# Patient Record
Sex: FEMALE | Race: BLACK OR AFRICAN AMERICAN | Employment: FULL TIME | URBAN - METROPOLITAN AREA
[De-identification: names, ages, dates, MRNs, and addresses within clinical notes are randomized per-mention and may not be internally consistent; named-entity substitution may affect disease eponyms.]

---

## 2017-01-16 ENCOUNTER — OFFICE VISIT (OUTPATIENT)
Dept: FAMILY MEDICINE CLINIC | Age: 57
End: 2017-01-16

## 2017-01-16 VITALS — WEIGHT: 182 LBS | BODY MASS INDEX: 33.29 KG/M2

## 2017-01-16 DIAGNOSIS — Z12.39 SCREENING FOR BREAST CANCER: ICD-10-CM

## 2017-01-16 DIAGNOSIS — L03.119 CELLULITIS AND ABSCESS OF LOWER EXTREMITY: Primary | ICD-10-CM

## 2017-01-16 DIAGNOSIS — E11.42 DIABETIC PERIPHERAL NEUROPATHY ASSOCIATED WITH TYPE 2 DIABETES MELLITUS (HCC): ICD-10-CM

## 2017-01-16 DIAGNOSIS — L02.419 CELLULITIS AND ABSCESS OF LOWER EXTREMITY: Primary | ICD-10-CM

## 2017-01-16 RX ORDER — SULFAMETHOXAZOLE AND TRIMETHOPRIM 800; 160 MG/1; MG/1
1 TABLET ORAL 2 TIMES DAILY
Qty: 20 TAB | Refills: 0 | Status: SHIPPED | OUTPATIENT
Start: 2017-01-16 | End: 2017-01-26

## 2017-01-16 RX ORDER — CEPHALEXIN 500 MG/1
500 CAPSULE ORAL 3 TIMES DAILY
Qty: 30 CAP | Refills: 0 | Status: SHIPPED | OUTPATIENT
Start: 2017-01-16 | End: 2017-01-26

## 2017-01-16 NOTE — MR AVS SNAPSHOT
Visit Information Date & Time Provider Department Dept. Phone Encounter #  
 1/16/2017  3:15 PM Tim Freeman MD [address] [facility] OFFICE-ANNEX [phone] [id number] Follow-up Instructions Return if symptoms worsen or fail to improve. Upcoming Health Maintenance Date Due  
 EYE EXAM RETINAL OR DILATED Q1 4/8/1969 PAP AKA CERVICAL CYTOLOGY 3/13/2016 BREAST CANCER SCRN MAMMOGRAM 1/14/2017 HEMOGLOBIN A1C Q6M 6/29/2017 FOOT EXAM Q1 12/29/2017 MICROALBUMIN Q1 12/29/2017 LIPID PANEL Q1 12/29/2017 DTaP/Tdap/Td series (2 - Td) 2/20/2022 COLONOSCOPY 11/25/2025 Allergies as of 1/16/2017  Review Complete On: 12/29/2016 By: Kary Hodges LPN Severity Noted Reaction Type Reactions Nabumetone High 06/01/2011    Shortness of Breath Codeine  08/03/2010    Nausea Only  
 Gabapentin (Bulk)  10/24/2011    Shortness of Breath Lyrica [Pregabalin]  10/24/2011    Swelling Ankle swelling, foot pain , insomina Percocet [Oxycodone-acetaminophen]  08/03/2010    Other (comments)  
 loopy Current Immunizations  Reviewed on 12/29/2016 Name Date Influenza Vaccine 10/15/2016, 9/30/2016, 10/16/2015, 10/9/2014 Influenza Vaccine PF 10/23/2013 Influenza Vaccine Split 10/24/2011 TDAP Vaccine 2/20/2012 Not reviewed this visit You Were Diagnosed With   
  
 Codes Comments Cellulitis and abscess of lower extremity    -  Primary ICD-10-CM: L02.419, L03.119 ICD-9-CM: 682.6 Screening for breast cancer     ICD-10-CM: Z12.39 
ICD-9-CM: V76.10 Diabetic peripheral neuropathy associated with type 2 diabetes mellitus (HCC)     ICD-10-CM: E11.42 
ICD-9-CM: 250.60, 357.2 Vitals Weight(growth percentile) BMI OB Status Smoking Status 182 lb (82.6 kg) 33.29 kg/m2 Postmenopausal Never Smoker BMI and BSA Data Body Mass Index Body Surface Area  
 33.29 kg/m 2 1.9 m 2 Preferred Pharmacy Pharmacy Name Phone [facility] [address], [address] [place] [address] Avenue [phone] Your Updated Medication List  
  
   
This list is accurate as of: 1/16/17  3:36 PM.  Always use your most recent med list.  
  
  
  
  
 albuterol 90 mcg/actuation inhaler Commonly known as:  PROVENTIL HFA, VENTOLIN HFA, PROAIR HFA Take 1 Puff by inhalation every four (4) hours as needed for Wheezing. aspirin delayed-release 81 mg tablet Take  by mouth daily. Bacillus coagulans 10 billion cell Cpdr  
Commonly known as:  PROBIOTIC (B. COAGULANS) Take 1 Caplet by mouth daily. budesonide-formoterol 160-4.5 mcg/actuation HFA inhaler Commonly known as:  SYMBICORT Take 2 Puffs by inhalation two (2) times a day. cephALEXin 500 mg capsule Commonly known as:  Amedeo Ronde Take 1 Cap by mouth three (3) times daily for 10 days. dexamethasone 1.5 mg (35 tabs) Dspk Commonly known as:  [address] As directed for 10 days  
  
 ergocalciferol 50,000 unit capsule Commonly known as:  ERGOCALCIFEROL Take 1 Cap by mouth every seven (7) days. Indications: VITAMIN D DEFICIENCY  
  
 famotidine 40 mg tablet Commonly known as:  PEPCID  
TAKE ONE TABLET BY MOUTH BEFORE BREAKFAST AND BEFORE SUPPER **MAY  TAKE  1  EXTRA  DOSE  FOR  SEVERE  HEARTBURN  IF  NEEDED** FLUZONE QUAD [id number] (PF) Syrg injection Generic drug:  influenza vaccine 2016-17 (36mos+)(PF)  
  
 ibuprofen 100 mg/5 mL suspension Commonly known as:  ADVIL;MOTRIN Take 30 mL every 6 hours as needed for pain/fever x 5 days  
  
 lamoTRIgine 25 mg tablet Commonly known as: LaMICtal  
Take 1 Tab by mouth two (2) times daily as needed. lansoprazole 30 mg disintegrating tablet Commonly known as:  Kali Gonzalezdon Take 1 Tab by mouth Daily (before breakfast). MULTI FOR HER PO Take 1 Tab by mouth daily. trimethoprim-sulfamethoxazole 160-800 mg per tablet Commonly known as:  BACTRIM DS, SEPTRA DS Take 1 Tab by mouth two (2) times a day for 10 days. VITAMIN C 500 mg tablet Generic drug:  ascorbic acid (vitamin C) Take 500 mg by mouth daily. Prescriptions Printed Refills  
 trimethoprim-sulfamethoxazole (BACTRIM DS, SEPTRA DS) 160-800 mg per tablet 0 Sig: Take 1 Tab by mouth two (2) times a day for 10 days. Class: Print Route: Oral  
 cephALEXin (KEFLEX) 500 mg capsule 0 Sig: Take 1 Cap by mouth three (3) times daily for 10 days. Class: Print Route: Oral  
  
We Performed the Following AEROBIC BACTERIAL CULTURE [id number] CPT(R)] REFERRAL TO OPHTHALMOLOGY [REF57 Custom] Comments:  
 Please evaluate patient for DM, r/o retinopathy Follow-up Instructions Return if symptoms worsen or fail to improve. To-Do List   
 01/16/2017 Imaging:  [facility] BI SCREENING INCL CAD Referral Information Referral ID Referred By Referred To  
  
 [id number] Lexi Salguero Not Available Visits Status Start Date End Date 1 New Request 1/16/17 1/16/18 If your referral has a status of pending review or denied, additional information will be sent to support the outcome of this decision. Patient Instructions Breast Cancer Screening: Care Instructions Your Care Instructions A breast X-ray (mammogram) and an exam by your doctor can help find breast cancer early. Cancer is easier to treat when it's found early. If you are age 36 or older, ask your doctor when to start and how often to have a mammogram. The X-ray can spot tumors that are too small to be felt by hand. (It also can show harmless lumps, such as fluid-filled cysts). During a breast exam, your doctor will feel your breasts for lumps or any other possible signs of cancer. During a mammogram, a machine squeezes your breasts to make them flatter and easier to X-ray.  Your breasts may feel a bit sore as the machine squeezes. After the test, a doctor will study your mammogram. Your doctor will tell you the results. You will also be told if you need any follow-up tests. Follow-up care is a key part of your treatment and safety. Be sure to make and go to all appointments, and call your doctor if you are having problems. It's also a good idea to know your test results and keep a list of the medicines you take. What should you do to get ready for a mammogram? 
· On the day of the mammogram, do not use any deodorant, perfume, powders, or lotions on your breasts or armpits. They may affect the X-rays. · Remove any jewelry. You will need to take off your clothes above the waist. You will put on a cloth or paper top. If you are concerned about an area of your breast, show the technologist so that the area can be noted. How can you care for yourself at home? · If you have breast pain after the mammogram, take an over-the-counter pain medicine, such as acetaminophen (Tylenol), ibuprofen (Advil, Motrin), or naproxen (Aleve). Read and follow all instructions on the label. · Do not take two or more pain medicines at the same time unless the doctor told you to. Many pain medicines have acetaminophen, which is Tylenol. Too much acetaminophen (Tylenol) can be harmful. When should you call for help? Watch closely for changes in your health, and be sure to contact your doctor if: 
· You notice any changes in your breasts or the skin on your breasts. These may include lumps, fluid leaking suddenly from your nipples, or changes to the skin on your breast or nipple. Where can you learn more? Go to http://esteban-tiffani.info/. Enter A321 in the search box to learn more about \"Breast Cancer Screening: Care Instructions. \" Current as of: July 26, 2016 Content Version: 11.1 © 1563-7807 [employer].  Care instructions adapted under license by [address] (which disclaims liability or warranty for this information). If you have questions about a medical condition or this instruction, always ask your healthcare professional. [address] any warranty or liability for your use of this information. Breast Self-Exam: Care Instructions Your Care Instructions A breast self-exam is when you check your breasts for lumps or changes. This regular exam helps you learn how your breasts normally look and feel. Most breast problems or changes are not because of cancer. Breast self-exam is not a substitute for a mammogram. Having regular breast exams by your doctor and regular mammograms improve your chances of finding any problems with your breasts. Some women set a time each month to do a step-by-step breast self-exam. Other women like a less formal system. They might look at their breasts as they brush their teeth, or feel their breasts once in a while in the shower. If you notice a change in your breast, tell your doctor. Follow-up care is a key part of your treatment and safety. Be sure to make and go to all appointments, and call your doctor if you are having problems. Its also a good idea to know your test results and keep a list of the medicines you take. How do you do a breast self-exam? 
· The best time to examine your breasts is usually one week after your menstrual period begins. Your breasts should not be tender then. If you do not have periods, you might do your exam on a day of the month that is easy to remember. · To examine your breasts: ¨ Remove all your clothes above the waist and lie down. When you are lying down, your breast tissue spreads evenly over your chest wall, which makes it easier to feel all your breast tissue. ¨ Use the padsnot the fingertipsof the 3 middle fingers of your left hand to check your right breast. Move your fingers slowly in small coin-sized circles that overlap. ¨ Use three levels of pressure to feel of all your breast tissue. Use light pressure to feel the tissue close to the skin surface. Use medium pressure to feel a little deeper. Use firm pressure to feel your tissue close to your breastbone and ribs. Use each pressure level to feel your breast tissue before moving on to the next spot. ¨ Check your entire breast, moving up and down as if following a strip from the collarbone to the bra line, and from the armpit to the ribs. Repeat until you have covered the entire breast. 
¨ Repeat this procedure for your left breast, using the pads of the 3 middle fingers of your right hand. · To examine your breasts while in the shower: 
¨ Place one arm over your head and lightly soap your breast on that side. ¨ Using the pads of your fingers, gently move your hand over your breast (in the strip pattern described above), feeling carefully for any lumps or changes. ¨ Repeat for the other breast. 
· Have your doctor inspect anything you notice to see if you need further testing. Where can you learn more? Go to http://esteban-tiffani.info/. Enter P148 in the search box to learn more about \"Breast Self-Exam: Care Instructions. \" Current as of: July 26, 2016 Content Version: 11.1 © 4520-5762 [employer]orporated. Care instructions adapted under license by [place]  (which disclaims liability or warranty for this information). If you have questions about a medical condition or this instruction, always ask your healthcare professional. [place] [address] any warranty or liability for your use of this information. Cellulitis: Care Instructions Your Care Instructions Cellulitis is a skin infection. It often occurs after a break in the skin from a scrape, cut, bite, or puncture, or after a rash. The doctor has checked you carefully, but problems can develop later.  If you notice any problems or new symptoms, get medical treatment right away. Follow-up care is a key part of your treatment and safety. Be sure to make and go to all appointments, and call your doctor if you are having problems. It's also a good idea to know your test results and keep a list of the medicines you take. How can you care for yourself at home? · Take your antibiotics as directed. Do not stop taking them just because you feel better. You need to take the full course of antibiotics. · Prop up the infected area on pillows to reduce pain and swelling. Try to keep the area above the level of your heart as often as you can. · If your doctor told you how to care for your wound, follow your doctor's instructions. If you did not get instructions, follow this general advice: ¨ Wash the wound with clean water 2 times a day. Don't use hydrogen peroxide or alcohol, which can slow healing. ¨ You may cover the wound with a thin layer of petroleum jelly, such as Vaseline, and a nonstick bandage. ¨ Apply more petroleum jelly and replace the bandage as needed. · Be safe with medicines. Take pain medicines exactly as directed. ¨ If the doctor gave you a prescription medicine for pain, take it as prescribed. ¨ If you are not taking a prescription pain medicine, ask your doctor if you can take an over-the-counter medicine. To prevent cellulitis in the future · Try to prevent cuts, scrapes, or other injuries to your skin. Cellulitis most often occurs where there is a break in the skin. · If you get a scrape, cut, mild burn, or bite, wash the wound with clean water as soon as you can to help avoid infection. Don't use hydrogen peroxide or alcohol, which can slow healing. · If you have swelling in your legs (edema), support stockings and good skin care may help prevent leg sores and cellulitis.  
· Take care of your feet, especially if you have diabetes or other conditions that increase the risk of infection. Wear shoes and socks. Do not go barefoot. If you have athlete's foot or other skin problems on your feet, talk to your doctor about how to treat them. When should you call for help? Call your doctor now or seek immediate medical care if: 
· You have signs that your infection is getting worse, such as: 
¨ Increased pain, swelling, warmth, or redness. ¨ Red streaks leading from the area. ¨ Pus draining from the area. ¨ A fever. · You get a rash. Watch closely for changes in your health, and be sure to contact your doctor if: 
· You are not getting better after 1 day (24 hours). · You do not get better as expected. Where can you learn more? Go to http://esteban-tiffani.info/. [place] in the search box to learn more about \"Cellulitis: Care Instructions. \" Current as of: February 5, 2016 Content Version: 11.1 © 1466-7014 [employer]. Care instructions adapted under license by [employer] (which disclaims liability or warranty for this information). If you have questions about a medical condition or this instruction, always ask your healthcare professional. [address] any warranty or liability for your use of this information. Learning About Diabetes Food Guidelines Your Care Instructions Meal planning is important to manage diabetes. It helps keep your blood sugar at a target level (which you set with your doctor). You don't have to eat special foods. You can eat what your family eats, including sweets once in a while. But you do have to pay attention to how often you eat and how much you eat of certain foods. You may want to work with a dietitian or a certified diabetes educator (CDE) to help you plan meals and snacks. A dietitian or CDE can also help you lose weight if that is one of your goals. What should you know about eating carbs? Managing the amount of carbohydrate (carbs) you eat is an important part of healthy meals when you have diabetes. Carbohydrate is found in many foods. · Learn which foods have carbs. And learn the amounts of carbs in different foods. ¨ Bread, cereal, pasta, and rice have about 15 grams of carbs in a serving. A serving is 1 slice of bread (1 ounce), ½ cup of cooked cereal, or 1/3 cup of cooked pasta or rice. ¨ Fruits have 15 grams of carbs in a serving. A serving is 1 small fresh fruit, such as an apple or orange; ½ of a banana; ½ cup of cooked or canned fruit; ½ cup of fruit juice; 1 cup of melon or raspberries; or 2 tablespoons of dried fruit. ¨ Milk and no-sugar-added yogurt have 15 grams of carbs in a serving. A serving is 1 cup of milk or 2/3 cup of no-sugar-added yogurt. ¨ Starchy vegetables have 15 grams of carbs in a serving. A serving is ½ cup of mashed potatoes or sweet potato; 1 cup winter squash; ½ of a small baked potato; ½ cup of cooked beans; or ½ cup cooked corn or green peas. · Learn how much carbs to eat each day and at each meal. A dietitian or CDE can teach you how to keep track of the amount of carbs you eat. This is called carbohydrate counting. · If you are not sure how to count carbohydrate grams, use the Plate Method to plan meals. It is a good, quick way to make sure that you have a balanced meal. It also helps you spread carbs throughout the day. ¨ Divide your plate by types of foods. Put non-starchy vegetables on half the plate, meat or other protein food on one-quarter of the plate, and a grain or starchy vegetable in the final quarter of the plate. To this you can add a small piece of fruit and 1 cup of milk or yogurt, depending on how many carbs you are supposed to eat at a meal. 
· Try to eat about the same amount of carbs at each meal. Do not \"save up\" your daily allowance of carbs to eat at one meal. 
· Proteins have very little or no carbs per serving.  Examples of proteins are beef, chicken, turkey, fish, eggs, tofu, cheese, cottage cheese, and peanut butter. A serving size of meat is 3 ounces, which is about the size of a deck of cards. Examples of meat substitute serving sizes (equal to 1 ounce of meat) are 1/4 cup of cottage cheese, 1 egg, 1 tablespoon of peanut butter, and ½ cup of tofu. How can you eat out and still eat healthy? · Learn to estimate the serving sizes of foods that have carbohydrate. If you measure food at home, it will be easier to estimate the amount in a serving of restaurant food. · If the meal you order has too much carbohydrate (such as potatoes, corn, or baked beans), ask to have a low-carbohydrate food instead. Ask for a salad or green vegetables. · If you use insulin, check your blood sugar before and after eating out to help you plan how much to eat in the future. · If you eat more carbohydrate at a meal than you had planned, take a walk or do other exercise. This will help lower your blood sugar. What else should you know? · Limit saturated fat, such as the fat from meat and dairy products. This is a healthy choice because people who have diabetes are at higher risk of heart disease. So choose lean cuts of meat and nonfat or low-fat dairy products. Use olive or canola oil instead of butter or shortening when cooking. · Don't skip meals. Your blood sugar may drop too low if you skip meals and take insulin or certain medicines for diabetes. · Check with your doctor before you drink alcohol. Alcohol can cause your blood sugar to drop too low. Alcohol can also cause a bad reaction if you take certain diabetes medicines. Follow-up care is a key part of your treatment and safety. Be sure to make and go to all appointments, and call your doctor if you are having problems. It's also a good idea to know your test results and keep a list of the medicines you take. Where can you learn more? Go to http://esteban-tiffani.info/. Enter V541 in the search box to learn more about \"Learning About Diabetes Food Guidelines. \" Current as of: May 23, 2016 Content Version: 11.1 © 2628-7575 [employer]. Care instructions adapted under license by [employer] (which disclaims liability or warranty for this information). If you have questions about a medical condition or this instruction, always ask your healthcare professional. [address] any warranty or liability for your use of this information. Introducing [facility]! [address]: Thank you for requesting a [employer] account. Our records indicate that you have previously registered for a [employer] account but its currently inactive. Please call our [employer] support line at [phone]. Additional Information If you have questions, please visit the Frequently Asked Questions section of the [employer] website at [email or web address] Remember, [employer] is NOT to be used for urgent needs. For medical emergencies, dial 911. Now available from your iPhone and Android! Please provide this summary of care documentation to your next provider. Your primary care clinician is listed as Rangel Camarena. If you have any questions after today's visit, please call [phone].

## 2017-01-16 NOTE — PROGRESS NOTES
HISTORY OF PRESENT ILLNESS  Mehran Conner is a 61 y.o. female. HPI   Rash on the rt,left abuttock   Started few days ago not better, the patient has tried alcohol washing and OTC antibiotic ointments, Vaseline application 2-3 times a day currently has 2 lesion on the right buttock and presentation on the left buttock no family member have the same problem, the area of concern having tingling sensations and they are painful,  states that are expanding red, and are swelled up, there were like couple lumps, but opened up and now with discharge      Current Outpatient Prescriptions   Medication Sig Dispense Refill    FLUZONE QUAD [id number], PF, syrg injection       budesonide-formoterol (SYMBICORT) 160-4.5 mcg/actuation HFA inhaler Take 2 Puffs by inhalation two (2) times a day. 1 Inhaler 5    albuterol (PROVENTIL HFA, VENTOLIN HFA, PROAIR HFA) 90 mcg/actuation inhaler Take 1 Puff by inhalation every four (4) hours as needed for Wheezing. 1 Inhaler 6    dexamethasone (DEXPAK 10 DAY) 1.5 mg (35 tabs) DsPk As directed for 10 days 35 Tab 0    famotidine (PEPCID) 40 mg tablet TAKE ONE TABLET BY MOUTH BEFORE BREAKFAST AND BEFORE SUPPER **MAY  TAKE  1  EXTRA  DOSE  FOR  SEVERE  HEARTBURN  IF  NEEDED** 70 Tab 0    lamoTRIgine (LAMICTAL) 25 mg tablet Take 1 Tab by mouth two (2) times daily as needed. 100 Tab 3    lansoprazole (PREVACID SOLUTAB) 30 mg disintegrating tablet Take 1 Tab by mouth Daily (before breakfast). 30 Tab 2    ergocalciferol (ERGOCALCIFEROL) 50,000 unit capsule Take 1 Cap by mouth every seven (7) days. Indications: VITAMIN D DEFICIENCY 12 Cap 0    ibuprofen (ADVIL;MOTRIN) 100 mg/5 mL suspension Take 30 mL every 6 hours as needed for pain/fever x 5 days 3 Bottle 3    aspirin delayed-release 81 mg tablet Take  by mouth daily.  ascorbic acid (VITAMIN C) 500 mg tablet Take 500 mg by mouth daily.  MULTIVITS,CA,MINERALS/IRON/FA (MULTI FOR HER PO) Take 1 Tab by mouth daily.        Allergies Allergen Reactions    Nabumetone Shortness of Breath    Codeine Nausea Only    Gabapentin (Bulk) Shortness of Breath    Lyrica [Pregabalin] Swelling     Ankle swelling, foot pain , insomina    Percocet [Oxycodone-Acetaminophen] Other (comments)     loopy     Past Medical History   Diagnosis Date    Arthritis 8/3/2010    Asthma     Hypercholesterolemia 8/18/2010    Postmenopausal 8/3/2010    Prediabetes 9/12/2013    Sinusitis, acute 12/12/2011     Past Surgical History   Procedure Laterality Date    Hx gyn  1987     tubal pregancy; laparotomy    Hx pelvic laparoscopy  1985     endometriosis    Hx orthopaedic  2006     neck    Pr revise knee joint replace,all parts  02/29/2012     tkr left    Hx orthopaedic  2008     cervical fusion of discs x4     Family History   Problem Relation Age of Onset    Hypertension Father     Heart Disease Father      CAD    Diabetes Father     High Cholesterol Father     Other Mother      tumor in chest    Allergic Rhinitis Brother     Asthma Brother     Allergic Rhinitis Child     Sickle Cell Trait Child     GERD Child      Social History   Substance Use Topics    Smoking status: Never Smoker    Smokeless tobacco: Never Used    Alcohol use No      Lab Results  Component Value Date/Time   WBC 5.3 10/28/2015 03:36 PM   HGB 14.4 10/28/2015 03:36 PM   HCT 43.8 10/28/2015 03:36 PM   PLATELET 844 94/92/4093 03:36 PM   MCV 83 10/28/2015 03:36 PM       Lab Results  Component Value Date/Time   TSH 0.661 10/28/2015 03:36 PM   T4, Total 7.7 08/04/2014 12:18 PM         Review of Systems   Constitutional: Negative for chills and fever. HENT: Negative for ear pain and nosebleeds. Eyes: Negative for blurred vision, pain and discharge. Respiratory: Negative for shortness of breath. Cardiovascular: Negative for chest pain and leg swelling. Gastrointestinal: Negative for constipation, diarrhea, nausea and vomiting. Genitourinary: Negative for frequency. Musculoskeletal: Negative for joint pain. Skin: Positive for rash. Neurological: Negative for headaches. Psychiatric/Behavioral: Negative for depression. The patient is not nervous/anxious. Physical Exam   Constitutional: She is oriented to person, place, and time. She appears well-developed and well-nourished. HENT:   Head: Normocephalic and atraumatic. Eyes: Conjunctivae and EOM are normal.   Neck: Normal range of motion. Neck supple. Cardiovascular: Normal rate, regular rhythm and normal heart sounds. No murmur heard. Pulmonary/Chest: Effort normal and breath sounds normal.   Abdominal: Soft. Bowel sounds are normal. She exhibits no distension. Musculoskeletal: Normal range of motion. She exhibits no edema. Neurological: She is alert and oriented to person, place, and time. Skin: Skin is warm. Rash noted. There is erythema. Psychiatric: Her behavior is normal.   Nursing note and vitals reviewed. ASSESSMENT and PLAN  Diagnoses and all orders for this visit:    Cellulitis and abscess of lower extremity  -     AEROBIC BACTERIAL CULTURE  -     trimethoprim-sulfamethoxazole (BACTRIM DS, SEPTRA DS) 160-800 mg per tablet; Take 1 Tab by mouth two (2) times a day for 10 days. -     cephALEXin (KEFLEX) 500 mg capsule; Take 1 Cap by mouth three (3) times daily for 10 days.  -     Bacillus coagulans (PROBIOTIC, B. COAGULANS,) 10 billion cell cpDR; Take 1 Caplet by mouth daily. Screening for breast cancer  -     [facility] MAMMO BI SCREENING INCL CAD;  Future    Diabetic peripheral neuropathy associated with type 2 diabetes mellitus ([facility]  -     REFERRAL TO OPHTHALMOLOGY     patient was told to call 3-7 days for the progression, in addition was told to daily washing showering which had twice daily and stay compliant with the current Abx high dose for the next 10 days, await the culture for further management mostly emphasized to continue on 2 wash bid with soaps and water, multiple hand washing, medications side effects was addressed, was told to RTC or call if not better  Patient agreed with today's recommendation

## 2017-01-16 NOTE — PATIENT INSTRUCTIONS
Breast Cancer Screening: Care Instructions  Your Care Instructions  A breast X-ray (mammogram) and an exam by your doctor can help find breast cancer early. Cancer is easier to treat when it's found early. If you are age 36 or older, ask your doctor when to start and how often to have a mammogram. The X-ray can spot tumors that are too small to be felt by hand. (It also can show harmless lumps, such as fluid-filled cysts). During a breast exam, your doctor will feel your breasts for lumps or any other possible signs of cancer. During a mammogram, a machine squeezes your breasts to make them flatter and easier to X-ray. Your breasts may feel a bit sore as the machine squeezes. After the test, a doctor will study your mammogram. Your doctor will tell you the results. You will also be told if you need any follow-up tests. Follow-up care is a key part of your treatment and safety. Be sure to make and go to all appointments, and call your doctor if you are having problems. It's also a good idea to know your test results and keep a list of the medicines you take. What should you do to get ready for a mammogram?  · On the day of the mammogram, do not use any deodorant, perfume, powders, or lotions on your breasts or armpits. They may affect the X-rays. · Remove any jewelry. You will need to take off your clothes above the waist. You will put on a cloth or paper top. If you are concerned about an area of your breast, show the technologist so that the area can be noted. How can you care for yourself at home? · If you have breast pain after the mammogram, take an over-the-counter pain medicine, such as acetaminophen (Tylenol), ibuprofen (Advil, Motrin), or naproxen (Aleve). Read and follow all instructions on the label. · Do not take two or more pain medicines at the same time unless the doctor told you to. Many pain medicines have acetaminophen, which is Tylenol.  Too much acetaminophen (Tylenol) can be harmful. When should you call for help? Watch closely for changes in your health, and be sure to contact your doctor if:  · You notice any changes in your breasts or the skin on your breasts. These may include lumps, fluid leaking suddenly from your nipples, or changes to the skin on your breast or nipple. Where can you learn more? Go to http://esteban-tiffani.info/. Enter H868 in the search box to learn more about \"Breast Cancer Screening: Care Instructions. \"  Current as of: July 26, 2016  Content Version: 11.1  © 4465-9438 [employer]. Care instructions adapted under license by [employer] (which disclaims liability or warranty for this information). If you have questions about a medical condition or this instruction, always ask your healthcare professional. [address] any warranty or liability for your use of this information. Breast Self-Exam: Care Instructions  Your Care Instructions  A breast self-exam is when you check your breasts for lumps or changes. This regular exam helps you learn how your breasts normally look and feel. Most breast problems or changes are not because of cancer. Breast self-exam is not a substitute for a mammogram. Having regular breast exams by your doctor and regular mammograms improve your chances of finding any problems with your breasts. Some women set a time each month to do a step-by-step breast self-exam. Other women like a less formal system. They might look at their breasts as they brush their teeth, or feel their breasts once in a while in the shower. If you notice a change in your breast, tell your doctor. Follow-up care is a key part of your treatment and safety. Be sure to make and go to all appointments, and call your doctor if you are having problems. Its also a good idea to know your test results and keep a list of the medicines you take.   How do you do a breast self-exam?  · The best time to examine your breasts is usually one week after your menstrual period begins. Your breasts should not be tender then. If you do not have periods, you might do your exam on a day of the month that is easy to remember. · To examine your breasts:  ¨ Remove all your clothes above the waist and lie down. When you are lying down, your breast tissue spreads evenly over your chest wall, which makes it easier to feel all your breast tissue. ¨ Use the pads--not the fingertips--of the 3 middle fingers of your left hand to check your right breast. Move your fingers slowly in small coin-sized circles that overlap. ¨ Use three levels of pressure to feel of all your breast tissue. Use light pressure to feel the tissue close to the skin surface. Use medium pressure to feel a little deeper. Use firm pressure to feel your tissue close to your breastbone and ribs. Use each pressure level to feel your breast tissue before moving on to the next spot. ¨ Check your entire breast, moving up and down as if following a strip from the collarbone to the bra line, and from the armpit to the ribs. Repeat until you have covered the entire breast.  ¨ Repeat this procedure for your left breast, using the pads of the 3 middle fingers of your right hand. · To examine your breasts while in the shower:  ¨ Place one arm over your head and lightly soap your breast on that side. ¨ Using the pads of your fingers, gently move your hand over your breast (in the strip pattern described above), feeling carefully for any lumps or changes. ¨ Repeat for the other breast.  · Have your doctor inspect anything you notice to see if you need further testing. Where can you learn more? Go to http://esteban-tiffani.info/. Enter P148 in the search box to learn more about \"Breast Self-Exam: Care Instructions. \"  Current as of: July 26, 2016  Content Version: 11.1  © 0006-0071 [employer]  Care instructions adapted under license by [employer] (which disclaims liability or warranty for this information). If you have questions about a medical condition or this instruction, always ask your healthcare professional. [address] any warranty or liability for your use of this information. Cellulitis: Care Instructions  Your Care Instructions    Cellulitis is a skin infection. It often occurs after a break in the skin from a scrape, cut, bite, or puncture, or after a rash. The doctor has checked you carefully, but problems can develop later. If you notice any problems or new symptoms, get medical treatment right away. Follow-up care is a key part of your treatment and safety. Be sure to make and go to all appointments, and call your doctor if you are having problems. It's also a good idea to know your test results and keep a list of the medicines you take. How can you care for yourself at home? · Take your antibiotics as directed. Do not stop taking them just because you feel better. You need to take the full course of antibiotics. · Prop up the infected area on pillows to reduce pain and swelling. Try to keep the area above the level of your heart as often as you can. · If your doctor told you how to care for your wound, follow your doctor's instructions. If you did not get instructions, follow this general advice:  ¨ Wash the wound with clean water 2 times a day. Don't use hydrogen peroxide or alcohol, which can slow healing. ¨ You may cover the wound with a thin layer of petroleum jelly, such as Vaseline, and a nonstick bandage. ¨ Apply more petroleum jelly and replace the bandage as needed. · Be safe with medicines. Take pain medicines exactly as directed. ¨ If the doctor gave you a prescription medicine for pain, take it as prescribed. ¨ If you are not taking a prescription pain medicine, ask your doctor if you can take an over-the-counter medicine.   To prevent cellulitis in the future  · Try to prevent cuts, scrapes, or other injuries to your skin. Cellulitis most often occurs where there is a break in the skin. · If you get a scrape, cut, mild burn, or bite, wash the wound with clean water as soon as you can to help avoid infection. Don't use hydrogen peroxide or alcohol, which can slow healing. · If you have swelling in your legs (edema), support stockings and good skin care may help prevent leg sores and cellulitis. · Take care of your feet, especially if you have diabetes or other conditions that increase the risk of infection. Wear shoes and socks. Do not go barefoot. If you have athlete's foot or other skin problems on your feet, talk to your doctor about how to treat them. When should you call for help? Call your doctor now or seek immediate medical care if:  · You have signs that your infection is getting worse, such as:  ¨ Increased pain, swelling, warmth, or redness. ¨ Red streaks leading from the area. ¨ Pus draining from the area. ¨ A fever. · You get a rash. Watch closely for changes in your health, and be sure to contact your doctor if:  · You are not getting better after 1 day (24 hours). · You do not get better as expected. Where can you learn more? Go to http://esteban-tiffani.info/. Perla Gonzalez in the search box to learn more about \"Cellulitis: Care Instructions. \"  Current as of: February 5, 2016  Content Version: 11.1  © 3113-5558 [employer]. Care instructions adapted under license by [employer] (which disclaims liability or warranty for this information). If you have questions about a medical condition or this instruction, always ask your healthcare professional. [place] [address] any warranty or liability for your use of this information. Learning About Diabetes Food Guidelines  Your Care Instructions  Meal planning is important to manage diabetes.  It helps keep your blood sugar at a target level (which you set with your doctor). You don't have to eat special foods. You can eat what your family eats, including sweets once in a while. But you do have to pay attention to how often you eat and how much you eat of certain foods. You may want to work with a dietitian or a certified diabetes educator (CDE) to help you plan meals and snacks. A dietitian or CDE can also help you lose weight if that is one of your goals. What should you know about eating carbs? Managing the amount of carbohydrate (carbs) you eat is an important part of healthy meals when you have diabetes. Carbohydrate is found in many foods. · Learn which foods have carbs. And learn the amounts of carbs in different foods. ¨ Bread, cereal, pasta, and rice have about 15 grams of carbs in a serving. A serving is 1 slice of bread (1 ounce), ½ cup of cooked cereal, or 1/3 cup of cooked pasta or rice. ¨ Fruits have 15 grams of carbs in a serving. A serving is 1 small fresh fruit, such as an apple or orange; ½ of a banana; ½ cup of cooked or canned fruit; ½ cup of fruit juice; 1 cup of melon or raspberries; or 2 tablespoons of dried fruit. ¨ Milk and no-sugar-added yogurt have 15 grams of carbs in a serving. A serving is 1 cup of milk or 2/3 cup of no-sugar-added yogurt. ¨ Starchy vegetables have 15 grams of carbs in a serving. A serving is ½ cup of mashed potatoes or sweet potato; 1 cup winter squash; ½ of a small baked potato; ½ cup of cooked beans; or ½ cup cooked corn or green peas. · Learn how much carbs to eat each day and at each meal. A dietitian or CDE can teach you how to keep track of the amount of carbs you eat. This is called carbohydrate counting. · If you are not sure how to count carbohydrate grams, use the Plate Method to plan meals. It is a good, quick way to make sure that you have a balanced meal. It also helps you spread carbs throughout the day. ¨ Divide your plate by types of foods.  Put non-starchy vegetables on half the plate, meat or other protein food on one-quarter of the plate, and a grain or starchy vegetable in the final quarter of the plate. To this you can add a small piece of fruit and 1 cup of milk or yogurt, depending on how many carbs you are supposed to eat at a meal.  · Try to eat about the same amount of carbs at each meal. Do not \"save up\" your daily allowance of carbs to eat at one meal.  · Proteins have very little or no carbs per serving. Examples of proteins are beef, chicken, turkey, fish, eggs, tofu, cheese, cottage cheese, and peanut butter. A serving size of meat is 3 ounces, which is about the size of a deck of cards. Examples of meat substitute serving sizes (equal to 1 ounce of meat) are 1/4 cup of cottage cheese, 1 egg, 1 tablespoon of peanut butter, and ½ cup of tofu. How can you eat out and still eat healthy? · Learn to estimate the serving sizes of foods that have carbohydrate. If you measure food at home, it will be easier to estimate the amount in a serving of restaurant food. · If the meal you order has too much carbohydrate (such as potatoes, corn, or baked beans), ask to have a low-carbohydrate food instead. Ask for a salad or green vegetables. · If you use insulin, check your blood sugar before and after eating out to help you plan how much to eat in the future. · If you eat more carbohydrate at a meal than you had planned, take a walk or do other exercise. This will help lower your blood sugar. What else should you know? · Limit saturated fat, such as the fat from meat and dairy products. This is a healthy choice because people who have diabetes are at higher risk of heart disease. So choose lean cuts of meat and nonfat or low-fat dairy products. Use olive or canola oil instead of butter or shortening when cooking. · Don't skip meals. Your blood sugar may drop too low if you skip meals and take insulin or certain medicines for diabetes. · Check with your doctor before you drink alcohol.  Alcohol can cause your blood sugar to drop too low. Alcohol can also cause a bad reaction if you take certain diabetes medicines. Follow-up care is a key part of your treatment and safety. Be sure to make and go to all appointments, and call your doctor if you are having problems. It's also a good idea to know your test results and keep a list of the medicines you take. Where can you learn more? Go to http://esteban-tiffani.info/. Enter W345 in the search box to learn more about \"Learning About Diabetes Food Guidelines. \"  Current as of: May 23, 2016  Content Version: 11.1  © 9443-1246 [employer]. Care instructions adapted under license by [employer] (which disclaims liability or warranty for this information). If you have questions about a medical condition or this instruction, always ask your healthcare professional. [address] any warranty or liability for your use of this information.

## 2017-01-18 ENCOUNTER — TELEPHONE (OUTPATIENT)
Dept: FAMILY MEDICINE CLINIC | Age: 57
End: 2017-01-18

## 2017-01-18 LAB — BACTERIA SPEC AEROBE CULT: NORMAL

## 2017-01-18 NOTE — TELEPHONE ENCOUNTER
----- Message from Melody Lovell sent at 1/18/2017  7:53 AM EST -----  Regarding: Izabela Plummer MD/Telephone  Contact: [phone]  Patient needs callback about medication Rx \"Smzitmp -160 tablets AMN and Ciphalexin 500 mg cap LUP\". Patient stated that medication is causing her to have headaches and setting off her asthma. Please call patience. Best contact number is [phone].

## 2017-01-18 NOTE — TELEPHONE ENCOUNTER
Returned call to pt. C/o headaches and having to use her inhaler more since starting bactrim and cephalexin for cellulitis. Requesting a change in medication if possible.   Message sent to Dr Gordon Luciano

## 2017-01-18 NOTE — TELEPHONE ENCOUNTER
adives pt that she can stop the bactrim but cont on with keflex if trouble still persist then she can stop both meds and call us back for other advise

## 2017-03-09 ENCOUNTER — NURSE NAVIGATOR (OUTPATIENT)
Dept: FAMILY MEDICINE CLINIC | Age: 57
End: 2017-03-09

## 2017-03-16 ENCOUNTER — TELEPHONE (OUTPATIENT)
Dept: FAMILY MEDICINE CLINIC | Age: 57
End: 2017-03-16

## 2017-03-16 ENCOUNTER — OFFICE VISIT (OUTPATIENT)
Dept: FAMILY MEDICINE CLINIC | Age: 57
End: 2017-03-16

## 2017-03-16 VITALS
WEIGHT: 186 LBS | RESPIRATION RATE: 18 BRPM | DIASTOLIC BLOOD PRESSURE: 90 MMHG | BODY MASS INDEX: 34.23 KG/M2 | TEMPERATURE: 96.4 F | HEART RATE: 66 BPM | SYSTOLIC BLOOD PRESSURE: 143 MMHG | HEIGHT: 62 IN | OXYGEN SATURATION: 94 %

## 2017-03-16 DIAGNOSIS — J45.901 ASTHMA EXACERBATION: Primary | ICD-10-CM

## 2017-03-16 RX ORDER — AMOXICILLIN 500 MG/1
500 CAPSULE ORAL 2 TIMES DAILY
Qty: 20 CAP | Refills: 0 | Status: SHIPPED | OUTPATIENT
Start: 2017-03-16 | End: 2017-03-26

## 2017-03-16 RX ORDER — METHYLPREDNISOLONE 4 MG/1
TABLET ORAL
Qty: 1 DOSE PACK | Refills: 0 | Status: SHIPPED | OUTPATIENT
Start: 2017-03-17 | End: 2017-05-10 | Stop reason: ALTCHOICE

## 2017-03-16 RX ORDER — METHYLPREDNISOLONE ACETATE 40 MG/ML
40 INJECTION, SUSPENSION INTRA-ARTICULAR; INTRALESIONAL; INTRAMUSCULAR; SOFT TISSUE ONCE
Qty: 1 VIAL | Refills: 0
Start: 2017-03-16 | End: 2017-03-16

## 2017-03-16 RX ORDER — ALBUTEROL SULFATE 0.83 MG/ML
2.5 SOLUTION RESPIRATORY (INHALATION) ONCE
Qty: 1 EACH | Refills: 0
Start: 2017-03-16 | End: 2021-06-07 | Stop reason: SDUPTHER

## 2017-03-16 RX ORDER — HYDROCODONE POLISTIREX AND CHLORPHENIRAMINE POLISTIREX 10; 8 MG/5ML; MG/5ML
5 SUSPENSION, EXTENDED RELEASE ORAL
Qty: 115 ML | Refills: 0 | Status: SHIPPED | OUTPATIENT
Start: 2017-03-16 | End: 2017-05-10

## 2017-03-16 NOTE — PROGRESS NOTES
Chief Complaint   Patient presents with    Cough    Nasal Congestion       SUBJECTIVE:   Tom Quezada is a 61 y.o. female who complains of congestion and cough described as productive of green sputum for 2 weeks. She denies a  fevers, shortness of breath, positive wheezing at night. Patient does not smoke cigarettes. OBJECTIVE:  Blood pressure 143/90, pulse 66, temperature 96.4 °F (35.8 °C), temperature source Oral, resp. rate 18, height 5' 2\" (1.575 m), weight 186 lb (84.4 kg), SpO2 94 %. Appears stable, vital signs are as noted. Ears normal.  pharynx erythema w/o lesion. Neck supple. Palpable right anterior adenopathy. Nose not congested. Sinuses non tender. Lungs: decreased air entry with scattered wheezes, no rales. ASSESSMENT/PLAN:    ICD-10-CM ICD-9-CM    1. Asthma exacerbation J45.901 493.92 albuterol (PROVENTIL VENTOLIN) 2.5 mg /3 mL (0.083 %) nebulizer solution      ALBUTEROL, INHAL. SOL., FDA-APPROVED FINAL, NON-COMPOUND UNIT DOSE, 1 MG      INHAL RX, AIRWAY OBST/DX SPUTUM INDUCT      METHYLPREDNISOLONE ACETATE INJECTION 40 MG      SC THER/PROPH/DIAG INJECTION, SUBCUT/IM      methylPREDNISolone acetate (DEPO-MEDROL) 40 mg/mL injection      amoxicillin (AMOXIL) 500 mg capsule      methylPREDNISolone (MEDROL, KEITH,) 4 mg tablet      chlorpheniramine-HYDROcodone (TUSSIONEX) 10-8 mg/5 mL suspension     Patient Instructions        Asthma in Adults: Care Instructions  Your Care Instructions    During an asthma attack, your airways swell and narrow as a reaction to certain things (triggers). This makes it hard to breathe. You may be able to prevent asthma attacks if you avoid the things that set off your asthma symptoms. Keeping your asthma under control and treating symptoms before they get bad can help you avoid severe attacks. If you can control your asthma, you may be able to do all of your normal daily activities.  You may also avoid asthma attacks and trips to the hospital.  Follow-up care is a key part of your treatment and safety. Be sure to make and go to all appointments, and call your doctor if you are having problems. It's also a good idea to know your test results and keep a list of the medicines you take. How can you care for yourself at home? · Follow your asthma action plan so you can manage your symptoms at home. An asthma action plan will help you prevent and control airway reactions and will tell you what to do during an asthma attack. If you do not have an asthma action plan, work with your doctor to build one. · Take your asthma medicine exactly as prescribed. Medicine plays an important role in controlling asthma. Talk to your doctor right away if you have any questions about what to take and how to take it. ¨ Use your quick-relief medicine when you have symptoms of an attack. Quick-relief medicine often is an albuterol inhaler. Some people need to use quick-relief medicine before they exercise. ¨ Take your controller medicine every day, not just when you have symptoms. Controller medicine is usually an inhaled corticosteroid. The goal is to prevent problems before they occur. Do not use your controller medicine to try to treat an attack that has already started. It does not work fast enough to help. ¨ If your doctor prescribed corticosteroid pills to use during an attack, take them as directed. They may take hours to work, but they may shorten the attack and help you breathe better. ¨ Keep your quick-relief medicine with you at all times. · Talk to your doctor before using other medicines. Some medicines, such as aspirin, can cause asthma attacks in some people. · Check yourself for asthma symptoms to know which step to follow in your action plan. Watch for things like being short of breath, having chest tightness, coughing, and wheezing. Also notice if symptoms wake you up at night or if you get tired quickly when you exercise.   · If you have a peak flow meter, use it to check how well you are breathing. This can help you predict when an asthma attack is going to occur. Then you can take medicine to prevent the asthma attack or make it less severe. · See your doctor regularly. These visits will help you learn more about asthma and what you can do to control it. Your doctor will monitor your treatment to make sure the medicine is helping you. · Keep track of your asthma attacks and your treatment. After you have had an attack, write down what triggered it, what helped end it, and any concerns you have about your asthma action plan. Take your diary when you see your doctor. You can then review your asthma action plan and decide if it is working. · Do not smoke or allow others to smoke around you. Avoid smoky places. Smoking makes asthma worse. If you need help quitting, talk to your doctor about stop-smoking programs and medicines. These can increase your chances of quitting for good. · Learn what triggers an asthma attack for you, and avoid the triggers when you can. Common triggers include colds, smoke, air pollution, dust, pollen, mold, pets, cockroaches, stress, and cold air. · Avoid colds and the flu. Get a pneumococcal vaccine shot. If you have had one before, ask your doctor whether you need a second dose. Get a flu vaccine every fall. If you must be around people with colds or the flu, wash your hands often. When should you call for help? Call 911 anytime you think you may need emergency care. For example, call if:  · You have severe trouble breathing. Call your doctor now or seek immediate medical care if:  · Your symptoms do not get better after you have followed your asthma action plan. · You cough up yellow, dark brown, or bloody mucus (sputum). Watch closely for changes in your health, and be sure to contact your doctor if:  · Your coughing and wheezing get worse.   · You need to use quick-relief medicine on more than 2 days a week (unless it is just for exercise). · You need help figuring out what is triggering your asthma attacks. Where can you learn more? Go to http://esteban-tiffani.info/. Enter P597 in the search box to learn more about \"Asthma in Adults: Care Instructions. \"  Current as of: May 23, 2016  Content Version: 11.1  © 8279-8245 [employer]. Care instructions adapted under license by [employer] (which disclaims liability or warranty for this information). If you have questions about a medical condition or this instruction, always ask your healthcare professional. [address] any warranty or liability for your use of this information. Follow-up Disposition:  Return if symptoms worsen or fail to improve, for with pcp.

## 2017-03-16 NOTE — PROGRESS NOTES
1. Have you been to the ER, urgent care clinic since your last visit? Hospitalized since your last visit? No    2. Have you seen or consulted any other health care providers outside of the [address] [place] [address] since your last visit? Include any pap smears or colon screening. No     Patient is a patient of dr Dipika Avalos, states she has a cough with nasal congestion.  States her cough is non productive and nasal drainage is yellowish

## 2017-03-16 NOTE — MR AVS SNAPSHOT
Visit Information Date & Time Provider Department Dept. Phone Encounter #  
 3/16/2017 11:15 AM Letty Foss MD [facility] at [address] [id number] Follow-up Instructions Return if symptoms worsen or fail to improve, for with pcp. Upcoming Health Maintenance Date Due  
 EYE EXAM RETINAL OR DILATED Q1 4/8/1969 PAP AKA CERVICAL CYTOLOGY 3/13/2016 BREAST CANCER SCRN MAMMOGRAM 1/14/2017 HEMOGLOBIN A1C Q6M 6/29/2017 FOOT EXAM Q1 12/29/2017 MICROALBUMIN Q1 12/29/2017 LIPID PANEL Q1 12/29/2017 DTaP/Tdap/Td series (2 - Td) 2/20/2022 COLONOSCOPY 11/25/2025 Allergies as of 3/16/2017  Review Complete On: 3/16/2017 By: Jacqueline Potts LPN Severity Noted Reaction Type Reactions Nabumetone High 06/01/2011    Shortness of Breath Codeine  08/03/2010    Nausea Only  
 Gabapentin (Bulk)  10/24/2011    Shortness of Breath Lyrica [Pregabalin]  10/24/2011    Swelling Ankle swelling, foot pain , insomina Percocet [Oxycodone-acetaminophen]  08/03/2010    Other (comments)  
 loopy Current Immunizations  Reviewed on 1/16/2017 Name Date Influenza Vaccine 10/15/2016, 9/30/2016, 10/16/2015, 10/9/2014 Influenza Vaccine PF 10/23/2013 Influenza Vaccine Split 10/24/2011 TDAP Vaccine 2/20/2012 Not reviewed this visit You Were Diagnosed With   
  
 Codes Comments Asthma exacerbation    -  Primary ICD-10-CM: I89.379 ICD-9-CM: 370.84 Vitals BP Pulse Temp Resp Height(growth percentile) Weight(growth percentile) 143/90 (BP 1 Location: Right arm, BP Patient Position: Sitting) 66 96.4 °F (35.8 °C) (Oral) 18 5' 2\" (1.575 m) 186 lb (84.4 kg) SpO2 BMI OB Status Smoking Status 94% 34.02 kg/m2 Postmenopausal Never Smoker Vitals History BMI and BSA Data Body Mass Index Body Surface Area 34.02 kg/m 2 1.92 m 2 Preferred Pharmacy Pharmacy Name Phone [facility] [address], [address] [place] [address] [phone] Your Updated Medication List  
  
   
This list is accurate as of: 3/16/17  1:00 PM.  Always use your most recent med list.  
  
  
  
  
 * albuterol 90 mcg/actuation inhaler Commonly known as:  PROVENTIL HFA, VENTOLIN HFA, PROAIR HFA Take 1 Puff by inhalation every four (4) hours as needed for Wheezing. * albuterol 2.5 mg /3 mL (0.083 %) nebulizer solution Commonly known as:  PROVENTIL VENTOLIN  
3 mL by Nebulization route once for 1 dose. amoxicillin 500 mg capsule Commonly known as:  AMOXIL Take 1 Cap by mouth two (2) times a day for 10 days. aspirin delayed-release 81 mg tablet Take  by mouth daily. Bacillus coagulans 10 billion cell Cpdr  
Commonly known as:  PROBIOTIC (B. COAGULANS) Take 1 Caplet by mouth daily. budesonide-formoterol 160-4.5 mcg/actuation HFA inhaler Commonly known as:  SYMBICORT Take 2 Puffs by inhalation two (2) times a day. chlorpheniramine-HYDROcodone 10-8 mg/5 mL suspension Commonly known as:  Wynema Childs Take 5 mL by mouth every twelve (12) hours as needed for Cough. Max Daily Amount: 10 mL. Indications: COUGH  
  
 ergocalciferol 50,000 unit capsule Commonly known as:  ERGOCALCIFEROL Take 1 Cap by mouth every seven (7) days. Indications: VITAMIN D DEFICIENCY  
  
 famotidine 40 mg tablet Commonly known as:  PEPCID  
TAKE ONE TABLET BY MOUTH BEFORE BREAKFAST AND BEFORE SUPPER **MAY  TAKE  1  EXTRA  DOSE  FOR  SEVERE  HEARTBURN  IF  NEEDED** FLUZONE QUAD [id number] (PF) Syrg injection Generic drug:  influenza vaccine 2016-17 (36mos+)(PF)  
  
 ibuprofen 100 mg/5 mL suspension Commonly known as:  ADVIL;MOTRIN Take 30 mL every 6 hours as needed for pain/fever x 5 days  
  
 lamoTRIgine 25 mg tablet Commonly known as: LaMICtal  
Take 1 Tab by mouth two (2) times daily as needed. lansoprazole 30 mg disintegrating tablet Commonly known as:  [place] Gift Take 1 Tab by mouth Daily (before breakfast). methylPREDNISolone 4 mg tablet Commonly known as:  MEDROL (KEITH) Take as directed Start taking on:  3/17/2017  
  
 methylPREDNISolone acetate 40 mg/mL injection Commonly known as:  DEPO-MEDROL 1 mL by IntraMUSCular route once for 1 dose. MULTI FOR HER PO Take 1 Tab by mouth daily. VITAMIN C 500 mg tablet Generic drug:  ascorbic acid (vitamin C) Take 500 mg by mouth daily. * Notice: This list has 2 medication(s) that are the same as other medications prescribed for you. Read the directions carefully, and ask your doctor or other care provider to review them with you. Prescriptions Printed Refills  
 chlorpheniramine-HYDROcodone (TUSSIONEX) 10-8 mg/5 mL suspension 0 Sig: Take 5 mL by mouth every twelve (12) hours as needed for Cough. Max Daily Amount: 10 mL. Indications: COUGH Class: Print Route: Oral  
  
Prescriptions Sent to Pharmacy Refills  
 amoxicillin (AMOXIL) 500 mg capsule 0 Sig: Take 1 Cap by mouth two (2) times a day for 10 days. Class: Normal  
 Pharmacy: [address] Ph #: [phone] Route: Oral  
 methylPREDNISolone (MEDROL, KEITH,) 4 mg tablet 0 Starting on: 3/17/2017 Sig: Take as directed Class: Normal  
 Pharmacy: [address] Ph #: [phone] We Performed the Following ALBUTEROL, INHAL. SOL., FDA-APPROVED FINAL, NON-COMPOUND UNIT DOSE, 1 MG [ HCPCS] INHAL RX, AIRWAY OBST/DX SPUTUM INDUCT [id number] CPT(R)] METHYLPREDNISOLONE ACETATE INJECTION 40 MG [ HCP] TN THER/PROPH/DIAG INJECTION, SUBCUT/IM [id number] CPT(R)] Follow-up Instructions Return if symptoms worsen or fail to improve, for with pcp. Patient Instructions Asthma in Adults: Care Instructions Your Care Instructions During an asthma attack, your airways swell and narrow as a reaction to certain things (triggers). This makes it hard to breathe. You may be able to prevent asthma attacks if you avoid the things that set off your asthma symptoms. Keeping your asthma under control and treating symptoms before they get bad can help you avoid severe attacks. If you can control your asthma, you may be able to do all of your normal daily activities. You may also avoid asthma attacks and trips to the hospital. 
Follow-up care is a key part of your treatment and safety. Be sure to make and go to all appointments, and call your doctor if you are having problems. It's also a good idea to know your test results and keep a list of the medicines you take. How can you care for yourself at home? · Follow your asthma action plan so you can manage your symptoms at home. An asthma action plan will help you prevent and control airway reactions and will tell you what to do during an asthma attack. If you do not have an asthma action plan, work with your doctor to build one. · Take your asthma medicine exactly as prescribed. Medicine plays an important role in controlling asthma. Talk to your doctor right away if you have any questions about what to take and how to take it. ¨ Use your quick-relief medicine when you have symptoms of an attack. Quick-relief medicine often is an albuterol inhaler. Some people need to use quick-relief medicine before they exercise. ¨ Take your controller medicine every day, not just when you have symptoms. Controller medicine is usually an inhaled corticosteroid. The goal is to prevent problems before they occur. Do not use your controller medicine to try to treat an attack that has already started. It does not work fast enough to help. ¨ If your doctor prescribed corticosteroid pills to use during an attack, take them as directed.  They may take hours to work, but they may shorten the attack and help you breathe better. ¨ Keep your quick-relief medicine with you at all times. · Talk to your doctor before using other medicines. Some medicines, such as aspirin, can cause asthma attacks in some people. · Check yourself for asthma symptoms to know which step to follow in your action plan. Watch for things like being short of breath, having chest tightness, coughing, and wheezing. Also notice if symptoms wake you up at night or if you get tired quickly when you exercise. · If you have a peak flow meter, use it to check how well you are breathing. This can help you predict when an asthma attack is going to occur. Then you can take medicine to prevent the asthma attack or make it less severe. · See your doctor regularly. These visits will help you learn more about asthma and what you can do to control it. Your doctor will monitor your treatment to make sure the medicine is helping you. · Keep track of your asthma attacks and your treatment. After you have had an attack, write down what triggered it, what helped end it, and any concerns you have about your asthma action plan. Take your diary when you see your doctor. You can then review your asthma action plan and decide if it is working. · Do not smoke or allow others to smoke around you. Avoid smoky places. Smoking makes asthma worse. If you need help quitting, talk to your doctor about stop-smoking programs and medicines. These can increase your chances of quitting for good. · Learn what triggers an asthma attack for you, and avoid the triggers when you can. Common triggers include colds, smoke, air pollution, dust, pollen, mold, pets, cockroaches, stress, and cold air. · Avoid colds and the flu. Get a pneumococcal vaccine shot. If you have had one before, ask your doctor whether you need a second dose. Get a flu vaccine every fall. If you must be around people with colds or the flu, wash your hands often. When should you call for help? Call 911 anytime you think you may need emergency care. For example, call if: 
· You have severe trouble breathing. Call your doctor now or seek immediate medical care if: 
· Your symptoms do not get better after you have followed your asthma action plan. · You cough up yellow, dark brown, or bloody mucus (sputum). Watch closely for changes in your health, and be sure to contact your doctor if: 
· Your coughing and wheezing get worse. · You need to use quick-relief medicine on more than 2 days a week (unless it is just for exercise). · You need help figuring out what is triggering your asthma attacks. Where can you learn more? Go to http://esteban-tiffani.info/. Enter P597 in the search box to learn more about \"Asthma in Adults: Care Instructions. \" Current as of: May 23, 2016 Content Version: 11.1 © 9742-4906 [employer]. Care instructions adapted under license by [employer] (which disclaims liability or warranty for this information). If you have questions about a medical condition or this instruction, always ask your healthcare professional. [address] any warranty or liability for your use of this information. Introducing [facility]! Dear Janneth Estrada: Thank you for requesting a [employer] account. Our records indicate that you have previously registered for a [employer] account but its currently inactive. Please call our [employer] support line at [phone]. Additional Information If you have questions, please visit the Frequently Asked Questions section of the [employer] website at [email or web address] Remember, [employer] is NOT to be used for urgent needs. For medical emergencies, dial 911. Now available from your iPhone and Android! Please provide this summary of care documentation to your next provider. Your primary care clinician is listed as Rhiannon Barr.  If you have any questions after today's visit, please call [phone].

## 2017-03-16 NOTE — PATIENT INSTRUCTIONS

## 2017-05-10 ENCOUNTER — OFFICE VISIT (OUTPATIENT)
Dept: FAMILY MEDICINE CLINIC | Age: 57
End: 2017-05-10

## 2017-05-10 ENCOUNTER — HOSPITAL ENCOUNTER (OUTPATIENT)
Dept: LAB | Age: 57
Discharge: HOME | End: 2017-05-10
Payer: COMMERCIAL

## 2017-05-10 VITALS
SYSTOLIC BLOOD PRESSURE: 144 MMHG | WEIGHT: 184 LBS | DIASTOLIC BLOOD PRESSURE: 92 MMHG | TEMPERATURE: 96.7 F | OXYGEN SATURATION: 98 % | HEART RATE: 69 BPM | RESPIRATION RATE: 20 BRPM | BODY MASS INDEX: 33.86 KG/M2 | HEIGHT: 62 IN

## 2017-05-10 DIAGNOSIS — Z01.419 NORMAL GYNECOLOGIC EXAMINATION: Primary | ICD-10-CM

## 2017-05-10 DIAGNOSIS — L29.9 PRURITIC CONDITION: ICD-10-CM

## 2017-05-10 DIAGNOSIS — G47.00 INSOMNIA, UNSPECIFIED TYPE: ICD-10-CM

## 2017-05-10 DIAGNOSIS — Z12.4 PAP SMEAR FOR CERVICAL CANCER SCREENING: ICD-10-CM

## 2017-05-10 DIAGNOSIS — Z12.39 SCREENING FOR BREAST CANCER: ICD-10-CM

## 2017-05-10 LAB — WET MOUNT POCT, WMPOCT: NORMAL

## 2017-05-10 PROCEDURE — 88175 CYTOPATH C/V AUTO FLUID REDO: CPT | Performed by: NURSE PRACTITIONER

## 2017-05-10 RX ORDER — CYCLOBENZAPRINE HCL 10 MG
TABLET ORAL
COMMUNITY
Start: 2017-03-09 | End: 2017-11-09 | Stop reason: SDUPTHER

## 2017-05-10 RX ORDER — AMOXICILLIN 500 MG/1
TABLET, FILM COATED ORAL
COMMUNITY
Start: 2017-03-09 | End: 2017-11-29 | Stop reason: ALTCHOICE

## 2017-05-10 RX ORDER — HYDROXYZINE 25 MG/1
25 TABLET, FILM COATED ORAL
Qty: 30 TAB | Refills: 1 | Status: SHIPPED | OUTPATIENT
Start: 2017-05-10 | End: 2017-05-20

## 2017-05-10 NOTE — PATIENT INSTRUCTIONS
Allergies: Care Instructions  Your Care Instructions  Allergies occur when your body's defense system (immune system) overreacts to certain substances. The immune system treats a harmless substance as if it were a harmful germ or virus. Many things can cause this overreaction, including pollens, medicine, food, dust, animal dander, and mold. Allergies can be mild or severe. Mild allergies can be managed with home treatment. But medicine may be needed to prevent problems. Managing your allergies is an important part of staying healthy. Your doctor may suggest that you have allergy testing to help find out what is causing your allergies. When you know what things trigger your symptoms, you can avoid them. This can prevent allergy symptoms and other health problems. For severe allergies that cause reactions that affect your whole body (anaphylactic reactions), your doctor may prescribe a shot of epinephrine to carry with you in case you have a severe reaction. Learn how to give yourself the shot and keep it with you at all times. Make sure it is not . Follow-up care is a key part of your treatment and safety. Be sure to make and go to all appointments, and call your doctor if you are having problems. It's also a good idea to know your test results and keep a list of the medicines you take. How can you care for yourself at home? · If you have been told by your doctor that dust or dust mites are causing your allergy, decrease the dust around your bed:  [facility] sheets, pillowcases, and other bedding in hot water every week. ¨ Use dust-proof covers for pillows, duvets, and mattresses. Avoid plastic covers because they tear easily and do not \"breathe. \" Wash as instructed on the label. ¨ Do not use any blankets and pillows that you do not need. ¨ Use blankets that you can wash in your washing machine. ¨ Consider removing drapes and carpets, which attract and hold dust, from your bedroom.   · If you are allergic to house dust and mites, do not use home humidifiers. Your doctor can suggest ways you can control dust and mites. · Look for signs of cockroaches. Cockroaches cause allergic reactions. Use cockroach baits to get rid of them. Then, clean your home well. Cockroaches like areas where grocery bags, newspapers, empty bottles, or cardboard boxes are stored. Do not keep these inside your home, and keep trash and food containers sealed. Seal off any spots where cockroaches might enter your home. · If you are allergic to mold, get rid of furniture, rugs, and drapes that smell musty. Check for mold in the bathroom. · If you are allergic to outdoor pollen or mold spores, use air-conditioning. Change or clean all filters every month. Keep windows closed. · If you are allergic to pollen, stay inside when pollen counts are high. Use a vacuum  with a HEPA filter or a double-thickness filter at least two times each week. · Stay inside when air pollution is bad. Avoid paint fumes, perfumes, and other strong odors. · Avoid conditions that make your allergies worse. Stay away from smoke. Do not smoke or let anyone else smoke in your house. Do not use fireplaces or wood-burning stoves. · If you are allergic to your pets, change the air filter in your furnace every month. Use high-efficiency filters. · If you are allergic to pet dander, keep pets outside or out of your bedroom. Old carpet and cloth furniture can hold a lot of animal dander. You may need to replace them. When should you call for help? Give an epinephrine shot if:  · You think you are having a severe allergic reaction. · You have symptoms in more than one body area, such as mild nausea and an itchy mouth. After giving an epinephrine shot call 911, even if you feel better. Call 911 if:  · You have symptoms of a severe allergic reaction. These may include:  ¨ Sudden raised, red areas (hives) all over your body.   ¨ Swelling of the throat, mouth, lips, or tongue. ¨ Trouble breathing. ¨ Passing out (losing consciousness). Or you may feel very lightheaded or suddenly feel weak, confused, or restless. · You have been given an epinephrine shot, even if you feel better. Call your doctor now or seek immediate medical care if:  · You have symptoms of an allergic reaction, such as:  ¨ A rash or hives (raised, red areas on the skin). ¨ Itching. ¨ Swelling. ¨ Belly pain, nausea, or vomiting. Watch closely for changes in your health, and be sure to contact your doctor if:  · You do not get better as expected. Where can you learn more? Go to http://esteban-tiffani.info/. Enter P934 in the search box to learn more about \"Allergies: Care Instructions. \"  Current as of: February 12, 2016  Content Version: 11.2  © 0794-3597 [employer]. Care instructions adapted under license by [employer] (which disclaims liability or warranty for this information). If you have questions about a medical condition or this instruction, always ask your healthcare professional. [address] any warranty or liability for your use of this information. Managing Your Allergies: Care Instructions  Your Care Instructions  Managing your allergies is an important part of staying healthy. Your doctor will help you find out what may be causing the allergies. Common causes of allergy symptoms are house dust and dust mites, animal dander, mold, and pollen. As soon as you know what triggers your symptoms, try to reduce your exposure to your triggers. This can help prevent allergy symptoms, asthma, and other health problems. Ask your doctor about allergy medicine or immunotherapy. These treatments may help reduce or prevent allergy symptoms. Follow-up care is a key part of your treatment and safety. Be sure to make and go to all appointments, and call your doctor if you are having problems.  It's also a good idea to know your test results and keep a list of the medicines you take. How can you care for yourself at home? · If you think that dust or dust mites are causing your allergies:  ¨ Wash sheets, pillowcases, and other bedding every week in hot water. ¨ Use airtight, dust-proof covers for pillows, duvets, and mattresses. Avoid plastic covers, because they tend to tear quickly and do not \"breathe. \" Wash according to the instructions. ¨ Remove extra blankets and pillows that you don't need. ¨ Use blankets that are machine-washable. ¨ Don't use home humidifiers. They can help mites live longer. · Use air-conditioning. Change or clean all filters every month. Keep windows closed. Use high-efficiency air filters. Don't use window or attic fans, which draw dust into the air. · If you're allergic to pet dander, keep pets outside or, at the very least, out of your bedroom. Old carpet and cloth-covered furniture can hold a lot of animal dander. You may need to replace them. · Look for signs of cockroaches. Use cockroach baits to get rid of them. Then clean your home well. · If you're allergic to mold, don't keep indoor plants, because molds can grow in soil. Get rid of furniture, rugs, and drapes that smell musty. Check for mold in the bathroom. · If you're allergic to pollen, stay inside when pollen counts are high. · Don't smoke or let anyone else smoke in your house. Don't use fireplaces or wood-burning stoves. Avoid paint fumes, perfumes, and other strong odors. When should you call for help? Give an epinephrine shot if:  · You think you are having a severe allergic reaction. · You have symptoms in more than one body area, such as mild nausea and an itchy mouth. After giving an epinephrine shot call 911, even if you feel better. Call 911 if:  · You have symptoms of a severe allergic reaction. These may include:  ¨ Sudden raised, red areas (hives) all over your body.   ¨ Swelling of the throat, mouth, lips, or tongue. ¨ Trouble breathing. ¨ Passing out (losing consciousness). Or you may feel very lightheaded or suddenly feel weak, confused, or restless. · You have been given an epinephrine shot, even if you feel better. Call your doctor now or seek immediate medical care if:  · You have symptoms of an allergic reaction, such as:  ¨ A rash or hives (raised, red areas on the skin). ¨ Itching. ¨ Swelling. ¨ Belly pain, nausea, or vomiting. Watch closely for changes in your health, and be sure to contact your doctor if:  · Your allergies get worse. · You need help controlling your allergies. · You have questions about allergy testing. · You do not get better as expected. Where can you learn more? Go to http://esteban-tiffani.info/. Enter L249 in the search box to learn more about \"Managing Your Allergies: Care Instructions. \"  Current as of: February 12, 2016  Content Version: 11.2  © 9888-4780 [employer]. Care instructions adapted under license by [employer] (which disclaims liability or warranty for this information). If you have questions about a medical condition or this instruction, always ask your healthcare professional. [place] [address] any warranty or liability for your use of this information. Insomnia: Care Instructions  Your Care Instructions  Insomnia is the inability to sleep well. It is a common problem for most people at some time. Insomnia may make it hard for you to get to sleep, stay asleep, or sleep as long as you need to. This can make you tired and grouchy during the day. It can also make you forgetful, less effective at work, and unhappy. Insomnia can be caused by conditions such as depression or anxiety. Pain can also affect your ability to sleep. When these problems are solved, the insomnia usually clears up. But sometimes bad sleep habits can cause insomnia.   If insomnia is affecting your work or your enjoyment of life, you can take steps to improve your sleep. Follow-up care is a key part of your treatment and safety. Be sure to make and go to all appointments, and call your doctor if you are having problems. It's also a good idea to know your test results and keep a list of the medicines you take. How can you care for yourself at home? What to avoid  · Do not have drinks with caffeine, such as coffee or black tea, for 8 hours before bed. · Do not smoke or use other types of tobacco near bedtime. Nicotine is a stimulant and can keep you awake. · Avoid drinking alcohol late in the evening, because it can cause you to wake in the middle of the night. · Do not eat a big meal close to bedtime. If you are hungry, eat a light snack. · Do not drink a lot of water close to bedtime, because the need to urinate may wake you up during the night. · Do not read or watch TV in bed. Use the bed only for sleeping and sexual activity. What to try  · Go to bed at the same time every night, and wake up at the same time every morning. Do not take naps during the day. · Keep your bedroom quiet, dark, and cool. · Sleep on a comfortable pillow and mattress. · If watching the clock makes you anxious, turn it facing away from you so you cannot see the time. · If you worry when you lie down, start a worry book. Well before bedtime, write down your worries, and then set the book and your concerns aside. · Try meditation or other relaxation techniques before you go to bed. · If you cannot fall asleep, get up and go to another room until you feel sleepy. Do something relaxing. Repeat your bedtime routine before you go to bed again. · Make your house quiet and calm about an hour before bedtime. Turn down the lights, turn off the TV, log off the computer, and turn down the volume on music. This can help you relax after a busy day. When should you call for help?   Watch closely for changes in your health, and be sure to contact your doctor if:  · Your efforts to improve your sleep do not work. · Your insomnia gets worse. · You have been feeling down, depressed, or hopeless or have lost interest in things that you usually enjoy. Where can you learn more? Go to http://esteban-tiffani.info/. Enter P513 in the search box to learn more about \"Insomnia: Care Instructions. \"  Current as of: July 26, 2016  Content Version: 11.2  © 4183-3469 [employer]. Care instructions adapted under license by [employer] (which disclaims liability or warranty for this information). If you have questions about a medical condition or this instruction, always ask your healthcare professional. [place] [address] any warranty or liability for your use of this information.

## 2017-05-10 NOTE — PROGRESS NOTES
HISTORY OF PRESENT ILLNESS  Tomasa Heard is a 61 y.o. female. HPI  Patient comes in today for pap. When body temperature rises, tends to have itching of skin. Taking OTC loratadine. Had allergy testing ~20 years ago, states she had multiple allergies, including pollen, mold, tress, pet dander  Denies vaginal complaints. Postmenopausal.  LMP 15+ years ago. Due for mammo  Patient states she does not rest well at night. Sleeps ~6h nightly, but will wake up multiple times during night. States she tends to be tired in evenings, will fall asleep after dinner while sitting on sofa. Denies stress, polyuria, depression. Patient to schedule an appointment with Dr. Tamia Payan for CPE in near future. Allergies   Allergen Reactions    Nabumetone Shortness of Breath    Codeine Nausea Only    Gabapentin (Bulk) Shortness of Breath    Lyrica [Pregabalin] Swelling     Ankle swelling, foot pain , insomina    Percocet [Oxycodone-Acetaminophen] Other (comments)     loopy       Past Medical History:   Diagnosis Date    Arthritis 8/3/2010    Asthma     Hypercholesterolemia 8/18/2010    Postmenopausal 8/3/2010    Prediabetes 9/12/2013    Sinusitis, acute 12/12/2011       Past Surgical History:   Procedure Laterality Date    HX GYN  1987    tubal pregancy; laparotomy    HX ORTHOPAEDIC  2006    neck    HX ORTHOPAEDIC  2008    cervical fusion of discs x4    HX PELVIC LAPAROSCOPY  1985    endometriosis    REVISE KNEE JOINT REPLACE,ALL PARTS  02/29/2012    tkr left       Social History     Social History    Marital status:      Spouse name: N/A    Number of children: N/A    Years of education: N/A     Occupational History    Not on file.      Social History Main Topics    Smoking status: Never Smoker    Smokeless tobacco: Never Used    Alcohol use No    Drug use: No    Sexual activity: Not Currently     Other Topics Concern    Not on file     Social History Narrative       Family History   Problem Relation Age of Onset    Hypertension Father     Heart Disease Father      CAD    Diabetes Father     High Cholesterol Father     Other Mother      tumor in chest    Allergic Rhinitis Brother     Asthma Brother     Allergic Rhinitis Child     Sickle Cell Trait Child     GERD Child        Current Outpatient Prescriptions   Medication Sig    amoxicillin 500 mg tab     cyclobenzaprine (FLEXERIL) 10 mg tablet     Bacillus coagulans (PROBIOTIC, B. COAGULANS,) 10 billion cell cpDR Take 1 Caplet by mouth daily.  FLUZONE QUAD [id number], PF, syrg injection     budesonide-formoterol (SYMBICORT) 160-4.5 mcg/actuation HFA inhaler Take 2 Puffs by inhalation two (2) times a day.  albuterol (PROVENTIL HFA, VENTOLIN HFA, PROAIR HFA) 90 mcg/actuation inhaler Take 1 Puff by inhalation every four (4) hours as needed for Wheezing.  famotidine (PEPCID) 40 mg tablet TAKE ONE TABLET BY MOUTH BEFORE BREAKFAST AND BEFORE SUPPER **MAY  TAKE  1  EXTRA  DOSE  FOR  SEVERE  HEARTBURN  IF  NEEDED**    ergocalciferol (ERGOCALCIFEROL) 50,000 unit capsule Take 1 Cap by mouth every seven (7) days. Indications: VITAMIN D DEFICIENCY    ibuprofen (ADVIL;MOTRIN) 100 mg/5 mL suspension Take 30 mL every 6 hours as needed for pain/fever x 5 days    aspirin delayed-release 81 mg tablet Take  by mouth daily.  ascorbic acid (VITAMIN C) 500 mg tablet Take 500 mg by mouth daily.  MULTIVITS,CA,MINERALS/IRON/FA (MULTI FOR HER PO) Take 1 Tab by mouth daily.  albuterol (PROVENTIL VENTOLIN) 2.5 mg /3 mL (0.083 %) nebulizer solution 3 mL by Nebulization route once for 1 dose.  lamoTRIgine (LAMICTAL) 25 mg tablet Take 1 Tab by mouth two (2) times daily as needed. No current facility-administered medications for this visit. Review of Systems   Constitutional: Negative for chills and fever. Respiratory: Negative for shortness of breath. Cardiovascular: Negative for chest pain and palpitations.    Gastrointestinal: Negative for abdominal pain, nausea and vomiting. Genitourinary: Negative for dysuria, flank pain, frequency, hematuria and urgency. Psychiatric/Behavioral: The patient has insomnia. Vitals:    05/10/17 1318   BP: (!) 144/92   Pulse: 69   Resp: 20   Temp: 96.7 °F (35.9 °C)   TempSrc: Oral   SpO2: 98%   Weight: 184 lb (83.5 kg)   Height: 5' 2\" (1.575 m)     Physical Exam   Constitutional: She is oriented to person, place, and time. She appears well-developed and well-nourished. She is cooperative. Cardiovascular: Normal rate, regular rhythm and normal heart sounds. Pulmonary/Chest: Effort normal and breath sounds normal.   Genitourinary: Uterus normal. There is no rash, tenderness or lesion on the right labia. There is no rash, tenderness or lesion on the left labia. Uterus is not enlarged and not tender. Cervix exhibits no motion tenderness and no discharge. Right adnexum displays no mass, no tenderness and no fullness. Left adnexum displays no mass, no tenderness and no fullness. No erythema or tenderness in the vagina. No vaginal discharge found. Neurological: She is alert and oriented to person, place, and time. Skin: Skin is warm and dry. Psychiatric: She has a normal mood and affect. Her behavior is normal. Judgment and thought content normal.   Vitals reviewed. ASSESSMENT and PLAN    ICD-10-CM ICD-9-CM    1. Normal gynecologic examination Z01.419 V72.31    2. Pap smear for cervical cancer screening Z12.4 V76.2 AMB POC SMEAR, STAIN & INTERPRET, WET MOUNT      PAP (IMAGE GUIDED) W/REFL HPV ALL PATHOLOGY ([id number])      PAP (IMAGE GUIDED) W/REFL HPV ALL PATHOLOGY ([id number])   3. Insomnia, unspecified type G47.00 780.52    4. Pruritic condition L29.9 698.9 hydrOXYzine HCl (ATARAX) 25 mg tablet   5. Screening for breast cancer Z12.39 V76.10 [facility] MAMMO BI SCREENING INCL CAD     Encounter Diagnoses   Name Primary?     Normal gynecologic examination Yes    Pap smear for cervical cancer screening  Insomnia, unspecified type     Pruritic condition     Screening for breast cancer      Orders Placed This Encounter    AMB POC SMEAR, STAIN & INTERPRET, WET MOUNT    amoxicillin 500 mg tab    cyclobenzaprine (FLEXERIL) 10 mg tablet    hydrOXYzine HCl (ATARAX) 25 mg tablet    PAP (IMAGE GUIDED) W/REFL HPV ALL PATHOLOGY ([id number])     Kg Urrutia was seen today for complete physical and gyn exam.    Diagnoses and all orders for this visit:    Normal gynecologic examination  Pap smear for cervical cancer screening  -     AMB POC SMEAR, STAIN & INTERPRET, WET MOUNT  -     PAP (IMAGE GUIDED) W/REFL HPV ALL PATHOLOGY ([id number]); Future  -     PAP (IMAGE GUIDED) W/REFL HPV ALL PATHOLOGY ([id number])    Insomnia, unspecified type - encouraged patient to avoid naps in evening. May try OTC melatonin. Discussed healthy sleep habits    Pruritic condition - discussed with patient possible referral for allergy testing  -     hydrOXYzine HCl (ATARAX) 25 mg tablet; Take 1 Tab by mouth three (3) times daily as needed for Itching for up to 10 days. Screening for breast cancer  -     [facility] MAMMO BI SCREENING INCL CAD      Follow-up Disposition:  Return if symptoms worsen or fail to improve. I have reviewed the patient's allergies and made any necessary changes. Medical, procedural, social and family histories have been reviewed and updated as medically indicated. I have reconciled and/or revised patient medications in the EMR. I have discussed each diagnosis listed in this note with Miguelito Ortiz and/or their family. I have discussed treatment options and the risk/benefit analysis of those options, including safe use of medications and possible medication side effects. Through the use of shared decision making we have agreed to the above plan. The patient has received an after-visit summary and questions were answered concerning future plans. Nichole Umanzor, NICOLEP-C    This note will not be viewable in MyChart.

## 2017-05-10 NOTE — MR AVS SNAPSHOT
Visit Information Date & Time Provider Department Dept. Phone Encounter #  
 5/10/2017 12:30 PM Elizabeth Izaguirre NP [address] [phone] [id number] Follow-up Instructions Return if symptoms worsen or fail to improve. Upcoming Health Maintenance Date Due  
 PAP AKA CERVICAL CYTOLOGY 3/13/2016 BREAST CANCER SCRN MAMMOGRAM 1/14/2017 EYE EXAM RETINAL OR DILATED Q1 6/30/2017* HEMOGLOBIN A1C Q6M 6/29/2017 INFLUENZA AGE 9 TO ADULT 8/1/2017 FOOT EXAM Q1 12/29/2017 MICROALBUMIN Q1 12/29/2017 LIPID PANEL Q1 12/29/2017 DTaP/Tdap/Td series (2 - Td) 2/20/2022 COLONOSCOPY 11/25/2025 *Topic was postponed. The date shown is not the original due date. Allergies as of 5/10/2017  Review Complete On: 5/10/2017 By: Sharon Briscoe LPN Severity Noted Reaction Type Reactions Nabumetone High 06/01/2011    Shortness of Breath Codeine  08/03/2010    Nausea Only  
 Gabapentin (Bulk)  10/24/2011    Shortness of Breath Lyrica [Pregabalin]  10/24/2011    Swelling Ankle swelling, foot pain , insomina Percocet [Oxycodone-acetaminophen]  08/03/2010    Other (comments)  
 loopy Current Immunizations  Reviewed on 1/16/2017 Name Date Influenza Vaccine 10/15/2016, 9/30/2016, 10/16/2015, 10/9/2014 Influenza Vaccine PF 10/23/2013 Influenza Vaccine Split 10/24/2011 TDAP Vaccine 2/20/2012 Not reviewed this visit You Were Diagnosed With   
  
 Codes Comments Pruritic condition    -  Primary ICD-10-CM: L29.9 ICD-9-CM: 698.9 Pap smear for cervical cancer screening     ICD-10-CM: Z12.4 ICD-9-CM: V76.2 Normal gynecologic examination     ICD-10-CM: A30.789 ICD-9-CM: V72.31 Insomnia, unspecified type     ICD-10-CM: G47.00 ICD-9-CM: 780.52 Vitals Resp Height(growth percentile) Weight(growth percentile) BMI OB Status Smoking Status  20 5' 2\" (1.575 m) 184 lb (83.5 kg) 33.65 kg/m2 Postmenopausal Never Smoker BMI and BSA Data Body Mass Index Body Surface Area  
 33.65 kg/m 2 1.91 m 2 Preferred Pharmacy Pharmacy Name Phone [facility] [address], [address] [place] [address] Avenue [phone] Your Updated Medication List  
  
   
This list is accurate as of: 5/10/17  2:09 PM.  Always use your most recent med list.  
  
  
  
  
 * albuterol 90 mcg/actuation inhaler Commonly known as:  PROVENTIL HFA, VENTOLIN HFA, PROAIR HFA Take 1 Puff by inhalation every four (4) hours as needed for Wheezing. * albuterol 2.5 mg /3 mL (0.083 %) nebulizer solution Commonly known as:  PROVENTIL VENTOLIN  
3 mL by Nebulization route once for 1 dose. amoxicillin 500 mg Tab  
  
 aspirin delayed-release 81 mg tablet Take  by mouth daily. Bacillus coagulans 10 billion cell Cpdr  
Commonly known as:  PROBIOTIC (B. COAGULANS) Take 1 Caplet by mouth daily. budesonide-formoterol 160-4.5 mcg/actuation HFA inhaler Commonly known as:  SYMBICORT Take 2 Puffs by inhalation two (2) times a day. cyclobenzaprine 10 mg tablet Commonly known as:  FLEXERIL  
  
 ergocalciferol 50,000 unit capsule Commonly known as:  ERGOCALCIFEROL Take 1 Cap by mouth every seven (7) days. Indications: VITAMIN D DEFICIENCY  
  
 famotidine 40 mg tablet Commonly known as:  PEPCID  
TAKE ONE TABLET BY MOUTH BEFORE BREAKFAST AND BEFORE SUPPER **MAY  TAKE  1  EXTRA  DOSE  FOR  SEVERE  HEARTBURN  IF  NEEDED** FLUZONE QUAD [id number] (PF) Syrg injection Generic drug:  influenza vaccine 2016-17 (36mos+)(PF)  
  
 hydrOXYzine HCl 25 mg tablet Commonly known as:  ATARAX Take 1 Tab by mouth three (3) times daily as needed for Itching for up to 10 days. ibuprofen 100 mg/5 mL suspension Commonly known as:  ADVIL;MOTRIN Take 30 mL every 6 hours as needed for pain/fever x 5 days  
  
 lamoTRIgine 25 mg tablet Commonly known as:   LaMICtal  
 Take 1 Tab by mouth two (2) times daily as needed. MULTI FOR HER PO Take 1 Tab by mouth daily. VITAMIN C 500 mg tablet Generic drug:  ascorbic acid (vitamin C) Take 500 mg by mouth daily. * Notice: This list has 2 medication(s) that are the same as other medications prescribed for you. Read the directions carefully, and ask your doctor or other care provider to review them with you. Prescriptions Printed Refills  
 hydrOXYzine HCl (ATARAX) 25 mg tablet 1 Sig: Take 1 Tab by mouth three (3) times daily as needed for Itching for up to 10 days. Class: Print Route: Oral  
  
We Performed the Following AMB POC SMEAR, STAIN & Donato Shave MOUNT [id number] CPT(R)] Follow-up Instructions Return if symptoms worsen or fail to improve. To-Do List   
 05/10/2017 Pathology:  PAP (IMAGE GUIDED) W/REFL HPV ALL PATHOLOGY ([id number]) Patient Instructions Allergies: Care Instructions Your Care Instructions Allergies occur when your body's defense system (immune system) overreacts to certain substances. The immune system treats a harmless substance as if it were a harmful germ or virus. Many things can cause this overreaction, including pollens, medicine, food, dust, animal dander, and mold. Allergies can be mild or severe. Mild allergies can be managed with home treatment. But medicine may be needed to prevent problems. Managing your allergies is an important part of staying healthy. Your doctor may suggest that you have allergy testing to help find out what is causing your allergies. When you know what things trigger your symptoms, you can avoid them. This can prevent allergy symptoms and other health problems. For severe allergies that cause reactions that affect your whole body (anaphylactic reactions), your doctor may prescribe a shot of epinephrine to carry with you in case you have a severe reaction.  Learn how to give yourself the shot and keep it with you at all times. Make sure it is not . Follow-up care is a key part of your treatment and safety. Be sure to make and go to all appointments, and call your doctor if you are having problems. It's also a good idea to know your test results and keep a list of the medicines you take. How can you care for yourself at home? · If you have been told by your doctor that dust or dust mites are causing your allergy, decrease the dust around your bed: 
[facility] sheets, pillowcases, and other bedding in hot water every week. ¨ Use dust-proof covers for pillows, duvets, and mattresses. Avoid plastic covers because they tear easily and do not \"breathe. \" Wash as instructed on the label. ¨ Do not use any blankets and pillows that you do not need. ¨ Use blankets that you can wash in your washing machine. ¨ Consider removing drapes and carpets, which attract and hold dust, from your bedroom. · If you are allergic to house dust and mites, do not use home humidifiers. Your doctor can suggest ways you can control dust and mites. · Look for signs of cockroaches. Cockroaches cause allergic reactions. Use cockroach baits to get rid of them. Then, clean your home well. Cockroaches like areas where grocery bags, newspapers, empty bottles, or cardboard boxes are stored. Do not keep these inside your home, and keep trash and food containers sealed. Seal off any spots where cockroaches might enter your home. · If you are allergic to mold, get rid of furniture, rugs, and drapes that smell musty. Check for mold in the bathroom. · If you are allergic to outdoor pollen or mold spores, use air-conditioning. Change or clean all filters every month. Keep windows closed. · If you are allergic to pollen, stay inside when pollen counts are high. Use a vacuum  with a HEPA filter or a double-thickness filter at least two times each week. · Stay inside when air pollution is bad. Avoid paint fumes, perfumes, and other strong odors. · Avoid conditions that make your allergies worse. Stay away from smoke. Do not smoke or let anyone else smoke in your house. Do not use fireplaces or wood-burning stoves. · If you are allergic to your pets, change the air filter in your furnace every month. Use high-efficiency filters. · If you are allergic to pet dander, keep pets outside or out of your bedroom. Old carpet and cloth furniture can hold a lot of animal dander. You may need to replace them. When should you call for help? Give an epinephrine shot if: 
· You think you are having a severe allergic reaction. · You have symptoms in more than one body area, such as mild nausea and an itchy mouth. After giving an epinephrine shot call 911, even if you feel better. Call 911 if: 
· You have symptoms of a severe allergic reaction. These may include: 
¨ Sudden raised, red areas (hives) all over your body. ¨ Swelling of the throat, mouth, lips, or tongue. ¨ Trouble breathing. ¨ Passing out (losing consciousness). Or you may feel very lightheaded or suddenly feel weak, confused, or restless. · You have been given an epinephrine shot, even if you feel better. Call your doctor now or seek immediate medical care if: 
· You have symptoms of an allergic reaction, such as: ¨ A rash or hives (raised, red areas on the skin). ¨ Itching. ¨ Swelling. ¨ Belly pain, nausea, or vomiting. Watch closely for changes in your health, and be sure to contact your doctor if: 
· You do not get better as expected. Where can you learn more? Go to http://esteban-tiffani.info/. Enter M762 in the search box to learn more about \"Allergies: Care Instructions. \" Current as of: February 12, 2016 Content Version: 11.2 © 2729-4121 [employer].  Care instructions adapted under license by [employer] (which disclaims liability or warranty for this information). If you have questions about a medical condition or this instruction, always ask your healthcare professional. [address] any warranty or liability for your use of this information. Managing Your Allergies: Care Instructions Your Care Instructions Managing your allergies is an important part of staying healthy. Your doctor will help you find out what may be causing the allergies. Common causes of allergy symptoms are house dust and dust mites, animal dander, mold, and pollen. As soon as you know what triggers your symptoms, try to reduce your exposure to your triggers. This can help prevent allergy symptoms, asthma, and other health problems. Ask your doctor about allergy medicine or immunotherapy. These treatments may help reduce or prevent allergy symptoms. Follow-up care is a key part of your treatment and safety. Be sure to make and go to all appointments, and call your doctor if you are having problems. It's also a good idea to know your test results and keep a list of the medicines you take. How can you care for yourself at home? · If you think that dust or dust mites are causing your allergies: 
¨ Wash sheets, pillowcases, and other bedding every week in hot water. ¨ Use airtight, dust-proof covers for pillows, duvets, and mattresses. Avoid plastic covers, because they tend to tear quickly and do not \"breathe. \" Wash according to the instructions. ¨ Remove extra blankets and pillows that you don't need. ¨ Use blankets that are machine-washable. ¨ Don't use home humidifiers. They can help mites live longer. · Use air-conditioning. Change or clean all filters every month. Keep windows closed. Use high-efficiency air filters. Don't use window or attic fans, which draw dust into the air. · If you're allergic to pet dander, keep pets outside or, at the very least, out of your bedroom.  Old carpet and cloth-covered furniture can hold a lot of animal dander. You may need to replace them. · Look for signs of cockroaches. Use cockroach baits to get rid of them. Then clean your home well. · If you're allergic to mold, don't keep indoor plants, because molds can grow in soil. Get rid of furniture, rugs, and drapes that smell musty. Check for mold in the bathroom. · If you're allergic to pollen, stay inside when pollen counts are high. · Don't smoke or let anyone else smoke in your house. Don't use fireplaces or wood-burning stoves. Avoid paint fumes, perfumes, and other strong odors. When should you call for help? Give an epinephrine shot if: 
· You think you are having a severe allergic reaction. · You have symptoms in more than one body area, such as mild nausea and an itchy mouth. After giving an epinephrine shot call 911, even if you feel better. Call 911 if: 
· You have symptoms of a severe allergic reaction. These may include: 
¨ Sudden raised, red areas (hives) all over your body. ¨ Swelling of the throat, mouth, lips, or tongue. ¨ Trouble breathing. ¨ Passing out (losing consciousness). Or you may feel very lightheaded or suddenly feel weak, confused, or restless. · You have been given an epinephrine shot, even if you feel better. Call your doctor now or seek immediate medical care if: 
· You have symptoms of an allergic reaction, such as: ¨ A rash or hives (raised, red areas on the skin). ¨ Itching. ¨ Swelling. ¨ Belly pain, nausea, or vomiting. Watch closely for changes in your health, and be sure to contact your doctor if: 
· Your allergies get worse. · You need help controlling your allergies. · You have questions about allergy testing. · You do not get better as expected. Where can you learn more? Go to http://esteban-tiffani.info/. Enter L249 in the search box to learn more about \"Managing Your Allergies: Care Instructions. \" Current as of: February 12, 2016 Content Version: 11.2 © 6923-5127 [employer]. Care instructions adapted under license by [employer] (which disclaims liability or warranty for this information). If you have questions about a medical condition or this instruction, always ask your healthcare professional. [address] any warranty or liability for your use of this information. Insomnia: Care Instructions Your Care Instructions Insomnia is the inability to sleep well. It is a common problem for most people at some time. Insomnia may make it hard for you to get to sleep, stay asleep, or sleep as long as you need to. This can make you tired and grouchy during the day. It can also make you forgetful, less effective at work, and unhappy. Insomnia can be caused by conditions such as depression or anxiety. Pain can also affect your ability to sleep. When these problems are solved, the insomnia usually clears up. But sometimes bad sleep habits can cause insomnia. If insomnia is affecting your work or your enjoyment of life, you can take steps to improve your sleep. Follow-up care is a key part of your treatment and safety. Be sure to make and go to all appointments, and call your doctor if you are having problems. It's also a good idea to know your test results and keep a list of the medicines you take. How can you care for yourself at home? What to avoid · Do not have drinks with caffeine, such as coffee or black tea, for 8 hours before bed. · Do not smoke or use other types of tobacco near bedtime. Nicotine is a stimulant and can keep you awake. · Avoid drinking alcohol late in the evening, because it can cause you to wake in the middle of the night. · Do not eat a big meal close to bedtime. If you are hungry, eat a light snack. · Do not drink a lot of water close to bedtime, because the need to urinate may wake you up during the night. · Do not read or watch TV in bed.  Use the bed only for sleeping and sexual activity. What to try · Go to bed at the same time every night, and wake up at the same time every morning. Do not take naps during the day. · Keep your bedroom quiet, dark, and cool. · Sleep on a comfortable pillow and mattress. · If watching the clock makes you anxious, turn it facing away from you so you cannot see the time. · If you worry when you lie down, start a worry book. Well before bedtime, write down your worries, and then set the book and your concerns aside. · Try meditation or other relaxation techniques before you go to bed. · If you cannot fall asleep, get up and go to another room until you feel sleepy. Do something relaxing. Repeat your bedtime routine before you go to bed again. · Make your house quiet and calm about an hour before bedtime. Turn down the lights, turn off the TV, log off the computer, and turn down the volume on music. This can help you relax after a busy day. When should you call for help? Watch closely for changes in your health, and be sure to contact your doctor if: 
· Your efforts to improve your sleep do not work. · Your insomnia gets worse. · You have been feeling down, depressed, or hopeless or have lost interest in things that you usually enjoy. Where can you learn more? Go to http://esteban-tiffani.info/. Enter P513 in the search box to learn more about \"Insomnia: Care Instructions. \" Current as of: July 26, 2016 Content Version: 11.2 © 0446-8071 [employer]. Care instructions adapted under license by [email or web address] (which disclaims liability or warranty for this information). If you have questions about a medical condition or this instruction, always ask your healthcare professional. [place] [address] any warranty or liability for your use of this information. Introducing [address]! Dear Giselle Cates: Thank you for requesting a [employer] account.   Our records indicate that you have previously registered for a [employer] account but its currently inactive. Please call our [employer] support line at [phone]. Additional Information If you have questions, please visit the Frequently Asked Questions section of the [employer] website at [email or web address] Remember, [employer] is NOT to be used for urgent needs. For medical emergencies, dial 911. Now available from your iPhone and Android! Please provide this summary of care documentation to your next provider. Your primary care clinician is listed as Sarwat Herbert. If you have any questions after today's visit, please call [phone].

## 2017-07-27 ENCOUNTER — TELEPHONE (OUTPATIENT)
Dept: FAMILY MEDICINE CLINIC | Age: 57
End: 2017-07-27

## 2017-07-27 NOTE — TELEPHONE ENCOUNTER
Received call from pt with c/o nasal congestion x 2 days,  Drainage clear,  Hasn't taken anything otc. Advised per Dr Brennen Tena  Mucinex 1 tab twice daily x 7 days,  If not better or drainage changes to call back.   Pt stated understanding

## 2017-08-14 NOTE — TELEPHONE ENCOUNTER
----- Message from Alison Pratt sent at 8/14/2017  8:49 AM EDT -----  Regarding: Avis/telephone  Pt is requesting an appointment for this week. She stated after she eats when she wakes up in the morning she feels like she has indigestion. Pts number is [phone].

## 2017-08-16 RX ORDER — FAMOTIDINE 40 MG/1
TABLET, FILM COATED ORAL
Qty: 70 TAB | Refills: 0 | Status: SHIPPED | OUTPATIENT
Start: 2017-08-16 | End: 2018-07-23 | Stop reason: ALTCHOICE

## 2017-11-09 ENCOUNTER — OFFICE VISIT (OUTPATIENT)
Dept: FAMILY MEDICINE CLINIC | Age: 57
End: 2017-11-09

## 2017-11-09 VITALS
HEIGHT: 62 IN | RESPIRATION RATE: 20 BRPM | HEART RATE: 59 BPM | TEMPERATURE: 96.5 F | DIASTOLIC BLOOD PRESSURE: 82 MMHG | WEIGHT: 181 LBS | SYSTOLIC BLOOD PRESSURE: 126 MMHG | OXYGEN SATURATION: 99 % | BODY MASS INDEX: 33.31 KG/M2

## 2017-11-09 DIAGNOSIS — M25.512 ACUTE PAIN OF LEFT SHOULDER: Primary | ICD-10-CM

## 2017-11-09 DIAGNOSIS — E78.00 HYPERCHOLESTEROLEMIA: ICD-10-CM

## 2017-11-09 DIAGNOSIS — R73.03 PREDIABETES: Chronic | ICD-10-CM

## 2017-11-09 DIAGNOSIS — J45.20 MILD INTERMITTENT ASTHMA WITHOUT COMPLICATION: ICD-10-CM

## 2017-11-09 DIAGNOSIS — E55.9 VITAMIN D DEFICIENCY: Chronic | ICD-10-CM

## 2017-11-09 LAB
BILIRUB UR QL STRIP: NEGATIVE
GLUCOSE UR-MCNC: NEGATIVE MG/DL
HBA1C MFR BLD HPLC: 5.7 %
KETONES P FAST UR STRIP-MCNC: NEGATIVE MG/DL
PH UR STRIP: 6 [PH] (ref 4.6–8)
PROT UR QL STRIP: NEGATIVE
SP GR UR STRIP: 1.01 (ref 1–1.03)
UA UROBILINOGEN AMB POC: NORMAL (ref 0.2–1)
URINALYSIS CLARITY POC: CLEAR
URINALYSIS COLOR POC: YELLOW
URINE BLOOD POC: NORMAL
URINE LEUKOCYTES POC: NEGATIVE
URINE NITRITES POC: NEGATIVE

## 2017-11-09 RX ORDER — ALBUTEROL SULFATE 90 UG/1
1 AEROSOL, METERED RESPIRATORY (INHALATION)
Qty: 1 INHALER | Refills: 6 | Status: SHIPPED | OUTPATIENT
Start: 2017-11-09 | End: 2019-05-29 | Stop reason: SDUPTHER

## 2017-11-09 RX ORDER — PREDNISONE 10 MG/1
TABLET ORAL
Qty: 21 TAB | Refills: 0 | Status: SHIPPED | OUTPATIENT
Start: 2017-11-09 | End: 2017-11-29 | Stop reason: ALTCHOICE

## 2017-11-09 RX ORDER — CYCLOBENZAPRINE HCL 10 MG
10 TABLET ORAL
Qty: 30 TAB | Refills: 1 | Status: SHIPPED | OUTPATIENT
Start: 2017-11-09 | End: 2017-11-29 | Stop reason: ALTCHOICE

## 2017-11-09 NOTE — PROGRESS NOTES
Chief Complaint   Patient presents with    Shoulder Injury     Patient here today to be seen for left shoulder pain x 2 weeks. Patient have taken OCT medication and applied heat compresses and using inhaler.

## 2017-11-09 NOTE — MR AVS SNAPSHOT
Visit Information Date & Time Provider Department Dept. Phone Encounter #  
 11/9/2017 10:00 AM Sarwat BeardManuel [phone] [id number] Follow-up Instructions Return if symptoms worsen or fail to improve. Your Appointments 12/13/2017 11:15 AM  
ROUTINE CARE with Beryle Clam, MD  
[facility] OFFICE-ANNEX ([facility]) Appt Note: (l) shoulder pain [address] [place] [address] [id number] [phone] [address] [phone] Upcoming Health Maintenance Date Due HEMOGLOBIN A1C Q6M 6/29/2017 BREAST CANCER SCRN MAMMOGRAM 12/5/2017* FOOT EXAM Q1 12/29/2017 MICROALBUMIN Q1 12/29/2017 LIPID PANEL Q1 12/29/2017 EYE EXAM RETINAL OR DILATED Q1 8/5/2018 PAP AKA CERVICAL CYTOLOGY 5/10/2020 COLONOSCOPY 11/25/2025 DTaP/Tdap/Td series (3 - Td) 9/27/2027 *Topic was postponed. The date shown is not the original due date. Allergies as of 11/9/2017  Review Complete On: 5/10/2017 By: Sarwat Beard NP Severity Noted Reaction Type Reactions Nabumetone High 06/01/2011    Shortness of Breath Codeine  08/03/2010    Nausea Only  
 Gabapentin (Bulk)  10/24/2011    Shortness of Breath Lyrica [Pregabalin]  10/24/2011    Swelling Ankle swelling, foot pain , insomina Percocet [Oxycodone-acetaminophen]  08/03/2010    Other (comments)  
 mia Current Immunizations  Reviewed on 1/16/2017 Name Date Influenza Vaccine 9/27/2017, 10/15/2016, 9/30/2016, 10/16/2015, 10/9/2014 Influenza Vaccine PF 10/23/2013 Influenza Vaccine Split 10/24/2011 Pneumococcal Vaccine (Unspecified Type) 11/1/2013 TDAP Vaccine 2/20/2012 Tdap 9/27/2017 Not reviewed this visit You Were Diagnosed With   
  
 Codes Comments Acute pain of left shoulder    -  Primary ICD-10-CM: M25.512 ICD-9-CM: 719.41   
 Mild persistent asthma with acute exacerbation     ICD-10-CM: J45.31 
ICD-9-CM: 493.92 Bronchitis     ICD-10-CM: J40 ICD-9-CM: 272 Vitals BP Pulse Temp Resp Height(growth percentile) Weight(growth percentile) 126/82 (!) 59 96.5 °F (35.8 °C) (Oral) 20 5' 2\" (1.575 m) 181 lb (82.1 kg) SpO2 BMI OB Status Smoking Status 99% 33.11 kg/m2 Postmenopausal Never Smoker Vitals History BMI and BSA Data Body Mass Index Body Surface Area  
 33.11 kg/m 2 1.9 m 2 Preferred Pharmacy Pharmacy Name Phone [facility] Fredrick Martin [phone] Your Updated Medication List  
  
   
This list is accurate as of: 11/9/17 11:16 AM.  Always use your most recent med list.  
  
  
  
  
 * albuterol 2.5 mg /3 mL (0.083 %) nebulizer solution Commonly known as:  PROVENTIL VENTOLIN  
3 mL by Nebulization route once for 1 dose. * albuterol 90 mcg/actuation inhaler Commonly known as:  PROVENTIL HFA, VENTOLIN HFA, PROAIR HFA Take 1 Puff by inhalation every four (4) hours as needed for Wheezing. amoxicillin 500 mg Tab  
  
 aspirin delayed-release 81 mg tablet Take  by mouth daily. Bacillus coagulans 10 billion cell Cpdr  
Commonly known as:  PROBIOTIC (B. COAGULANS) Take 1 Caplet by mouth daily. budesonide-formoterol 160-4.5 mcg/actuation Hfaa Commonly known as:  SYMBICORT Take 2 Puffs by inhalation two (2) times a day. cyclobenzaprine 10 mg tablet Commonly known as:  FLEXERIL Take 1 Tab by mouth three (3) times daily (with meals). As needed for shoulder pain  
  
 ergocalciferol 50,000 unit capsule Commonly known as:  ERGOCALCIFEROL Take 1 Cap by mouth every seven (7) days. Indications: VITAMIN D DEFICIENCY  
  
 famotidine 40 mg tablet Commonly known as:  PEPCID  
TAKE ONE TABLET BY MOUTH BEFORE BREAKFAST AND BEFORE SUPPER **MAY  TAKE  1  EXTRA  DOSE  FOR  SEVERE  HEARTBURN  IF  NEEDED**  
  
 ibuprofen 100 mg/5 mL suspension Commonly known as:  ADVIL;MOTRIN Take 30 mL every 6 hours as needed for pain/fever x 5 days  
  
 lamoTRIgine 25 mg tablet Commonly known as: LaMICtal  
Take 1 Tab by mouth two (2) times daily as needed. MULTI FOR HER PO Take 1 Tab by mouth daily. predniSONE 10 mg dose pack Commonly known as:  STERAPRED DS See administration instruction per 10mg dose pack VITAMIN C 500 mg tablet Generic drug:  ascorbic acid (vitamin C) Take 500 mg by mouth daily. * Notice: This list has 2 medication(s) that are the same as other medications prescribed for you. Read the directions carefully, and ask your doctor or other care provider to review them with you. Prescriptions Sent to Pharmacy Refills  
 albuterol (PROVENTIL HFA, VENTOLIN HFA, PROAIR HFA) 90 mcg/actuation inhaler 6 Sig: Take 1 Puff by inhalation every four (4) hours as needed for Wheezing. Class: Normal  
 Pharmacy: [address], [address] [place] [address] Ph #: [phone] Route: Inhalation  
 predniSONE (STERAPRED DS) 10 mg dose pack 0 Sig: See administration instruction per 10mg dose pack Class: Normal  
 Pharmacy: [address] [place] [address] Ph #: [phone]  
 cyclobenzaprine (FLEXERIL) 10 mg tablet 1 Sig: Take 1 Tab by mouth three (3) times daily (with meals). As needed for shoulder pain  
 Class: Normal  
 Pharmacy: [address] [place] [address] Ph #: [phone] Route: Oral  
  
Follow-up Instructions Return if symptoms worsen or fail to improve. To-Do List   
 12/05/2017 3:20 PM  
  Appointment with [facility] K[facility]IN 1 at [facility] ([phone]) Shower or bathe using soap and water.   Do not use deodorant, powder, perfumes, or lotion the day of your exam.  If your prior mammograms were not performed at [facility] 6 please bring films with you or forward prior images 2 days before your procedure. Check in at registration 15min before your appointment time unless you were instructed to do otherwise. A script is not necessary, but if you have one, please bring it on the day of the mammogram or have it faxed to the department. [facility] [phone] [facility]  [phone] [address]  [phone] [facility] [phone] [facility] [address] Patria Primrose [phone] Patient Instructions Rotator Cuff: Exercises Your Care Instructions Here are some examples of typical rehabilitation exercises for your condition. Start each exercise slowly. Ease off the exercise if you start to have pain. Your doctor or physical therapist will tell you when you can start these exercises and which ones will work best for you. How to do the exercises Pendulum swing If you have pain in your back, do not do this exercise. 1. Hold on to a table or the back of a chair with your good arm. Then bend forward a little and let your sore arm hang straight down. This exercise does not use the arm muscles. Rather, use your legs and your hips to create movement that makes your arm swing freely. 2. Use the movement from your hips and legs to guide the slightly swinging arm back and forth like a pendulum (or elephant trunk). Then guide it in circles that start small (about the size of a dinner plate). Make the circles a bit larger each day, as your pain allows. 3. Do this exercise for 5 minutes, 5 to 7 times each day. 4. As you have less pain, try bending over a little farther to do this exercise. This will increase the amount of movement at your shoulder. Posterior stretching exercise 1. Hold the elbow of your injured arm with your other hand. 2. Use your hand to pull your injured arm gently up and across your body. You will feel a gentle stretch across the back of your injured shoulder. 3. Hold for at least 15 to 30 seconds. Then slowly lower your arm. 4. Repeat 2 to 4 times. Up-the-back stretch Your doctor or physical therapist may want you to wait to do this stretch until you have regained most of your range of motion and strength. You can do this stretch in different ways. Hold any of these stretches for at least 15 to 30 seconds. Repeat them 2 to 4 times. 1. Put your hand in your back pocket. Let it rest there to stretch your shoulder. 2. With your other hand, hold your injured arm (palm outward) behind your back by the wrist. Pull your arm up gently to stretch your shoulder. 3. Next, put a towel over your other shoulder. Put the hand of your injured arm behind your back. Now hold the back end of the towel. With the other hand, hold the front end of the towel in front of your body. Pull gently on the front end of the towel. This will bring your hand farther up your back to stretch your shoulder. Overhead stretch 1. Standing about an arm's length away, grasp onto a solid surface. You could use a countertop, a doorknob, or the back of a sturdy chair. 2. With your knees slightly bent, bend forward with your arms straight. Lower your upper body, and let your shoulders stretch. 3. As your shoulders are able to stretch farther, you may need to take a step or two backward. 4. Hold for at least 15 to 30 seconds. Then stand up and relax. If you had stepped back during your stretch, step forward so you can keep your hands on the solid surface. 5. Repeat 2 to 4 times. Shoulder flexion (lying down) To make a wand for this exercise, use a piece of PVC pipe or a broom handle with the broom removed. Make the wand about a foot wider than your shoulders. 1. Lie on your back, holding a wand with both hands. Your palms should face down as you hold the wand. 2. Keeping your elbows straight, slowly raise your arms over your head. Raise them until you feel a stretch in your shoulders, upper back, and chest. 
3. Hold for 15 to 30 seconds. 4. Repeat 2 to 4 times. Shoulder rotation (lying down) To make a wand for this exercise, use a piece of PVC pipe or a broom handle with the broom removed. Make the wand about a foot wider than your shoulders. 1. Lie on your back. Hold a wand with both hands with your elbows bent and palms up. 2. Keep your elbows close to your body, and move the wand across your body toward the sore arm. 3. Hold for 8 to 12 seconds. 4. Repeat 2 to 4 times. Wall climbing (to the side) Avoid any movement that is straight to your side, and be careful not to arch your back. Your arm should stay about 30 degrees to the front of your side. 1. Stand with your side to a wall so that your fingers can just touch it at an angle about 30 degrees toward the front of your body. 2. Walk the fingers of your injured arm up the wall as high as pain permits. Try not to shrug your shoulder up toward your ear as you move your arm up. 3. Hold that position for a count of at least 15 to 20. 
4. Walk your fingers back down to the starting position. 5. Repeat at least 2 to 4 times. Try to reach higher each time. Wall climbing (to the front) During this stretching exercise, be careful not to arch your back. 1. Face a wall, and stand so your fingers can just touch it. 2. Keeping your shoulder down, walk the fingers of your injured arm up the wall as high as pain permits. (Don't shrug your shoulder up toward your ear.) 3. Hold your arm in that position for at least 15 to 30 seconds. 4. Slowly walk your fingers back down to where you started. 5. Repeat at least 2 to 4 times. Try to reach higher each time. Shoulder blade squeeze 1. Stand with your arms at your sides, and squeeze your shoulder blades together. Do not raise your shoulders up as you squeeze. 2. Hold 6 seconds. 3. Repeat 8 to 12 times. Scapular exercise: Arm reach 1. Lie flat on your back. This exercise is a very slight motion that starts with your arms raised (elbows straight, arms straight). 2. From this position, reach higher toward the maria dolores or ceiling. Keep your elbows straight. All motion should be from your shoulder blade only. 3. Relax your arms back to where you started. 4. Repeat 8 to 12 times. Arm raise to the side During this strengthening exercise, your arm should stay about 30 degrees to the front of your side. 1. Slowly raise your injured arm to the side, with your thumb facing up. Raise your arm 60 degrees at the most (shoulder level is 90 degrees). 2. Hold the position for 3 to 5 seconds. Then lower your arm back to your side. If you need to, bring your \"good\" arm across your body and place it under the elbow as you lower your injured arm. Use your good arm to keep your injured arm from dropping down too fast. 
3. Repeat 8 to 12 times. 4. When you first start out, don't hold any extra weight in your hand. As you get stronger, you may use a 1-pound to 2-pound dumbbell or a small can of food. Shoulder flexor and extensor exercise These are isometric exercises. That means you contract your muscles without actually moving. 1. Push forward (flex): Stand facing a wall or doorjamb, about 6 inches or less back. Hold your injured arm against your body. Make a closed fist with your thumb on top. Then gently push your hand forward into the wall with about 25% to 50% of your strength. Don't let your body move backward as you push. Hold for about 6 seconds. Relax for a few seconds. Repeat 8 to 12 times. 2. Push backward (extend): Stand with your back flat against a wall. Your upper arm should be against the wall, with your elbow bent 90 degrees (your hand straight ahead). Push your elbow gently back against the wall with about 25% to 50% of your strength.  Don't let your body move forward as you push. Hold for about 6 seconds. Relax for a few seconds. Repeat 8 to 12 times. Scapular exercise: Wall push-ups This exercise is best done with your fingers somewhat turned out, rather than straight up and down. 1. Stand facing a wall, about 12 inches to 18 inches away. 2. Place your hands on the wall at shoulder height. 3. Slowly bend your elbows and bring your face to the wall. Keep your back and hips straight. 4. Push back to where you started. 5. Repeat 8 to 12 times. 6. When you can do this exercise against a wall comfortably, you can try it against a counter. You can then slowly progress to the end of a couch, then to a sturdy chair, and finally to the floor. Scapular exercise: Retraction For this exercise, you will need elastic exercise material, such as surgical tubing or Thera-Band. 1. Put the band around a solid object at about waist level. (A bedpost will work well.) Each hand should hold an end of the band. 2. With your elbows at your sides and bent to 90 degrees, pull the band back. Your shoulder blades should move toward each other. Then move your arms back where you started. 3. Repeat 8 to 12 times. 4. If you have good range of motion in your shoulders, try this exercise with your arms lifted out to the sides. Keep your elbows at a 90-degree angle. Raise the elastic band up to about shoulder level. Pull the band back to move your shoulder blades toward each other. Then move your arms back where you started. Internal rotator strengthening exercise 1. Start by tying a piece of elastic exercise material to a doorknob. You can use surgical tubing or Thera-Band. 2. Stand or sit with your shoulder relaxed and your elbow bent 90 degrees. Your upper arm should rest comfortably against your side. Squeeze a rolled towel between your elbow and your body for comfort. This will help keep your arm at your side. 3. Hold one end of the elastic band in the hand of the painful arm. 4. Slowly rotate your forearm toward your body until it touches your belly. Slowly move it back to where you started. 5. Keep your elbow and upper arm firmly tucked against the towel roll or at your side. 6. Repeat 8 to 12 times. External rotator strengthening exercise 1. Start by tying a piece of elastic exercise material to a doorknob. You can use surgical tubing or Thera-Band. (You may also hold one end of the band in each hand.) 2. Stand or sit with your shoulder relaxed and your elbow bent 90 degrees. Your upper arm should rest comfortably against your side. Squeeze a rolled towel between your elbow and your body for comfort. This will help keep your arm at your side. 3. Hold one end of the elastic band with the hand of the painful arm. 4. Start with your forearm across your belly. Slowly rotate the forearm out away from your body. Keep your elbow and upper arm tucked against the towel roll or the side of your body until you begin to feel tightness in your shoulder. Slowly move your arm back to where you started. 5. Repeat 8 to 12 times. Follow-up care is a key part of your treatment and safety. Be sure to make and go to all appointments, and call your doctor if you are having problems. It's also a good idea to know your test results and keep a list of the medicines you take. Where can you learn more? Go to http://esteban-tiffani.info/. Enter Patricia Hurtado in the search box to learn more about \"Rotator Cuff: Exercises. \" Current as of: March 21, 2017 Content Version: 11.4 © 5907-9948 [employer]. Care instructions adapted under license by [employer] (which disclaims liability or warranty for this information). If you have questions about a medical condition or this instruction, always ask your healthcare professional. [address] any warranty or liability for your use of this information. Shoulder Stretches: Exercises Your Care Instructions Here are some examples of exercises for your shoulder. Start each exercise slowly. Ease off the exercise if you start to have pain. Your doctor or physical therapist will tell you when you can start these exercises and which ones will work best for you. How to do the exercises Shoulder stretch 5.  a doorway and place one arm against the door frame. Your elbow should be a little higher than your shoulder. 6. Relax your shoulders as you lean forward, allowing your chest and shoulder muscles to stretch. You can also turn your body slightly away from your arm to stretch the muscles even more. 7. Hold for 15 to 30 seconds. 8. Repeat 2 to 4 times with each arm. Shoulder and chest stretch 5. Shoulder and chest stretch 6. While sitting, relax your upper body so you slump slightly in your chair. 7. As you breathe in, straighten your back and open your arms out to the sides. 8. Gently pull your shoulder blades back and downward. 9. Hold for 15 to 30 seconds as your breathe normally. 10. Repeat 2 to 4 times. Overhead stretch 4. Reach up over your head with both arms. 5. Hold for 15 to 30 seconds. 6. Repeat 2 to 4 times. Follow-up care is a key part of your treatment and safety. Be sure to make and go to all appointments, and call your doctor if you are having problems. It's also a good idea to know your test results and keep a list of the medicines you take. Where can you learn more? Go to http://esteban-tiffani.info/. Enter S254 in the search box to learn more about \"Shoulder Stretches: Exercises. \" Current as of: March 21, 2017 Content Version: 11.4 © 2974-9726 [employer]. Care instructions adapted under license by [employer] (which disclaims liability or warranty for this information).  If you have questions about a medical condition or this instruction, always ask your healthcare professional. Mindy [employer] disclaims any warranty or liability for your use of this information. Introducing [facility]! [facility]: Thank you for requesting a [employer] account. Our records indicate that you have previously registered for a [employer] account but its currently inactive. Please call our [employer] support line at [phone]. Additional Information If you have questions, please visit the Frequently Asked Questions section of the [employer] website at [email or web address] Remember, [employer] is NOT to be used for urgent needs. For medical emergencies, dial 911. Now available from your iPhone and Android! Please provide this summary of care documentation to your next provider. Your primary care clinician is listed as Prabhakar Christian. If you have any questions after today's visit, please call [phone].

## 2017-11-09 NOTE — PROGRESS NOTES
HISTORY OF PRESENT ILLNESS  Oswaldo Ty is a 61 y.o. female. HPI  Patient comes in today for left shoulder pain x 2 weeks. Not sure if she pulled a muscle. States she goes to bed with pain and wakes up with pain. dentist had given her muscle relaxer for jaw ans she took for shoulder, helped some with pain. Does not recall injury, but does some lifting of objects. Pain does not radiate down left arm. Some tingling in hand, but patient states related to her neck. Has tried ibuprofen, heat with some relief. States she has GERD, taking acid reflux medication. Using inhalers for asthma, no cough. Has been cleaning a back room with dust.    Would like fasting labs drawn today. Has appointment with Dr. Laurent Mittal on 11/29/17 for follow up. Allergies   Allergen Reactions    Nabumetone Shortness of Breath    Codeine Nausea Only    Gabapentin (Bulk) Shortness of Breath    Lyrica [Pregabalin] Swelling     Ankle swelling, foot pain , insomina    Percocet [Oxycodone-Acetaminophen] Other (comments)     loopy       Past Medical History:   Diagnosis Date    Arthritis 8/3/2010    Asthma     Hypercholesterolemia 8/18/2010    Postmenopausal 8/3/2010    Prediabetes 9/12/2013    Sinusitis, acute 12/12/2011       Past Surgical History:   Procedure Laterality Date    HX GYN  1987    tubal pregancy; laparotomy    HX ORTHOPAEDIC  2006    neck    HX ORTHOPAEDIC  2008    cervical fusion of discs x4    HX PELVIC LAPAROSCOPY  1985    endometriosis    REVISE KNEE JOINT REPLACE,ALL PARTS  02/29/2012    tkr left       Social History     Social History    Marital status:      Spouse name: N/A    Number of children: N/A    Years of education: N/A     Occupational History    Not on file.      Social History Main Topics    Smoking status: Never Smoker    Smokeless tobacco: Never Used    Alcohol use No    Drug use: No    Sexual activity: Not Currently     Other Topics Concern    Not on file     Social History Narrative       Family History   Problem Relation Age of Onset    Hypertension Father     Heart Disease Father      CAD    Diabetes Father     High Cholesterol Father     Other Mother      tumor in chest    Allergic Rhinitis Brother     Asthma Brother     Allergic Rhinitis Child     Sickle Cell Trait Child     GERD Child        Current Outpatient Prescriptions   Medication Sig    famotidine (PEPCID) 40 mg tablet TAKE ONE TABLET BY MOUTH BEFORE BREAKFAST AND BEFORE SUPPER **MAY  TAKE  1  EXTRA  DOSE  FOR  SEVERE  HEARTBURN  IF  NEEDED**    cyclobenzaprine (FLEXERIL) 10 mg tablet     Bacillus coagulans (PROBIOTIC, B. COAGULANS,) 10 billion cell cpDR Take 1 Caplet by mouth daily.  budesonide-formoterol (SYMBICORT) 160-4.5 mcg/actuation HFA inhaler Take 2 Puffs by inhalation two (2) times a day.  albuterol (PROVENTIL HFA, VENTOLIN HFA, PROAIR HFA) 90 mcg/actuation inhaler Take 1 Puff by inhalation every four (4) hours as needed for Wheezing.  ergocalciferol (ERGOCALCIFEROL) 50,000 unit capsule Take 1 Cap by mouth every seven (7) days. Indications: VITAMIN D DEFICIENCY    ibuprofen (ADVIL;MOTRIN) 100 mg/5 mL suspension Take 30 mL every 6 hours as needed for pain/fever x 5 days    aspirin delayed-release 81 mg tablet Take  by mouth daily.  ascorbic acid (VITAMIN C) 500 mg tablet Take 500 mg by mouth daily.  MULTIVITS,CA,MINERALS/IRON/FA (MULTI FOR HER PO) Take 1 Tab by mouth daily.  amoxicillin 500 mg tab     albuterol (PROVENTIL VENTOLIN) 2.5 mg /3 mL (0.083 %) nebulizer solution 3 mL by Nebulization route once for 1 dose.  lamoTRIgine (LAMICTAL) 25 mg tablet Take 1 Tab by mouth two (2) times daily as needed. No current facility-administered medications for this visit. Review of Systems   Constitutional: Negative for chills and fever. Respiratory: Negative for cough, shortness of breath and wheezing. Cardiovascular: Negative for chest pain and palpitations. Musculoskeletal: Positive for joint pain (left anterior shoulder pain). Negative for falls and neck pain. Skin: Negative. Neurological: Negative for tingling, sensory change and focal weakness. Vitals:    11/09/17 1002 11/09/17 1111   BP: (!) 143/93 126/82   Pulse: (!) 59    Resp: 20    Temp: 96.5 °F (35.8 °C)    TempSrc: Oral    SpO2: 99%    Weight: 181 lb (82.1 kg)    Height: 5' 2\" (1.575 m)      Physical Exam   Constitutional: She is oriented to person, place, and time. Vital signs are normal. She appears well-developed and well-nourished. She is cooperative. Cardiovascular: Normal rate, regular rhythm and normal heart sounds. Pulses:       Radial pulses are 2+ on the right side, and 2+ on the left side. Pulmonary/Chest: Effort normal and breath sounds normal. She exhibits tenderness and bony tenderness (left upper chest wall TTP just below clavicle). She exhibits no mass, no laceration, no crepitus, no edema, no deformity, no swelling and no retraction. Musculoskeletal:        Left shoulder: She exhibits tenderness (anterior shoulder TTP just below clavicle over subscapularis muscle.) and pain. She exhibits normal range of motion, no bony tenderness, no swelling, no effusion, no crepitus, no deformity, no laceration, no spasm, normal pulse and normal strength. Muscle strength BUEs 5/5 bilaterally, NV/sensation intact   Neurological: She is alert and oriented to person, place, and time. Skin: Skin is warm and dry. Psychiatric: She has a normal mood and affect. Her behavior is normal. Judgment and thought content normal.   Vitals reviewed. ASSESSMENT and PLAN    ICD-10-CM ICD-9-CM    1. Acute pain of left shoulder M25.512 719.41 predniSONE (STERAPRED DS) 10 mg dose pack      cyclobenzaprine (FLEXERIL) 10 mg tablet   2. Hypercholesterolemia W73.43 346.1 METABOLIC PANEL, COMPREHENSIVE      LIPID PANEL   3.  Prediabetes R73.03 790.29 CBC WITH AUTOMATED DIFF      METABOLIC PANEL, COMPREHENSIVE      AMB POC HEMOGLOBIN A1C      AMB POC URINALYSIS DIP STICK AUTO W/O MICRO   4. Vitamin D deficiency E55.9 268.9 VITAMIN D, 25 HYDROXY   5. Mild intermittent asthma without complication G48.97 079.79 albuterol (PROVENTIL HFA, VENTOLIN HFA, PROAIR HFA) 90 mcg/actuation inhaler     Encounter Diagnoses   Name Primary?  Acute pain of left shoulder Yes    Hypercholesterolemia     Prediabetes     Vitamin D deficiency     Mild intermittent asthma without complication      Orders Placed This Encounter    CBC WITH AUTOMATED DIFF    METABOLIC PANEL, COMPREHENSIVE    LIPID PANEL    VITAMIN D, 25 HYDROXY    AMB POC HEMOGLOBIN A1C    AMB POC URINALYSIS DIP STICK AUTO W/O MICRO    DISCONTD: FLUARIX QUAD [id number], PF, syrg injection    albuterol (PROVENTIL HFA, VENTOLIN HFA, PROAIR HFA) 90 mcg/actuation inhaler    predniSONE (STERAPRED DS) 10 mg dose pack    cyclobenzaprine (FLEXERIL) 10 mg tablet     Diagnoses and all orders for this visit:    1. Acute pain of left shoulder - ?strain / sprain ? costochondritis ?doubt referred pain from cardiac origin. Will trial steroid taper, muscle relaxer, continue heat, HEP. If no improvement in 1-2 weeks, patient to RTC  -     predniSONE (STERAPRED DS) 10 mg dose pack; See administration instruction per 10mg dose pack  -     cyclobenzaprine (FLEXERIL) 10 mg tablet; Take 1 Tab by mouth three (3) times daily (with meals). As needed for shoulder pain    FOLLOWING DIAGNOSES NOT MANAGED BY ME, MANAGED BY DR RUIZ (PCP), PATIENT IS FASTING TODAY, WANTS FASTING LABS PRIOR TO HER APPOINTMENT WITH DR RUIZ ON 11/29/17:  2. Hypercholesterolemia  -     METABOLIC PANEL, COMPREHENSIVE  -     LIPID PANEL    3. Prediabetes  -     CBC WITH AUTOMATED DIFF  -     METABOLIC PANEL, COMPREHENSIVE  -     AMB POC HEMOGLOBIN A1C  -     AMB POC URINALYSIS DIP STICK AUTO W/O MICRO    4.  Vitamin D deficiency  -     VITAMIN D, 25 HYDROXY    5. Mild intermittent asthma without complication  -     albuterol (PROVENTIL HFA, VENTOLIN HFA, PROAIR HFA) 90 mcg/actuation inhaler; Take 1 Puff by inhalation every four (4) hours as needed for Wheezing. Follow-up Disposition:  Return if symptoms worsen or fail to improve. I have reviewed the patient's allergies and made any necessary changes. Medical, procedural, social and family histories have been reviewed and updated as medically indicated. I have reconciled and/or revised patient medications in the EMR. I have discussed each diagnosis listed in this note with Reyna Bahena and/or their family. I have discussed treatment options and the risk/benefit analysis of those options, including safe use of medications and possible medication side effects. Through the use of shared decision making we have agreed to the above plan. The patient has received an after-visit summary and questions were answered concerning future plans. Nichole Umanzor, NICOLEP-C    This note will not be viewable in [employer].

## 2017-11-09 NOTE — PATIENT INSTRUCTIONS
Rotator Cuff: Exercises  Your Care Instructions  Here are some examples of typical rehabilitation exercises for your condition. Start each exercise slowly. Ease off the exercise if you start to have pain. Your doctor or physical therapist will tell you when you can start these exercises and which ones will work best for you. How to do the exercises  Pendulum swing    If you have pain in your back, do not do this exercise. 1. Hold on to a table or the back of a chair with your good arm. Then bend forward a little and let your sore arm hang straight down. This exercise does not use the arm muscles. Rather, use your legs and your hips to create movement that makes your arm swing freely. 2. Use the movement from your hips and legs to guide the slightly swinging arm back and forth like a pendulum (or elephant trunk). Then guide it in circles that start small (about the size of a dinner plate). Make the circles a bit larger each day, as your pain allows. 3. Do this exercise for 5 minutes, 5 to 7 times each day. 4. As you have less pain, try bending over a little farther to do this exercise. This will increase the amount of movement at your shoulder. Posterior stretching exercise    1. Hold the elbow of your injured arm with your other hand. 2. Use your hand to pull your injured arm gently up and across your body. You will feel a gentle stretch across the back of your injured shoulder. 3. Hold for at least 15 to 30 seconds. Then slowly lower your arm. 4. Repeat 2 to 4 times. Up-the-back stretch    Your doctor or physical therapist may want you to wait to do this stretch until you have regained most of your range of motion and strength. You can do this stretch in different ways. Hold any of these stretches for at least 15 to 30 seconds. Repeat them 2 to 4 times. 1. Put your hand in your back pocket. Let it rest there to stretch your shoulder.   2. With your other hand, hold your injured arm (palm outward) behind your back by the wrist. Pull your arm up gently to stretch your shoulder. 3. Next, put a towel over your other shoulder. Put the hand of your injured arm behind your back. Now hold the back end of the towel. With the other hand, hold the front end of the towel in front of your body. Pull gently on the front end of the towel. This will bring your hand farther up your back to stretch your shoulder. Overhead stretch    1. Standing about an arm's length away, grasp onto a solid surface. You could use a countertop, a doorknob, or the back of a sturdy chair. 2. With your knees slightly bent, bend forward with your arms straight. Lower your upper body, and let your shoulders stretch. 3. As your shoulders are able to stretch farther, you may need to take a step or two backward. 4. Hold for at least 15 to 30 seconds. Then stand up and relax. If you had stepped back during your stretch, step forward so you can keep your hands on the solid surface. 5. Repeat 2 to 4 times. Shoulder flexion (lying down)    To make a wand for this exercise, use a piece of PVC pipe or a broom handle with the broom removed. Make the wand about a foot wider than your shoulders. 1. Lie on your back, holding a wand with both hands. Your palms should face down as you hold the wand. 2. Keeping your elbows straight, slowly raise your arms over your head. Raise them until you feel a stretch in your shoulders, upper back, and chest.  3. Hold for 15 to 30 seconds. 4. Repeat 2 to 4 times. Shoulder rotation (lying down)    To make a wand for this exercise, use a piece of PVC pipe or a broom handle with the broom removed. Make the wand about a foot wider than your shoulders. 1. Lie on your back. Hold a wand with both hands with your elbows bent and palms up. 2. Keep your elbows close to your body, and move the wand across your body toward the sore arm. 3. Hold for 8 to 12 seconds. 4. Repeat 2 to 4 times.   Wall climbing (to the side)    Avoid any movement that is straight to your side, and be careful not to arch your back. Your arm should stay about 30 degrees to the front of your side. 1. Stand with your side to a wall so that your fingers can just touch it at an angle about 30 degrees toward the front of your body. 2. Walk the fingers of your injured arm up the wall as high as pain permits. Try not to shrug your shoulder up toward your ear as you move your arm up. 3. Hold that position for a count of at least 15 to 20.  4. Walk your fingers back down to the starting position. 5. Repeat at least 2 to 4 times. Try to reach higher each time. Wall climbing (to the front)    During this stretching exercise, be careful not to arch your back. 1. Face a wall, and stand so your fingers can just touch it. 2. Keeping your shoulder down, walk the fingers of your injured arm up the wall as high as pain permits. (Don't shrug your shoulder up toward your ear.)  3. Hold your arm in that position for at least 15 to 30 seconds. 4. Slowly walk your fingers back down to where you started. 5. Repeat at least 2 to 4 times. Try to reach higher each time. Shoulder blade squeeze    1. Stand with your arms at your sides, and squeeze your shoulder blades together. Do not raise your shoulders up as you squeeze. 2. Hold 6 seconds. 3. Repeat 8 to 12 times. Scapular exercise: Arm reach    1. Lie flat on your back. This exercise is a very slight motion that starts with your arms raised (elbows straight, arms straight). 2. From this position, reach higher toward the maria dolores or ceiling. Keep your elbows straight. All motion should be from your shoulder blade only. 3. Relax your arms back to where you started. 4. Repeat 8 to 12 times. Arm raise to the side    During this strengthening exercise, your arm should stay about 30 degrees to the front of your side. 1. Slowly raise your injured arm to the side, with your thumb facing up.  Raise your arm 60 degrees at the most (shoulder level is 90 degrees). 2. Hold the position for 3 to 5 seconds. Then lower your arm back to your side. If you need to, bring your \"good\" arm across your body and place it under the elbow as you lower your injured arm. Use your good arm to keep your injured arm from dropping down too fast.  3. Repeat 8 to 12 times. 4. When you first start out, don't hold any extra weight in your hand. As you get stronger, you may use a 1-pound to 2-pound dumbbell or a small can of food. Shoulder flexor and extensor exercise    These are isometric exercises. That means you contract your muscles without actually moving. 1. Push forward (flex): Stand facing a wall or doorjamb, about 6 inches or less back. Hold your injured arm against your body. Make a closed fist with your thumb on top. Then gently push your hand forward into the wall with about 25% to 50% of your strength. Don't let your body move backward as you push. Hold for about 6 seconds. Relax for a few seconds. Repeat 8 to 12 times. 2. Push backward (extend): Stand with your back flat against a wall. Your upper arm should be against the wall, with your elbow bent 90 degrees (your hand straight ahead). Push your elbow gently back against the wall with about 25% to 50% of your strength. Don't let your body move forward as you push. Hold for about 6 seconds. Relax for a few seconds. Repeat 8 to 12 times. Scapular exercise: Wall push-ups    This exercise is best done with your fingers somewhat turned out, rather than straight up and down. 1. Stand facing a wall, about 12 inches to 18 inches away. 2. Place your hands on the wall at shoulder height. 3. Slowly bend your elbows and bring your face to the wall. Keep your back and hips straight. 4. Push back to where you started. 5. Repeat 8 to 12 times. 6. When you can do this exercise against a wall comfortably, you can try it against a counter.  You can then slowly progress to the end of a couch, then to a sturdy chair, and finally to the floor. Scapular exercise: Retraction    For this exercise, you will need elastic exercise material, such as surgical tubing or Thera-Band. 1. Put the band around a solid object at about waist level. (A bedpost will work well.) Each hand should hold an end of the band. 2. With your elbows at your sides and bent to 90 degrees, pull the band back. Your shoulder blades should move toward each other. Then move your arms back where you started. 3. Repeat 8 to 12 times. 4. If you have good range of motion in your shoulders, try this exercise with your arms lifted out to the sides. Keep your elbows at a 90-degree angle. Raise the elastic band up to about shoulder level. Pull the band back to move your shoulder blades toward each other. Then move your arms back where you started. Internal rotator strengthening exercise    1. Start by tying a piece of elastic exercise material to a doorknob. You can use surgical tubing or Thera-Band. 2. Stand or sit with your shoulder relaxed and your elbow bent 90 degrees. Your upper arm should rest comfortably against your side. Squeeze a rolled towel between your elbow and your body for comfort. This will help keep your arm at your side. 3. Hold one end of the elastic band in the hand of the painful arm. 4. Slowly rotate your forearm toward your body until it touches your belly. Slowly move it back to where you started. 5. Keep your elbow and upper arm firmly tucked against the towel roll or at your side. 6. Repeat 8 to 12 times. External rotator strengthening exercise    1. Start by tying a piece of elastic exercise material to a doorknob. You can use surgical tubing or Thera-Band. (You may also hold one end of the band in each hand.)  2. Stand or sit with your shoulder relaxed and your elbow bent 90 degrees. Your upper arm should rest comfortably against your side. Squeeze a rolled towel between your elbow and your body for comfort.  This will help keep your arm at your side. 3. Hold one end of the elastic band with the hand of the painful arm. 4. Start with your forearm across your belly. Slowly rotate the forearm out away from your body. Keep your elbow and upper arm tucked against the towel roll or the side of your body until you begin to feel tightness in your shoulder. Slowly move your arm back to where you started. 5. Repeat 8 to 12 times. Follow-up care is a key part of your treatment and safety. Be sure to make and go to all appointments, and call your doctor if you are having problems. It's also a good idea to know your test results and keep a list of the medicines you take. Where can you learn more? Go to http://esteban-tiffani.info/. Enter Monica Gonzalez in the search box to learn more about \"Rotator Cuff: Exercises. \"  Current as of: March 21, 2017  Content Version: 11.4  © 5768-5939 [employer]. Care instructions adapted under license by [employer] (which disclaims liability or warranty for this information). If you have questions about a medical condition or this instruction, always ask your healthcare professional. [address] any warranty or liability for your use of this information. Shoulder Stretches: Exercises  Your Care Instructions  Here are some examples of exercises for your shoulder. Start each exercise slowly. Ease off the exercise if you start to have pain. Your doctor or physical therapist will tell you when you can start these exercises and which ones will work best for you. How to do the exercises  Shoulder stretch    5.  a doorway and place one arm against the door frame. Your elbow should be a little higher than your shoulder. 6. Relax your shoulders as you lean forward, allowing your chest and shoulder muscles to stretch. You can also turn your body slightly away from your arm to stretch the muscles even more.   7. Hold for 15 to 30 seconds. 8. Repeat 2 to 4 times with each arm. Shoulder and chest stretch    5. Shoulder and chest stretch  6. While sitting, relax your upper body so you slump slightly in your chair. 7. As you breathe in, straighten your back and open your arms out to the sides. 8. Gently pull your shoulder blades back and downward. 9. Hold for 15 to 30 seconds as your breathe normally. 10. Repeat 2 to 4 times. Overhead stretch    4. Reach up over your head with both arms. 5. Hold for 15 to 30 seconds. 6. Repeat 2 to 4 times. Follow-up care is a key part of your treatment and safety. Be sure to make and go to all appointments, and call your doctor if you are having problems. It's also a good idea to know your test results and keep a list of the medicines you take. Where can you learn more? Go to http://esteban-tiffani.info/. Enter S254 in the search box to learn more about \"Shoulder Stretches: Exercises. \"  Current as of: March 21, 2017  Content Version: 11.4  © 1250-6312 [employer]. Care instructions adapted under license by [employer] (which disclaims liability or warranty for this information). If you have questions about a medical condition or this instruction, always ask your healthcare professional. [address] any warranty or liability for your use of this information.

## 2017-11-10 LAB
25(OH)D3+25(OH)D2 SERPL-MCNC: 17.6 NG/ML (ref 30–100)
ALBUMIN SERPL-MCNC: 4.6 G/DL (ref 3.5–5.5)
ALBUMIN/GLOB SERPL: 1.6 {RATIO} (ref 1.2–2.2)
ALP SERPL-CCNC: 66 IU/L (ref 39–117)
ALT SERPL-CCNC: 19 IU/L (ref 0–32)
AST SERPL-CCNC: 24 IU/L (ref 0–40)
BASOPHILS # BLD AUTO: 0 X10E3/UL (ref 0–0.2)
BASOPHILS NFR BLD AUTO: 0 %
BILIRUB SERPL-MCNC: 0.7 MG/DL (ref 0–1.2)
BUN SERPL-MCNC: 15 MG/DL (ref 6–24)
BUN/CREAT SERPL: 14 (ref 9–23)
CALCIUM SERPL-MCNC: 9.7 MG/DL (ref 8.7–10.2)
CHLORIDE SERPL-SCNC: 99 MMOL/L (ref 96–106)
CHOLEST SERPL-MCNC: 263 MG/DL (ref 100–199)
CO2 SERPL-SCNC: 26 MMOL/L (ref 18–29)
CREAT SERPL-MCNC: 1.07 MG/DL (ref 0.57–1)
EOSINOPHIL # BLD AUTO: 0.2 X10E3/UL (ref 0–0.4)
EOSINOPHIL NFR BLD AUTO: 3 %
ERYTHROCYTE [DISTWIDTH] IN BLOOD BY AUTOMATED COUNT: 15.5 % (ref 12.3–15.4)
GFR SERPLBLD CREATININE-BSD FMLA CKD-EPI: 57 ML/MIN/1.73
GFR SERPLBLD CREATININE-BSD FMLA CKD-EPI: 66 ML/MIN/1.73
GLOBULIN SER CALC-MCNC: 2.8 G/DL (ref 1.5–4.5)
GLUCOSE SERPL-MCNC: 84 MG/DL (ref 65–99)
HCT VFR BLD AUTO: 45.5 % (ref 34–46.6)
HDLC SERPL-MCNC: 71 MG/DL
HGB BLD-MCNC: 14.5 G/DL (ref 11.1–15.9)
IMM GRANULOCYTES # BLD: 0 X10E3/UL (ref 0–0.1)
IMM GRANULOCYTES NFR BLD: 0 %
INTERPRETATION, 910389: NORMAL
INTERPRETATION: NORMAL
LDLC SERPL CALC-MCNC: 173 MG/DL (ref 0–99)
LYMPHOCYTES # BLD AUTO: 2.4 X10E3/UL (ref 0.7–3.1)
LYMPHOCYTES NFR BLD AUTO: 46 %
MCH RBC QN AUTO: 26.6 PG (ref 26.6–33)
MCHC RBC AUTO-ENTMCNC: 31.9 G/DL (ref 31.5–35.7)
MCV RBC AUTO: 84 FL (ref 79–97)
MONOCYTES # BLD AUTO: 0.4 X10E3/UL (ref 0.1–0.9)
MONOCYTES NFR BLD AUTO: 8 %
NEUTROPHILS # BLD AUTO: 2.3 X10E3/UL (ref 1.4–7)
NEUTROPHILS NFR BLD AUTO: 43 %
PDF IMAGE, 910387: NORMAL
PLATELET # BLD AUTO: 285 X10E3/UL (ref 150–379)
POTASSIUM SERPL-SCNC: 4.4 MMOL/L (ref 3.5–5.2)
PROT SERPL-MCNC: 7.4 G/DL (ref 6–8.5)
RBC # BLD AUTO: 5.45 X10E6/UL (ref 3.77–5.28)
SODIUM SERPL-SCNC: 140 MMOL/L (ref 134–144)
TRIGL SERPL-MCNC: 94 MG/DL (ref 0–149)
VLDLC SERPL CALC-MCNC: 19 MG/DL (ref 5–40)
WBC # BLD AUTO: 5.3 X10E3/UL (ref 3.4–10.8)

## 2017-11-27 DIAGNOSIS — E55.9 VITAMIN D DEFICIENCY: Primary | ICD-10-CM

## 2017-11-27 RX ORDER — ERGOCALCIFEROL 1.25 MG/1
50000 CAPSULE ORAL
Qty: 4 CAP | Refills: 2 | Status: SHIPPED | OUTPATIENT
Start: 2017-11-27 | End: 2017-11-29 | Stop reason: SDUPTHER

## 2017-11-29 ENCOUNTER — OFFICE VISIT (OUTPATIENT)
Dept: FAMILY MEDICINE CLINIC | Age: 57
End: 2017-11-29

## 2017-11-29 VITALS
TEMPERATURE: 96.7 F | BODY MASS INDEX: 33.57 KG/M2 | DIASTOLIC BLOOD PRESSURE: 79 MMHG | HEIGHT: 62 IN | WEIGHT: 182.4 LBS | HEART RATE: 82 BPM | SYSTOLIC BLOOD PRESSURE: 120 MMHG

## 2017-11-29 DIAGNOSIS — R06.83 PRIMARY SNORING: ICD-10-CM

## 2017-11-29 DIAGNOSIS — R73.03 PREDIABETES: Chronic | ICD-10-CM

## 2017-11-29 DIAGNOSIS — J45.30 MILD PERSISTENT ASTHMA WITHOUT COMPLICATION: Primary | ICD-10-CM

## 2017-11-29 DIAGNOSIS — E55.9 VITAMIN D DEFICIENCY: ICD-10-CM

## 2017-11-29 DIAGNOSIS — E78.00 HYPERCHOLESTEROLEMIA: ICD-10-CM

## 2017-11-29 RX ORDER — ROSUVASTATIN CALCIUM 10 MG/1
10 TABLET, COATED ORAL
Qty: 30 TAB | Refills: 6 | Status: SHIPPED | OUTPATIENT
Start: 2017-11-29 | End: 2018-02-08 | Stop reason: SDUPTHER

## 2017-11-29 RX ORDER — FLUTICASONE PROPIONATE 50 MCG
SPRAY, SUSPENSION (ML) NASAL
Qty: 1 BOTTLE | Refills: 5 | Status: SHIPPED | OUTPATIENT
Start: 2017-11-29 | End: 2020-06-12 | Stop reason: ALTCHOICE

## 2017-11-29 RX ORDER — ERGOCALCIFEROL 1.25 MG/1
50000 CAPSULE ORAL
Qty: 4 CAP | Refills: 2 | Status: SHIPPED | OUTPATIENT
Start: 2017-11-29 | End: 2018-07-23 | Stop reason: ALTCHOICE

## 2017-11-29 RX ORDER — CLOSTRIDIUM TETANI TOXOID ANTIGEN (FORMALDEHYDE INACTIVATED), CORYNEBACTERIUM DIPHTHERIAE TOXOID ANTIGEN (FORMALDEHYDE INACTIVATED), BORDETELLA PERTUSSIS TOXOID ANTIGEN (GLUTARALDEHYDE INACTIVATED), BORDETELLA PERTUSSIS FILAMENTOUS HEMAGGLUTININ ANTIGEN (FORMALDEHYDE INACTIVATED), BORDETELLA PERTUSSIS PERTACTIN ANTIGEN, AND BORDETELLA PERTUSSIS FIMBRIAE 2/3 ANTIGEN 5; 2; 2.5; 5; 3; 5 [LF]/.5ML; [LF]/.5ML; UG/.5ML; UG/.5ML; UG/.5ML; UG/.5ML
INJECTION, SUSPENSION INTRAMUSCULAR
COMMUNITY
Start: 2017-09-27 | End: 2017-11-29 | Stop reason: ALTCHOICE

## 2017-11-29 NOTE — PROGRESS NOTES
Chief Complaint   Patient presents with    Sinus Pain       C/o sinus pressure to right side of face ,  Congestion and cough,  Sputum yellow x 2 weeks. Denies fever/chills  Taking mucinex with no relief. Also requesting lab results      1. Have you been to the ER, urgent care clinic since your last visit? Hospitalized since your last visit? No    2. Have you seen or consulted any other health care providers outside of the [facility] since your last visit? Include any pap smears or colon screening.  No

## 2017-11-29 NOTE — PROGRESS NOTES
HISTORY OF PRESENT ILLNESS  Florian Mcclure is a 61 y.o. female.   HPI   Patient present stating that she has been dealing with asthmatic state for many years when she was asked how her asthmatic test was done she stated that she went to see  and they did protect her skin and therefore by pricking her skin she was told that she has an asthmatic state and that has been many years ago now days she does short acting bronchodilator Symbicort 2 puff twice daily and still complaining of congestion in addition she was told that since she has severe allergic rhinitis and she may need to take some over-the-counter medication as well for a better outcome she denies any fever chills she has some watery nose watery eyes no cough with some itchy throat appetite okay, fortunately she is an ever smoker, today she denies any fever chills has had no sick person exposure     Unfortunately she has body mass index of e greater than 33 elevated lipid panel hemoglobin A1c of prediabetic state abdominal obesity of more than 40 inches she does some daily walking and more otherwise a sedentary lifestyle and she has no level of vitamin D unfortunately she has had history of proteinuria in the past as well, also she has had positive family history of early heart disease currently    On no blood pressure medication not taking any daily aspirin today's blood pressure is nice and normal in addition she has had a claim of multiple allergic reaction toward nabumetone when she was asked what kind of allergic reaction she had that medication she states she does not recall many of the medication that she claimed that she has allergic reaction when she was asked today kidney to explain the reaction that she has she stated on all of them that she does not recall what kind of the reaction she had against those medications,  In addition patient has Had trouble with snoring and sometimes does not sleep throughout the night and sometimes feels tired during the day  Current Outpatient Prescriptions   Medication Sig Dispense Refill    albuterol (PROVENTIL HFA, VENTOLIN HFA, PROAIR HFA) 90 mcg/actuation inhaler Take 1 Puff by inhalation every four (4) hours as needed for Wheezing. 1 Inhaler 6    budesonide-formoterol (SYMBICORT) 160-4.5 mcg/actuation HFA inhaler Take 2 Puffs by inhalation two (2) times a day. 1 Inhaler 5    ibuprofen (ADVIL;MOTRIN) 100 mg/5 mL suspension Take 30 mL every 6 hours as needed for pain/fever x 5 days 3 Bottle 3    MULTIVITS,CA,MINERALS/IRON/FA (MULTI FOR HER PO) Take 1 Tab by mouth daily.  ergocalciferol (ERGOCALCIFEROL) 50,000 unit capsule Take 1 Cap by mouth every seven (7) days. Indications: Vitamin D Deficiency (Patient not taking: Reported on 11/29/2017) 4 Cap 2    famotidine (PEPCID) 40 mg tablet TAKE ONE TABLET BY MOUTH BEFORE BREAKFAST AND BEFORE SUPPER **MAY  TAKE  1  EXTRA  DOSE  FOR  SEVERE  HEARTBURN  IF  NEEDED** (Patient not taking: Reported on 11/29/2017) 70 Tab 0    albuterol (PROVENTIL VENTOLIN) 2.5 mg /3 mL (0.083 %) nebulizer solution 3 mL by Nebulization route once for 1 dose.  1 Each 0     Allergies   Allergen Reactions    Nabumetone Shortness of Breath    Codeine Nausea Only    Gabapentin (Bulk) Shortness of Breath    Lyrica [Pregabalin] Swelling     Ankle swelling, foot pain , insomina    Percocet [Oxycodone-Acetaminophen] Other (comments)     loopy     Past Medical History:   Diagnosis Date    Arthritis 8/3/2010    Asthma     Hypercholesterolemia 8/18/2010    Postmenopausal 8/3/2010    Prediabetes 9/12/2013    Sinusitis, acute 12/12/2011     Past Surgical History:   Procedure Laterality Date    HX GYN  1987    tubal pregancy; laparotomy    HX ORTHOPAEDIC  2006    neck    HX ORTHOPAEDIC  2008    cervical fusion of discs x4    HX PELVIC LAPAROSCOPY  1985    endometriosis    REVISE KNEE JOINT REPLACE,ALL PARTS  02/29/2012    tkr left     Family History   Problem Relation Age of Onset    Hypertension Father     Heart Disease Father      CAD    Diabetes Father     High Cholesterol Father     Other Mother      tumor in chest    Allergic Rhinitis Brother     Asthma Brother     Allergic Rhinitis Child     Sickle Cell Trait Child     GERD Child      Social History   Substance Use Topics    Smoking status: Never Smoker    Smokeless tobacco: Never Used    Alcohol use No      Lab Results  Component Value Date/Time   WBC 5.3 11/09/2017 11:38 AM   HGB 14.5 11/09/2017 11:38 AM   HCT 45.5 11/09/2017 11:38 AM   PLATELET 901 91/55/3794 11:38 AM   MCV 84 11/09/2017 11:38 AM     Lab Results  Component Value Date/Time   Hemoglobin A1c 6.1 08/26/2013 08:47 AM   Glucose 84 11/09/2017 11:38 AM   Microalb/Creat ratio (ug/mg creat.) 16.6 08/04/2014 12:18 PM   LDL, calculated 173 11/09/2017 11:38 AM   Creatinine 1.07 11/09/2017 11:38 AM      Lab Results  Component Value Date/Time   Cholesterol, total 263 11/09/2017 11:38 AM   HDL Cholesterol 71 11/09/2017 11:38 AM   LDL, calculated 173 11/09/2017 11:38 AM   Triglyceride 94 11/09/2017 11:38 AM   CHOL/HDL Ratio 4.1 08/18/2010 10:31 AM   Lab Results  Component Value Date/Time   ALT (SGPT) 19 11/09/2017 11:38 AM   AST (SGOT) 24 11/09/2017 11:38 AM   Alk. phosphatase 66 11/09/2017 11:38 AM   Bilirubin, total 0.7 11/09/2017 11:38 AM   Albumin 4.6 11/09/2017 11:38 AM   Protein, total 7.4 11/09/2017 11:38 AM   PLATELET 164 83/50/2470 11:38 AM       Lab Results  Component Value Date/Time   TSH 0.661 10/28/2015 03:36 PM   T4, Total 7.7 08/04/2014 12:18 PM           Review of Systems   Constitutional: Negative for chills and fever. HENT: Negative for ear pain and nosebleeds. Eyes: Negative for blurred vision, pain and discharge. Respiratory: Negative for shortness of breath. Cardiovascular: Negative for chest pain and leg swelling. Gastrointestinal: Negative for constipation, diarrhea, nausea and vomiting. Genitourinary: Negative for frequency. Musculoskeletal: Negative for joint pain. Skin: Negative for itching and rash. Neurological: Negative for headaches. Psychiatric/Behavioral: Negative for depression. The patient is not nervous/anxious. Physical Exam   Constitutional: She is oriented to person, place, and time. She appears well-developed and well-nourished. HENT:   Head: Normocephalic and atraumatic. Eyes: Conjunctivae and EOM are normal.   Neck: Normal range of motion. Neck supple. Cardiovascular: Normal rate, regular rhythm and normal heart sounds. No murmur heard. Pulmonary/Chest: Effort normal and breath sounds normal.   Abdominal: Soft. Bowel sounds are normal. She exhibits no distension. Musculoskeletal: Normal range of motion. She exhibits no edema. Neurological: She is alert and oriented to person, place, and time. Skin: No erythema. Psychiatric: Her behavior is normal.   Nursing note and vitals reviewed. ASSESSMENT and PLAN  Diagnoses and all orders for this visit:    1. Mild persistent asthma without complication  -     MICROALBUMIN, UR, RAND W/ MICROALBUMIN/CREA RATIO  -      DIABETES FOOT EXAM  -     [facility]  -     fluticasone (FLONASE) 50 mcg/actuation nasal spray; 2 sprays nighlty next 8 wks then every other night for 6 wks thereafter as needed  -     rosuvastatin (CRESTOR) 10 mg tablet; Take 1 Tab by mouth nightly.  -     REFERRAL TO SLEEP STUDIES    2. Vitamin D deficiency  -     MICROALBUMIN, UR, RAND W/ MICROALBUMIN/CREA RATIO  -      DIABETES FOOT EXAM  -     ergocalciferol (ERGOCALCIFEROL) 50,000 unit capsule; Take 1 Cap by mouth every seven (7) days. Indications: Vitamin D Deficiency  -     [facility]  -     fluticasone (FLONASE) 50 mcg/actuation nasal spray; 2 sprays nighlty next 8 wks then every other night for 6 wks thereafter as needed  -     rosuvastatin (CRESTOR) 10 mg tablet; Take 1 Tab by mouth nightly.  -     REFERRAL TO SLEEP STUDIES    3. Hypercholesterolemia  -     MICROALBUMIN, UR, RAND W/ MICROALBUMIN/CREA RATIO  -      DIABETES FOOT EXAM  -     [facility]  -     fluticasone (FLONASE) 50 mcg/actuation nasal spray; 2 sprays nighlty next 8 wks then every other night for 6 wks thereafter as needed  -     rosuvastatin (CRESTOR) 10 mg tablet; Take 1 Tab by mouth nightly.  -     REFERRAL TO SLEEP STUDIES    4. Prediabetes  -     MICROALBUMIN, UR, RAND W/ MICROALBUMIN/CREA RATIO  -      DIABETES FOOT EXAM  -     [facility]  -     fluticasone (FLONASE) 50 mcg/actuation nasal spray; 2 sprays nighlty next 8 wks then every other night for 6 wks thereafter as needed  -     rosuvastatin (CRESTOR) 10 mg tablet; Take 1 Tab by mouth nightly.  -     REFERRAL TO SLEEP STUDIES    5. Primary snoring  -     fluticasone (FLONASE) 50 mcg/actuation nasal spray; 2 sprays nighlty next 8 wks then every other night for 6 wks thereafter as needed  -     rosuvastatin (CRESTOR) 10 mg tablet;  Take 1 Tab by mouth nightly.  -     REFERRAL TO SLEEP STUDIES      At this time patient was told to lose weight, so that her body mass index would get into a normal level between 20-25,  increase physical activity, limit alcohol consumption, stop secondhand tobacco exposure    In addition the patient was told to start an active life style modifications, for which includes creating a an interesting delightful to do list,  such as start of a light physical activity with a brisk daily walking 30 minutes most days of the week, most likely to total of 150 minutes per week, then the patient was told to try to avoid fatty fast foods, have a low-fat low-cholesterol diet, include seafood such as adding fatty fish such as Deisy Lava, Mackerel, Eagle to the diet, increase vegetables and fruits, nuts 3-4 times per week and finally have a low-salt and K rich food intake for a good 4-6 months possibly for ever for the best outcome,   All mentioned recommendations, have to be done at least most days of the weeks for the best result,  Routine labs ordered, and the needed abnormal labs will be discussed soon and they can be repeated in 3-6 months. In addition relevant handouts were given to the patient for a better understanding,    patient was told to call if any problems.   Patient acknowledged understanding and     Patient agreed with today's recommendation

## 2017-11-29 NOTE — MR AVS SNAPSHOT
Visit Information Date & Time Provider Department Dept. Phone Encounter #  
 11/29/2017  4:30 PM Daniele Mera MD [address] [facility] [phone] [id number] Follow-up Instructions Return if symptoms worsen or fail to improve. Your Appointments 12/13/2017 11:15 AM  
ROUTINE CARE with Daniele Mera MD  
[address] [facility] ([facility]) Appt Note: (l) shoulder pain [address] [place] [address] [phone] [address] [facility] [address] Upcoming Health Maintenance Date Due  
 FOOT EXAM Q1 12/29/2017 MICROALBUMIN Q1 12/29/2017 BREAST CANCER SCRN MAMMOGRAM 12/5/2017* HEMOGLOBIN A1C Q6M 5/9/2018 EYE EXAM RETINAL OR DILATED Q1 8/5/2018 LIPID PANEL Q1 11/9/2018 PAP AKA CERVICAL CYTOLOGY 5/10/2020 COLONOSCOPY 11/25/2025 DTaP/Tdap/Td series (3 - Td) 9/27/2027 *Topic was postponed. The date shown is not the original due date. Allergies as of 11/29/2017  Review Complete On: 11/29/2017 By: Steven Macedo LPN Severity Noted Reaction Type Reactions Nabumetone High 06/01/2011    Shortness of Breath Codeine  08/03/2010    Nausea Only  
 Gabapentin (Bulk)  10/24/2011    Shortness of Breath Lyrica [Pregabalin]  10/24/2011    Swelling Ankle swelling, foot pain , insomina Percocet [Oxycodone-acetaminophen]  08/03/2010    Other (comments)  
 mia Current Immunizations  Reviewed on 1/16/2017 Name Date Influenza Vaccine 9/27/2017, 10/15/2016, 9/30/2016, 10/16/2015, 10/9/2014 Influenza Vaccine PF 10/23/2013 Influenza Vaccine Split 10/24/2011 Pneumococcal Vaccine (Unspecified Type) 11/1/2013 TDAP Vaccine 2/20/2012 Tdap 9/27/2017 Not reviewed this visit You Were Diagnosed With   
  
 Codes Comments Mild persistent asthma without complication    -  Primary ICD-10-CM: J45.30 ICD-9-CM: 493.90 Vitamin D deficiency     ICD-10-CM: E55.9 ICD-9-CM: 268.9 Hypercholesterolemia     ICD-10-CM: E78.00 ICD-9-CM: 272.0 Prediabetes     ICD-10-CM: R73.03 
ICD-9-CM: 790.29 Primary snoring     ICD-10-CM: R06.83 
ICD-9-CM: 786.09 Vitals BP Pulse Temp Height(growth percentile) Weight(growth percentile) BMI  
 120/79 (BP 1 Location: Left arm, BP Patient Position: Sitting) 82 96.7 °F (35.9 °C) (Oral) 5' 2\" (1.575 m) 182 lb 6.4 oz (82.7 kg) 33.36 kg/m2 OB Status Smoking Status Postmenopausal Never Smoker Vitals History BMI and BSA Data Body Mass Index Body Surface Area  
 33.36 kg/m 2 1.9 m 2 Preferred Pharmacy Pharmacy Name [facility] Fred[facility] Martin [phone] Your Updated Medication List  
  
   
This list is accurate as of: 11/29/17  5:25 PM.  Always use your most recent med list.  
  
  
  
  
 * albuterol 2.5 mg /3 mL (0.083 %) nebulizer solution Commonly known as:  PROVENTIL VENTOLIN  
3 mL by Nebulization route once for 1 dose. * albuterol 90 mcg/actuation inhaler Commonly known as:  PROVENTIL HFA, VENTOLIN HFA, PROAIR HFA Take 1 Puff by inhalation every four (4) hours as needed for Wheezing. budesonide-formoterol 160-4.5 mcg/actuation Hfaa Commonly known as:  SYMBICORT Take 2 Puffs by inhalation two (2) times a day. ergocalciferol 50,000 unit capsule Commonly known as:  ERGOCALCIFEROL Take 1 Cap by mouth every seven (7) days. Indications: Vitamin D Deficiency  
  
 famotidine 40 mg tablet Commonly known as:  PEPCID  
TAKE ONE TABLET BY MOUTH BEFORE BREAKFAST AND BEFORE SUPPER **MAY  TAKE  1  EXTRA  DOSE  FOR  SEVERE  HEARTBURN  IF  NEEDED**  
  
 fluticasone 50 mcg/actuation nasal spray Commonly known as:  FLONASE  
2 sprays nighlty next 8 wks then every other night for 6 wks thereafter as needed  
  
 ibuprofen 100 mg/5 mL suspension Commonly known as:  ADVIL;MOTRIN Take 30 mL every 6 hours as needed for pain/fever x 5 days MULTI FOR HER PO Take 1 Tab by mouth daily. rosuvastatin 10 mg tablet Commonly known as:  CRESTOR Take 1 Tab by mouth nightly. * Notice: This list has 2 medication(s) that are the same as other medications prescribed for you. Read the directions carefully, and ask your doctor or other care provider to review them with you. Prescriptions Sent to Pharmacy Refills  
 ergocalciferol (ERGOCALCIFEROL) 50,000 unit capsule 2 Sig: Take 1 Cap by mouth every seven (7) days. Indications: Vitamin D Deficiency Class: Normal  
 Pharmacy: [address] [place] [address], [address] [place] [address] Ph #: [phone] Route: Oral  
 fluticasone (FLONASE) 50 mcg/actuation nasal spray 5 Si sprays nighlty next 8 wks then every other night for 6 wks thereafter as needed Class: Normal  
 Pharmacy: [address] [place] [address], [address] [place] [address] Ph #: [phone] rosuvastatin (CRESTOR) 10 mg tablet 6 Sig: Take 1 Tab by mouth nightly. Class: Normal  
 Pharmacy: [address] [place] [address], [address] [place] [address] Ph #: [phone] Route: Oral  
  
We Performed the Following  DIABETES FOOT EXAM [HM7 Custom] MICROALBUMIN, UR, RAND W/ MICROALBUMIN/CREA RATIO [id number] CPT(R)] REFERRAL TO PULMONARY DISEASE [LVF79 Custom] Comments:  
 pulm function test  
 REFERRAL TO SLEEP STUDIES [REF99 Custom] Follow-up Instructions Return if symptoms worsen or fail to improve. To-Do List   
 2017 3:20 PM  
  Appointment with [facility] KATERIN 1 at [facility] ([phone]) Shower or bathe using soap and water.   Do not use deodorant, powder, perfumes, or lotion the day of your exam.  If your prior mammograms were not performed at [facility] please bring films with you or forward prior images 2 days before your procedure. Check in at registration 15min before your appointment time unless you were instructed to do otherwise. A script is not necessary, but if you have one, please bring it on the day of the mammogram or have it faxed to the department. [facility] [phone] [facility]  [phone] [facility] JAI  [phone] [facility] [phone] [facility] [address] [facility] [phone] Referral Information Referral ID Referred By Referred To  
  
 [id number] Erica Ph[facility] [address] [place] [address] [facility] [address], [address] Visits Status Start Date End Date 1 New Request 11/29/17 11/29/18 If your referral has a status of pending review or denied, additional information will be sent to support the outcome of this decision. Referral ID Referred By Referred To  
 [id number] Erica Phan Not Available Visits Status Start Date End Date 1 New Request 11/29/17 11/29/18 If your referral has a status of pending review or denied, additional information will be sent to support the outcome of this decision. Introducing [facility]! Dear Tito Haines: Thank you for requesting a [employer] account. Our records indicate that you have previously registered for a [employer] account but its currently inactive. Please call our [employer] support line at [phone]. Additional Information If you have questions, please visit the Frequently Asked Questions section of the [employer] website at [email or web address] Remember, [employer] is NOT to be used for urgent needs. For medical emergencies, dial 911. Now available from your iPhone and Android! Please provide this summary of care documentation to your next provider. Your primary care clinician is listed as Aquiles Morfin. If you have any questions after today's visit, please call [phone].

## 2017-12-05 ENCOUNTER — HOSPITAL ENCOUNTER (OUTPATIENT)
Dept: MAMMOGRAPHY | Age: 57
Discharge: HOME | End: 2017-12-05
Attending: FAMILY MEDICINE
Payer: COMMERCIAL

## 2017-12-05 DIAGNOSIS — Z12.31 VISIT FOR SCREENING MAMMOGRAM: ICD-10-CM

## 2017-12-05 PROCEDURE — 77067 SCR MAMMO BI INCL CAD: CPT

## 2017-12-13 DIAGNOSIS — J45.30 MILD PERSISTENT ASTHMA WITHOUT COMPLICATION: Primary | ICD-10-CM

## 2017-12-13 NOTE — PROGRESS NOTES
Received call from pt. Requesting chest xray.   Needed for pulm referral with Frank Collins MD  Order placed for pulmonary referral , per Verbal Order from Dr. Pablo Lagos on 12/13/2017

## 2018-02-08 ENCOUNTER — OFFICE VISIT (OUTPATIENT)
Dept: FAMILY MEDICINE CLINIC | Age: 58
End: 2018-02-08

## 2018-02-08 VITALS — HEIGHT: 62 IN | BODY MASS INDEX: 32.94 KG/M2 | WEIGHT: 179 LBS | RESPIRATION RATE: 14 BRPM

## 2018-02-08 DIAGNOSIS — R06.83 PRIMARY SNORING: ICD-10-CM

## 2018-02-08 DIAGNOSIS — G89.29 CHRONIC PAIN OF BOTH SHOULDERS: Primary | ICD-10-CM

## 2018-02-08 DIAGNOSIS — M25.512 ACUTE PAIN OF LEFT SHOULDER: ICD-10-CM

## 2018-02-08 DIAGNOSIS — E55.9 VITAMIN D DEFICIENCY: ICD-10-CM

## 2018-02-08 DIAGNOSIS — M25.512 CHRONIC PAIN OF BOTH SHOULDERS: Primary | ICD-10-CM

## 2018-02-08 DIAGNOSIS — J45.30 MILD PERSISTENT ASTHMA WITHOUT COMPLICATION: ICD-10-CM

## 2018-02-08 DIAGNOSIS — M25.511 CHRONIC PAIN OF BOTH SHOULDERS: Primary | ICD-10-CM

## 2018-02-08 DIAGNOSIS — E11.21 TYPE 2 DIABETES MELLITUS WITH NEPHROPATHY (HCC): ICD-10-CM

## 2018-02-08 DIAGNOSIS — M19.90 ARTHRITIS: ICD-10-CM

## 2018-02-08 DIAGNOSIS — R73.03 PREDIABETES: Chronic | ICD-10-CM

## 2018-02-08 DIAGNOSIS — E11.9 DIABETES MELLITUS WITHOUT COMPLICATION (HCC): ICD-10-CM

## 2018-02-08 DIAGNOSIS — E78.00 HYPERCHOLESTEROLEMIA: ICD-10-CM

## 2018-02-08 LAB — HBA1C MFR BLD HPLC: 5.6 %

## 2018-02-08 RX ORDER — METHYLPREDNISOLONE 4 MG/1
TABLET ORAL
Qty: 1 DOSE PACK | Refills: 0 | Status: SHIPPED | OUTPATIENT
Start: 2018-02-08 | End: 2018-05-30 | Stop reason: ALTCHOICE

## 2018-02-08 RX ORDER — IBUPROFEN 800 MG/1
800 TABLET ORAL
Qty: 18 TAB | Refills: 0 | Status: SHIPPED | OUTPATIENT
Start: 2018-02-08 | End: 2018-07-23 | Stop reason: ALTCHOICE

## 2018-02-08 RX ORDER — ROSUVASTATIN CALCIUM 10 MG/1
5 TABLET, COATED ORAL
Qty: 30 TAB | Refills: 6
Start: 2018-02-08 | End: 2018-05-30 | Stop reason: SDDI

## 2018-02-08 RX ORDER — GLUCOSAMINE/CHONDR SU A SOD 750-600 MG
TABLET ORAL
Qty: 60 TAB | Refills: 6 | Status: SHIPPED | OUTPATIENT
Start: 2018-02-08 | End: 2021-06-11 | Stop reason: ALTCHOICE

## 2018-02-08 RX ORDER — MULTIVITAMIN
1 TABLET ORAL 2 TIMES DAILY
Qty: 60 TAB | Refills: 11 | Status: SHIPPED | OUTPATIENT
Start: 2018-02-08 | End: 2021-12-20 | Stop reason: ALTCHOICE

## 2018-02-08 NOTE — PROGRESS NOTES
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 Health Maintenance reviewed-discussed with patient. 1. Have you been to the ER, urgent care clinic since your last visit? Hospitalized since your last visit? No    2. Have you seen or consulted any other health care providers outside of the [address] [place] [address] since your last visit? Include any pap smears or colon screening.  No

## 2018-02-08 NOTE — MR AVS SNAPSHOT
[facility] [facility] [address] 
[phone] Patient: Benjamin Soto MRN: [id number] :1960 Visit Information Date & Time Provider Department Dept. Phone Encounter #  
 2018 10:15 AM [facility]i Zam[facility] MD [address] [facility] [phone] [id number] Follow-up Instructions Return in about 6 months (around 2018), or if symptoms worsen or fail to improve. Your Appointments 2018  9:45 AM  
ROUTINE CARE with Remi Zamora MD  
[facility] ([address]) Appt Note: 6 months f/u. caharris 17  
 [address] [facility] [address]  
[phone] [id number] Upcoming Health Maintenance Date Due MICROALBUMIN Q1 2017 HEMOGLOBIN A1C Q6M 2018 EYE EXAM RETINAL OR DILATED Q1 2018 LIPID PANEL Q1 2018 FOOT EXAM Q1 2018 BREAST CANCER SCRN MAMMOGRAM 2019 PAP AKA CERVICAL CYTOLOGY 5/10/2020 COLONOSCOPY 2025 DTaP/Tdap/Td series (3 - Td) 2027 Allergies as of 2018  Review Complete On: 2018 By: Remi Zamora MD  
  
 Severity Noted Reaction Type Reactions Crestor [Rosuvastatin] High 2018    Shortness of Breath Nabumetone High 2011    Shortness of Breath Codeine  2010    Nausea Only  
 Gabapentin (Bulk)  10/24/2011    Shortness of Breath Lyrica [Pregabalin]  10/24/2011    Swelling Ankle swelling, foot pain , insomina Percocet [Oxycodone-acetaminophen]  2010    Other (comments)  
 loopy Current Immunizations  Reviewed on 2017 Name Date Influenza Vaccine 2017, 10/15/2016, 2016, 10/16/2015, 10/9/2014 Influenza Vaccine PF 10/23/2013 Influenza Vaccine Split 10/24/2011 Pneumococcal Vaccine (Unspecified Type) 2013 TDAP Vaccine 2/20/2012 Tdap 9/27/2017 Not reviewed this visit You Were Diagnosed With   
  
 Codes Comments Chronic pain of both shoulders    -  Primary ICD-10-CM: M25.511, G89.29, M25.512 ICD-9-CM: 719.41, 338.29 Diabetes mellitus without complication ([facility].)     NINOSKA-87-HZ: E11.9 ICD-9-CM: 250.00 Acute pain of left shoulder     ICD-10-CM: M25.512 ICD-9-CM: 719.41 Arthritis     ICD-10-CM: M19.90 ICD-9-CM: 716.90 Type 2 diabetes mellitus with nephropathy (HCC)     ICD-10-CM: E11.21 
ICD-9-CM: 250.40, 583.81 Hypercholesterolemia     ICD-10-CM: E78.00 ICD-9-CM: 272.0 Vitals Resp Height(growth percentile) Weight(growth percentile) BMI OB Status Smoking Status 14 5' 2\" (1.575 m) 179 lb (81.2 kg) 32.74 kg/m2 Postmenopausal Never Smoker BMI and BSA Data Body Mass Index Body Surface Area 32.74 kg/m 2 1.88 m 2 Preferred [facility] Pharm[facility] Name Phone [address] [facility] [phone] Your Updated Medication List  
  
   
This list is accurate as of: 2/8/18 11:49 AM.  Always use your most recent med list.  
  
  
  
  
 * albuterol 2.5 mg /3 mL (0.083 %) nebulizer solution Commonly known as:  PROVENTIL VENTOLIN  
3 mL by Nebulization route once for 1 dose. * albuterol 90 mcg/actuation inhaler Commonly known as:  PROVENTIL HFA, VENTOLIN HFA, PROAIR HFA Take 1 Puff by inhalation every four (4) hours as needed for Wheezing. budesonide-formoterol 160-4.5 mcg/actuation Hfaa Commonly known as:  SYMBICORT Take 2 Puffs by inhalation two (2) times a day. calcium-cholecalciferol (D3) tablet Commonly known as:  CALTRATE 600+D Take 1 Tab by mouth two (2) times a day. ergocalciferol 50,000 unit capsule Commonly known as:  ERGOCALCIFEROL Take 1 Cap by mouth every seven (7) days. Indications: Vitamin D Deficiency  
  
 famotidine 40 mg tablet Commonly known as:  PEPCID  
 TAKE ONE TABLET BY MOUTH BEFORE BREAKFAST AND BEFORE SUPPER **MAY  TAKE  1  EXTRA  DOSE  FOR  SEVERE  HEARTBURN  IF  NEEDED**  
  
 fluticasone 50 mcg/actuation nasal spray Commonly known as:  FLONASE  
2 sprays nighlty next 8 wks then every other night for 6 wks thereafter as needed  
  
 glucosamine-chondroitin 750-600 mg Tab One tab twice daily  
  
 ibuprofen 800 mg tablet Commonly known as:  MOTRIN Take 1 Tab by mouth every eight (8) hours as needed for Pain. methylPREDNISolone 4 mg tablet Commonly known as:  Daquan Brown As directed for 6 days one package MULTI FOR HER PO Take 1 Tab by mouth daily. rosuvastatin 10 mg tablet Commonly known as:  CRESTOR Take 1 Tab by mouth nightly. * Notice: This list has 2 medication(s) that are the same as other medications prescribed for you. Read the directions carefully, and ask your doctor or other care provider to review them with you. Prescriptions Printed Refills  
 methylPREDNISolone (MEDROL DOSEPACK) 4 mg tablet 0 Sig: As directed for 6 days one package Class: Print  
 ibuprofen (MOTRIN) 800 mg tablet 0 Sig: Take 1 Tab by mouth every eight (8) hours as needed for Pain. Class: Print Route: Oral  
 calcium-cholecalciferol, D3, (CALTRATE 600+D) tablet 11 Sig: Take 1 Tab by mouth two (2) times a day. Class: Print Route: Oral  
 GLUCOSAMINE/CHONDR BREWER A SOD (GLUCOSAMINE-CHONDROITIN) 750-600 mg tab 6 Sig: One tab twice daily Class: Print We Performed the Following AMB POC HEMOGLOBIN A1C [05810 CPT(R)] CBC W/O DIFF [01131 CPT(R)] LIPID PANEL [50995 CPT(R)] METABOLIC PANEL, COMPREHENSIVE [88530 CPT(R)] MICROALBUMIN, UR, RAND W/ MICROALBUMIN/CREA RATIO [id number] CPT(R)] REFERRAL TO PHYSICAL THERAPY [UEU27 Custom] Comments:  
 Pivot for rotator shoulder rehab Follow-up Instructions Return in about 6 months (around 8/8/2018), or if symptoms worsen or fail to improve. Referral Information Referral ID Referred By Referred To  
  
 [id number] Barbara Josiah Not Available Visits Status Start Date End Date 1 New Request 2/8/18 2/8/19 If your referral has a status of pending review or denied, additional information will be sent to support the outcome of this decision. Patient Instructions Shoulder Pain: Care Instructions Your Care Instructions You can hurt your shoulder by using it too much during an activity, such as fishing or baseball. It can also happen as part of the everyday wear and tear of getting older. Shoulder injuries can be slow to heal, but your shoulder should get better with time. Your doctor may recommend a sling to rest your shoulder. If you have injured your shoulder, you may need testing and treatment. Follow-up care is a key part of your treatment and safety. Be sure to make and go to all appointments, and call your doctor if you are having problems. It's also a good idea to know your test results and keep a list of the medicines you take. How can you care for yourself at home? · Take pain medicines exactly as directed. ¨ If the doctor gave you a prescription medicine for pain, take it as prescribed. ¨ If you are not taking a prescription pain medicine, ask your doctor if you can take an over-the-counter medicine. ¨ Do not take two or more pain medicines at the same time unless the doctor told you to. Many pain medicines contain acetaminophen, which is Tylenol. Too much acetaminophen (Tylenol) can be harmful. · If your doctor recommends that you wear a sling, use it as directed. Do not take it off before your doctor tells you to. · Put ice or a cold pack on the sore area for 10 to 20 minutes at a time. Put a thin cloth between the ice and your skin.  
· If there is no swelling, you can put moist heat, a heating pad, or a warm cloth on your shoulder. Some doctors suggest alternating between hot and cold. · Rest your shoulder for a few days. If your doctor recommends it, you can then begin gentle exercise of the shoulder, but do not lift anything heavy. When should you call for help? Call 911 anytime you think you may need emergency care. For example, call if: 
? · You have chest pain or pressure. This may occur with: ¨ Sweating. ¨ Shortness of breath. ¨ Nausea or vomiting. ¨ Pain that spreads from the chest to the neck, jaw, or one or both shoulders or arms. ¨ Dizziness or lightheadedness. ¨ A fast or uneven pulse. After calling 911, chew 1 adult-strength aspirin. Wait for an ambulance. Do not try to drive yourself. ? · Your arm or hand is cool or pale or changes color. ?Call your doctor now or seek immediate medical care if: 
? · You have signs of infection, such as: 
¨ Increased pain, swelling, warmth, or redness in your shoulder. ¨ Red streaks leading from a place on your shoulder. ¨ Pus draining from an area of your shoulder. ¨ Swollen lymph nodes in your neck, armpits, or groin. ¨ A fever. ? Watch closely for changes in your health, and be sure to contact your doctor if: 
? · You cannot use your shoulder. ? · Your shoulder does not get better as expected. Where can you learn more? Go to http://esteban-tiffani.info/. Enter G262 in the search box to learn more about \"Shoulder Pain: Care Instructions. \" Current as of: March 21, 2017 Content Version: 11.4 © 4418-0564 Qt Software. Care instructions adapted under license by [employer] (which disclaims liability or warranty for this information). If you have questions about a medical condition or this instruction, always ask your healthcare professional. [place] [address] any warranty or liability for your use of this information. Rotator Cuff: Exercises Your Care Instructions Here are some examples of typical rehabilitation exercises for your condition. Start each exercise slowly. Ease off the exercise if you start to have pain. Your doctor or physical therapist will tell you when you can start these exercises and which ones will work best for you. How to do the exercises Pendulum swing If you have pain in your back, do not do this exercise. 1. Hold on to a table or the back of a chair with your good arm. Then bend forward a little and let your sore arm hang straight down. This exercise does not use the arm muscles. Rather, use your legs and your hips to create movement that makes your arm swing freely. 2. Use the movement from your hips and legs to guide the slightly swinging arm back and forth like a pendulum (or elephant trunk). Then guide it in circles that start small (about the size of a dinner plate). Make the circles a bit larger each day, as your pain allows. 3. Do this exercise for 5 minutes, 5 to 7 times each day. 4. As you have less pain, try bending over a little farther to do this exercise. This will increase the amount of movement at your shoulder. Posterior stretching exercise 1. Hold the elbow of your injured arm with your other hand. 2. Use your hand to pull your injured arm gently up and across your body. You will feel a gentle stretch across the back of your injured shoulder. 3. Hold for at least 15 to 30 seconds. Then slowly lower your arm. 4. Repeat 2 to 4 times. Up-the-back stretch Your doctor or physical therapist may want you to wait to do this stretch until you have regained most of your range of motion and strength. You can do this stretch in different ways. Hold any of these stretches for at least 15 to 30 seconds. Repeat them 2 to 4 times. 1. Put your hand in your back pocket. Let it rest there to stretch your shoulder.  
2. With your other hand, hold your injured arm (palm outward) behind your back by the wrist. Pull your arm up gently to stretch your shoulder. 3. Next, put a towel over your other shoulder. Put the hand of your injured arm behind your back. Now hold the back end of the towel. With the other hand, hold the front end of the towel in front of your body. Pull gently on the front end of the towel. This will bring your hand farther up your back to stretch your shoulder. Overhead stretch 1. Standing about an arm's length away, grasp onto a solid surface. You could use a countertop, a doorknob, or the back of a sturdy chair. 2. With your knees slightly bent, bend forward with your arms straight. Lower your upper body, and let your shoulders stretch. 3. As your shoulders are able to stretch farther, you may need to take a step or two backward. 4. Hold for at least 15 to 30 seconds. Then stand up and relax. If you had stepped back during your stretch, step forward so you can keep your hands on the solid surface. 5. Repeat 2 to 4 times. Shoulder flexion (lying down) To make a wand for this exercise, use a piece of PVC pipe or a broom handle with the broom removed. Make the wand about a foot wider than your shoulders. 1. Lie on your back, holding a wand with both hands. Your palms should face down as you hold the wand. 2. Keeping your elbows straight, slowly raise your arms over your head. Raise them until you feel a stretch in your shoulders, upper back, and chest. 
3. Hold for 15 to 30 seconds. 4. Repeat 2 to 4 times. Shoulder rotation (lying down) To make a wand for this exercise, use a piece of PVC pipe or a broom handle with the broom removed. Make the wand about a foot wider than your shoulders. 1. Lie on your back. Hold a wand with both hands with your elbows bent and palms up. 2. Keep your elbows close to your body, and move the wand across your body toward the sore arm. 3. Hold for 8 to 12 seconds. 4. Repeat 2 to 4 times. Wall climbing (to the side) Avoid any movement that is straight to your side, and be careful not to arch your back. Your arm should stay about 30 degrees to the front of your side. 1. Stand with your side to a wall so that your fingers can just touch it at an angle about 30 degrees toward the front of your body. 2. Walk the fingers of your injured arm up the wall as high as pain permits. Try not to shrug your shoulder up toward your ear as you move your arm up. 3. Hold that position for a count of at least 15 to 20. 
4. Walk your fingers back down to the starting position. 5. Repeat at least 2 to 4 times. Try to reach higher each time. Wall climbing (to the front) During this stretching exercise, be careful not to arch your back. 1. Face a wall, and stand so your fingers can just touch it. 2. Keeping your shoulder down, walk the fingers of your injured arm up the wall as high as pain permits. (Don't shrug your shoulder up toward your ear.) 3. Hold your arm in that position for at least 15 to 30 seconds. 4. Slowly walk your fingers back down to where you started. 5. Repeat at least 2 to 4 times. Try to reach higher each time. Shoulder blade squeeze 1. Stand with your arms at your sides, and squeeze your shoulder blades together. Do not raise your shoulders up as you squeeze. 2. Hold 6 seconds. 3. Repeat 8 to 12 times. Scapular exercise: Arm reach 1. Lie flat on your back. This exercise is a very slight motion that starts with your arms raised (elbows straight, arms straight). 2. From this position, reach higher toward the maria dolores or ceiling. Keep your elbows straight. All motion should be from your shoulder blade only. 3. Relax your arms back to where you started. 4. Repeat 8 to 12 times. Arm raise to the side During this strengthening exercise, your arm should stay about 30 degrees to the front of your side. 1. Slowly raise your injured arm to the side, with your thumb facing up. Raise your arm 60 degrees at the most (shoulder level is 90 degrees). 2. Hold the position for 3 to 5 seconds. Then lower your arm back to your side. If you need to, bring your \"good\" arm across your body and place it under the elbow as you lower your injured arm. Use your good arm to keep your injured arm from dropping down too fast. 
3. Repeat 8 to 12 times. 4. When you first start out, don't hold any extra weight in your hand. As you get stronger, you may use a 1-pound to 2-pound dumbbell or a small can of food. Shoulder flexor and extensor exercise These are isometric exercises. That means you contract your muscles without actually moving. 1. Push forward (flex): Stand facing a wall or doorjamb, about 6 inches or less back. Hold your injured arm against your body. Make a closed fist with your thumb on top. Then gently push your hand forward into the wall with about 25% to 50% of your strength. Don't let your body move backward as you push. Hold for about 6 seconds. Relax for a few seconds. Repeat 8 to 12 times. 2. Push backward (extend): Stand with your back flat against a wall. Your upper arm should be against the wall, with your elbow bent 90 degrees (your hand straight ahead). Push your elbow gently back against the wall with about 25% to 50% of your strength. Don't let your body move forward as you push. Hold for about 6 seconds. Relax for a few seconds. Repeat 8 to 12 times. Scapular exercise: Wall push-ups This exercise is best done with your fingers somewhat turned out, rather than straight up and down. 1. Stand facing a wall, about 12 inches to 18 inches away. 2. Place your hands on the wall at shoulder height. 3. Slowly bend your elbows and bring your face to the wall. Keep your back and hips straight. 4. Push back to where you started. 5. Repeat 8 to 12 times.  
6. When you can do this exercise against a wall comfortably, you can try it against a counter. You can then slowly progress to the end of a couch, then to a sturdy chair, and finally to the floor. Scapular exercise: Retraction For this exercise, you will need elastic exercise material, such as surgical tubing or Thera-Band. 1. Put the band around a solid object at about waist level. (A bedpost will work well.) Each hand should hold an end of the band. 2. With your elbows at your sides and bent to 90 degrees, pull the band back. Your shoulder blades should move toward each other. Then move your arms back where you started. 3. Repeat 8 to 12 times. 4. If you have good range of motion in your shoulders, try this exercise with your arms lifted out to the sides. Keep your elbows at a 90-degree angle. Raise the elastic band up to about shoulder level. Pull the band back to move your shoulder blades toward each other. Then move your arms back where you started. Internal rotator strengthening exercise 1. Start by tying a piece of elastic exercise material to a doorknob. You can use surgical tubing or Thera-Band. 2. Stand or sit with your shoulder relaxed and your elbow bent 90 degrees. Your upper arm should rest comfortably against your side. Squeeze a rolled towel between your elbow and your body for comfort. This will help keep your arm at your side. 3. Hold one end of the elastic band in the hand of the painful arm. 4. Slowly rotate your forearm toward your body until it touches your belly. Slowly move it back to where you started. 5. Keep your elbow and upper arm firmly tucked against the towel roll or at your side. 6. Repeat 8 to 12 times. External rotator strengthening exercise 1. Start by tying a piece of elastic exercise material to a doorknob. You can use surgical tubing or Thera-Band. (You may also hold one end of the band in each hand.) 2. Stand or sit with your shoulder relaxed and your elbow bent 90 degrees. Your upper arm should rest comfortably against your side. Squeeze a rolled towel between your elbow and your body for comfort. This will help keep your arm at your side. 3. Hold one end of the elastic band with the hand of the painful arm. 4. Start with your forearm across your belly. Slowly rotate the forearm out away from your body. Keep your elbow and upper arm tucked against the towel roll or the side of your body until you begin to feel tightness in your shoulder. Slowly move your arm back to where you started. 5. Repeat 8 to 12 times. Follow-up care is a key part of your treatment and safety. Be sure to make and go to all appointments, and call your doctor if you are having problems. It's also a good idea to know your test results and keep a list of the medicines you take. Where can you learn more? Go to http://estebanPÃºbliKotiffani.info/. Enter Hermannrosemary Wiggins in the search box to learn more about \"Rotator Cuff: Exercises. \" Current as of: March 21, 2017 Content Version: 11.4 © 2787-2629 [employer]. Care instructions adapted under license by [employer] (which disclaims liability or warranty for this information). If you have questions about a medical condition or this instruction, always ask your healthcare professional. [facility] any warranty or liability for your use of this information. Introducing [facility]! Dear Marisela Cassette: Thank you for requesting a [employer] account. Our records indicate that you have previously registered for a [employer] account but its currently inactive. Please call our [employer] support line at [phone]. Additional Information If you have questions, please visit the Frequently Asked Questions section of the [employer] website at [email or web address]. [employer]. [email or web address]/[email or web address] [email or web address]/. Remember, [employer]tank is NOT to be used for urgent needs. For medical emergencies, dial 911. Now available from your iPhone and Android! Please provide this summary of care documentation to your next provider. Your primary care clinician is listed as Roma Lainez. If you have any questions after today's visit, please call [phone].

## 2018-02-08 NOTE — PROGRESS NOTES
HISTORY OF PRESENT ILLNESS  Ben Quiroz is a 61 y.o. female. HPI   Shoulder Pain   The history is provided by the patient. This is a chronic problem. 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 The problem has not changed since onset. The pain is present in the shoulder. The quality of the pain is described as dull. The pain is at a severity of 8/10. Associated symptoms include limited range of motion. Pertinent negatives include no numbness, no stiffness, no tingling, no itching, no back pain and + neck pain. The symptoms are aggravated by movement and palpation. There has been no history of extremity trauma.    Hypercholestremia  Patient has no personal no family history of  early heart disease with the patient's parents and siblings, aware of patient's last lipoprotein profile, blood sugar, TSH, liver and renal function tests, Patient was given Crestor 10 mg noncompliant stating that he did not like the medication currently not on it patient stating that the Crestor medication were sent her asthmatic states currently on no statin therapy    the patient eats out but not more than average,   and currently, there is no muscle nor abdominal pain, And patient fasting today, in addition to the intake of daily baby aspirin  In addition patient has history of diabetic state type II currently on no diabetic medication fortunately last A1c was at 5.7 percentile which would put the patient into a normal state indicating no diabetic unfortunately patient does not know how she was diagnosed for type II diabetic  Patient currently taking Motrin able to tolerated nicely without any shortness of breath chest pressure has had no rash no swelling, was given [place] which is an nonsteroidal anti-inflammatory pain medication for which she developed shortness of breath otherwise stating that she has been taken Advil and Aleve naproxen ibuprofen Motrin and she never had any adverse reaction toward any of the mentioned medication except for Nabumetone     Current Outpatient Prescriptions   Medication Sig Dispense Refill    ergocalciferol (ERGOCALCIFEROL) 50,000 unit capsule Take 1 Cap by mouth every seven (7) days. Indications: Vitamin D Deficiency 4 Cap 2    fluticasone (FLONASE) 50 mcg/actuation nasal spray 2 sprays nighlty next 8 wks then every other night for 6 wks thereafter as needed 1 Bottle 5    albuterol (PROVENTIL HFA, VENTOLIN HFA, PROAIR HFA) 90 mcg/actuation inhaler Take 1 Puff by inhalation every four (4) hours as needed for Wheezing. 1 Inhaler 6    budesonide-formoterol (SYMBICORT) 160-4.5 mcg/actuation HFA inhaler Take 2 Puffs by inhalation two (2) times a day. 1 Inhaler 5    ibuprofen (ADVIL;MOTRIN) 100 mg/5 mL suspension Take 30 mL every 6 hours as needed for pain/fever x 5 days 3 Bottle 3    MULTIVITS,CA,MINERALS/IRON/FA (MULTI FOR HER PO) Take 1 Tab by mouth daily.  rosuvastatin (CRESTOR) 10 mg tablet Take 1 Tab by mouth nightly. (Patient not taking: Reported on 2/8/2018) 30 Tab 6    famotidine (PEPCID) 40 mg tablet TAKE ONE TABLET BY MOUTH BEFORE BREAKFAST AND BEFORE SUPPER **MAY  TAKE  1  EXTRA  DOSE  FOR  SEVERE  HEARTBURN  IF  NEEDED** (Patient not taking: Reported on 11/29/2017) 70 Tab 0    albuterol (PROVENTIL VENTOLIN) 2.5 mg /3 mL (0.083 %) nebulizer solution 3 mL by Nebulization route once for 1 dose.  1 Each 0     Allergies   Allergen Reactions    Crestor [Rosuvastatin] Shortness of Breath    Nabumetone Shortness of Breath    Codeine Nausea Only    Gabapentin (Bulk) Shortness of Breath    Lyrica [Pregabalin] Swelling     Ankle swelling, foot pain , insomina    Percocet [Oxycodone-Acetaminophen] Other (comments)     loopy     Past Medical History:   Diagnosis Date    Arthritis 8/3/2010    Asthma     Hypercholesterolemia 8/18/2010    Postmenopausal 8/3/2010    Prediabetes 9/12/2013    Primary snoring 11/29/2017    Shoulder pain, bilateral 2/8/2018    Sinusitis, acute 12/12/2011     Past Surgical History:   Procedure Laterality Date    HX GYN  1987    tubal pregancy; laparotomy    HX ORTHOPAEDIC  2006    neck    HX ORTHOPAEDIC  2008    cervical fusion of discs x4    HX PELVIC LAPAROSCOPY  1985    endometriosis    REVISE KNEE JOINT REPLACE,ALL PARTS  02/29/2012    tkr left     Family History   Problem Relation Age of Onset    Hypertension Father     Heart Disease Father      CAD    Diabetes Father     High Cholesterol Father     Other Mother      tumor in chest    Allergic Rhinitis Brother     Asthma Brother     Allergic Rhinitis Child     Sickle Cell Trait Child     GERD Child      Social History   Substance Use Topics    Smoking status: Never Smoker    Smokeless tobacco: Never Used    Alcohol use No      Lab Results  Component Value Date/Time   WBC 5.3 11/09/2017 11:38 AM   HGB 14.5 11/09/2017 11:38 AM   HCT 45.5 11/09/2017 11:38 AM   PLATELET 556 73/76/6103 11:38 AM   MCV 84 11/09/2017 11:38 AM     Lab Results  Component Value Date/Time   Hemoglobin A1c 6.1 (H) 08/26/2013 08:47 AM   Glucose 84 11/09/2017 11:38 AM   Microalb/Creat ratio (ug/mg creat.) 16.6 08/04/2014 12:18 PM   LDL, calculated 173 (H) 11/09/2017 11:38 AM   Creatinine 1.07 (H) 11/09/2017 11:38 AM      Lab Results  Component Value Date/Time   Cholesterol, total 263 (H) 11/09/2017 11:38 AM   HDL Cholesterol 71 11/09/2017 11:38 AM   LDL, calculated 173 (H) 11/09/2017 11:38 AM   Triglyceride 94 11/09/2017 11:38 AM   CHOL/HDL Ratio 4.1 08/18/2010 10:31 AM     Lab Results  Component Value Date/Time   TSH 0.661 10/28/2015 03:36 PM   T4, Total 7.7 08/04/2014 12:18 PM         Review of Systems   Constitutional: Negative for chills, fever and malaise/fatigue. HENT: Negative for congestion and nosebleeds. Eyes: Negative for blurred vision and pain. Respiratory: Negative for shortness of breath and wheezing. Cardiovascular: Negative for chest pain, palpitations and leg swelling. Gastrointestinal: Negative for constipation, diarrhea, nausea and vomiting. Genitourinary: Negative for dysuria and frequency. Musculoskeletal: Positive for joint pain, myalgias and neck pain. Skin: Negative for itching and rash. Neurological: Negative for dizziness, loss of consciousness and headaches. Endo/Heme/Allergies: Does not bruise/bleed easily. Psychiatric/Behavioral: Negative for depression. The patient has insomnia. The patient is not nervous/anxious. All other systems reviewed and are negative. Physical Exam   Constitutional: She is oriented to person, place, and time. She appears well-developed and well-nourished. HENT:   Head: Normocephalic and atraumatic. Eyes: Conjunctivae and EOM are normal.   Neck: Normal range of motion. Neck supple. No JVD present. Cardiovascular: Normal rate, regular rhythm and normal heart sounds. No murmur heard. Pulmonary/Chest: Effort normal and breath sounds normal. No stridor. Abdominal: Soft. Bowel sounds are normal. She exhibits no distension and no mass. There is no tenderness. Musculoskeletal: She exhibits tenderness. She exhibits no edema. Right shoulder: She exhibits decreased range of motion, tenderness, pain and spasm. Left shoulder: She exhibits decreased range of motion, tenderness, pain and spasm. Nl pulses, nl visual inspection nl monoF, +dystrophy and elongated Nails   Lymphadenopathy:     She has no cervical adenopathy. Neurological: She is alert and oriented to person, place, and time. Skin: No erythema. Psychiatric: Her behavior is normal.   Nursing note and vitals reviewed. ASSESSMENT and PLAN  Diagnoses and all orders for this visit:    1.  Chronic pain of both shoulders  -     REFERRAL TO PHYSICAL THERAPY  -     methylPREDNISolone (MEDROL DOSEPACK) 4 mg tablet; As directed for 6 days one package  -     ibuprofen (MOTRIN) 800 mg tablet; Take 1 Tab by mouth every eight (8) hours as needed for Pain.  -     calcium-cholecalciferol, D3, (CALTRATE 600+D) tablet; Take 1 Tab by mouth two (2) times a day. -     GLUCOSAMINE/CHONDR BREWER A SOD (GLUCOSAMINE-CHONDROITIN) 750-600 mg tab; One tab twice daily    2. Diabetes mellitus without complication (HCC)  -     MICROALBUMIN, UR, RAND W/ MICROALBUMIN/CREA RATIO  -     CBC W/O DIFF  -     METABOLIC PANEL, COMPREHENSIVE  -     LIPID PANEL  -     AMB POC HEMOGLOBIN A1C    3. Acute pain of left shoulder  -     REFERRAL TO PHYSICAL THERAPY  -     methylPREDNISolone (MEDROL DOSEPACK) 4 mg tablet; As directed for 6 days one package  -     ibuprofen (MOTRIN) 800 mg tablet; Take 1 Tab by mouth every eight (8) hours as needed for Pain.  -     calcium-cholecalciferol, D3, (CALTRATE 600+D) tablet; Take 1 Tab by mouth two (2) times a day. -     GLUCOSAMINE/CHONDR BREWER A SOD (GLUCOSAMINE-CHONDROITIN) 750-600 mg tab; One tab twice daily    4. Arthritis  -     REFERRAL TO PHYSICAL THERAPY  -     methylPREDNISolone (MEDROL DOSEPACK) 4 mg tablet; As directed for 6 days one package  -     ibuprofen (MOTRIN) 800 mg tablet; Take 1 Tab by mouth every eight (8) hours as needed for Pain.  -     calcium-cholecalciferol, D3, (CALTRATE 600+D) tablet; Take 1 Tab by mouth two (2) times a day. -     GLUCOSAMINE/CHONDR BREWER A SOD (GLUCOSAMINE-CHONDROITIN) 750-600 mg tab; One tab twice daily    5. Type 2 diabetes mellitus with nephropathy (HCC)  -     CBC W/O DIFF  -     METABOLIC PANEL, COMPREHENSIVE  -     LIPID PANEL  -     AMB POC HEMOGLOBIN A1C    6.  Hypercholesterolemia  -     CBC W/O DIFF  -     METABOLIC PANEL, COMPREHENSIVE  -     LIPID PANEL  -     AMB POC HEMOGLOBIN A1C    Patient was provided evidence based informations with the regard of their expected course,  In addition was told to help with weight reduction, shoulder manipulations, massage therapy, exercise therapy:  Patient was also told to remain active but to avoid heavy lifting and pushing at this time for the next 6 weeks which is the time of the recovery for most of theshoulder pain duration of healing. Advised for self cared options such as:  NSAID's and Tylenol for pain, take meds w/ food and water, if develop abdominal upsets, such as heart burn and sour stomach. Please take some OTC antacids or Nexium 30 min before the first meal once daily and watch for discolored stool. 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 Dependency and tolerancy were also addressed,  meds side effects and compliancy advised,  Call or rtc if worsens,   Pt agreed with today's recommendations.

## 2018-02-08 NOTE — PATIENT INSTRUCTIONS
Shoulder Pain: Care Instructions  Your Care Instructions    You can hurt your shoulder by using it too much during an activity, such as fishing or baseball. It can also happen as part of the everyday wear and tear of getting older. Shoulder injuries can be slow to heal, but your shoulder should get better with time. Your doctor may recommend a sling to rest your shoulder. If you have injured your shoulder, you may need testing and treatment. Follow-up care is a key part of your treatment and safety. Be sure to make and go to all appointments, and call your doctor if you are having problems. It's also a good idea to know your test results and keep a list of the medicines you take. How can you care for yourself at home? · Take pain medicines exactly as directed. ¨ If the doctor gave you a prescription medicine for pain, take it as prescribed. ¨ If you are not taking a prescription pain medicine, ask your doctor if you can take an over-the-counter medicine. ¨ Do not take two or more pain medicines at the same time unless the doctor told you to. Many pain medicines contain acetaminophen, which is Tylenol. Too much acetaminophen (Tylenol) can be harmful. · If your doctor recommends that you wear a sling, use it as directed. Do not take it off before your doctor tells you to. · Put ice or a cold pack on the sore area for 10 to 20 minutes at a time. Put a thin cloth between the ice and your skin. · If there is no swelling, you can put moist heat, a heating pad, or a warm cloth on your shoulder. Some doctors suggest alternating between hot and cold. · Rest your shoulder for a few days. If your doctor recommends it, you can then begin gentle exercise of the shoulder, but do not lift anything heavy. When should you call for help? Call 911 anytime you think you may need emergency care. For example, call if:  ? · You have chest pain or pressure. This may occur with:  ¨ Sweating.   ¨ Shortness of breath. ¨ Nausea or vomiting. ¨ Pain that spreads from the chest to the neck, jaw, or one or both shoulders or arms. ¨ Dizziness or lightheadedness. ¨ A fast or uneven pulse. After calling 911, chew 1 adult-strength aspirin. Wait for an ambulance. Do not try to drive yourself. ? · Your arm or hand is cool or pale or changes color. ?Call your doctor now or seek immediate medical care if:  ? · You have signs of infection, such as:  ¨ Increased pain, swelling, warmth, or redness in your shoulder. ¨ Red streaks leading from a place on your shoulder. ¨ Pus draining from an area of your shoulder. ¨ Swollen lymph nodes in your neck, armpits, or groin. ¨ A fever. ? Watch closely for changes in your health, and be sure to contact your doctor if:  ? · You cannot use your shoulder. ? · Your shoulder does not get better as expected. Where can you learn more? Go to http://esteban-tiffani.info/. Enter G638 in the search box to learn more about \"Shoulder Pain: Care Instructions. \"  Current as of: March 21, 2017  Content Version: 11.4  © 2513-5684 [employer]. Care instructions adapted under license by [employer] (which disclaims liability or warranty for this information). If you have questions about a medical condition or this instruction, always ask your healthcare professional. [place] [address] any warranty or liability for your use of this information. Rotator Cuff: Exercises  Your Care Instructions  Here are some examples of typical rehabilitation exercises for your condition. Start each exercise slowly. Ease off the exercise if you start to have pain. Your doctor or physical therapist will tell you when you can start these exercises and which ones will work best for you. How to do the exercises  Pendulum swing    If you have pain in your back, do not do this exercise. 1. Hold on to a table or the back of a chair with your good arm.  Then bend forward a little and let your sore arm hang straight down. This exercise does not use the arm muscles. Rather, use your legs and your hips to create movement that makes your arm swing freely. 2. Use the movement from your hips and legs to guide the slightly swinging arm back and forth like a pendulum (or elephant trunk). Then guide it in circles that start small (about the size of a dinner plate). Make the circles a bit larger each day, as your pain allows. 3. Do this exercise for 5 minutes, 5 to 7 times each day. 4. As you have less pain, try bending over a little farther to do this exercise. This will increase the amount of movement at your shoulder. Posterior stretching exercise    1. Hold the elbow of your injured arm with your other hand. 2. Use your hand to pull your injured arm gently up and across your body. You will feel a gentle stretch across the back of your injured shoulder. 3. Hold for at least 15 to 30 seconds. Then slowly lower your arm. 4. Repeat 2 to 4 times. Up-the-back stretch    Your doctor or physical therapist may want you to wait to do this stretch until you have regained most of your range of motion and strength. You can do this stretch in different ways. Hold any of these stretches for at least 15 to 30 seconds. Repeat them 2 to 4 times. 1. Put your hand in your back pocket. Let it rest there to stretch your shoulder. 2. With your other hand, hold your injured arm (palm outward) behind your back by the wrist. Pull your arm up gently to stretch your shoulder. 3. Next, put a towel over your other shoulder. Put the hand of your injured arm behind your back. Now hold the back end of the towel. With the other hand, hold the front end of the towel in front of your body. Pull gently on the front end of the towel. This will bring your hand farther up your back to stretch your shoulder. Overhead stretch    1. Standing about an arm's length away, grasp onto a solid surface.  You could use a countertop, a doorknob, or the back of a sturdy chair. 2. With your knees slightly bent, bend forward with your arms straight. Lower your upper body, and let your shoulders stretch. 3. As your shoulders are able to stretch farther, you may need to take a step or two backward. 4. Hold for at least 15 to 30 seconds. Then stand up and relax. If you had stepped back during your stretch, step forward so you can keep your hands on the solid surface. 5. Repeat 2 to 4 times. Shoulder flexion (lying down)    To make a wand for this exercise, use a piece of PVC pipe or a broom handle with the broom removed. Make the wand about a foot wider than your shoulders. 1. Lie on your back, holding a wand with both hands. Your palms should face down as you hold the wand. 2. Keeping your elbows straight, slowly raise your arms over your head. Raise them until you feel a stretch in your shoulders, upper back, and chest.  3. Hold for 15 to 30 seconds. 4. Repeat 2 to 4 times. Shoulder rotation (lying down)    To make a wand for this exercise, use a piece of PVC pipe or a broom handle with the broom removed. Make the wand about a foot wider than your shoulders. 1. Lie on your back. Hold a wand with both hands with your elbows bent and palms up. 2. Keep your elbows close to your body, and move the wand across your body toward the sore arm. 3. Hold for 8 to 12 seconds. 4. Repeat 2 to 4 times. Wall climbing (to the side)    Avoid any movement that is straight to your side, and be careful not to arch your back. Your arm should stay about 30 degrees to the front of your side. 1. Stand with your side to a wall so that your fingers can just touch it at an angle about 30 degrees toward the front of your body. 2. Walk the fingers of your injured arm up the wall as high as pain permits. Try not to shrug your shoulder up toward your ear as you move your arm up.   3. Hold that position for a count of at least 15 to 20.  4. Walk your fingers back down to the starting position. 5. Repeat at least 2 to 4 times. Try to reach higher each time. Wall climbing (to the front)    During this stretching exercise, be careful not to arch your back. 1. Face a wall, and stand so your fingers can just touch it. 2. Keeping your shoulder down, walk the fingers of your injured arm up the wall as high as pain permits. (Don't shrug your shoulder up toward your ear.)  3. Hold your arm in that position for at least 15 to 30 seconds. 4. Slowly walk your fingers back down to where you started. 5. Repeat at least 2 to 4 times. Try to reach higher each time. Shoulder blade squeeze    1. Stand with your arms at your sides, and squeeze your shoulder blades together. Do not raise your shoulders up as you squeeze. 2. Hold 6 seconds. 3. Repeat 8 to 12 times. Scapular exercise: Arm reach    1. Lie flat on your back. This exercise is a very slight motion that starts with your arms raised (elbows straight, arms straight). 2. From this position, reach higher toward the [place]ia dolores or ceiling. Keep your elbows straight. All motion should be from your shoulder blade only. 3. Relax your arms back to where you started. 4. Repeat 8 to 12 times. Arm raise to the side    During this strengthening exercise, your arm should stay about 30 degrees to the front of your side. 1. Slowly raise your injured arm to the side, with your thumb facing up. Raise your arm 60 degrees at the most (shoulder level is 90 degrees). 2. Hold the position for 3 to 5 seconds. Then lower your arm back to your side. If you need to, bring your \"good\" arm across your body and place it under the elbow as you lower your injured arm. Use your good arm to keep your injured arm from dropping down too fast.  3. Repeat 8 to 12 times. 4. When you first start out, don't hold any extra weight in your hand. As you get stronger, you may use a 1-pound to 2-pound dumbbell or a small can of food.   Shoulder flexor and extensor exercise    These are isometric exercises. That means you contract your muscles without actually moving. 1. Push forward (flex): Stand facing a wall or doorjamb, about 6 inches or less back. Hold your injured arm against your body. Make a closed fist with your thumb on top. Then gently push your hand forward into the wall with about 25% to 50% of your strength. Don't let your body move backward as you push. Hold for about 6 seconds. Relax for a few seconds. Repeat 8 to 12 times. 2. Push backward (extend): Stand with your back flat against a wall. Your upper arm should be against the wall, with your elbow bent 90 degrees (your hand straight ahead). Push your elbow gently back against the wall with about 25% to 50% of your strength. Don't let your body move forward as you push. Hold for about 6 seconds. Relax for a few seconds. Repeat 8 to 12 times. Scapular exercise: Wall push-ups    This exercise is best done with your fingers somewhat turned out, rather than straight up and down. 1. Stand facing a wall, about 12 inches to 18 inches away. 2. Place your hands on the wall at shoulder height. 3. Slowly bend your elbows and bring your face to the wall. Keep your back and hips straight. 4. Push back to where you started. 5. Repeat 8 to 12 times. 6. When you can do this exercise against a wall comfortably, you can try it against a counter. You can then slowly progress to the end of a couch, then to a sturdy chair, and finally to the floor. Scapular exercise: Retraction    For this exercise, you will need elastic exercise material, such as surgical tubing or Thera-Band. 1. Put the band around a solid object at about waist level. (A bedpost will work well.) Each hand should hold an end of the band. 2. With your elbows at your sides and bent to 90 degrees, pull the band back. Your shoulder blades should move toward each other. Then move your arms back where you started. 3. Repeat 8 to 12 times.   4. If you have good range of motion in your shoulders, try this exercise with your arms lifted out to the sides. Keep your elbows at a 90-degree angle. Raise the elastic band up to about shoulder level. Pull the band back to move your shoulder blades toward each other. Then move your arms back where you started. Internal rotator strengthening exercise    1. Start by tying a piece of elastic exercise material to a doorknob. You can use surgical tubing or Thera-Band. 2. Stand or sit with your shoulder relaxed and your elbow bent 90 degrees. Your upper arm should rest comfortably against your side. Squeeze a rolled towel between your elbow and your body for comfort. This will help keep your arm at your side. 3. Hold one end of the elastic band in the hand of the painful arm. 4. Slowly rotate your forearm toward your body until it touches your belly. Slowly move it back to where you started. 5. Keep your elbow and upper arm firmly tucked against the towel roll or at your side. 6. Repeat 8 to 12 times. External rotator strengthening exercise    1. Start by tying a piece of elastic exercise material to a doorknob. You can use surgical tubing or Thera-Band. (You may also hold one end of the band in each hand.)  2. Stand or sit with your shoulder relaxed and your elbow bent 90 degrees. Your upper arm should rest comfortably against your side. Squeeze a rolled towel between your elbow and your body for comfort. This will help keep your arm at your side. 3. Hold one end of the elastic band with the hand of the painful arm. 4. Start with your forearm across your belly. Slowly rotate the forearm out away from your body. Keep your elbow and upper arm tucked against the towel roll or the side of your body until you begin to feel tightness in your shoulder. Slowly move your arm back to where you started. 5. Repeat 8 to 12 times. Follow-up care is a key part of your treatment and safety.  Be sure to make and go to all appointments, and call your doctor if you are having problems. It's also a good idea to know your test results and keep a list of the medicines you take. Where can you learn more? Go to http://esteban-tiffani.info/. Enter Linnette Hitchcock in the search box to learn more about \"Rotator Cuff: Exercises. \"  Current as of: March 21, 2017  Content Version: 11.4  © 8595-8407 [employer]. Care instructions adapted under license by [employer] (which disclaims liability or warranty for this information). If you have questions about a medical condition or this instruction, always ask your healthcare professional. [address] any warranty or liability for your use of this information.

## 2018-02-09 LAB
ALBUMIN SERPL-MCNC: 4.6 G/DL (ref 3.5–5.5)
ALBUMIN/CREAT UR: 6.2 MG/G CREAT (ref 0–30)
ALBUMIN/GLOB SERPL: 1.7 {RATIO} (ref 1.2–2.2)
ALP SERPL-CCNC: 63 IU/L (ref 39–117)
ALT SERPL-CCNC: 16 IU/L (ref 0–32)
AST SERPL-CCNC: 20 IU/L (ref 0–40)
BILIRUB SERPL-MCNC: 0.5 MG/DL (ref 0–1.2)
BUN SERPL-MCNC: 18 MG/DL (ref 6–24)
BUN/CREAT SERPL: 17 (ref 9–23)
CALCIUM SERPL-MCNC: 9.4 MG/DL (ref 8.7–10.2)
CHLORIDE SERPL-SCNC: 102 MMOL/L (ref 96–106)
CHOLEST SERPL-MCNC: 242 MG/DL (ref 100–199)
CO2 SERPL-SCNC: 23 MMOL/L (ref 18–29)
CREAT SERPL-MCNC: 1.04 MG/DL (ref 0.57–1)
CREAT UR-MCNC: 85.1 MG/DL
ERYTHROCYTE [DISTWIDTH] IN BLOOD BY AUTOMATED COUNT: 15.4 % (ref 12.3–15.4)
GFR SERPLBLD CREATININE-BSD FMLA CKD-EPI: 59 ML/MIN/1.73
GFR SERPLBLD CREATININE-BSD FMLA CKD-EPI: 68 ML/MIN/1.73
GLOBULIN SER CALC-MCNC: 2.7 G/DL (ref 1.5–4.5)
GLUCOSE SERPL-MCNC: 80 MG/DL (ref 65–99)
HCT VFR BLD AUTO: 45.2 % (ref 34–46.6)
HDLC SERPL-MCNC: 65 MG/DL
HGB BLD-MCNC: 14.9 G/DL (ref 11.1–15.9)
INTERPRETATION: NORMAL
LDLC SERPL CALC-MCNC: 159 MG/DL (ref 0–99)
Lab: NORMAL
MCH RBC QN AUTO: 27.4 PG (ref 26.6–33)
MCHC RBC AUTO-ENTMCNC: 33 G/DL (ref 31.5–35.7)
MCV RBC AUTO: 83 FL (ref 79–97)
MICROALBUMIN UR-MCNC: 5.3 UG/ML
PLATELET # BLD AUTO: 217 X10E3/UL (ref 150–379)
POTASSIUM SERPL-SCNC: 4.3 MMOL/L (ref 3.5–5.2)
PROT SERPL-MCNC: 7.3 G/DL (ref 6–8.5)
RBC # BLD AUTO: 5.43 X10E6/UL (ref 3.77–5.28)
SODIUM SERPL-SCNC: 142 MMOL/L (ref 134–144)
TRIGL SERPL-MCNC: 92 MG/DL (ref 0–149)
VLDLC SERPL CALC-MCNC: 18 MG/DL (ref 5–40)
WBC # BLD AUTO: 5.1 X10E3/UL (ref 3.4–10.8)

## 2018-05-30 ENCOUNTER — OFFICE VISIT (OUTPATIENT)
Dept: FAMILY MEDICINE CLINIC | Age: 58
End: 2018-05-30

## 2018-05-30 VITALS
OXYGEN SATURATION: 99 % | SYSTOLIC BLOOD PRESSURE: 136 MMHG | HEIGHT: 62 IN | BODY MASS INDEX: 33.9 KG/M2 | TEMPERATURE: 97.3 F | DIASTOLIC BLOOD PRESSURE: 74 MMHG | HEART RATE: 62 BPM | WEIGHT: 184.2 LBS | RESPIRATION RATE: 16 BRPM

## 2018-05-30 DIAGNOSIS — R73.03 PREDIABETES: Primary | Chronic | ICD-10-CM

## 2018-05-30 DIAGNOSIS — M25.511 CHRONIC PAIN OF BOTH SHOULDERS: ICD-10-CM

## 2018-05-30 DIAGNOSIS — M19.90 ARTHRITIS: ICD-10-CM

## 2018-05-30 DIAGNOSIS — E78.00 HYPERCHOLESTEROLEMIA: ICD-10-CM

## 2018-05-30 DIAGNOSIS — G89.29 CHRONIC PAIN OF BOTH SHOULDERS: ICD-10-CM

## 2018-05-30 DIAGNOSIS — M25.512 CHRONIC PAIN OF BOTH SHOULDERS: ICD-10-CM

## 2018-05-30 PROBLEM — E11.21 TYPE 2 DIABETES MELLITUS WITH NEPHROPATHY (HCC): Status: RESOLVED | Noted: 2018-02-08 | Resolved: 2018-05-30

## 2018-05-30 NOTE — PATIENT INSTRUCTIONS
Body Mass Index: Care Instructions  Your Care Instructions    Body mass index (BMI) can help you see if your weight is raising your risk for health problems. It uses a formula to compare how much you weigh with how tall you are. · A BMI lower than 18.5 is considered underweight. · A BMI between 18.5 and 24.9 is considered healthy. · A BMI between 25 and 29.9 is considered overweight. A BMI of 30 or higher is considered obese. If your BMI is in the normal range, it means that you have a lower risk for weight-related health problems. If your BMI is in the overweight or obese range, you may be at increased risk for weight-related health problems, such as high blood pressure, heart disease, stroke, arthritis or joint pain, and diabetes. If your BMI is in the underweight range, you may be at increased risk for health problems such as fatigue, lower protection (immunity) against illness, muscle loss, bone loss, hair loss, and hormone problems. BMI is just one measure of your risk for weight-related health problems. You may be at higher risk for health problems if you are not active, you eat an unhealthy diet, or you drink too much alcohol or use tobacco products. Follow-up care is a key part of your treatment and safety. Be sure to make and go to all appointments, and call your doctor if you are having problems. It's also a good idea to know your test results and keep a list of the medicines you take. How can you care for yourself at home? · Practice healthy eating habits. This includes eating plenty of fruits, vegetables, whole grains, lean protein, and low-fat dairy. · If your doctor recommends it, get more exercise. Walking is a good choice. Bit by bit, increase the amount you walk every day. Try for at least 30 minutes on most days of the week. · Do not smoke. Smoking can increase your risk for health problems. If you need help quitting, talk to your doctor about stop-smoking programs and medicines. These can increase your chances of quitting for good. · Limit alcohol to 2 drinks a day for men and 1 drink a day for women. Too much alcohol can cause health problems. If you have a BMI higher than 25  · Your doctor may do other tests to check your risk for weight-related health problems. This may include measuring the distance around your waist. A waist measurement of more than 40 inches in men or 35 inches in women can increase the risk of weight-related health problems. · Talk with your doctor about steps you can take to stay healthy or improve your health. You may need to make lifestyle changes to lose weight and stay healthy, such as changing your diet and getting regular exercise. If you have a BMI lower than 18.5  · Your doctor may do other tests to check your risk for health problems. · Talk with your doctor about steps you can take to stay healthy or improve your health. You may need to make lifestyle changes to gain or maintain weight and stay healthy, such as getting more healthy foods in your diet and doing exercises to build muscle. Where can you learn more? Go to http://esteban-tiffani.info/. Enter S176 in the search box to learn more about \"Body Mass Index: Care Instructions. \"  Current as of: October 13, 2016  Content Version: 11.4  © 4073-7502 Healthwise, [employer]orporated. Care instructions adapted under license by [employer] (which disclaims liability or warranty for this information). If you have questions about a medical condition or this instruction, always ask your healthcare professional. [place] [id number] any warranty or liability for your use of this information. Lifestyle Changes for Chronic Health Conditions: Care Instructions  Your Care Instructions    If you have diabetes, heart disease, or blood pressure or cholesterol problems, making healthy lifestyle changes can help.  Changing your diet, getting more exercise, and getting rid of harmful habits can reduce your risk of heart attack, stroke, and other serious health problems. Even small changes can help. Start with steps that you can take right away. Think about things such as time limits, stress, and temptations that might get in the way, and figure out how you can avoid or overcome them. Work with your doctor to plan lifestyle changes to deal with your health problem. Follow-up care is a key part of your treatment and safety. Be sure to make and go to all appointments, and call your doctor if you are having problems. It's also a good idea to know your test results and keep a list of the medicines you take. How can you care for yourself at home? · If your doctor recommends it, get more exercise. Walking is a good choice. Bit by bit, increase the amount you walk every day. Try for at least 30 minutes on most days of the week. You also may want to swim, bike, or do other activities. · Eat a healthy diet. ¨ Choose fruits, vegetables, whole grains, protein, and low-fat dairy foods. ¨ Limit saturated fat and avoid trans fat. ¨ Try to limit how much sodium you eat to less than 2,300 milligrams (mg) a day. · Lose weight if you are overweight. A loss of just 10 pounds can help. The best way to lose weight and keep it off is to exercise on most days, choose healthy foods, and keep portion sizes under control. Aim to lose no more than 1 pound a week. · Do not smoke. Smoking can make most chronic health problems worse. If you need help quitting, talk to your doctor about stop-smoking programs and medicines. These can increase your chances of quitting for good. · Limit alcohol to 2 drinks a day for men and 1 drink a day for women. Too much alcohol can cause health problems. · Take your medicines on time and in the right amounts. Use a pillbox to organize them, and use schedules, alarms, or other tools to help you stay on track. For medicines to work properly, you must take them as directed. Call your doctor if you think you are having a problem with your medicine. · Get your blood pressure checked often. Get a cholesterol test when your doctor tells you to. And keep track of your blood sugar if you have diabetes. · If you have talked about it with your doctor, take a low-dose aspirin every day. Aspirin can help certain people lower their risk of a heart attack or stroke. But taking aspirin isn't right for everyone, because it can cause serious bleeding. Do not start taking daily aspirin unless your doctor knows about it. Where can you learn more? Go to http://estebanDrill Maptiffani.info/. Enter E047 in the search box to learn more about \"Lifestyle Changes for Chronic Health Conditions: Care Instructions. \"  Current as of: May 12, 2017  Content Version: 11.4  © 5904-1373 [employer]. Care instructions adapted under license by [employer] (which disclaims liability or warranty for this information). If you have questions about a medical condition or this instruction, always ask your healthcare professional. [place] [address] any warranty or liability for your use of this information. Learning About Changing a Habit by Setting Goals  How can you change a habit? If you've decided to change a habit-whether it's quitting smoking, lowering your blood pressure, becoming more active, or doing something else to improve your health-congratulations! Making that decision is the first step toward making a change. What happens next? Have a reason. Set goals you can reach. Prepare for slip-ups. And get support. What's your reason? Your reason for wanting to change a habit is really important. Maybe you want to quit smoking so that you can avoid future health problems. Or maybe you want to eat a healthier diet so you can lose weight. If you have high blood pressure, your reason may be clear: to lower your blood pressure.  Maybe you smoke and want to save money on cigarettes. You need to feel ready to make a change. If you don't feel ready now, that's okay. You can still be thinking and planning. When you truly want to make changes, you're ready for the next step. It's not easy to change habits-but you can do it. Taking the time to really think about what will motivate or inspire you will help you reach your goals. How do you set goals? · Focus on small goals. This will help you reach larger goals over time. With smaller goals, you'll have success more often, which will help you stay with it. For example, your large goal may be to lose 50 pounds. Your small goal could be to lose 5.  · Write down your goals. This will help you remember, and you'll have a clearer idea of what you want to achieve. Use a journal or notebook to record your goals. Hang up your plan where you will see it often as a reminder of what you're trying to do. · Make your goals specific. Specific goals help you measure your progress. For example, setting a goal to eat 5 helpings of fruits and vegetables 5 days a week is better than a general goal to \"eat more vegetables. \"  · Focus on one goal at a time. By doing this, you're less likely to feel overwhelmed and then give up. · When you reach a goal, reward yourself. Celebrate your new behavior and success for several days, and then think about setting your next goal.  How can you prepare for slip-ups? It's perfectly normal to try to change a habit, go along fine for a while, and then have a setback. Lots of people try and try again before they reach their goals. What are the things that might cause a setback for you? If you have tried to change a habit before, think about what helped you and what got in your way. By thinking about these barriers now, you can plan ahead for how to deal with them if they happen. There will be times when you slip up and don't make your goal for the week. When that happens, don't get mad at yourself.  Learn from the experience. Ask yourself what got in the way of reaching your goal. Positive thinking goes a long way when you're making lifestyle changes. How can you get support? · Get a partner. It's motivating to know that someone is trying to make the same change that you're making, like being more active or changing your eating habits. You have someone who is counting on you to help him or her succeed. That person can also remind you how far you've come. · Get friends and family involved. They can exercise with you or encourage you by saying how they admire what you are doing. Family members can join you in your healthy eating efforts. Don't be afraid to tell family and friends that their encouragement makes a big difference to you. · Join a class or support group. People in these groups often have some of the same barriers you have. They can give you support when you don't feel like staying with your plan. They can boost your morale when you need a lift. Karlee Mejia also find a number of online support groups. · Encourage yourself. When you feel like giving up, don't waste energy feeling bad about yourself. Remember your reason for wanting to change, think about the progress you've made, and give yourself a pep talk and a pat on the back. · Get professional help. A dietitian can help you make your diet healthier while still allowing you to eat foods that you enjoy. A  or physical therapist can help design an exercise program that is fun and easy to stay on. A counselor, a , or your doctor can help you overcome hurdles, reduce stress, or quit smoking. Where can you learn more? Go to http://esteban-tiffani.info/. Enter U234 in the search box to learn more about \"Learning About Changing a Habit by Setting Goals. \"  Current as of: May 12, 2017  Content Version: 11.4  © 1517-8023 Healthwise, Incorporated.  Care instructions adapted under license by [employer] (which disclaims liability or warranty for this information). If you have questions about a medical condition or this instruction, always ask your healthcare professional. [address] any warranty or liability for your use of this information.

## 2018-05-30 NOTE — MR AVS SNAPSHOT
[address] [facility] [address] [address] [phone] Patient: Bola Berg MRN: [id number] :1960 Visit Information Date & Time Provider Department Dept. Phone Encounter #  
 2018  9:45 AM MD Leander Oseguera [place] [facility] [phone] [id number] Follow-up Instructions Return in about 6 months (around 2018), or if symptoms worsen or fail to improve. Follow-up and Disposition History Your Appointments 2018 10:45 AM  
ROUTINE CARE with MD Leander Oseguera [facility] ([facility]) Appt Note: 6 mnth fu  
 [address] [facility] [address] [address]  
[phone] [id number] Upcoming Health Maintenance Date Due Influenza Age 5 to Adult 2018 EYE EXAM RETINAL OR DILATED Q1 2018 HEMOGLOBIN A1C Q6M 2018 FOOT EXAM Q1 2018 MICROALBUMIN Q1 2019 LIPID PANEL Q1 2019 BREAST CANCER SCRN MAMMOGRAM 2019 PAP AKA CERVICAL CYTOLOGY 5/10/2020 COLONOSCOPY 2025 DTaP/Tdap/Td series (3 - Td) 2027 Allergies as of 2018  Review Complete On: 2018 By: Tiago Harris LPN Severity Noted Reaction Type Reactions Crestor [Rosuvastatin] High 2018    Shortness of Breath Nabumetone High 2011    Shortness of Breath Codeine  2010    Nausea Only  
 Gabapentin (Bulk)  10/24/2011    Shortness of Breath Lyrica [Pregabalin]  10/24/2011    Swelling Ankle swelling, foot pain , insomina Percocet [Oxycodone-acetaminophen]  2010    Other (comments)  
 loopy Current Immunizations  Reviewed on 2017 Name Date Influenza Vaccine 2017, 10/15/2016, 2016, 10/16/2015, 10/9/2014 Influenza Vaccine PF 10/23/2013 Influenza Vaccine Split 10/24/2011 Pneumococcal Vaccine (Unspecified Type) 11/1/2013 TDAP Vaccine 2/20/2012 Tdap 9/27/2017 Not reviewed this visit You Were Diagnosed With   
  
 Codes Comments Prediabetes    -  Primary ICD-10-CM: R73.03 
ICD-9-CM: 790.29 Arthritis     ICD-10-CM: M19.90 ICD-9-CM: 716.90 Hypercholesterolemia     ICD-10-CM: E78.00 ICD-9-CM: 272.0 Chronic pain of both shoulders     ICD-10-CM: M25.511, G89.29, M25.512 ICD-9-CM: 719.41, 338.29 Vitals BP Pulse Temp Resp Height(growth percentile) Weight(growth percentile) 136/74 (BP 1 Location: Left arm, BP Patient Position: At rest) 62 97.3 °F (36.3 °C) (Oral) 16 5' 2\" (1.575 m) 184 lb 3.2 oz (83.6 kg) SpO2 BMI OB Status Smoking Status 99% 33.69 kg/m2 Postmenopausal Never Smoker Vitals History BMI and BSA Data Body Mass Index Body Surface Area  
 33.69 kg/m 2 1.91 m 2 Preferred Pharmacy [facility] [address] [phone] Your Updated Medication List  
  
   
This list is accurate as of 5/30/18 10:51 AM.  Always use your most recent med list.  
  
  
  
  
 * albuterol 2.5 mg /3 mL (0.083 %) nebulizer solution Commonly known as:  PROVENTIL VENTOLIN  
3 mL by Nebulization route once for 1 dose. * albuterol 90 mcg/actuation inhaler Commonly known as:  PROVENTIL HFA, VENTOLIN HFA, PROAIR HFA Take 1 Puff by inhalation every four (4) hours as needed for Wheezing. budesonide-formoterol 160-4.5 mcg/actuation Hfaa Commonly known as:  SYMBICORT Take 2 Puffs by inhalation two (2) times a day. calcium-cholecalciferol (D3) tablet Commonly known as:  CALTRATE 600+D Take 1 Tab by mouth two (2) times a day. ergocalciferol 50,000 unit capsule Commonly known as:  ERGOCALCIFEROL Take 1 Cap by mouth every seven (7) days. Indications: Vitamin D Deficiency  
  
 famotidine 40 mg tablet Commonly known as:  PEPCID  
 TAKE ONE TABLET BY MOUTH BEFORE BREAKFAST AND BEFORE SUPPER **MAY  TAKE  1  EXTRA  DOSE  FOR  SEVERE  HEARTBURN  IF  NEEDED**  
  
 fluticasone 50 mcg/actuation nasal spray Commonly known as:  FLONASE  
2 sprays nighlty next 8 wks then every other night for 6 wks thereafter as needed  
  
 glucosamine-chondroitin 750-600 mg Tab One tab twice daily  
  
 ibuprofen 800 mg tablet Commonly known as:  MOTRIN Take 1 Tab by mouth every eight (8) hours as needed for Pain. MULTI FOR HER PO Take 1 Tab by mouth daily. * Notice: This list has 2 medication(s) that are the same as other medications prescribed for you. Read the directions carefully, and ask your doctor or other care provider to review them with you. Follow-up Instructions Return in about 6 months (around 11/30/2018), or if symptoms worsen or fail to improve. Patient Instructions Body Mass Index: Care Instructions Your Care Instructions Body mass index (BMI) can help you see if your weight is raising your risk for health problems. It uses a formula to compare how much you weigh with how tall you are. · A BMI lower than 18.5 is considered underweight. · A BMI between 18.5 and 24.9 is considered healthy. · A BMI between 25 and 29.9 is considered overweight. A BMI of 30 or higher is considered obese. If your BMI is in the normal range, it means that you have a lower risk for weight-related health problems. If your BMI is in the overweight or obese range, you may be at increased risk for weight-related health problems, such as high blood pressure, heart disease, stroke, arthritis or joint pain, and diabetes. If your BMI is in the underweight range, you may be at increased risk for health problems such as fatigue, lower protection (immunity) against illness, muscle loss, bone loss, hair loss, and hormone problems. BMI is just one measure of your risk for weight-related health problems. You may be at higher risk for health problems if you are not active, you eat an unhealthy diet, or you drink too much alcohol or use tobacco products. Follow-up care is a key part of your treatment and safety. Be sure to make and go to all appointments, and call your doctor if you are having problems. It's also a good idea to know your test results and keep a list of the medicines you take. How can you care for yourself at home? · Practice healthy eating habits. This includes eating plenty of fruits, vegetables, whole grains, lean protein, and low-fat dairy. · If your doctor recommends it, get more exercise. Walking is a good choice. Bit by bit, increase the amount you walk every day. Try for at least 30 minutes on most days of the week. · Do not smoke. Smoking can increase your risk for health problems. If you need help quitting, talk to your doctor about stop-smoking programs and medicines. These can increase your chances of quitting for good. · Limit alcohol to 2 drinks a day for men and 1 drink a day for women. Too much alcohol can cause health problems. If you have a BMI higher than 25 · Your doctor may do other tests to check your risk for weight-related health problems. This may include measuring the distance around your waist. A waist measurement of more than 40 inches in men or 35 inches in women can increase the risk of weight-related health problems. · Talk with your doctor about steps you can take to stay healthy or improve your health. You may need to make lifestyle changes to lose weight and stay healthy, such as changing your diet and getting regular exercise. If you have a BMI lower than 18.5 · Your doctor may do other tests to check your risk for health problems. · Talk with your doctor about steps you can take to stay healthy or improve your health.  You may need to make lifestyle changes to gain or maintain weight and stay healthy, such as getting more healthy foods in your diet and doing exercises to build muscle. Where can you learn more? Go to http://esteban-tiffani.info/. Enter S176 in the search box to learn more about \"Body Mass Index: Care Instructions. \" Current as of: October 13, 2016 Content Version: 11.4 © 2335-9234 [employer]. Care instructions adapted under license by [employer] (which disclaims liability or warranty for this information). If you have questions about a medical condition or this instruction, always ask your healthcare professional. [address] any warranty or liability for your use of this information. Lifestyle Changes for Chronic Health Conditions: Care Instructions Your Care Instructions If you have diabetes, heart disease, or blood pressure or cholesterol problems, making healthy lifestyle changes can help. Changing your diet, getting more exercise, and getting rid of harmful habits can reduce your risk of heart attack, stroke, and other serious health problems. Even small changes can help. Start with steps that you can take right away. Think about things such as time limits, stress, and temptations that might get in the way, and figure out how you can avoid or overcome them. Work with your doctor to plan lifestyle changes to deal with your health problem. Follow-up care is a key part of your treatment and safety. Be sure to make and go to all appointments, and call your doctor if you are having problems. It's also a good idea to know your test results and keep a list of the medicines you take. How can you care for yourself at home? · If your doctor recommends it, get more exercise. Walking is a good choice. Bit by bit, increase the amount you walk every day. Try for at least 30 minutes on most days of the week. You also may want to swim, bike, or do other activities. · Eat a healthy diet.  
¨ Choose fruits, vegetables, whole grains, protein, and low-fat dairy foods. 
¨ Limit saturated fat and avoid trans fat. ¨ Try to limit how much sodium you eat to less than 2,300 milligrams (mg) a day. · Lose weight if you are overweight. A loss of just 10 pounds can help. The best way to lose weight and keep it off is to exercise on most days, choose healthy foods, and keep portion sizes under control. Aim to lose no more than 1 pound a week. · Do not smoke. Smoking can make most chronic health problems worse. If you need help quitting, talk to your doctor about stop-smoking programs and medicines. These can increase your chances of quitting for good. · Limit alcohol to 2 drinks a day for men and 1 drink a day for women. Too much alcohol can cause health problems. · Take your medicines on time and in the right amounts. Use a pillbox to organize them, and use schedules, alarms, or other tools to help you stay on track. For medicines to work properly, you must take them as directed. Call your doctor if you think you are having a problem with your medicine. · Get your blood pressure checked often. Get a cholesterol test when your doctor tells you to. And keep track of your blood sugar if you have diabetes. · If you have talked about it with your doctor, take a low-dose aspirin every day. Aspirin can help certain people lower their risk of a heart attack or stroke. But taking aspirin isn't right for everyone, because it can cause serious bleeding. Do not start taking daily aspirin unless your doctor knows about it. Where can you learn more? Go to http://esteban-tiffani.info/. Enter X282 in the search box to learn more about \"Lifestyle Changes for Chronic Health Conditions: Care Instructions. \" Current as of: May 12, 2017 Content Version: 11.4 © 7339-4440 [employer]. Care instructions adapted under license by [employer] (which disclaims liability or warranty for this information).  If you have questions about a medical condition or this instruction, always ask your healthcare professional. [address] any warranty or liability for your use of this information. Learning About Changing a Habit by Setting Goals How can you change a habit? If you've decided to change a habit-whether it's quitting smoking, lowering your blood pressure, becoming more active, or doing something else to improve your health-congratulations! Making that decision is the first step toward making a change. What happens next? Have a reason. Set goals you can reach. Prepare for slip-ups. And get support. What's your reason? Your reason for wanting to change a habit is really important. Maybe you want to quit smoking so that you can avoid future health problems. Or maybe you want to eat a healthier diet so you can lose weight. If you have high blood pressure, your reason may be clear: to lower your blood pressure. Maybe you smoke and want to save money on cigarettes. You need to feel ready to make a change. If you don't feel ready now, that's okay. You can still be thinking and planning. When you truly want to make changes, you're ready for the next step. It's not easy to change habits-but you can do it. Taking the time to really think about what will motivate or inspire you will help you reach your goals. How do you set goals? · Focus on small goals. This will help you reach larger goals over time. With smaller goals, you'll have success more often, which will help you stay with it. For example, your large goal may be to lose 50 pounds. Your small goal could be to lose 5. 
· Write down your goals. This will help you remember, and you'll have a clearer idea of what you want to achieve. Use a journal or notebook to record your goals. Hang up your plan where you will see it often as a reminder of what you're trying to do. · Make your goals specific. Specific goals help you measure your progress. For example, setting a goal to eat 5 helpings of fruits and vegetables 5 days a week is better than a general goal to \"eat more vegetables. \" 
· Focus on one goal at a time. By doing this, you're less likely to feel overwhelmed and then give up. · When you reach a goal, reward yourself. Celebrate your new behavior and success for several days, and then think about setting your next goal. 
How can you prepare for slip-ups? It's perfectly normal to try to change a habit, go along fine for a while, and then have a setback. Lots of people try and try again before they reach their goals. What are the things that might cause a setback for you? If you have tried to change a habit before, think about what helped you and what got in your way. By thinking about these barriers now, you can plan ahead for how to deal with them if they happen. There will be times when you slip up and don't make your goal for the week. When that happens, don't get mad at yourself. Learn from the experience. Ask yourself what got in the way of reaching your goal. Positive thinking goes a long way when you're making lifestyle changes. How can you get support? · Get a partner. It's motivating to know that someone is trying to make the same change that you're making, like being more active or changing your eating habits. You have someone who is counting on you to help him or her succeed. That person can also remind you how far you've come. · Get friends and family involved. They can exercise with you or encourage you by saying how they admire what you are doing. Family members can join you in your healthy eating efforts. Don't be afraid to tell family and friends that their encouragement makes a big difference to you. · Join a class or support group. People in these groups often have some of the same barriers you have. They can give you support when you don't feel like staying with your plan.  They can boost your morale when you need a Sarika Dali also find a number of online support groups. · Encourage yourself. When you feel like giving up, don't waste energy feeling bad about yourself. Remember your reason for wanting to change, think about the progress you've made, and give yourself a pep talk and a pat on the back. · Get professional help. A dietitian can help you make your diet healthier while still allowing you to eat foods that you enjoy. A  or physical therapist can help design an exercise program that is fun and easy to stay on. A counselor, a , or your doctor can help you overcome hurdles, reduce stress, or quit smoking. Where can you learn more? Go to http://esteban-tiffani.info/. Enter V099 in the search box to learn more about \"Learning About Changing a Habit by Setting Goals. \" Current as of: May 12, 2017 Content Version: 11.4 © 6564-3163 [employer]. Care instructions adapted under license by [employer] (which disclaims liability or warranty for this information). If you have questions about a medical condition or this instruction, always ask your healthcare professional. [place] [address] any warranty or liability for your use of this information. Patient Instructions History Introducing [facility]! [facility]: Thank you for requesting a Vokle account. Our records indicate that you have previously registered for a Vokle account but its currently inactive. Please call our Vokle support line at [phone]. Additional Information If you have questions, please visit the Frequently Asked Questions section of the Vokle website at [email or web address] Remember, Vokle is NOT to be used for urgent needs. For medical emergencies, dial 911. Now available from your iPhone and Android! Please provide this summary of care documentation to your next provider. Your primary care clinician is listed as Josefina Hummel. If you have any questions after today's visit, please call [phone].

## 2018-05-30 NOTE — PROGRESS NOTES
HISTORY OF PRESENT ILLNESS  Cha Tang is a 60 y.o. female. HPI   Shoulder Pain   The history is provided by the patient. This is a chronic problem. Episode onset: few yrs ago, + obese, shehas been doing the ROM info given on the last visit it has been helping,  With hx of prediabetic state, elevated ldl was givne statin but pt not compliant with statin tx, doesnotlike the side effects, +fhx of it, has not been on any diabetic meds, last A1c 5.6, 5.7%%%, Denies any tingling sensation, polyurea and polydipsia,  Diabetes Latest Ref Rng & Units 5/30/2018 2/8/2018 2/8/2018   Hgb A1c (POC) %  5.6      Diabetes Latest Ref Rng & Units 11/29/2017 11/9/2017 11/9/2017   Hgb A1c (POC) %  5.7      Diabetes Latest Ref Rng & Units 5/10/2017 3/16/2017 1/16/2017   Hgb A1c (POC) %        Diabetes Latest Ref Rng & Units 12/29/2016 6/16/2016 4/13/2016   Hgb A1c (POC) %        Diabetes Latest Ref Rng & Units 11/25/2015 11/25/2015 10/28/2015   Hgb A1c (POC) %   5.7     Diabetes Latest Ref Rng & Units 6/24/2015 6/24/2015 3/6/2015   Hgb A1c (POC) %        Diabetes Latest Ref Rng & Units 12/17/2014 9/16/2014 8/4/2014   Hgb A1c (POC) %   5.6%           Current Outpatient Prescriptions   Medication Sig Dispense Refill    ibuprofen (MOTRIN) 800 mg tablet Take 1 Tab by mouth every eight (8) hours as needed for Pain. 18 Tab 0    calcium-cholecalciferol, D3, (CALTRATE 600+D) tablet Take 1 Tab by mouth two (2) times a day. 60 Tab 11    fluticasone (FLONASE) 50 mcg/actuation nasal spray 2 sprays nighlty next 8 wks then every other night for 6 wks thereafter as needed 1 Bottle 5    albuterol (PROVENTIL HFA, VENTOLIN HFA, PROAIR HFA) 90 mcg/actuation inhaler Take 1 Puff by inhalation every four (4) hours as needed for Wheezing. 1 Inhaler 6    budesonide-formoterol (SYMBICORT) 160-4.5 mcg/actuation HFA inhaler Take 2 Puffs by inhalation two (2) times a day.  1 Inhaler 5    MULTIVITS,CA,MINERALS/IRON/FA (MULTI FOR HER PO) Take 1 Tab by mouth daily.      GLUCOSAMINE/CHONDR BREWER A SOD (GLUCOSAMINE-CHONDROITIN) 750-600 mg tab One tab twice daily 60 Tab 6    rosuvastatin (CRESTOR) 10 mg tablet Take 0.5 Tabs by mouth nightly. Indications: prevention of cerebrovascular accident, primary prevention of coronary heart disease (Patient not taking: Reported on 5/30/2018) 30 Tab 6    ergocalciferol (ERGOCALCIFEROL) 50,000 unit capsule Take 1 Cap by mouth every seven (7) days. Indications: Vitamin D Deficiency 4 Cap 2    famotidine (PEPCID) 40 mg tablet TAKE ONE TABLET BY MOUTH BEFORE BREAKFAST AND BEFORE SUPPER **MAY  TAKE  1  EXTRA  DOSE  FOR  SEVERE  HEARTBURN  IF  NEEDED** 70 Tab 0    albuterol (PROVENTIL VENTOLIN) 2.5 mg /3 mL (0.083 %) nebulizer solution 3 mL by Nebulization route once for 1 dose.  1 Each 0     Allergies   Allergen Reactions    Crestor [Rosuvastatin] Shortness of Breath    Nabumetone Shortness of Breath    Codeine Nausea Only    Gabapentin (Bulk) Shortness of Breath    Lyrica [Pregabalin] Swelling     Ankle swelling, foot pain , insomina    Percocet [Oxycodone-Acetaminophen] Other (comments)     loopy     Past Medical History:   Diagnosis Date    Arthritis 8/3/2010    Asthma     Hypercholesterolemia 8/18/2010    Postmenopausal 8/3/2010    Prediabetes 9/12/2013    Primary snoring 11/29/2017    Shoulder pain, bilateral 2/8/2018    Sinusitis, acute 12/12/2011     Past Surgical History:   Procedure Laterality Date    HX GYN  1987    tubal pregancy; laparotomy    HX ORTHOPAEDIC  2006    neck    HX ORTHOPAEDIC  2008    cervical fusion of discs x4    HX PELVIC LAPAROSCOPY  1985    endometriosis    REVISE KNEE JOINT REPLACE,ALL PARTS  02/29/2012    tkr left     Family History   Problem Relation Age of Onset    Hypertension Father     Heart Disease Father      CAD    Diabetes Father     High Cholesterol Father     Other Mother      tumor in chest    Allergic Rhinitis Brother     Asthma Brother     Allergic Rhinitis Child  Sickle Cell Trait Child     GERD Child      Social History   Substance Use Topics    Smoking status: Never Smoker    Smokeless tobacco: Never Used    Alcohol use No      Lab Results  Component Value Date/Time   WBC 5.1 02/08/2018 11:55 AM   HGB 14.9 02/08/2018 11:55 AM   HCT 45.2 02/08/2018 11:55 AM   PLATELET 076 62/46/1613 11:55 AM   MCV 83 02/08/2018 11:55 AM     Lab Results  Component Value Date/Time   Hemoglobin A1c 6.1 (H) 08/26/2013 08:47 AM   Glucose 80 02/08/2018 11:55 AM   Microalb/Creat ratio (ug/mg creat.) 6.2 02/08/2018 11:55 AM   LDL, calculated 159 (H) 02/08/2018 11:55 AM   Creatinine 1.04 (H) 02/08/2018 11:55 AM      Lab Results  Component Value Date/Time   Cholesterol, total 242 (H) 02/08/2018 11:55 AM   HDL Cholesterol 65 02/08/2018 11:55 AM   LDL, calculated 159 (H) 02/08/2018 11:55 AM   Triglyceride 92 02/08/2018 11:55 AM   CHOL/HDL Ratio 4.1 08/18/2010 10:31 AM        Review of Systems   Constitutional: Negative for chills, fever and malaise/fatigue. HENT: Negative for congestion and nosebleeds. Eyes: Negative for blurred vision and pain. Respiratory: Negative for shortness of breath and wheezing. Cardiovascular: Negative for chest pain, palpitations and leg swelling. Gastrointestinal: Negative for constipation, diarrhea, nausea and vomiting. Genitourinary: Negative for dysuria and frequency. Musculoskeletal: Negative for joint pain and myalgias. Skin: Negative for itching and rash. Neurological: Negative for dizziness, loss of consciousness and headaches. Endo/Heme/Allergies: Does not bruise/bleed easily. Psychiatric/Behavioral: Negative for depression. The patient is not nervous/anxious and does not have insomnia. All other systems reviewed and are negative. Physical Exam   Constitutional: She is oriented to person, place, and time. She appears well-developed and well-nourished. HENT:   Head: Normocephalic and atraumatic.    Eyes: Conjunctivae and EOM are normal.   Neck: Normal range of motion. Neck supple. No JVD present. Cardiovascular: Normal rate, regular rhythm and normal heart sounds. No murmur heard. Pulmonary/Chest: Effort normal and breath sounds normal. No stridor. Abdominal: Soft. Bowel sounds are normal. She exhibits no distension and no mass. There is no tenderness. Musculoskeletal: Normal range of motion. She exhibits no edema. Nl pulses, nl visual inspection nl monoF, +dystrophy and elongated Nails   Lymphadenopathy:     She has no cervical adenopathy. Neurological: She is alert and oriented to person, place, and time. Skin: No erythema. Psychiatric: Her behavior is normal.   Nursing note and vitals reviewed. ASSESSMENT and PLAN  Diagnoses and all orders for this visit:    1. Prediabetes    2. Arthritis    3. Hypercholesterolemia    4.  Chronic pain of both shoulders    At this time patient was told to lose weight, so that the current body mass index would get into a close to 25 or between 20-25, patient was told that the patient can achieve this by starting an active life style modifications, working on the diet, increase physical activity, limit alcohol consumption, stop secondhand tobacco exposure,    for which includes creating a an interesting delightful to do list,  such as start of a light physical activity with a brisk daily walking 30 minutes most days of the week, most likely to total of 150 minutes per week, then the patient was told to try to avoid fatty fast foods, have a low-fat low-cholesterol diet, include seafood such as adding fatty fish such as [place], Mackerel, [place] to the diet, increase vegetables and fruits, nuts 3-4 times per week and finally have a low-salt and K rich food intake for a good 4-6 months possibly for ever for the best outcome, patient was told that either a DASH diet or Mediterranean diet but satisfies the need     Routine labs ordered, and the needed abnormal labs will be discussed soon and they can be repeated in 3-6 months. In addition relevant handouts were given to the patient for a better understanding,    patient was told to call if any problems. Patient acknowledged understanding and  agreed with today's recommendations.

## 2018-05-30 NOTE — PROGRESS NOTES
Name and  verified    Chief Complaint   Patient presents with    Shoulder Pain     Both (f/u)         Health Maintenance reviewed-discussed with patient. 1. Have you been to the ER, urgent care clinic since your last visit? Hospitalized since your last visit? No    2. Have you seen or consulted any other health care providers outside of the [address] [place] [address] since your last visit? Include any pap smears or colon screening.  No

## 2018-07-23 ENCOUNTER — OFFICE VISIT (OUTPATIENT)
Dept: FAMILY MEDICINE CLINIC | Age: 58
End: 2018-07-23

## 2018-07-23 ENCOUNTER — TELEPHONE (OUTPATIENT)
Dept: FAMILY MEDICINE CLINIC | Age: 58
End: 2018-07-23

## 2018-07-23 VITALS
DIASTOLIC BLOOD PRESSURE: 69 MMHG | HEART RATE: 70 BPM | SYSTOLIC BLOOD PRESSURE: 111 MMHG | TEMPERATURE: 97.8 F | BODY MASS INDEX: 33.34 KG/M2 | RESPIRATION RATE: 18 BRPM | OXYGEN SATURATION: 95 % | HEIGHT: 62 IN | WEIGHT: 181.2 LBS

## 2018-07-23 DIAGNOSIS — M25.571 ACUTE RIGHT ANKLE PAIN: Primary | ICD-10-CM

## 2018-07-23 DIAGNOSIS — M21.611 BUNION OF GREAT TOE OF RIGHT FOOT: ICD-10-CM

## 2018-07-23 DIAGNOSIS — M20.41 HAMMER TOE OF SECOND TOE OF RIGHT FOOT: ICD-10-CM

## 2018-07-23 RX ORDER — METHYLPREDNISOLONE 4 MG/1
TABLET ORAL
Qty: 1 DOSE PACK | Refills: 0 | Status: SHIPPED | OUTPATIENT
Start: 2018-07-23 | End: 2018-11-29 | Stop reason: ALTCHOICE

## 2018-07-23 RX ORDER — DICLOFENAC POTASSIUM 50 MG/1
50 TABLET, FILM COATED ORAL 3 TIMES DAILY
Qty: 30 TAB | Refills: 0 | Status: SHIPPED | OUTPATIENT
Start: 2018-07-23 | End: 2018-08-02

## 2018-07-23 NOTE — TELEPHONE ENCOUNTER
Received call from pt with c/o left foot and ankle pain and swelling x 1 week,  Stated she fell twice at work,  Unable to bear weight at this time. Taking ibuprofen as needed with no relief,  Ice therapy with no relief.   appt scheduled for [email or web address]

## 2018-07-23 NOTE — PROGRESS NOTES
Name and  verified      Chief Complaint   Patient presents with    Ankle Pain     Right. Patient stated fall twice within two weeks and she stated unable if fall is related to ankle injury. Health Maintenance reviewed-discussed with patient. 1. Have you been to the ER, urgent care clinic since your last visit? Hospitalized since your last visit? No    2. Have you seen or consulted any other health care providers outside of the [address] [place] [address] since your last visit? Include any pap smears or colon screening.  No

## 2018-07-23 NOTE — PATIENT INSTRUCTIONS

## 2018-07-23 NOTE — PROGRESS NOTES
HISTORY OF PRESENT ILLNESS  Callie Crowe is a 60 y.o. female. HPI   rt ankle Pain   The history is provided by the patient. This is a new problem. 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 The problem has not changed since onset. The pain is present in the right knee. The quality of the pain is described as dull. The pain is at a severity of 4/10. Associated symptoms include limited range of motion. Pertinent negatives include no numbness, no stiffness, no tingling, no itching, no back pain and no neck pain. The symptoms are aggravated by movement and palpation. There has been ++ history of extremity trauma. Current Outpatient Prescriptions   Medication Sig Dispense Refill    ibuprofen (MOTRIN) 800 mg tablet Take 1 Tab by mouth every eight (8) hours as needed for Pain. 18 Tab 0    calcium-cholecalciferol, D3, (CALTRATE 600+D) tablet Take 1 Tab by mouth two (2) times a day. 60 Tab 11    GLUCOSAMINE/CHONDR BREWER A SOD (GLUCOSAMINE-CHONDROITIN) 750-600 mg tab One tab twice daily 60 Tab 6    fluticasone (FLONASE) 50 mcg/actuation nasal spray 2 sprays nighlty next 8 wks then every other night for 6 wks thereafter as needed 1 Bottle 5    albuterol (PROVENTIL HFA, VENTOLIN HFA, PROAIR HFA) 90 mcg/actuation inhaler Take 1 Puff by inhalation every four (4) hours as needed for Wheezing. 1 Inhaler 6    budesonide-formoterol (SYMBICORT) 160-4.5 mcg/actuation HFA inhaler Take 2 Puffs by inhalation two (2) times a day.  1 Inhaler 5    MULTIVITS,CA,MINERALS/IRON/FA (MULTI FOR HER PO) Take 1 Tab by mouth daily.  albuterol (PROVENTIL VENTOLIN) 2.5 mg /3 mL (0.083 %) nebulizer solution 3 mL by Nebulization route once for 1 dose. 1 Each 0     Allergies   Allergen Reactions    Crestor [Rosuvastatin] Shortness of Breath    Nabumetone Shortness of Breath    Codeine Nausea Only    Gabapentin (Bulk) Shortness of Breath    Lyrica [Pregabalin] Swelling     Ankle swelling, foot pain , insomina    Percocet [Oxycodone-Acetaminophen] Other (comments)     loopy     Past Medical History:   Diagnosis Date    Acute right ankle pain 7/23/2018    Arthritis 8/3/2010    Asthma     Hypercholesterolemia 8/18/2010    Postmenopausal 8/3/2010    Prediabetes 9/12/2013    Primary snoring 11/29/2017    Shoulder pain, bilateral 2/8/2018    Sinusitis, acute 12/12/2011     Past Surgical History:   Procedure Laterality Date    HX GYN  1987    tubal pregancy; laparotomy    HX ORTHOPAEDIC  2006    neck    HX ORTHOPAEDIC  2008    cervical fusion of discs x4    HX PELVIC LAPAROSCOPY  1985    endometriosis    REVISE KNEE JOINT REPLACE,ALL PARTS  02/29/2012    tkr left     Family History   Problem Relation Age of Onset    Hypertension Father     Heart Disease Father      CAD    Diabetes Father     High Cholesterol Father     Other Mother      tumor in chest    Allergic Rhinitis Brother     Asthma Brother     Allergic Rhinitis Child     Sickle Cell Trait Child     GERD Child      Social History   Substance Use Topics    Smoking status: Never Smoker    Smokeless tobacco: Never Used    Alcohol use No      Lab Results  Component Value Date/Time   WBC 5.1 02/08/2018 11:55 AM   HGB 14.9 02/08/2018 11:55 AM   HCT 45.2 02/08/2018 11:55 AM   PLATELET 669 51/86/5212 11:55 AM   MCV 83 02/08/2018 11:55 AM     Lab Results  Component Value Date/Time   TSH 0.661 10/28/2015 03:36 PM   T4, Total 7.7 08/04/2014 12:18 PM         Review of Systems   Constitutional: Negative for chills and fever.    HENT: Negative for congestion and nosebleeds. Eyes: Negative for blurred vision and pain. Respiratory: Negative for cough, shortness of breath and wheezing. Cardiovascular: Negative for chest pain and leg swelling. Gastrointestinal: Negative for constipation, diarrhea, nausea and vomiting. Genitourinary: Negative for dysuria and frequency. Musculoskeletal: Positive for joint pain. Negative for myalgias. Skin: Negative for itching and rash. Neurological: Negative for dizziness, loss of consciousness and headaches. Psychiatric/Behavioral: Negative for depression. The patient is not nervous/anxious and does not have insomnia. Physical Exam   Constitutional: She is oriented to person, place, and time. She appears well-developed and well-nourished. HENT:   Head: Normocephalic and atraumatic. Eyes: Conjunctivae and EOM are normal. Pupils are equal, round, and reactive to light. Neck: No JVD present. No thyromegaly present. Cardiovascular: Normal rate, regular rhythm, normal heart sounds and intact distal pulses. Exam reveals no gallop and no friction rub. No murmur heard. Pulmonary/Chest: Effort normal and breath sounds normal. No stridor. No respiratory distress. She has no wheezes. She has no rales. Abdominal: Soft. Bowel sounds are normal. She exhibits no distension and no mass. There is no tenderness. Musculoskeletal: Normal range of motion. She exhibits no edema or tenderness. The pain is present in the rt medial Maleollous , no midfoot no lateral aspect of the ankle no base of 5th metatarsal    Lymphadenopathy:     She has no cervical adenopathy. Neurological: She is alert and oriented to person, place, and time. She has normal reflexes. No cranial nerve deficit. Skin: No rash noted. No erythema. Psychiatric: She has a normal mood and affect. Her behavior is normal.   Nursing note and vitals reviewed. ASSESSMENT and PLAN  Diagnoses and all orders for this visit:    1.  Acute right ankle pain  -     diclofenac potassium (CATAFLAM) 50 mg tablet; Take 1 Tab by mouth three (3) times daily for 10 days. -     methylPREDNISolone (MEDROL DOSEPACK) 4 mg tablet; As directed for 6 days one package  -     XR ANKLE RT AP/LAT; Future    2. Hammer toe of second toe of right foot  -     diclofenac potassium (CATAFLAM) 50 mg tablet; Take 1 Tab by mouth three (3) times daily for 10 days. -     methylPREDNISolone (MEDROL DOSEPACK) 4 mg tablet; As directed for 6 days one package  -     XR ANKLE RT AP/LAT; Future    3. Bunion of great toe of right foot  -     diclofenac potassium (CATAFLAM) 50 mg tablet; Take 1 Tab by mouth three (3) times daily for 10 days. -     methylPREDNISolone (MEDROL DOSEPACK) 4 mg tablet; As directed for 6 days one package  -     XR ANKLE RT AP/LAT; Future      Patient was provided evidence based informations with the regard of their expected course,  In addition was told to help with weight reduction, spinal manipulations, massage therapy, exercise therapy:  Patient was also told to remain active but to avoid heavy lifting and pushing at this time for the next 6 weeks   Advised for self cared options such as: NSAID's and Tylenol for pain, take meds w/ food and water, if develop abdominal upsets, such as heart burn and sour stomach. Please take some OTC antacids or Nexium 30 min before the first meal once daily and watch for discolored stool. 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   Dependency and tolerancy were also addressed,  meds side effects and compliancy advised,  Call or rtc if worsens,   Pt agreed with today's recommendations.

## 2018-07-23 NOTE — MR AVS SNAPSHOT
[address] [facility] [place] [address] [address] 
[phone] Patient: Amberly Neff MRN: [id number] :1960 Visit Information Date & Time Provider Department Dept. Phone Encounter #  
 2018  2:30 PM MD Leander Dias [facility] [phone] [id number] Follow-up Instructions Return in about 6 months (around 2019), or if symptoms worsen or fail to improve. Your Appointments 2018 10:45 AM  
ROUTINE CARE with MD Leander Dias [facility] ([facility]) Appt Note: [facility]  
 [address] [facility] [address] [address]  
[phone] [facility] [id number]  
  
    
 2018  9:30 AM  
ROUTINE CARE with MD Leander Dias OFFICE[facility]ANN[facility] ([facility]) Appt Note: [facility]  
 [address] [facility] [address] [address]  
[phone] Upcoming Health Maintenance Date Due Influenza Age 5 to Adult 2018 BREAST CANCER SCRN MAMMOGRAM 2019 PAP AKA CERVICAL CYTOLOGY 5/10/2020 COLONOSCOPY 2025 DTaP/Tdap/Td series (3 - Td) 2027 Allergies as of 2018  Review Complete On: 2018 By: Richardson Herrera MD  
  
 Severity Noted Reaction Type Reactions Crestor [Rosuvastatin] High 2018    Shortness of Breath Nabumetone High 2011    Shortness of Breath Codeine  2010    Nausea Only  
 Gabapentin (Bulk)  10/24/2011    Shortness of Breath Lyrica [Pregabalin]  10/24/2011    Swelling Ankle swelling, foot pain , insomina Percocet [Oxycodone-acetaminophen]  2010    Other (comments)  
 loopy Current Immunizations  Reviewed on 2017 Name Date Influenza Vaccine 2017, 10/15/2016, 2016, 10/16/2015, 10/9/2014 Influenza Vaccine PF 10/23/2013 Influenza Vaccine Split 10/24/2011 Pneumococcal Vaccine (Unspecified Type) 11/1/2013 TDAP Vaccine 2/20/2012 Tdap 9/27/2017 Not reviewed this visit You Were Diagnosed With   
  
 Codes Comments Acute right ankle pain    -  Primary ICD-10-CM: M25.571 ICD-9-CM: 719.47, 338.19 Hammer toe of second toe of right foot     ICD-10-CM: M20.41 ICD-9-CM: 735.4 Bunion of great toe of right foot     ICD-10-CM: M21.611 ICD-9-CM: 727.1 Vitals BP Pulse Temp Resp Height(growth percentile) Weight(growth percentile) 111/69 (BP 1 Location: Left arm, BP Patient Position: At rest) 70 97.8 °F (36.6 °C) (Oral) 18 5' 2\" (1.575 m) 181 lb 3.2 oz (82.2 kg) SpO2 BMI OB Status Smoking Status 95% 33.14 kg/m2 Postmenopausal Never Smoker Vitals History BMI and BSA Data Body Mass Index Body Surface Area  
 33.14 kg/m 2 1.9 m 2 Preferred Pharmacy Pharmacy Name Phone [address], [facility] [phone] Your Updated Medication List  
  
   
This list is accurate as of 7/23/18  3:29 PM.  Always use your most recent med list.  
  
  
  
  
 * albuterol 2.5 mg /3 mL (0.083 %) nebulizer solution Commonly known as:  PROVENTIL VENTOLIN  
3 mL by Nebulization route once for 1 dose. * albuterol 90 mcg/actuation inhaler Commonly known as:  PROVENTIL HFA, VENTOLIN HFA, PROAIR HFA Take 1 Puff by inhalation every four (4) hours as needed for Wheezing. budesonide-formoterol 160-4.5 mcg/actuation Hfaa Commonly known as:  SYMBICORT Take 2 Puffs by inhalation two (2) times a day. calcium-cholecalciferol (D3) tablet Commonly known as:  CALTRATE 600+D Take 1 Tab by mouth two (2) times a day. diclofenac potassium 50 mg tablet Commonly known as:  CATAFLAM  
Take 1 Tab by mouth three (3) times daily for 10 days. fluticasone 50 mcg/actuation nasal spray Commonly known as:  Marie Shames 2 sprays nighlty next 8 wks then every other night for 6 wks thereafter as needed  
  
 glucosamine-chondroitin 750-600 mg Tab One tab twice daily  
  
 methylPREDNISolone 4 mg tablet Commonly known as:  Rom Craig As directed for 6 days one package MULTI FOR HER PO Take 1 Tab by mouth daily. * Notice: This list has 2 medication(s) that are the same as other medications prescribed for you. Read the directions carefully, and ask your doctor or other care provider to review them with you. Prescriptions Sent to Pharmacy Refills  
 diclofenac potassium (CATAFLAM) 50 mg tablet 0 Sig: Take 1 Tab by mouth three (3) times daily for 10 days. Class: Normal  
 Pharmacy: [address] [place] [address], [address] [place] [address] Ph #: [phone] Route: Oral  
 methylPREDNISolone (MEDROL DOSEPACK) 4 mg tablet 0 Sig: As directed for 6 days one package Class: Normal  
 Pharmacy: [address] [place] [address], [address] [place] [address] Ph #: [phone] Follow-up Instructions Return in about 6 months (around 1/23/2019), or if symptoms worsen or fail to improve. To-Do List   
 07/23/2018 Imaging:  XR ANKLE RT AP/LAT Patient Instructions Foot Pain: Care Instructions Your Care Instructions Foot injuries that cause pain and swelling are fairly common. Almost all sports or home repair projects can cause a misstep that ends up as foot pain. Normal wear and tear, especially as you get older, also can cause foot pain. Most minor foot injuries will heal on their own, and home treatment is usually all you need to do. If you have a severe injury, you may need tests and treatment. Follow-up care is a key part of your treatment and safety. Be sure to make and go to all appointments, and call your doctor if you are having problems. It's also a good idea to know your test results and keep a list of the medicines you take. How can you care for yourself at home? · Take pain medicines exactly as directed. ¨ If the doctor gave you a prescription medicine for pain, take it as prescribed. ¨ If you are not taking a prescription pain medicine, ask your doctor if you can take an over-the-counter medicine. · Rest and protect your foot. Take a break from any activity that may cause pain. · Put ice or a cold pack on your foot for 10 to 20 minutes at a time. Put a thin cloth between the ice and your skin. · Prop up the sore foot on a pillow when you ice it or anytime you sit or lie down during the next 3 days. Try to keep it above the level of your heart. This will help reduce swelling. · Your doctor may recommend that you wrap your foot with an elastic bandage. Keep your foot wrapped for as long as your doctor advises. · If your doctor recommends crutches, use them as directed. · Wear roomy footwear. · As soon as pain and swelling end, begin gentle exercises of your foot. Your doctor can tell you which exercises will help. When should you call for help? Call 911 anytime you think you may need emergency care. For example, call if: 
  · Your foot turns pale, white, blue, or cold.  
 Call your doctor now or seek immediate medical care if: 
  · You cannot move or stand on your foot.  
  · Your foot looks twisted or out of its normal position.  
  · Your foot is not stable when you step down.  
  · You have signs of infection, such as: 
¨ Increased pain, swelling, warmth, or redness. ¨ Red streaks leading from the sore area. ¨ Pus draining from a place on your foot. ¨ A fever.  
  · Your foot is numb or tingly.  
 Watch closely for changes in your health, and be sure to contact your doctor if: 
  · You do not get better as expected.  
  · You have bruises from an injury that last longer than 2 weeks. Where can you learn more? Go to http://esteban-tiffani.info/. Enter V863 in the search box to learn more about \"Foot Pain: Care Instructions. \" Current as of: November 29, 2017 Content Version: 11.7 © 5152-5212 [employer]. Care instructions adapted under license by [employer] (which disclaims liability or warranty for this information). If you have questions about a medical condition or this instruction, always ask your healthcare professional. [address] any warranty or liability for your use of this information. Introducing [facility]! Dear Robinson Morfin: Thank you for requesting a [employer] account. Our records indicate that you have previously registered for a [employer] account but its currently inactive. Please call our [employer] support line at [phone]. Additional Information If you have questions, please visit the Frequently Asked Questions section of the [employer] website at [email or web address] Remember, [employer] is NOT to be used for urgent needs. For medical emergencies, dial 911. Now available from your iPhone and Android! Please provide this summary of care documentation to your next provider. Your primary care clinician is listed as Rae Singh. If you have any questions after today's visit, please call [phone].

## 2018-09-18 ENCOUNTER — TELEPHONE (OUTPATIENT)
Dept: FAMILY MEDICINE CLINIC | Age: 58
End: 2018-09-18

## 2018-09-18 ENCOUNTER — HOSPITAL ENCOUNTER (OUTPATIENT)
Dept: GENERAL RADIOLOGY | Age: 58
Discharge: HOME | End: 2018-09-18
Payer: COMMERCIAL

## 2018-09-18 ENCOUNTER — OFFICE VISIT (OUTPATIENT)
Dept: FAMILY MEDICINE CLINIC | Age: 58
End: 2018-09-18

## 2018-09-18 VITALS
TEMPERATURE: 97.3 F | OXYGEN SATURATION: 98 % | DIASTOLIC BLOOD PRESSURE: 82 MMHG | HEART RATE: 63 BPM | RESPIRATION RATE: 16 BRPM | SYSTOLIC BLOOD PRESSURE: 137 MMHG

## 2018-09-18 DIAGNOSIS — M25.532 WRIST PAIN, ACUTE, LEFT: ICD-10-CM
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
## 2018-09-18 DIAGNOSIS — M25.532 WRIST PAIN, ACUTE, LEFT: Primary | ICD-10-CM

## 2018-09-18 PROCEDURE — 73110 X-RAY EXAM OF WRIST: CPT

## 2018-09-18 RX ORDER — TRAMADOL HYDROCHLORIDE 50 MG/1
50 TABLET ORAL
Qty: 20 TAB | Refills: 0 | Status: SHIPPED | OUTPATIENT
Start: 2018-09-18 | End: 2018-11-29 | Stop reason: ALTCHOICE

## 2018-09-18 RX ORDER — IBUPROFEN 800 MG/1
800 TABLET ORAL
Qty: 30 TAB | Refills: 0 | Status: SHIPPED | OUTPATIENT
Start: 2018-09-18 | End: 2018-11-29 | Stop reason: SDUPTHER

## 2018-09-18 NOTE — TELEPHONE ENCOUNTER
Received call from pt with c/o falling down stairs yesterday. C/o left wrist pain. Has gotten worse.    No swelling  requesting appt appt scheduled for [email or web address]

## 2018-09-18 NOTE — PROGRESS NOTES
HISTORY OF PRESENT ILLNESS  Brandon Grigsby is a 60 y.o. female. HPI   Chief Complaint   Patient presents with    Wrist Pain     Sheba Lindsey going down porch steps at home yesterday (9/17/18)   1. Have you been to the ER, urgent care clinic since your last visit? Hospitalized since your last visit? Yes When: 9/12/18 [facility] Reason for visit: xray on ankle  Left wrist Pain from a fall on an outstretched hand  The history is provided by the patient. This is a new problem. Episode onset: 1D ago, + obese, she has difficulty with overhead activity, raising her arm is painfull and the pain awakens her at night,  The problem occurs constantly. The problem has not changed since onset. The quality of the pain is described as dull. The pain is at a severity of 10/10 taken 2 advil and did not touch the pain as she states, pt sates that she never had any adverse rxn again ibuprofen, naproxen nor aspirin at all, . Associated symptoms include limited range of motion. Pertinent negatives include + numbness, ++stiffness, + tingling, no itching, +swelling with bruises no back pain and no neck pain. The symptoms are aggravated by movement and palpation. There has been +++ history of extremity trauma. Current Outpatient Prescriptions   Medication Sig Dispense Refill    calcium-cholecalciferol, D3, (CALTRATE 600+D) tablet Take 1 Tab by mouth two (2) times a day. 60 Tab 11    GLUCOSAMINE/CHONDR BREWER A SOD (GLUCOSAMINE-CHONDROITIN) 750-600 mg tab One tab twice daily 60 Tab 6    fluticasone (FLONASE) 50 mcg/actuation nasal spray 2 sprays nighlty next 8 wks then every other night for 6 wks thereafter as needed 1 Bottle 5    albuterol (PROVENTIL HFA, VENTOLIN HFA, PROAIR HFA) 90 mcg/actuation inhaler Take 1 Puff by inhalation every four (4) hours as needed for Wheezing. 1 Inhaler 6    budesonide-formoterol (SYMBICORT) 160-4.5 mcg/actuation HFA inhaler Take 2 Puffs by inhalation two (2) times a day.  1 Inhaler 5    MULTIVITS,CA,MINERALS/IRON/FA (MULTI FOR HER PO) Take 1 Tab by mouth daily.  methylPREDNISolone (MEDROL DOSEPACK) 4 mg tablet As directed for 6 days one package 1 Dose Pack 0    albuterol (PROVENTIL VENTOLIN) 2.5 mg /3 mL (0.083 %) nebulizer solution 3 mL by Nebulization route once for 1 dose.  1 Each 0     Allergies   Allergen Reactions    Crestor [Rosuvastatin] Shortness of Breath    Nabumetone Shortness of Breath    Codeine Nausea Only    Gabapentin (Bulk) Shortness of Breath    Lyrica [Pregabalin] Swelling     Ankle swelling, foot pain , insomina    Percocet [Oxycodone-Acetaminophen] Other (comments)     loopy     Past Medical History:   Diagnosis Date    Acute right ankle pain 7/23/2018    Arthritis 8/3/2010    Asthma     Bunion of great toe of right foot 7/23/2018    Fall at home, initial encounter 9/18/2018    Hammer toe of second toe of right foot 7/23/2018    Hypercholesterolemia 8/18/2010    Postmenopausal 8/3/2010    Prediabetes 9/12/2013    Primary snoring 11/29/2017    Shoulder pain, bilateral 2/8/2018    Sinusitis, acute 12/12/2011    Wrist pain, acute, left 9/18/2018     Past Surgical History:   Procedure Laterality Date    HX GYN  1987    tubal pregancy; laparotomy    HX ORTHOPAEDIC  2006    neck    HX ORTHOPAEDIC  2008    cervical fusion of discs x4    HX PELVIC LAPAROSCOPY  1985    endometriosis    REVISE KNEE JOINT REPLACE,ALL PARTS  02/29/2012    tkr left     Family History   Problem Relation Age of Onset    Hypertension Father     Heart Disease Father      CAD    Diabetes Father     High Cholesterol Father     Other Mother      tumor in chest    Allergic Rhinitis Brother     Asthma Brother     Allergic Rhinitis Child     Sickle Cell Trait Child     GERD Child      Social History   Substance Use Topics    Smoking status: Never Smoker    Smokeless tobacco: Never Used    Alcohol use No      Lab Results  Component Value Date/Time   WBC 5.1 02/08/2018 11:55 AM   HGB 14.9 02/08/2018 11:55 AM   HCT 45.2 02/08/2018 11:55 AM   PLATELET 586 98/45/4359 11:55 AM   MCV 83 02/08/2018 11:55 AM     Lab Results  Component Value Date/Time   ALT (SGPT) 16 02/08/2018 11:55 AM   AST (SGOT) 20 02/08/2018 11:55 AM   Alk. phosphatase 63 02/08/2018 11:55 AM   Bilirubin, total 0.5 02/08/2018 11:55 AM   Albumin 4.6 02/08/2018 11:55 AM   Protein, total 7.3 02/08/2018 11:55 AM   PLATELET 716 23/73/6468 11:55 AM          Review of Systems   Constitutional: Negative for chills and fever. HENT: Negative for congestion and nosebleeds. Eyes: Negative for blurred vision and pain. Respiratory: Negative for cough, shortness of breath and wheezing. Cardiovascular: Negative for chest pain and leg swelling. Gastrointestinal: Negative for constipation, diarrhea, nausea and vomiting. Genitourinary: Negative for dysuria and frequency. Musculoskeletal: Negative for joint pain and myalgias. Skin: Negative for itching and rash. Neurological: Negative for dizziness, loss of consciousness and headaches. Psychiatric/Behavioral: Negative for depression. The patient is not nervous/anxious and does not have insomnia. Physical Exam   Constitutional: She is oriented to person, place, and time. She appears well-developed and well-nourished. HENT:   Head: Normocephalic and atraumatic. Eyes: Conjunctivae and EOM are normal. Pupils are equal, round, and reactive to light. Neck: No JVD present. No thyromegaly present. Cardiovascular: Normal rate, regular rhythm, normal heart sounds and intact distal pulses. Exam reveals no gallop and no friction rub. No murmur heard. Pulmonary/Chest: Effort normal and breath sounds normal. No stridor. No respiratory distress. She has no wheezes. She has no rales. Abdominal: Soft. Bowel sounds are normal. She exhibits no distension and no mass. There is no tenderness. Musculoskeletal: She exhibits edema and tenderness.         Left wrist: She exhibits decreased range of motion, tenderness, bony tenderness and swelling. Neg sniff box   Lymphadenopathy:     She has no cervical adenopathy. Neurological: She is alert and oriented to person, place, and time. She has normal reflexes. No cranial nerve deficit. Skin: No rash noted. No erythema. Psychiatric: She has a normal mood and affect. Her behavior is normal.   Nursing note and vitals reviewed. ASSESSMENT and PLAN  Diagnoses and all orders for this visit:    1. Wrist pain, acute, left  -     XR WRIST LT AP/LAT/OBL MIN 3V; Future  -     ibuprofen (MOTRIN) 800 mg tablet; Take 1 Tab by mouth every eight (8) hours as needed for Pain. -     traMADol (ULTRAM) 50 mg tablet; Take 1 Tab by mouth every eight (8) hours as needed for Pain. Max Daily Amount: 150 mg. Indications: Pain    2. Fall at home, initial encounter  -     XR WRIST LT AP/LAT/OBL MIN 3V; Future  -     ibuprofen (MOTRIN) 800 mg tablet; Take 1 Tab by mouth every eight (8) hours as needed for Pain. -     traMADol (ULTRAM) 50 mg tablet; Take 1 Tab by mouth every eight (8) hours as needed for Pain. Max Daily Amount: 150 mg. Indications: Pain      Patient was provided evidence based informations with the regard of their expected course,  In addition was told to help with weight reduction, , massage therapy, exercise therapy:  Patient was also told to remain active but to avoid heavy lifting and pushing at this time for the next 6 weeks which is the time of the recovery  Advised for self cared options such as: 1. NSAID's and Tylenol for pain, take meds w/ food and water, if develop abdominal upsets, such as heart burn and sour stomach. Please take some OTC antacids or Nexium 30 min before the first meal once daily and watch for discolored stool. Also do the exercise therapy:  Ice therapy 2-30min tid daily, daily stretching x2 daily for 5-10 min, rom strengthening with resistance banding 3-4 times per week  Most of the how to do informations printed and handed out to the patient,   and finally the stool softner if opioid base meds given, in addition, pt was told to avoid machinary operation and driving while on any opioid based medications that will cause dizziness, drowsiness, and sleepiness. Dependency and tolerancy were also addressed,  meds side effects and compliancy advised,  Call or rtc if worsens,   Pt agreed with today's recommendations.

## 2018-09-18 NOTE — PATIENT INSTRUCTIONS
Learning About RICE (Rest, Ice, Compression, and Elevation)  What is RICE? RICE is a way to care for an injury. RICE helps relieve pain and swelling. It may also help with healing and flexibility. RICE stands for:  · Rest and protect the injured or sore area. · Ice or a cold pack used as soon as possible. · Compression, or wrapping the injured or sore area with an elastic bandage. · Elevation (propping up) the injured or sore area. How do you do RICE? You can use RICE for home treatment when you have general aches and pains or after an injury or surgery. Rest  · Do not put weight on the injury for at least 24 to 48 hours. · Use crutches for a badly sprained knee or ankle. · Support a sprained wrist, elbow, or shoulder with a sling. Ice  · Put ice or a cold pack on the injury right away to reduce pain and swelling. Frozen vegetables will also work as an ice pack. Put a thin cloth between the ice or cold pack and your skin. The cloth protects the injured area from getting too cold. · Use ice for 10 to 15 minutes at a time for the first 48 to 72 hours. Compression  · Use compression for sprains, strains, and surgeries of the arms and legs. · Wrap the injured area with an elastic bandage or compression sleeve to reduce swelling. · Don't wrap it too tightly. If the area below it feels numb, tingles, or feels cool, loosen the wrap. Elevation  · Use elevation for areas of the body that can be propped up, such as arms and legs. · Prop up the injured area on pillows whenever you use ice. Keep it propped up anytime you sit or lie down. · Try to keep the injured area at or above the level of your heart. This will help reduce swelling and bruising. Where can you learn more? Go to http://esetban-tiffani.info/. Enter M506 in the search box to learn more about \"Learning About RICE (Rest, Ice, Compression, and Elevation). \"  Current as of: November 29, 2017  Content Version: 11.7  © 2609-3056 [employer]. Care instructions adapted under license by [employer] (which disclaims liability or warranty for this information). If you have questions about a medical condition or this instruction, always ask your healthcare professional. [place] [address] any warranty or liability for your use of this information.

## 2018-09-18 NOTE — PROGRESS NOTES
Chief Complaint   Patient presents with    Wrist Pain     Isaiah Romano going down porch steps at home yesterday (9/17/18)     1. Have you been to the ER, urgent care clinic since your last visit? Hospitalized since your last visit? Yes When: 9/12/18 [facility] Reason for visit: xray on ankle    2. Have you seen or consulted any other health care providers outside of the [address] [place] [address] since your last visit? Include any pap smears or colon screening.  No

## 2018-09-18 NOTE — MR AVS SNAPSHOT
[address] [facility] [employer] [phone] 
[phone] Patient: Naomi Shah MRN: [id number] :1960 Visit Information Date & Time Provider Department Dept. Phone Encounter #  
 2018 12:00 PM Suzan Man MD [facility] OFFICE-ANNEX [phone] [id number] Follow-up Instructions Return in about 3 months (around 2018), or if symptoms worsen or fail to improve. Follow-up and Disposition History Your Appointments 2018  9:30 AM  
ROUTINE CARE with Suzan Man MD  
[address] [facility] Appt Note: 6 mnth fu  
 [address] [facility] [phone]  
[phone] [phone]  
  
    
 2019 10:30 AM  
ROUTINE CARE with Suzan Man MD  
[address] [place] [address] OFFICE-ANNEX ([facility]) Appt Note: 6 mo f/u  
 [address] [facility] [phone]  
[phone] Upcoming Health Maintenance Date Due Influenza Age 5 to Adult 10/26/2018* BREAST CANCER SCRN MAMMOGRAM 2019 PAP AKA CERVICAL CYTOLOGY 5/10/2020 COLONOSCOPY 2025 DTaP/Tdap/Td series (3 - Td) 2027 *Topic was postponed. The date shown is not the original due date. Allergies as of 2018  Review Complete On: 2018 By: Suzan Man MD  
  
 Severity Noted Reaction Type Reactions Crestor [Rosuvastatin] High 2018    Shortness of Breath Nabumetone High 2011    Shortness of Breath Codeine  2010    Nausea Only  
 Gabapentin (Bulk)  10/24/2011    Shortness of Breath Lyrica [Pregabalin]  10/24/2011    Swelling Ankle swelling, foot pain , insomina Percocet [Oxycodone-acetaminophen]  2010    Other (comments)  
 loopy Current Immunizations  Reviewed on 2017 Name Date Influenza Vaccine 9/27/2017, 10/15/2016, 9/30/2016, 10/16/2015, 10/9/2014 Influenza Vaccine PF 10/23/2013 Influenza Vaccine Split 10/24/2011 Pneumococcal Vaccine (Unspecified Type) 11/1/2013 TDAP Vaccine 2/20/2012 Tdap 9/27/2017 Not reviewed this visit You Were Diagnosed With   
  
 Codes Comments Wrist pain, acute, left    -  Primary ICD-10-CM: C93.199 ICD-9-CM: 719.43 Fall at home, initial encounter     ICD-10-CM: W19. 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  
  
   
This list is accurate as of 9/18/18  1:01 PM.  Always use your most recent med list.  
  
  
  
  
 * albuterol 2.5 mg /3 mL (0.083 %) nebulizer solution Commonly known as:  PROVENTIL VENTOLIN  
3 mL by Nebulization route once for 1 dose. * albuterol 90 mcg/actuation inhaler Commonly known as:  PROVENTIL HFA, VENTOLIN HFA, PROAIR HFA Take 1 Puff by inhalation every four (4) hours as needed for Wheezing. budesonide-formoterol 160-4.5 mcg/actuation Hfaa Commonly known as:  SYMBICORT Take 2 Puffs by inhalation two (2) times a day. calcium-cholecalciferol (D3) tablet Commonly known as:  CALTRATE 600+D Take 1 Tab by mouth two (2) times a day. fluticasone 50 mcg/actuation nasal spray Commonly known as:  FLONASE  
2 sprays nighlty next 8 wks then every other night for 6 wks thereafter as needed glucosamine-chondroitin 750-600 mg Tab One tab twice daily  
  
 ibuprofen 800 mg tablet Commonly known as:  MOTRIN Take 1 Tab by mouth every eight (8) hours as needed for Pain. methylPREDNISolone 4 mg tablet Commonly known as:  Zia Labella As directed for 6 days one package MULTI FOR HER PO Take 1 Tab by mouth daily. traMADol 50 mg tablet Commonly known as:  ULTRAM  
Take 1 Tab by mouth every eight (8) hours as needed for Pain. Max Daily Amount: 150 mg. Indications: Pain * Notice: This list has 2 medication(s) that are the same as other medications prescribed for you. Read the directions carefully, and ask your doctor or other care provider to review them with you. Prescriptions Printed Refills  
 ibuprofen (MOTRIN) 800 mg tablet 0 Sig: Take 1 Tab by mouth every eight (8) hours as needed for Pain. Class: Print Route: Oral  
 traMADol (ULTRAM) 50 mg tablet 0 Sig: Take 1 Tab by mouth every eight (8) hours as needed for Pain. Max Daily Amount: 150 mg. Indications: Pain Class: Print Route: Oral  
  
Follow-up Instructions Return in about 3 months (around 12/18/2018), or if symptoms worsen or fail to improve. To-Do List   
 09/18/2018 Imaging:  XR WRIST LT AP/LAT/OBL MIN 3V Patient Instructions Learning About RICE (Rest, Ice, Compression, and Elevation) What is RICE? RICE is a way to care for an injury. RICE helps relieve pain and swelling. It may also help with healing and flexibility. RICE stands for: · Rest and protect the injured or sore area. · Ice or a cold pack used as soon as possible. · Compression, or wrapping the injured or sore area with an elastic bandage. · Elevation (propping up) the injured or sore area. How do you do RICE? You can use RICE for home treatment when you have general aches and pains or after an injury or surgery.  
Rest 
 · Do not put weight on the injury for at least 24 to 48 hours. · Use crutches for a badly sprained knee or ankle. · Support a sprained wrist, elbow, or shoulder with a sling. Ice · Put ice or a cold pack on the injury right away to reduce pain and swelling. Frozen vegetables will also work as an ice pack. Put a thin cloth between the ice or cold pack and your skin. The cloth protects the injured area from getting too cold. · Use ice for 10 to 15 minutes at a time for the first 48 to 72 hours. Compression · Use compression for sprains, strains, and surgeries of the arms and legs. · Wrap the injured area with an elastic bandage or compression sleeve to reduce swelling. · Don't wrap it too tightly. If the area below it feels numb, tingles, or feels cool, loosen the wrap. Elevation · Use elevation for areas of the body that can be propped up, such as arms and legs. · Prop up the injured area on pillows whenever you use ice. Keep it propped up anytime you sit or lie down. · Try to keep the injured area at or above the level of your heart. This will help reduce swelling and bruising. Where can you learn more? Go to http://esteban-tiffani.info/. Enter C451 in the search box to learn more about \"Learning About RICE (Rest, Ice, Compression, and Elevation). \" 
Current as of: November 29, 2017 Content Version: 11.7 © 0900-2418 [employer]. Care instructions adapted under license by Learn[employer]est (which disclaims liability or warranty for this information). If you have questions about a medical condition or this instruction, always ask your healthcare professional. [place] [address] any warranty or liability for your use of this information. Introducing \[facility] [place] [facility]\"" & HEALTH SERVICES! Dear Marina Ray: Thank you for requesting a BeOnDesk account.   Our records indicate that you have previously registered for a BeOn[employer]k account but its currently inactive. Please call our [employer] support line at [phone]. Additional Information If you have questions, please visit the Frequently Asked Questions section of the Mongo[employer] website at [email or web address] [email or web address]. [email or web address] Remember, [employer] is NOT to be used for urgent needs. For medical emergencies, dial 911. Now available from your iPhone and Android! Please provide this summary of care documentation to your next provider. Your primary care clinician is listed as Sofi Traore. If you have any questions after today's visit, please call [phone].

## 2018-11-29 ENCOUNTER — OFFICE VISIT (OUTPATIENT)
Dept: FAMILY MEDICINE CLINIC | Age: 58
End: 2018-11-29

## 2018-11-29 VITALS
DIASTOLIC BLOOD PRESSURE: 73 MMHG | HEART RATE: 73 BPM | WEIGHT: 181.3 LBS | BODY MASS INDEX: 33.36 KG/M2 | RESPIRATION RATE: 18 BRPM | OXYGEN SATURATION: 97 % | SYSTOLIC BLOOD PRESSURE: 125 MMHG | HEIGHT: 62 IN | TEMPERATURE: 97.4 F

## 2018-11-29 DIAGNOSIS — Z23 ENCOUNTER FOR IMMUNIZATION: ICD-10-CM

## 2018-11-29 DIAGNOSIS — E78.00 HYPERCHOLESTEROLEMIA: ICD-10-CM

## 2018-11-29 DIAGNOSIS — G89.29 CHRONIC PAIN OF RIGHT ANKLE: Primary | ICD-10-CM

## 2018-11-29 DIAGNOSIS — M21.961 ANKLE DEFORMITY, RIGHT: ICD-10-CM

## 2018-11-29 DIAGNOSIS — M25.571 CHRONIC PAIN OF RIGHT ANKLE: Primary | ICD-10-CM

## 2018-11-29 RX ORDER — IBUPROFEN 800 MG/1
800 TABLET ORAL
Qty: 30 TAB | Refills: 0
Start: 2018-11-29 | End: 2020-06-09 | Stop reason: ALTCHOICE

## 2018-11-29 NOTE — PATIENT INSTRUCTIONS
Body Mass Index: Care Instructions Your Care Instructions Body mass index (BMI) can help you see if your weight is raising your risk for health problems. It uses a formula to compare how much you weigh with how tall you are. · A BMI lower than 18.5 is considered underweight. · A BMI between 18.5 and 24.9 is considered healthy. · A BMI between 25 and 29.9 is considered overweight. A BMI of 30 or higher is considered obese. If your BMI is in the normal range, it means that you have a lower risk for weight-related health problems. If your BMI is in the overweight or obese range, you may be at increased risk for weight-related health problems, such as high blood pressure, heart disease, stroke, arthritis or joint pain, and diabetes. If your BMI is in the underweight range, you may be at increased risk for health problems such as fatigue, lower protection (immunity) against illness, muscle loss, bone loss, hair loss, and hormone problems. BMI is just one measure of your risk for weight-related health problems. You may be at higher risk for health problems if you are not active, you eat an unhealthy diet, or you drink too much alcohol or use tobacco products. Follow-up care is a key part of your treatment and safety. Be sure to make and go to all appointments, and call your doctor if you are having problems. It's also a good idea to know your test results and keep a list of the medicines you take. How can you care for yourself at home? · Practice healthy eating habits. This includes eating plenty of fruits, vegetables, whole grains, lean protein, and low-fat dairy. · If your doctor recommends it, get more exercise. Walking is a good choice. Bit by bit, increase the amount you walk every day. Try for at least 30 minutes on most days of the week. · Do not smoke. Smoking can increase your risk for health problems.  If you need help quitting, talk to your doctor about stop-smoking programs and medicines. These can increase your chances of quitting for good. · Limit alcohol to 2 drinks a day for men and 1 drink a day for women. Too much alcohol can cause health problems. If you have a BMI higher than 25 · Your doctor may do other tests to check your risk for weight-related health problems. This may include measuring the distance around your waist. A waist measurement of more than 40 inches in men or 35 inches in women can increase the risk of weight-related health problems. · Talk with your doctor about steps you can take to stay healthy or improve your health. You may need to make lifestyle changes to lose weight and stay healthy, such as changing your diet and getting regular exercise. If you have a BMI lower than 18.5 · Your doctor may do other tests to check your risk for health problems. · Talk with your doctor about steps you can take to stay healthy or improve your health. You may need to make lifestyle changes to gain or maintain weight and stay healthy, such as getting more healthy foods in your diet and doing exercises to build muscle. Where can you learn more? Go to http://esteban-tiffani.info/. Enter S176 in the search box to learn more about \"Body Mass Index: Care Instructions. \" Current as of: October 13, 2016 Content Version: 11.4 © 6455-2594 [employer]. Care instructions adapted under license by [employer]m (which disclaims liability or warranty for this information). If you have questions about a medical condition or this instruction, always ask your healthcare professional. [address] any warranty or liability for your use of this information. Ankle Sprain: Rehab Exercises Your Care Instructions Here are some examples of typical rehabilitation exercises for your condition. Start each exercise slowly. Ease off the exercise if you start to have pain. Learning About High Cholesterol What is high cholesterol? Cholesterol is a type of fat in your blood. It is needed for many body functions, such as making new cells. Cholesterol is made by your body. It also comes from food you eat. If you have too much cholesterol, it starts to build up in your arteries. This is called hardening of the arteries, or atherosclerosis. High cholesterol raises your risk of a heart attack and stroke. There are different types of cholesterol. LDL is the \"bad\" cholesterol. High LDL can raise your risk for heart disease, heart attack, and stroke. HDL is the \"good\" cholesterol. High HDL is linked with a lower risk for heart disease, heart attack, and stroke. Your cholesterol levels help your doctor find out your risk for having a heart attack or stroke. How can you prevent high cholesterol? A heart-healthy lifestyle can help you prevent high cholesterol. This lifestyle helps lower your risk for a heart attack and stroke. · Eat heart-healthy foods. ? Eat fruits, vegetables, whole grains (like oatmeal), dried beans and peas, nuts and seeds, soy products (like tofu), and fat-free or low-fat dairy products. ? Replace butter, margarine, and hydrogenated or partially hydrogenated oils with olive and canola oils. (Canola oil margarine without trans fat is fine.) ? Replace red meat with fish, poultry, and soy protein (like tofu). ? Limit processed and packaged foods like chips, crackers, and cookies. · Be active. Exercise can improve your cholesterol level. Get at least 30 minutes of exercise on most days of the week. Walking is a good choice. You also may want to do other activities, such as running, swimming, cycling, or playing tennis or team sports. · Stay at a healthy weight. Lose weight if you need to. · Don't smoke. If you need help quitting, talk to your doctor about stop-smoking programs and medicines. These can increase your chances of quitting for good. How is high cholesterol treated? The goal of treatment is to reduce your chances of having a heart attack or stroke. The goal is not to lower your cholesterol numbers only. · You may make lifestyle changes, such as eating healthy foods, not smoking, losing weight, and being more active. · You may have to take medicine. Follow-up care is a key part of your treatment and safety. Be sure to make and go to all appointments, and call your doctor if you are having problems. It's also a good idea to know your test results and keep a list of the medicines you take. Where can you learn more? Go to http://estebanU*tiquetiffani.info/. Enter D119 in the search box to learn more about \"Learning About High Cholesterol. \" Current as of: December 6, 2017 Content Version: 11.8 © 9235-3950 [employer]. Care instructions adapted under license by [employer] (which disclaims liability or warranty for this information). If you have questions about a medical condition or this instruction, always ask your healthcare professional. [place] [address] any warranty or liability for your use of this information. Your doctor or physical therapist will tell you when you can start these exercises and which ones will work best for you. How to do the exercises \"Alphabet\" exercise 1. Trace the alphabet with your toe. This helps your ankle move in all directions. Side-to-side knee swing exercise 1. Sit in a chair with your foot flat on the floor. 2. Slowly move your knee from side to side. Keep your foot pressed flat. 3. Continue this exercise for 2 to 3 minutes. Towel curl 1. While sitting, place your foot on a towel on the floor. Scrunch the towel toward you with your toes. 2. Then use your toes to push the towel away from you. 3. To make this exercise more challenging you can put something on the other end of the towel. A can of soup is about the right weight for this. Towel stretch 1. Sit with your legs extended and knees straight. 2. Place a towel around your foot just under the toes. 3. Hold each end of the towel in each hand, with your hands above your knees. 4. Pull back with the towel so that your foot stretches toward you. 5. Hold the position for at least 15 to 30 seconds. 6. Repeat 2 to 4 times a session. Do up to 5 sessions a day. Ankle eversion exercise 1. Start by sitting with your foot flat on the floor. Push your foot outward against a wall or a piece of furniture that doesn't move. Hold for about 6 seconds, and relax. Repeat 8 to 12 times. 2. After you feel comfortable with this, try using rubber tubing looped around the outside of your feet for resistance. Push your foot out to the side against the tubing, and then count to 10 as you slowly bring your foot back to the middle. Repeat 8 to 12 times. Isometric opposition exercises 1. While sitting, put your feet together flat on the floor. 2. Press your injured foot inward against your other foot. Hold for about 6 seconds, and relax. Repeat 8 to 12 times. 3. Then place the heel of your other foot on top of the injured one. Push down with the top heel while trying to push up with your injured foot. Hold for about 6 seconds, and relax. Repeat 8 to 12 times. Resisted ankle inversion 1. Sit on the floor with your good leg crossed over your other leg. 2. Hold both ends of an exercise band and loop the band around the inside of your affected foot. Then press your other foot against the band. 3. Keeping your legs crossed, slowly push your affected foot against the band so that foot moves away from your other foot. Then slowly relax. 4. Repeat 8 to 12 times. Resisted ankle eversion 1. Sit on the floor with your legs straight. 2. Hold both ends of an exercise band and loop the band around the outside of your affected foot. Then press your other foot against the band. 3. Keeping your leg straight, slowly push your affected foot outward against the band and away from your other foot without letting your leg rotate. Then slowly relax. 4. Repeat 8 to 12 times. Resisted ankle dorsiflexion 1. Tie the ends of an exercise band together to form a loop. Attach one end of the loop to a secure object or shut a door on it to hold it in place. (Or you can have someone hold one end of the loop to provide resistance.) 2. While sitting on the floor or in a chair, loop the other end of the band over the top of your affected foot. 3. Keeping your knee and leg straight, slowly flex your foot to pull back on the exercise band, and then slowly relax. 4. Repeat 8 to 12 times. Single-leg balance 1. Stand on a flat surface with your arms stretched out to your sides like you are making the letter \"T. \" Then lift your good leg off the floor, bending it at the knee. If you are not steady on your feet, use one hand to hold on to a chair, counter, or wall. 2. Standing on the leg with your affected ankle, keep that knee straight. Try to balance on that leg for up to 30 seconds. Then rest for up to 10 seconds. 3. Repeat 6 to 8 times. 4. When you can balance on your affected leg for 30 seconds with your eyes open, try to balance on it with your eyes closed. 5. When you can do this exercise with your eyes closed for 30 seconds and with ease and no pain, try standing on a pillow or piece of foam, and repeat steps 1 through 4. Follow-up care is a key part of your treatment and safety. Be sure to make and go to all appointments, and call your doctor if you are having problems. It's also a good idea to know your test results and keep a list of the medicines you take. Where can you learn more? Go to http://esteban-tiffani.info/. Gildardo Barger in the search box to learn more about \"Ankle Sprain: Rehab Exercises. \" Current as of: November 29, 2017 Content Version: 11.8 © 5137-0439 Healthwise, [employer]. Care instructions adapted under license by MaulSoup (which disclaims liability or warranty for this information). If you have questions about a medical condition or this instruction, always ask your healthcare professional. [address] any warranty or liability for your use of this information.

## 2018-11-29 NOTE — PROGRESS NOTES
HISTORY OF PRESENT ILLNESS Jennifer Vargas is a 60 y.o. female. HPI Rt ankle Pain The history is provided by the patient. This is a new but recurrent problem. Takes motrin 1-2x per wk which helping greatly, does not to see a pt,  Episode onset: 4 weeks ago,+++ obese, she is working with a lot of walking and a lot of going up and down the steps all days. The problem occurs constantly. The problem has not changed since onset. The quality of the pain is described as dull. The pain is at a severity of 6/10. Associated symptoms include limited range of motion. Pertinent negatives include no numbness, no stiffness, no tingling, no itching, no back pain and no neck pain. The symptoms are aggravated by movement and palpation. There has been no history of extremity trauma. Current Outpatient Medications Medication Sig Dispense Refill  ibuprofen (MOTRIN) 800 mg tablet Take 1 Tab by mouth every eight (8) hours as needed for Pain. 30 Tab 0  
 calcium-cholecalciferol, D3, (CALTRATE 600+D) tablet Take 1 Tab by mouth two (2) times a day. 60 Tab 11  
 GLUCOSAMINE/CHONDR BREWER A SOD (GLUCOSAMINE-CHONDROITIN) 750-600 mg tab One tab twice daily 60 Tab 6  fluticasone (FLONASE) 50 mcg/actuation nasal spray 2 sprays nighlty next 8 wks then every other night for 6 wks thereafter as needed 1 Bottle 5  
 albuterol (PROVENTIL HFA, VENTOLIN HFA, PROAIR HFA) 90 mcg/actuation inhaler Take 1 Puff by inhalation every four (4) hours as needed for Wheezing. 1 Inhaler 6  
 budesonide-formoterol (SYMBICORT) 160-4.5 mcg/actuation HFA inhaler Take 2 Puffs by inhalation two (2) times a day. 1 Inhaler 5  MULTIVITS,CA,MINERALS/IRON/FA (MULTI FOR HER PO) Take 1 Tab by mouth daily.  traMADol (ULTRAM) 50 mg tablet Take 1 Tab by mouth every eight (8) hours as needed for Pain. Max Daily Amount: 150 mg.  Indications: Pain 20 Tab 0  
 albuterol (PROVENTIL VENTOLIN) 2.5 mg /3 mL (0.083 %) nebulizer solution 3 mL by Nebulization route once for 1 dose. 1 Each 0 Allergies Allergen Reactions  Crestor [Rosuvastatin] Shortness of Breath  Nabumetone Shortness of Breath  Codeine Nausea Only  Gabapentin (Bulk) Shortness of Breath  Lyrica [Pregabalin] Swelling Ankle swelling, foot pain , insomina  Percocet [Oxycodone-Acetaminophen] Other (comments)  
  loopy Past Medical History:  
Diagnosis Date  Acute right ankle pain 7/23/2018  Arthritis 8/3/2010  Asthma  Bunion of great toe of right foot 7/23/2018  Fall at home, initial encounter 9/18/2018  Hammer toe of second toe of right foot 7/23/2018  Hypercholesterolemia 8/18/2010  Postmenopausal 8/3/2010  Prediabetes 9/12/2013  Primary snoring 11/29/2017  Shoulder pain, bilateral 2/8/2018  Sinusitis, acute 12/12/2011  Wrist pain, acute, left 9/18/2018 Past Surgical History:  
Procedure Laterality Date  HX GYN  [id number]  
 tubal pregancy; laparotomy  HX ORTHOPAEDIC  2006  
 neck  HX ORTHOPAEDIC  2008  
 cervical fusion of discs x4  
 HX PELVIC LAPAROSCOPY  1985  
 endometriosis  REVISE KNEE JOINT REPLACE,ALL PARTS  02/29/2012  
 tkr left Family History Problem Relation Age of Onset  Hypertension Father  Heart Disease Father CAD  Diabetes Father  High Cholesterol Father  Other Mother   
     tumor in chest  
 Allergic Rhinitis Brother  Asthma Brother  Allergic Rhinitis Child  Sickle Cell Trait Child  GERD Child Social History Tobacco Use  Smoking status: Never Smoker  Smokeless tobacco: Never Used Substance Use Topics  Alcohol use: No  
  
Lab Results Component Value Date/Time WBC 5.1 02/08/2018 11:55 AM  
 HGB 14.9 02/08/2018 11:55 AM  
 HCT 45.2 02/08/2018 11:55 AM  
 PLATELET 684 22/30/8334 11:55 AM  
 MCV 83 02/08/2018 11:55 AM  
 
Lab Results Component Value Date/Time  GFR est non-AA 59 (L) 02/08/2018 11:55 AM  
 GFR est AA 68 02/08/2018 11:55 AM  
 Creatinine 1.04 (H) 02/08/2018 11:55 AM  
 BUN 18 02/08/2018 11:55 AM  
 Sodium 142 02/08/2018 11:55 AM  
 Potassium 4.3 02/08/2018 11:55 AM  
 Chloride 102 02/08/2018 11:55 AM  
 CO2 23 02/08/2018 11:55 AM  
  
Review of Systems Constitutional: Negative for chills and fever. HENT: Negative for ear pain and nosebleeds. Eyes: Negative for blurred vision, pain and discharge. Respiratory: Negative for shortness of breath. Cardiovascular: Negative for chest pain and leg swelling. Gastrointestinal: Negative for constipation, diarrhea, nausea and vomiting. Genitourinary: Negative for frequency. Musculoskeletal: Positive for joint pain. Skin: Negative for itching and rash. Neurological: Negative for headaches. Psychiatric/Behavioral: Negative for depression. The patient is not nervous/anxious. Physical Exam  
Constitutional: She is oriented to person, place, and time. She appears well-developed and well-nourished. HENT:  
Head: Normocephalic and atraumatic. Eyes: Conjunctivae and EOM are normal. Pupils are equal, round, and reactive to light. Neck: Normal range of motion. Neck supple. No JVD present. No thyromegaly present. Cardiovascular: Normal rate, regular rhythm, normal heart sounds and intact distal pulses. Exam reveals no gallop and no friction rub. No murmur heard. Pulmonary/Chest: Effort normal and breath sounds normal. No stridor. No respiratory distress. She has no wheezes. She has no rales. Abdominal: Soft. Bowel sounds are normal. She exhibits no distension and no mass. There is no tenderness. Musculoskeletal: Normal range of motion. She exhibits tenderness. She exhibits no edema. Lymphadenopathy:  
  She has no cervical adenopathy. Neurological: She is alert and oriented to person, place, and time. She has normal reflexes. No cranial nerve deficit. Skin: No rash noted. No erythema. Psychiatric: She has a normal mood and affect. Her behavior is normal.  
Nursing note and vitals reviewed. ASSESSMENT and PLAN Diagnoses and all orders for this visit: 
 
1. Chronic pain of right ankle -     ibuprofen (MOTRIN) 800 mg tablet; Take 1 Tab by mouth every eight (8) hours as needed for Pain. 
-     REFERRAL TO ORTHOPEDICS 2. Hypercholesterolemia -     LIPID PANEL; Future 3. Encounter for immunization 
-     varicella-zoster recombinant, PF, (SHINGRIX) 50 mcg/0.5 mL susr injection; 0.5 mL by IntraMUSCular route once for 1 dose. 4. Ankle deformity, right 
-     REFERRAL TO ORTHOPEDICS Discussed the patient's BMI with her. The BMI follow up plan is as follows:  
 
dietary management education, guidance, and counseling 
encourage exercise 
monitor weight 
prescribed dietary intake An After Visit Summary was printed and given to the patient.

## 2018-11-29 NOTE — PROGRESS NOTES
Name and  verified Chief Complaint Patient presents with  Follow-up 6 month f/u right anlke pain Health Maintenance reviewed-discussed with patient. 1. Have you been to the ER, urgent care clinic since your last visit? Hospitalized since your last visit? no 
 
2. Have you seen or consulted any other health care providers outside of the [address] [place] [address] since your last visit? Include any pap smears or colon screening. Yes, [facility]  for right ankle pain and swelling and Pulmonary provider  office visit 2018.

## 2018-11-30 LAB
CHOLEST SERPL-MCNC: 208 MG/DL (ref 100–199)
HDLC SERPL-MCNC: 62 MG/DL
LDLC SERPL CALC-MCNC: 134 MG/DL (ref 0–99)
TRIGL SERPL-MCNC: 61 MG/DL (ref 0–149)
VLDLC SERPL CALC-MCNC: 12 MG/DL (ref 5–40)

## 2019-01-04 ENCOUNTER — HOSPITAL ENCOUNTER (OUTPATIENT)
Dept: MAMMOGRAPHY | Age: 59
Discharge: HOME | End: 2019-01-04
Attending: FAMILY MEDICINE
Payer: COMMERCIAL

## 2019-01-04 DIAGNOSIS — Z12.31 VISIT FOR SCREENING MAMMOGRAM: ICD-10-CM

## 2019-01-04 PROCEDURE — 77067 SCR MAMMO BI INCL CAD: CPT

## 2019-01-07 ENCOUNTER — HOSPITAL ENCOUNTER (OUTPATIENT)
Dept: MAMMOGRAPHY | Age: 59
Discharge: HOME | End: 2019-01-07
Attending: FAMILY MEDICINE
Payer: COMMERCIAL

## 2019-01-07 ENCOUNTER — HOSPITAL ENCOUNTER (OUTPATIENT)
Dept: ULTRASOUND IMAGING | Age: 59
Discharge: HOME | End: 2019-01-07
Attending: FAMILY MEDICINE
Payer: COMMERCIAL

## 2019-01-07 DIAGNOSIS — R92.8 ABNORMALITY OF RIGHT BREAST ON SCREENING MAMMOGRAM: ICD-10-CM

## 2019-01-07 PROCEDURE — 77065 DX MAMMO INCL CAD UNI: CPT

## 2019-05-29 ENCOUNTER — OFFICE VISIT (OUTPATIENT)
Dept: FAMILY MEDICINE CLINIC | Age: 59
End: 2019-05-29

## 2019-05-29 VITALS
RESPIRATION RATE: 18 BRPM | DIASTOLIC BLOOD PRESSURE: 71 MMHG | SYSTOLIC BLOOD PRESSURE: 133 MMHG | WEIGHT: 174.7 LBS | HEIGHT: 62 IN | BODY MASS INDEX: 32.15 KG/M2 | HEART RATE: 60 BPM | TEMPERATURE: 96.4 F | OXYGEN SATURATION: 99 %

## 2019-05-29 DIAGNOSIS — R73.03 PREDIABETES: ICD-10-CM

## 2019-05-29 DIAGNOSIS — E55.9 VITAMIN D DEFICIENCY: Primary | ICD-10-CM

## 2019-05-29 DIAGNOSIS — J45.20 MILD INTERMITTENT ASTHMA WITHOUT COMPLICATION: ICD-10-CM

## 2019-05-29 DIAGNOSIS — H60.93 OTITIS EXTERNA OF BOTH EARS, UNSPECIFIED CHRONICITY, UNSPECIFIED TYPE: ICD-10-CM

## 2019-05-29 DIAGNOSIS — R80.8 OTHER PROTEINURIA: ICD-10-CM

## 2019-05-29 DIAGNOSIS — Z23 ENCOUNTER FOR IMMUNIZATION: ICD-10-CM

## 2019-05-29 DIAGNOSIS — M79.10 MYALGIA: ICD-10-CM

## 2019-05-29 LAB — HBA1C MFR BLD HPLC: 5.7 %

## 2019-05-29 RX ORDER — BACLOFEN 10 MG/1
10 TABLET ORAL
Qty: 30 TAB | Refills: 0 | Status: SHIPPED | OUTPATIENT
Start: 2019-05-29 | End: 2020-06-12 | Stop reason: ALTCHOICE

## 2019-05-29 RX ORDER — NEOMYCIN SULFATE, POLYMYXIN B SULFATE AND HYDROCORTISONE 10; 3.5; 1 MG/ML; MG/ML; [USP'U]/ML
3 SUSPENSION/ DROPS AURICULAR (OTIC) 4 TIMES DAILY
Qty: 10 ML | Refills: 0 | Status: SHIPPED | OUTPATIENT
Start: 2019-05-29 | End: 2021-06-07 | Stop reason: ALTCHOICE

## 2019-05-29 RX ORDER — ASCORBIC ACID 500 MG
500 TABLET ORAL DAILY
COMMUNITY

## 2019-05-29 RX ORDER — BUDESONIDE AND FORMOTEROL FUMARATE DIHYDRATE 160; 4.5 UG/1; UG/1
2 AEROSOL RESPIRATORY (INHALATION) 2 TIMES DAILY
Qty: 1 INHALER | Refills: 5
Start: 2019-05-29 | End: 2019-10-09 | Stop reason: SDUPTHER

## 2019-05-29 RX ORDER — ALBUTEROL SULFATE 90 UG/1
1 AEROSOL, METERED RESPIRATORY (INHALATION)
Qty: 1 INHALER | Refills: 6 | Status: SHIPPED | OUTPATIENT
Start: 2019-05-29 | End: 2020-12-29 | Stop reason: SDUPTHER

## 2019-05-29 NOTE — PATIENT INSTRUCTIONS
Body Mass Index: Care Instructions Your Care Instructions Body mass index (BMI) can help you see if your weight is raising your risk for health problems. It uses a formula to compare how much you weigh with how tall you are. · A BMI lower than 18.5 is considered underweight. · A BMI between 18.5 and 24.9 is considered healthy. · A BMI between 25 and 29.9 is considered overweight. A BMI of 30 or higher is considered obese. If your BMI is in the normal range, it means that you have a lower risk for weight-related health problems. If your BMI is in the overweight or obese range, you may be at increased risk for weight-related health problems, such as high blood pressure, heart disease, stroke, arthritis or joint pain, and diabetes. If your BMI is in the underweight range, you may be at increased risk for health problems such as fatigue, lower protection (immunity) against illness, muscle loss, bone loss, hair loss, and hormone problems. BMI is just one measure of your risk for weight-related health problems. You may be at higher risk for health problems if you are not active, you eat an unhealthy diet, or you drink too much alcohol or use tobacco products. Follow-up care is a key part of your treatment and safety. Be sure to make and go to all appointments, and call your doctor if you are having problems. It's also a good idea to know your test results and keep a list of the medicines you take. How can you care for yourself at home? · Practice healthy eating habits. This includes eating plenty of fruits, vegetables, whole grains, lean protein, and low-fat dairy. · If your doctor recommends it, get more exercise. Walking is a good choice. Bit by bit, increase the amount you walk every day. Try for at least 30 minutes on most days of the week. · Do not smoke. Smoking can increase your risk for health problems.  If you need help quitting, talk to your doctor about stop-smoking programs and medicines. These can increase your chances of quitting for good. · Limit alcohol to 2 drinks a day for men and 1 drink a day for women. Too much alcohol can cause health problems. If you have a BMI higher than 25 · Your doctor may do other tests to check your risk for weight-related health problems. This may include measuring the distance around your waist. A waist measurement of more than 40 inches in men or 35 inches in women can increase the risk of weight-related health problems. · Talk with your doctor about steps you can take to stay healthy or improve your health. You may need to make lifestyle changes to lose weight and stay healthy, such as changing your diet and getting regular exercise. If you have a BMI lower than 18.5 · Your doctor may do other tests to check your risk for health problems. · Talk with your doctor about steps you can take to stay healthy or improve your health. You may need to make lifestyle changes to gain or maintain weight and stay healthy, such as getting more healthy foods in your diet and doing exercises to build muscle. Where can you learn more? Go to http://esteban-tiffani.info/. Enter S176 in the search box to learn more about \"Body Mass Index: Care Instructions. \" Current as of: October 13, 2016 Content Version: 11.4 © 2603-3673 [employer]. Care instructions adapted under license by [employer] (which disclaims liability or warranty for this information). If you have questions about a medical condition or this instruction, always ask your healthcare professional. [place] [address] any warranty or liability for your use of this information. Musculoskeletal Pain: Care Instructions Your Care Instructions Different problems with the bones, muscles, nerves, ligaments, and tendons in the body can cause pain. One or more areas of your body may ache or burn. Or they may feel tired, stiff, or sore. The medical term for this type of pain is musculoskeletal pain. It can have many different causes. Sometimes the pain is caused by an injury such as a strain or sprain. Or you might have pain from using one part of your body in the same way over and over again. This is called overuse. In some cases, the cause of the pain is another health problem such as arthritis or fibromyalgia. The doctor will examine you and ask you questions about your health to help find the cause of your pain. Blood tests or imaging tests like an X-ray may also be helpful. But sometimes doctors can't find a cause of the pain. Treatment depends on your symptoms and the cause of the pain, if known. The doctor has checked you carefully, but problems can develop later. If you notice any problems or new symptoms, get medical treatment right away. Follow-up care is a key part of your treatment and safety. Be sure to make and go to all appointments, and call your doctor if you are having problems. It's also a good idea to know your test results and keep a list of the medicines you take. How can you care for yourself at home? · Rest until you feel better. · Do not do anything that makes the pain worse. Return to exercise gradually if you feel better and your doctor says it's okay. · Be safe with medicines. Read and follow all instructions on the label. ? If the doctor gave you a prescription medicine for pain, take it as prescribed. ? If you are not taking a prescription pain medicine, ask your doctor if you can take an over-the-counter medicine. · Put ice or a cold pack on the area for 10 to 20 minutes at a time to ease pain. Put a thin cloth between the ice and your skin. When should you call for help? Call your doctor now or seek immediate medical care if: 
  · You have new pain, or your pain gets worse.  
  · You have new symptoms such as a fever, a rash, or chills.  Watch closely for changes in your health, and be sure to contact your doctor if: 
  · You do not get better as expected. Where can you learn more? Go to http://esteban-tiffani.info/. Enter V547 in the search box to learn more about \"Musculoskeletal Pain: Care Instructions. \" Current as of: Suellen 3, 2018 Content Version: 11.9 © 2006-2018 [employer]. Care instructions adapted under license by "[employer]" (which disclaims liability or warranty for this information). If you have questions about a medical condition or this instruction, always ask your healthcare professional. [address] any warranty or liability for your use of this information. Asthma: Your Action Plan Sample Action Plan Controller medicine action plan Fill in the blank spaces and boxes that apply for all sections. · Name of your controller medicine: 
? ____________________________________________ · How much of this medicine do you take? ? ____________________________________________ · How often do you take this medicine? ? ____________________________________________ · Other instructions? ? ____________________________________________ Quick-relief medicine action plan · Name of your quick-relief medicine: 
? ____________________________________________ · How much of this medicine do you take? ? ____________________________________________ · How often do you take this medicine? ? ____________________________________________ Asthma Zones GREEN ZONE: This is where you want to be! Green zone symptoms · You have no shortness of breath or chest tightness. You are not coughing or wheezing. · You can do all of your usual activities. · You sleep well at night. Green zone peak flow (if you use a peak flow meter) · ______ or more (80% or more of your personal best) Green zone actions (Check the boxes and fill in the blank spaces that apply.) [ ] You take your controller medicine(s) every day. [ ] Kitty Sotelo are staying away from your asthma triggers. [ ] You take quick-relief medicine (called _____________________) ______ minutes before exercise. YELLOW ZONE: Your asthma is getting worse. Yellow zone symptoms · You are short of breath or have chest tightness. You are coughing or wheezing. · You have symptoms that keep you up at night. · You can do some, but not all, of your usual activities. Yellow zone peak flow (if you use a peak flow meter) · ______ to ______ (50% to 79% of your personal best) Yellow zone actions (Check the boxes and fill in the blank spaces that apply.) [ ] Take _____ puff(s) of quick-relief medicine called ______________________. Repeat _____ times. [ ] If your symptoms don't get better or your peak flow has not returned to the green zone in 1 hour, then: · [ ] Take _____ puff(s) of medicine called ______________________. Take it ____ times a day. · [ ] Begin or increase treatment with corticosteroid pills. Take ______ mg of medicine called ____________________________ every __________. · [ ] Call your doctor at this number: ____________________. RED ZONE: Danger! Red zone symptoms · You are very short of breath. · You can't do your usual activities. · Quick-relief medicine doesn't help. Or your symptoms don't get better after 24 hours in the yellow zone. Red zone peak flow (if you use a peak flow meter) · Less than _______ (less than 50% of your personal best) Red zone actions (Check the boxes and fill in the blank spaces that apply.) [ ] Take _____ puff(s) of quick-relief medicine called ____________________________. Repeat ______ times. [ ] Begin or increase treatment with corticosteroid pills. Take ________ mg now. [ ] Call your doctor at this number: _________________. If you can't contact your doctor, go to the emergency department. Call 911 or ___________________. [ ] Other numbers you might call are: ___________________________________. When should you call for help? Call 911 anytime you think you may need emergency care. For example, call if: 
· You have severe trouble breathing. Call your doctor now or seek immediate medical care if: 
· You are in the red zone of your asthma action plan. · You've used your quick-relief medicine but are still having trouble breathing. · You cough up blood. · You have new or worse trouble breathing. · You cough up dark brown or bloody mucus (sputum). Watch closely for changes in your health, and be sure to contact your doctor if: 
· You need to use quick-relief medicine more than 2 days each week (unless it's just for exercise). · Your coughing and wheezing get worse. Follow-up care is a key part of your treatment and safety. Be sure to make and go to all appointments, and call your doctor if you are having problems. It's also a good idea to know your test results and keep a list of the medicines you take. Where can you learn more? Go to http://esteban-tiffani.info/. Enter 24 01 69 in the search box to learn more about \"Asthma: Your Action Plan. \" Current as of: September 5, 2018 Content Version: 11.9 © 0176-7170 [employer]. Care instructions adapted under license by [employer] (which disclaims liability or warranty for this information). If you have questions about a medical condition or this instruction, always ask your healthcare professional. [address] any warranty or liability for your use of this information.

## 2019-05-29 NOTE — PROGRESS NOTES
Name and  verified        Chief Complaint   Patient presents with    Vitamin D Deficiency       Health Maintenance reviewed-discussed with patient. 1. Have you been to the ER, urgent care clinic since your last visit? Hospitalized since your last visit? no    2. Have you seen or consulted any other health care providers outside of the [address] [place] [address] since your last visit? Include any pap smears or colon screening. Yes, Dentist and Pulmonary and Foot provider.

## 2019-05-29 NOTE — PROGRESS NOTES
HISTORY OF PRESENT ILLNESS  Nazanin Daigle is a 60 y.o. female. HPI   Ear discomfort,  who presents for evaluation of a plugged painful LT ear,  has noticed the symptoms ove the last 3 day, There is no a prior history of cerumen impaction, nor hx of daily swimming nor of Ear submerging into water, or exposed to traumatic wind injury, has no discharge, the hearing decreased and patient does daily Q tips cleaning, pain is 1/10 dull, none radiating with some HA taken OTC not helping, no other complaint today, no other foreign Impaction, not getting better  Muscle cramps  harli horses at night, feeling restless    b12 def   Currently on oral tablet daily, less fatigued more active, no metformin no etoh intake, on no multivitamin daily      Asthma  The patient has had no frequent daytime and nighttime asthma symptoms, patient currently has not been taking her short-acting bronchodilator last couple weeks,  The patient is using short-acting beta agonists for symptom control , fortunately patient has no exacerbation over the last 12 months,  stating that last oral systemic corticosteroids was in 2018, offered flu shot in 2018 but did not like it, has been getting it yrly, patient currently has no wheezing, no dry cough cough, no sob,  Also stating that there has not been few night awakening for dyspnea over the last 30 days, compliant with meds, + RF needed,     Current Outpatient Medications   Medication Sig Dispense Refill    multivitamin (HAIR,SKIN AND NAILS) tablet Take 1 Tab by mouth daily.  ascorbic acid, vitamin C, (VITAMIN C) 500 mg tablet Take  by mouth.  tiotropium bromide (SPIRIVA RESPIMAT) 2.5 mcg/actuation inhaler Take 2 Puffs by inhalation nightly.  calcium-cholecalciferol, D3, (CALTRATE 600+D) tablet Take 1 Tab by mouth two (2) times a day.  60 Tab 11    fluticasone (FLONASE) 50 mcg/actuation nasal spray 2 sprays nighlty next 8 wks then every other night for 6 wks thereafter as needed 1 Bottle 5    albuterol (PROVENTIL HFA, VENTOLIN HFA, PROAIR HFA) 90 mcg/actuation inhaler Take 1 Puff by inhalation every four (4) hours as needed for Wheezing. 1 Inhaler 6    budesonide-formoterol (SYMBICORT) 160-4.5 mcg/actuation HFA inhaler Take 2 Puffs by inhalation two (2) times a day. 1 Inhaler 5    ibuprofen (MOTRIN) 800 mg tablet Take 1 Tab by mouth every eight (8) hours as needed for Pain. 30 Tab 0    GLUCOSAMINE/CHONDR BREWER A SOD (GLUCOSAMINE-CHONDROITIN) 750-600 mg tab One tab twice daily 60 Tab 6    albuterol (PROVENTIL VENTOLIN) 2.5 mg /3 mL (0.083 %) nebulizer solution 3 mL by Nebulization route once for 1 dose. 1 Each 0    MULTIVITS,CA,MINERALS/IRON/FA (MULTI FOR HER PO) Take 1 Tab by mouth daily.        Allergies   Allergen Reactions    Crestor [Rosuvastatin] Shortness of Breath    Nabumetone Shortness of Breath    Codeine Nausea Only    Gabapentin (Bulk) Shortness of Breath    Lyrica [Pregabalin] Swelling     Ankle swelling, foot pain , insomina    Percocet [Oxycodone-Acetaminophen] Other (comments)     loopy     Past Medical History:   Diagnosis Date    Acute right ankle pain 7/23/2018    Arthritis 8/3/2010    Asthma     Bunion of great toe of right foot 7/23/2018    Fall at home, initial encounter 9/18/2018    Hammer toe of second toe of right foot 7/23/2018    Hypercholesterolemia 8/18/2010    Postmenopausal 8/3/2010    Prediabetes 9/12/2013    Primary snoring 11/29/2017    Shoulder pain, bilateral 2/8/2018    Sinusitis, acute 12/12/2011    Wrist pain, acute, left 9/18/2018     Past Surgical History:   Procedure Laterality Date    HX GYN  1987    tubal pregancy; laparotomy    HX ORTHOPAEDIC  2006    neck    HX ORTHOPAEDIC  2008    cervical fusion of discs x4    HX PELVIC LAPAROSCOPY  1985    endometriosis    REVISE KNEE JOINT REPLACE,ALL PARTS  02/29/2012    tkr left     Family History   Problem Relation Age of Onset    Hypertension Father     Heart Disease Father         CAD  Diabetes Father     High Cholesterol Father     Other Mother         tumor in chest    Allergic Rhinitis Brother     Asthma Brother     Allergic Rhinitis Child     Sickle Cell Trait Child     GERD Child      Social History     Tobacco Use    Smoking status: Never Smoker    Smokeless tobacco: Never Used   Substance Use Topics    Alcohol use: No      Lab Results   Component Value Date/Time    WBC 5.1 02/08/2018 11:55 AM    HGB 14.9 02/08/2018 11:55 AM    HCT 45.2 02/08/2018 11:55 AM    PLATELET 409 76/11/9693 11:55 AM    MCV 83 02/08/2018 11:55 AM     Lab Results   Component Value Date/Time    TSH 0.661 10/28/2015 03:36 PM    T4, Total 7.7 08/04/2014 12:18 PM         Review of Systems   Constitutional: Negative for chills and fever. HENT: Negative for ear pain and nosebleeds. Eyes: Negative for blurred vision, pain and discharge. Respiratory: Negative for shortness of breath. Cardiovascular: Negative for chest pain and leg swelling. Gastrointestinal: Negative for constipation, diarrhea, nausea and vomiting. Genitourinary: Negative for frequency. Musculoskeletal: Negative for joint pain. Skin: Negative for itching and rash. Neurological: Negative for headaches. Psychiatric/Behavioral: Negative for depression. The patient is not nervous/anxious. Physical Exam   Constitutional: She is oriented to person, place, and time. She appears well-developed and well-nourished. HENT:   Head: Normocephalic and atraumatic. Eyes: Conjunctivae and EOM are normal.   Neck: Normal range of motion. Neck supple. Cardiovascular: Normal rate, regular rhythm and normal heart sounds. No murmur heard. Pulmonary/Chest: Effort normal and breath sounds normal. No stridor. Abdominal: Soft. Bowel sounds are normal. She exhibits no distension and no mass. There is no tenderness. Musculoskeletal: Normal range of motion. She exhibits no edema.    Nl pulses, nl visual inspection nl monoF, +dystrophy and elongated Nails   Lymphadenopathy:     She has no cervical adenopathy. Neurological: She is alert and oriented to person, place, and time. Skin: No erythema. Psychiatric: Her behavior is normal.   Nursing note and vitals reviewed. ASSESSMENT and PLAN  Diagnoses and all orders for this visit:    1. Vitamin D deficiency  -     CBC W/O DIFF  -     VITAMIN B12 & FOLATE  -     TSH 3RD GENERATION  -     LIPID PANEL  -     METABOLIC PANEL, COMPREHENSIVE  -     FERRITIN  -     AMB POC HEMOGLOBIN A1C  -     neomycin-polymyxin-hydrocortisone, buffered, (PEDIOTIC) 3.5-10,000-1 mg/mL-unit/mL-% otic suspension; Administer 3 Drops in left ear four (4) times daily. Indications: outer ear inflammation caused by allergy or infection    2. Encounter for immunization  -     varicella-zoster recombinant, PF, (SHINGRIX) 50 mcg/0.5 mL susr injection; 0.5 mL by IntraMUSCular route once for 1 dose. -     CBC W/O DIFF  -     VITAMIN B12 & FOLATE  -     TSH 3RD GENERATION  -     LIPID PANEL  -     METABOLIC PANEL, COMPREHENSIVE  -     FERRITIN  -     AMB POC HEMOGLOBIN A1C  -     neomycin-polymyxin-hydrocortisone, buffered, (PEDIOTIC) 3.5-10,000-1 mg/mL-unit/mL-% otic suspension; Administer 3 Drops in left ear four (4) times daily. Indications: outer ear inflammation caused by allergy or infection    3. Prediabetes  -     CBC W/O DIFF  -     VITAMIN B12 & FOLATE  -     TSH 3RD GENERATION  -     LIPID PANEL  -     METABOLIC PANEL, COMPREHENSIVE  -     FERRITIN  -     AMB POC HEMOGLOBIN A1C  -     neomycin-polymyxin-hydrocortisone, buffered, (PEDIOTIC) 3.5-10,000-1 mg/mL-unit/mL-% otic suspension; Administer 3 Drops in left ear four (4) times daily. Indications: outer ear inflammation caused by allergy or infection    4.  Myalgia  -     CBC W/O DIFF  -     VITAMIN B12 & FOLATE  -     TSH 3RD GENERATION  -     LIPID PANEL  -     METABOLIC PANEL, COMPREHENSIVE  -     FERRITIN  -     AMB POC HEMOGLOBIN A1C  - neomycin-polymyxin-hydrocortisone, buffered, (PEDIOTIC) 3.5-10,000-1 mg/mL-unit/mL-% otic suspension; Administer 3 Drops in left ear four (4) times daily. Indications: outer ear inflammation caused by allergy or infection    5. Other proteinuria  -     CBC W/O DIFF  -     VITAMIN B12 & FOLATE  -     TSH 3RD GENERATION  -     LIPID PANEL  -     METABOLIC PANEL, COMPREHENSIVE  -     FERRITIN  -     AMB POC HEMOGLOBIN A1C  -     neomycin-polymyxin-hydrocortisone, buffered, (PEDIOTIC) 3.5-10,000-1 mg/mL-unit/mL-% otic suspension; Administer 3 Drops in left ear four (4) times daily. Indications: outer ear inflammation caused by allergy or infection    6. Mild intermittent asthma without complication  -     CBC W/O DIFF  -     VITAMIN B12 & FOLATE  -     TSH 3RD GENERATION  -     LIPID PANEL  -     METABOLIC PANEL, COMPREHENSIVE  -     FERRITIN  -     AMB POC HEMOGLOBIN A1C  -     albuterol (PROVENTIL HFA, VENTOLIN HFA, PROAIR HFA) 90 mcg/actuation inhaler; Take 1 Puff by inhalation every four (4) hours as needed for Wheezing.  -     neomycin-polymyxin-hydrocortisone, buffered, (PEDIOTIC) 3.5-10,000-1 mg/mL-unit/mL-% otic suspension; Administer 3 Drops in left ear four (4) times daily. Indications: outer ear inflammation caused by allergy or infection    7. Otitis externa of both ears, unspecified chronicity, unspecified type  -     neomycin-polymyxin-hydrocortisone, buffered, (PEDIOTIC) 3.5-10,000-1 mg/mL-unit/mL-% otic suspension; Administer 3 Drops in left ear four (4) times daily. Indications: outer ear inflammation caused by allergy or infection    Other orders  -     tiotropium bromide (SPIRIVA RESPIMAT) 2.5 mcg/actuation inhaler; Take 2 Puffs by inhalation nightly. -     budesonide-formoterol (SYMBICORT) 160-4.5 mcg/actuation HFAA; Take 2 Puffs by inhalation two (2) times a day. -     baclofen (LIORESAL) 10 mg tablet; Take 1 Tab by mouth nightly as needed for Pain. Discussed the patient's BMI with her.   The BMI follow up plan is as follows:     dietary management education, guidance, and counseling  encourage exercise  monitor weight  prescribed dietary intake    An After Visit Summary was printed and given to the patient.

## 2019-05-30 LAB
ALBUMIN SERPL-MCNC: 4.5 G/DL (ref 3.6–4.8)
ALBUMIN/GLOB SERPL: 1.9 {RATIO} (ref 1.2–2.2)
ALP SERPL-CCNC: 64 IU/L (ref 39–117)
ALT SERPL-CCNC: 15 IU/L (ref 0–32)
AST SERPL-CCNC: 20 IU/L (ref 0–40)
BILIRUB SERPL-MCNC: 0.5 MG/DL (ref 0–1.2)
BUN SERPL-MCNC: 15 MG/DL (ref 8–27)
BUN/CREAT SERPL: 15 (ref 12–28)
CALCIUM SERPL-MCNC: 9.5 MG/DL (ref 8.7–10.3)
CHLORIDE SERPL-SCNC: 100 MMOL/L (ref 96–106)
CHOLEST SERPL-MCNC: 242 MG/DL (ref 100–199)
CO2 SERPL-SCNC: 25 MMOL/L (ref 20–29)
CREAT SERPL-MCNC: 0.97 MG/DL (ref 0.57–1)
ERYTHROCYTE [DISTWIDTH] IN BLOOD BY AUTOMATED COUNT: 14.7 % (ref 12.3–15.4)
FERRITIN SERPL-MCNC: 127 NG/ML (ref 15–150)
FOLATE SERPL-MCNC: 13.7 NG/ML
GLOBULIN SER CALC-MCNC: 2.4 G/DL (ref 1.5–4.5)
GLUCOSE SERPL-MCNC: 89 MG/DL (ref 65–99)
HCT VFR BLD AUTO: 43.9 % (ref 34–46.6)
HDLC SERPL-MCNC: 65 MG/DL
HGB BLD-MCNC: 13.9 G/DL (ref 11.1–15.9)
LDLC SERPL CALC-MCNC: 162 MG/DL (ref 0–99)
MCH RBC QN AUTO: 27.1 PG (ref 26.6–33)
MCHC RBC AUTO-ENTMCNC: 31.7 G/DL (ref 31.5–35.7)
MCV RBC AUTO: 86 FL (ref 79–97)
PLATELET # BLD AUTO: 252 X10E3/UL (ref 150–450)
POTASSIUM SERPL-SCNC: 4.5 MMOL/L (ref 3.5–5.2)
PROT SERPL-MCNC: 6.9 G/DL (ref 6–8.5)
RBC # BLD AUTO: 5.13 X10E6/UL (ref 3.77–5.28)
SODIUM SERPL-SCNC: 140 MMOL/L (ref 134–144)
TRIGL SERPL-MCNC: 76 MG/DL (ref 0–149)
TSH SERPL DL<=0.005 MIU/L-ACNC: 0.56 UIU/ML (ref 0.45–4.5)
VIT B12 SERPL-MCNC: 797 PG/ML (ref 232–1245)
VLDLC SERPL CALC-MCNC: 15 MG/DL (ref 5–40)
WBC # BLD AUTO: 5.2 X10E3/UL (ref 3.4–10.8)

## 2019-09-25 PROBLEM — Z12.39 SCREENING FOR BREAST CANCER: Status: RESOLVED | Noted: 2017-01-16 | Resolved: 2019-09-25

## 2019-10-09 ENCOUNTER — OFFICE VISIT (OUTPATIENT)
Dept: FAMILY MEDICINE CLINIC | Age: 59
End: 2019-10-09

## 2019-10-09 VITALS
OXYGEN SATURATION: 98 % | SYSTOLIC BLOOD PRESSURE: 148 MMHG | TEMPERATURE: 98.1 F | HEIGHT: 62 IN | DIASTOLIC BLOOD PRESSURE: 86 MMHG | HEART RATE: 75 BPM | BODY MASS INDEX: 32.02 KG/M2 | RESPIRATION RATE: 18 BRPM | WEIGHT: 174 LBS

## 2019-10-09 DIAGNOSIS — M25.512 CHRONIC PAIN OF BOTH SHOULDERS: ICD-10-CM

## 2019-10-09 DIAGNOSIS — J45.901 ASTHMA EXACERBATION: Primary | ICD-10-CM

## 2019-10-09 DIAGNOSIS — G89.29 CHRONIC PAIN OF BOTH SHOULDERS: ICD-10-CM

## 2019-10-09 DIAGNOSIS — M25.511 CHRONIC PAIN OF BOTH SHOULDERS: ICD-10-CM

## 2019-10-09 DIAGNOSIS — J20.8 ACUTE BRONCHITIS DUE TO OTHER SPECIFIED ORGANISMS: ICD-10-CM

## 2019-10-09 RX ORDER — AZITHROMYCIN 250 MG/1
TABLET, FILM COATED ORAL
Qty: 6 TAB | Refills: 0 | Status: SHIPPED | OUTPATIENT
Start: 2019-10-09 | End: 2020-06-09 | Stop reason: ALTCHOICE

## 2019-10-09 RX ORDER — IPRATROPIUM BROMIDE AND ALBUTEROL SULFATE 2.5; .5 MG/3ML; MG/3ML
3 SOLUTION RESPIRATORY (INHALATION)
Qty: 3 ML | Refills: 0
Start: 2019-10-09 | End: 2019-10-09

## 2019-10-09 RX ORDER — BUDESONIDE AND FORMOTEROL FUMARATE DIHYDRATE 160; 4.5 UG/1; UG/1
2 AEROSOL RESPIRATORY (INHALATION) 2 TIMES DAILY
Qty: 1 INHALER | Refills: 5 | Status: SHIPPED | OUTPATIENT
Start: 2019-10-09 | End: 2021-03-29

## 2019-10-09 RX ORDER — ALBUTEROL SULFATE 0.83 MG/ML
2.5 SOLUTION RESPIRATORY (INHALATION) ONCE
Qty: 1 EACH | Refills: 0 | Status: CANCELLED | OUTPATIENT
Start: 2019-10-09 | End: 2019-10-09

## 2019-10-09 RX ORDER — CYCLOBENZAPRINE HCL 10 MG
10 TABLET ORAL
Qty: 30 TAB | Refills: 1 | Status: SHIPPED | OUTPATIENT
Start: 2019-10-09 | End: 2020-06-09 | Stop reason: SDUPTHER

## 2019-10-09 RX ORDER — METHYLPREDNISOLONE 4 MG/1
TABLET ORAL
Qty: 1 DOSE PACK | Refills: 0 | Status: SHIPPED | OUTPATIENT
Start: 2019-10-09 | End: 2020-06-09 | Stop reason: ALTCHOICE

## 2019-10-09 NOTE — PROGRESS NOTES
Chief Complaint   Patient presents with    Asthma     follow up     1. Have you been to the ER, urgent care clinic since your last visit? Hospitalized since your last visit? No    2. Have you seen or consulted any other health care providers outside of the [address] [place] [address] since your last visit? Include any pap smears or colon screening. No      C/o  Chest tightness and shortness of breath,  Has not had symbicort or rescue inhaler  in a few days.

## 2019-10-09 NOTE — PROGRESS NOTES
HISTORY OF PRESENT ILLNESS  Tanja Zhang is a 60 y.o. female. HPI   Asthma  The patient has had frequent daytime and nighttime asthma symptoms, patient currently has been taking her short-acting bronchodilator few times a day over the last couple weeks,  The patient is using short-acting beta agonists for symptom control , fortunately patient has no exacerbation over the last 12 months,  stating that last oral systemic corticosteroids was couple yrs ago, offered flu shot but patient refused and did not like it, has been otherwise nicely vaccinated with Tdap and pneumonia vaccine, patient currently has + wheezing, some dry cough, no sob,  Also stating that there has not been few night awakening for dyspnea over the last 30 days,  + RF needed,     Upper respiratory problem    Started >9 days ago not better,   otc not helping, have no Sore throat, with a lot of painful Cough which are Productive yellowish , ++ hx of asthma  , there has been a lot of decrease in the patient's sleep pattern, in addition there has been some muscle ache responding to OTC , no diarhea, no ear ache,also there has been a decrease in the appetite, has been had an exposure to sick person, fortunately a none smoker    Shoulder Pain   The history is provided by the patient. This is a chronic problem. Episode onset: few yrs ago, not obese, patient has a lot of difficulty with overhead activity, raising arm is very painful and the pain  awakens the patient at night,  The problem occurs constantly. The problem has not changed since onset. The pain is present in the rt shoulder. The quality of the pain is described as dull. The pain is at a severity of 8/10. Associated symptoms include limited range of motion. Pertinent negatives include no numbness, ++stiffness, no tingling, no itching, no back pain and + neck pain. The symptoms are aggravated by movement and palpation. There has been no history of extremity trauma.          Current Outpatient Medications   Medication Sig Dispense Refill    multivitamin (HAIR,SKIN AND NAILS) tablet Take 1 Tab by mouth daily.  ascorbic acid, vitamin C, (VITAMIN C) 500 mg tablet Take  by mouth.  tiotropium bromide (SPIRIVA RESPIMAT) 2.5 mcg/actuation inhaler Take 2 Puffs by inhalation nightly. 1 Inhaler 6    albuterol (PROVENTIL HFA, VENTOLIN HFA, PROAIR HFA) 90 mcg/actuation inhaler Take 1 Puff by inhalation every four (4) hours as needed for Wheezing. 1 Inhaler 6    budesonide-formoterol (SYMBICORT) 160-4.5 mcg/actuation HFAA Take 2 Puffs by inhalation two (2) times a day. 1 Inhaler 5    baclofen (LIORESAL) 10 mg tablet Take 1 Tab by mouth nightly as needed for Pain. 30 Tab 0    neomycin-polymyxin-hydrocortisone, buffered, (PEDIOTIC) 3.5-10,000-1 mg/mL-unit/mL-% otic suspension Administer 3 Drops in left ear four (4) times daily. Indications: outer ear inflammation caused by allergy or infection 10 mL 0    ibuprofen (MOTRIN) 800 mg tablet Take 1 Tab by mouth every eight (8) hours as needed for Pain. 30 Tab 0    calcium-cholecalciferol, D3, (CALTRATE 600+D) tablet Take 1 Tab by mouth two (2) times a day. 60 Tab 11    GLUCOSAMINE/CHONDR BREWER A SOD (GLUCOSAMINE-CHONDROITIN) 750-600 mg tab One tab twice daily 60 Tab 6    fluticasone (FLONASE) 50 mcg/actuation nasal spray 2 sprays nighlty next 8 wks then every other night for 6 wks thereafter as needed 1 Bottle 5    albuterol (PROVENTIL VENTOLIN) 2.5 mg /3 mL (0.083 %) nebulizer solution 3 mL by Nebulization route once for 1 dose. 1 Each 0    MULTIVITS,CA,MINERALS/IRON/FA (MULTI FOR HER PO) Take 1 Tab by mouth daily.        Allergies   Allergen Reactions    Crestor [Rosuvastatin] Shortness of Breath    Nabumetone Shortness of Breath    Codeine Nausea Only    Gabapentin (Bulk) Shortness of Breath    Lyrica [Pregabalin] Swelling     Ankle swelling, foot pain , insomina    Percocet [Oxycodone-Acetaminophen] Other (comments)     loopy     Past Medical History:   Diagnosis Date    Acute right ankle pain 7/23/2018    Arthritis 8/3/2010    Asthma     Bunion of great toe of right foot 7/23/2018    Fall at home, initial encounter 9/18/2018    Hammer toe of second toe of right foot 7/23/2018    Hypercholesterolemia 8/18/2010    Postmenopausal 8/3/2010    Prediabetes 9/12/2013    Primary snoring 11/29/2017    Shoulder pain, bilateral 2/8/2018    Sinusitis, acute 12/12/2011    Wrist pain, acute, left 9/18/2018     Past Surgical History:   Procedure Laterality Date    HX GYN  1987    tubal pregancy; laparotomy    HX ORTHOPAEDIC  2006    neck    HX ORTHOPAEDIC  2008    cervical fusion of discs x4    HX PELVIC LAPAROSCOPY  1985    endometriosis    REVISE KNEE JOINT REPLACE,ALL PARTS  02/29/2012    tkr left     Family History   Problem Relation Age of Onset    Hypertension Father     Heart Disease Father         CAD    Diabetes Father     High Cholesterol Father     Other Mother         tumor in chest    Allergic Rhinitis Brother     Asthma Brother     Allergic Rhinitis Child     Sickle Cell Trait Child     GERD Child      Social History     Tobacco Use    Smoking status: Never Smoker    Smokeless tobacco: Never Used   Substance Use Topics    Alcohol use: No      Lab Results   Component Value Date/Time    WBC 5.2 05/29/2019 10:53 AM    HGB 13.9 05/29/2019 10:53 AM    HCT 43.9 05/29/2019 10:53 AM    PLATELET 859 09/17/8367 10:53 AM    MCV 86 05/29/2019 10:53 AM        Review of Systems   Constitutional: Negative for chills and fever. HENT: Negative for ear pain and nosebleeds. Eyes: Negative for blurred vision, pain and discharge. Respiratory: Positive for cough, sputum production, shortness of breath and wheezing. Cardiovascular: Negative for chest pain and leg swelling. Gastrointestinal: Negative for constipation, diarrhea, nausea and vomiting. Genitourinary: Negative for frequency. Musculoskeletal: Negative for joint pain. Skin: Negative for itching and rash. Neurological: Negative for headaches. Psychiatric/Behavioral: Negative for depression. The patient is not nervous/anxious. Physical Exam   Constitutional: She is oriented to person, place, and time. She appears well-developed and well-nourished. HENT:   Head: Normocephalic and atraumatic. Eyes: Conjunctivae and EOM are normal.   Neck: Normal range of motion. Neck supple. Cardiovascular: Normal rate, regular rhythm and normal heart sounds. No murmur heard. Pulmonary/Chest: Effort normal. She has wheezes. Abdominal: Soft. Bowel sounds are normal. She exhibits no distension. Musculoskeletal: Normal range of motion. She exhibits no edema. Lymphadenopathy:     She has cervical adenopathy. Neurological: She is alert and oriented to person, place, and time. Skin: No erythema. Psychiatric: Her behavior is normal.   Nursing note and vitals reviewed. ASSESSMENT and PLAN  Diagnoses and all orders for this visit:    1. Asthma exacerbation  -     budesonide-formoterol (SYMBICORT) 160-4.5 mcg/actuation HFAA; Take 2 Puffs by inhalation two (2) times a day. -     ALBUTEROL IPRATROP NON-COMP  -     MD PRESSURIZED/NONPRESSURIZED INHALATION TREATMENT  -     albuterol-ipratropium (DUO-NEB) 2.5 mg-0.5 mg/3 ml nebu; 3 mL by Nebulization route now for 1 dose. 2. Acute bronchitis due to other specified organisms  -     budesonide-formoterol (SYMBICORT) 160-4.5 mcg/actuation HFAA; Take 2 Puffs by inhalation two (2) times a day. -     ALBUTEROL IPRATROP NON-COMP  -     MD PRESSURIZED/NONPRESSURIZED INHALATION TREATMENT  -     albuterol-ipratropium (DUO-NEB) 2.5 mg-0.5 mg/3 ml nebu; 3 mL by Nebulization route now for 1 dose. -     methylPREDNISolone (MEDROL DOSEPACK) 4 mg tablet; As directed for 6 days one package  -     azithromycin (ZITHROMAX) 250 mg tablet; 2 first day then one tab daily till finished    3.  Chronic pain of both shoulders  - methylPREDNISolone (MEDROL DOSEPACK) 4 mg tablet; As directed for 6 days one package  -     cyclobenzaprine (FLEXERIL) 10 mg tablet; Take 1 Tab by mouth nightly.  Indications: muscle spasm    Patient felt immediately better after the nebulizer treatment stating that the wheezing and the tightness has gone away significantly  [facility],  [place] and [employer] action plan was stated to the patient, was told to use 1 or 2 puffs 10-15 minutes before any kind of exercise activity patient acknowledged, return to clinic any time in the office,   recommended  Immunizations,  carry meds at all time w/ self any wheezing or cough please call or go to er, avoid stimulants  Patient agreed with today's recommendation

## 2019-10-09 NOTE — PATIENT INSTRUCTIONS
Learning About Asthma Triggers What are triggers? When you have asthma, certain things can make your symptoms worse. These are called triggers. They include: · Cigarette smoke or air pollution. · Things you are allergic to, such as: 
¨ Pollen, mold, or dust mites. ¨ Pet hair, skin, or saliva. · Illnesses, like colds, flu, or pneumonia. · Exercise. · Dry, cold air. How do triggers affect asthma? Triggers can make it harder for your lungs to work as they should and can lead to sudden difficulty breathing and other symptoms. When you are around a trigger, an asthma attack is more likely. If your symptoms are severe, you may need emergency treatment or have to go to the hospital for treatment. If you know what your triggers are and can avoid them, you may be able to prevent asthma attacks, reduce how often you have them, and make them less severe. What can you do to avoid triggers? The first thing is to know your triggers. When you are having symptoms, note the things around you that might be causing them. Then look for patterns in what may be triggering your symptoms. Record your triggers on a piece of paper or in an asthma diary. When you have your list of possible triggers, work with your doctor to find ways to avoid them. You also can check how well your lungs are working by measuring your peak expiratory flow (PEF) throughout the day. Your PEF may drop when you are near things that trigger symptoms. Here are some ways to avoid a few common triggers. · Do not smoke or allow others to smoke around you. If you need help quitting, talk to your doctor about stop-smoking programs and medicines. These can increase your chances of quitting for good. · If there is a lot of pollution, pollen, or dust outside, stay at home and keep your windows closed. Use an air conditioner or air filter in your home. Check your local weather report or newspaper for air quality and pollen reports. · Get a flu shot every year. Talk to your doctor about getting a pneumococcal shot. Wash your hands often to prevent infections. · Avoid exercising outdoors in cold weather. If you are outdoors in cold weather, wear a scarf around your face and breathe through your nose. How can you manage an asthma attack? · If you have an asthma action plan, follow the plan. In general: ¨ Use your quick-relief inhaler as directed by your doctor. If your symptoms do not get better after you use your medicine, have someone take you to the emergency room. Call an ambulance if needed. ¨ If your doctor has given you other inhaled medicines or steroid pills, take them as directed. Where can you learn more? Go to Corso.be Enter L820 in the search box to learn more about \"Learning About Asthma Triggers. \"  
© 5255-7071 Healthwise, Incorporated. Care instructions adapted under license by [facility] [employer] (which disclaims liability or warranty for this information). This care instruction is for use with your licensed healthcare professional. If you have questions about a medical condition or this instruction, always ask your healthcare professional. [place] [address] any warranty or liability for your use of this information. Content Version: 80.8.195748; Last Revised: February 23, 2012 Asthma Attack: Care Instructions Your Care Instructions During an asthma attack, the airways swell and narrow. This makes it hard to breathe. Severe asthma attacks can be life-threatening, but you can help prevent them by keeping your asthma under control and treating symptoms before they get bad. Symptoms include being short of breath, having chest tightness, coughing, and wheezing. Noting and treating these symptoms can also help you avoid future trips to the emergency room. The doctor has checked you carefully, but problems can develop later.  If you notice any problems or new symptoms, get medical treatment right away. Follow-up care is a key part of your treatment and safety. Be sure to make and go to all appointments, and call your doctor if you are having problems. It's also a good idea to know your test results and keep a list of the medicines you take. How can you care for yourself at home? · Follow your asthma action plan to prevent and treat attacks. If you don't have an asthma action plan, work with your doctor to create one. · Take your asthma medicines exactly as prescribed. Talk to your doctor right away if you have any questions about how to take them. ? Use your quick-relief medicine when you have symptoms of an attack. Quick-relief medicine is usually an albuterol inhaler. Some people need to use quick-relief medicine before they exercise. ? Take your controller medicine every day, not just when you have symptoms. Controller medicine is usually an inhaled corticosteroid. The goal is to prevent problems before they occur. Don't use your controller medicine to treat an attack that has already started. It doesn't work fast enough to help. ? If your doctor prescribed corticosteroid pills to use during an attack, take them exactly as prescribed. It may take hours for the pills to work, but they may make the episode shorter and help you breathe better. ? Keep your quick-relief medicine with you at all times. · Talk to your doctor before using other medicines. Some medicines, such as aspirin, can cause asthma attacks in some people. · If you have a peak flow meter, use it to check how well you are breathing. This can help you predict when an asthma attack is going to occur. Then you can take medicine to prevent the asthma attack or make it less severe. · Do not smoke or allow others to smoke around you. Avoid smoky places. Smoking makes asthma worse.  If you need help quitting, talk to your doctor about stop-smoking programs and medicines. These can increase your chances of quitting for good. · Learn what triggers an asthma attack for you, and avoid the triggers when you can. Common triggers include colds, smoke, air pollution, dust, pollen, mold, pets, cockroaches, stress, and cold air. · Avoid colds and the flu. Get a pneumococcal vaccine shot. If you have had one before, ask your doctor if you need a second dose. Get a flu vaccine every fall. If you must be around people with colds or the flu, wash your hands often. When should you call for help? Call 911 anytime you think you may need emergency care. For example, call if: 
  · You have severe trouble breathing.  
 Call your doctor now or seek immediate medical care if: 
  · Your symptoms do not get better after you have followed your asthma action plan.  
  · You have new or worse trouble breathing.  
  · Your coughing and wheezing get worse.  
  · You cough up dark brown or bloody mucus (sputum).  
  · You have a new or higher fever.  
 Watch closely for changes in your health, and be sure to contact your doctor if: 
  · You need to use quick-relief medicine on more than 2 days a week (unless it is just for exercise).  
  · You cough more deeply or more often, especially if you notice more mucus or a change in the color of your mucus.  
  · You are not getting better as expected. Where can you learn more? Go to http://esteban-tiffani.info/. Enter E913 in the search box to learn more about \"Asthma Attack: Care Instructions. \" Current as of: June 9, 2019 Content Version: 12.2 © 6826-1590 Healthwise, Incorporated. Care instructions adapted under license by [employer] (which disclaims liability or warranty for this information).  If you have questions about a medical condition or this instruction, always ask your healthcare professional. [address] any warranty or liability for your use of this information.

## 2019-10-16 ENCOUNTER — TELEPHONE (OUTPATIENT)
Dept: FAMILY MEDICINE CLINIC | Age: 59
End: 2019-10-16

## 2019-10-16 NOTE — TELEPHONE ENCOUNTER
Received call from pt,  Stated seen 10/09/19 for asthma excerbation,   Completed dose pack and zpack on yesterday. Today c/o feeling \"drunk\",  Balance is unsteady,  Blood pressure 123/75, requesting call back from Dr Ulice Kawasaki.   [phone]

## 2020-03-11 ENCOUNTER — HOSPITAL ENCOUNTER (OUTPATIENT)
Dept: MAMMOGRAPHY | Age: 60
Discharge: HOME | End: 2020-03-11
Attending: FAMILY MEDICINE
Payer: COMMERCIAL

## 2020-03-11 DIAGNOSIS — Z12.31 VISIT FOR SCREENING MAMMOGRAM: ICD-10-CM

## 2020-03-11 PROCEDURE — 77067 SCR MAMMO BI INCL CAD: CPT

## 2020-06-09 ENCOUNTER — VIRTUAL VISIT (OUTPATIENT)
Dept: FAMILY MEDICINE CLINIC | Age: 60
End: 2020-06-09

## 2020-06-09 DIAGNOSIS — Z02.89 ENCOUNTER TO OBTAIN EXCUSE FROM WORK: ICD-10-CM

## 2020-06-09 DIAGNOSIS — M25.511 BILATERAL SHOULDER PAIN, UNSPECIFIED CHRONICITY: ICD-10-CM

## 2020-06-09 DIAGNOSIS — M96.1 CERVICAL POST-LAMINECTOMY SYNDROME: Primary | ICD-10-CM

## 2020-06-09 DIAGNOSIS — M25.511 CHRONIC PAIN OF BOTH SHOULDERS: ICD-10-CM

## 2020-06-09 DIAGNOSIS — M25.512 BILATERAL SHOULDER PAIN, UNSPECIFIED CHRONICITY: ICD-10-CM

## 2020-06-09 DIAGNOSIS — M25.512 CHRONIC PAIN OF BOTH SHOULDERS: ICD-10-CM

## 2020-06-09 DIAGNOSIS — G89.29 CHRONIC PAIN OF BOTH SHOULDERS: ICD-10-CM

## 2020-06-09 RX ORDER — DICLOFENAC POTASSIUM 50 MG/1
50 TABLET, FILM COATED ORAL
COMMUNITY
End: 2020-06-09 | Stop reason: SDUPTHER

## 2020-06-09 RX ORDER — MONTELUKAST SODIUM 10 MG/1
10 TABLET ORAL DAILY
COMMUNITY
End: 2020-06-12 | Stop reason: SDUPTHER

## 2020-06-09 RX ORDER — DICLOFENAC POTASSIUM 50 MG/1
50 TABLET, FILM COATED ORAL
Qty: 40 TAB | Refills: 0 | Status: SHIPPED | OUTPATIENT
Start: 2020-06-09 | End: 2021-06-07 | Stop reason: ALTCHOICE

## 2020-06-09 RX ORDER — AZELASTINE HCL 205.5 UG/1
2 SPRAY NASAL DAILY
COMMUNITY
End: 2022-09-13 | Stop reason: ALTCHOICE

## 2020-06-09 RX ORDER — CYCLOBENZAPRINE HCL 10 MG
10 TABLET ORAL
Qty: 30 TAB | Refills: 1 | Status: SHIPPED | OUTPATIENT
Start: 2020-06-09 | End: 2021-06-07 | Stop reason: ALTCHOICE

## 2020-06-09 NOTE — LETTER
NOTIFICATION RETURN TO WORK / SCHOOL 
 
 
 
6/9/2020 1:14 PM 
 
Ms. [address] [facility] [address] [phone] To Whom It May Concern: 
 
Kaylie Naqvi is currently under the care of [address] [facility]. She will be off work 6/9/2020 and she will be able to return to work/school on: 6/10/2020 If there are questions or concerns please have the patient contact our office.  
 
 
 
Sincerely, 
 
 
Julian Payne MD

## 2020-06-09 NOTE — PROGRESS NOTES
Chief Complaint   Patient presents with    Shoulder Pain     right      1. Have you been to the ER, urgent care clinic since your last visit? Hospitalized since your last visit? Yes When: 02/2020 Where: pt first  Reason for visit: right shoulder pain    2. Have you seen or consulted any other health care providers outside of the [address] [place] [address] since your last visit? Include any pap smears or colon screening. No    Call placed to pt. Verified patient with two type of identifiers. C/o throbbing aching  right arm pain , radiates to elbow x 3 days,   Hurts to lift or reach.   Using heat therapy  And  with no relief, unsure of injury,  Pain level 10

## 2020-06-09 NOTE — PATIENT INSTRUCTIONS
Shoulder Pain: Care Instructions Your Care Instructions You can hurt your shoulder by using it too much during an activity, such as fishing or baseball. It can also happen as part of the everyday wear and tear of getting older. Shoulder injuries can be slow to heal, but your shoulder should get better with time. Your doctor may recommend a sling to rest your shoulder. If you have injured your shoulder, you may need testing and treatment. Follow-up care is a key part of your treatment and safety. Be sure to make and go to all appointments, and call your doctor if you are having problems. It's also a good idea to know your test results and keep a list of the medicines you take. How can you care for yourself at home? · Take pain medicines exactly as directed. ? If the doctor gave you a prescription medicine for pain, take it as prescribed. ? If you are not taking a prescription pain medicine, ask your doctor if you can take an over-the-counter medicine. ? Do not take two or more pain medicines at the same time unless the doctor told you to. Many pain medicines contain acetaminophen, which is Tylenol. Too much acetaminophen (Tylenol) can be harmful. · If your doctor recommends that you wear a sling, use it as directed. Do not take it off before your doctor tells you to. · Put ice or a cold pack on the sore area for 10 to 20 minutes at a time. Put a thin cloth between the ice and your skin. · If there is no swelling, you can put moist heat, a heating pad, or a warm cloth on your shoulder. Some doctors suggest alternating between hot and cold. · Rest your shoulder for a few days. If your doctor recommends it, you can then begin gentle exercise of the shoulder, but do not lift anything heavy. When should you call for help? [id number] anytime you think you may need emergency care. For example, call if: 
· You have chest pain or pressure. This may occur with: ? Sweating. ? Shortness of breath. ? Nausea or vomiting. ? Pain that spreads from the chest to the neck, jaw, or one or both shoulders or arms. ? Dizziness or lightheadedness. ? A fast or uneven pulse. After calling 911, chew 1 adult-strength aspirin. Wait for an ambulance. Do not try to drive yourself. · Your arm or hand is cool or pale or changes color. Call your doctor now or seek immediate medical care if: 
· You have signs of infection, such as: 
? Increased pain, swelling, warmth, or redness in your shoulder. ? Red streaks leading from a place on your shoulder. ? Pus draining from an area of your shoulder. ? Swollen lymph nodes in your neck, armpits, or groin. ? A fever. Watch closely for changes in your health, and be sure to contact your doctor if: 
· You cannot use your shoulder. · Your shoulder does not get better as expected. Where can you learn more? Go to http://esteban-tfifani.info/ Enter Y734 in the search box to learn more about \"Shoulder Pain: Care Instructions. \" Current as of: March 2, 2020               Content Version: 12.5 © 3520-3665 [employer]. Care instructions adapted under license by [employer] (which disclaims liability or warranty for this information). If you have questions about a medical condition or this instruction, always ask your healthcare professional. [place] [address] any warranty or liability for your use of this information.

## 2020-06-09 NOTE — PROGRESS NOTES
HISTORY OF PRESENT ILLNESS  Kaylie Naqvi is a 63 y.o. female. HPI  Today's Pt main concerns were provided on virtual visit and Video telemed format,  Present on VV for the concern of the current medical conditions,  pt is w/ comorbid history and unaware if the pt has been exposed to covid-19 individual,   Pt Have been staying at home for couple of wks,  pt has no fever no cough no dyspnea, no ha, not dizzy, nl smell nl taste, no N/V/D, no body ache. Shoulder Pain   The history is provided by the patient. This is a chronic problem. Episode onset: few yrs ago, not obese, patient has a lot of difficulty with overhead activity, raising arm is very painful and the pain  awakens the patient at night,  The problem occurs constantly. The problem has not changed since onset. The pain is present in the lt shoulder. The quality of the pain is described as dull. The pain is at a severity of 8/10. Associated symptoms include limited range of motion. Pertinent negatives include no numbness, ++stiffness, no tingling, no itching, no back pain and + neck pain. The symptoms are aggravated by movement and palpation. There has been no history of extremity trauma. Current Outpatient Medications   Medication Sig Dispense Refill    diclofenac potassium (CATAFLAM) 50 mg tablet Take 50 mg by mouth two (2) times daily as needed.  montelukast (SINGULAIR) 10 mg tablet Take 10 mg by mouth daily.  azelastine (ASTEPRO) 0.15 % (205.5 mcg) 2 Sprays by Both Nostrils route daily.  budesonide-formoterol (SYMBICORT) 160-4.5 mcg/actuation HFAA Take 2 Puffs by inhalation two (2) times a day. 1 Inhaler 5    cyclobenzaprine (FLEXERIL) 10 mg tablet Take 1 Tab by mouth nightly. Indications: muscle spasm 30 Tab 1    multivitamin (HAIR,SKIN AND NAILS) tablet Take 1 Tab by mouth daily.  ascorbic acid, vitamin C, (VITAMIN C) 500 mg tablet Take  by mouth.       albuterol (PROVENTIL HFA, VENTOLIN HFA, PROAIR HFA) 90 mcg/actuation inhaler Take 1 Puff by inhalation every four (4) hours as needed for Wheezing. 1 Inhaler 6    ibuprofen (MOTRIN) 800 mg tablet Take 1 Tab by mouth every eight (8) hours as needed for Pain. 30 Tab 0    MULTIVITS,CA,MINERALS/IRON/FA (MULTI FOR HER PO) Take 1 Tab by mouth daily.  tiotropium bromide (SPIRIVA RESPIMAT) 2.5 mcg/actuation inhaler Take 2 Puffs by inhalation nightly. 1 Inhaler 6    baclofen (LIORESAL) 10 mg tablet Take 1 Tab by mouth nightly as needed for Pain. (Patient not taking: Reported on 6/9/2020) 30 Tab 0    neomycin-polymyxin-hydrocortisone, buffered, (PEDIOTIC) 3.5-10,000-1 mg/mL-unit/mL-% otic suspension Administer 3 Drops in left ear four (4) times daily. Indications: outer ear inflammation caused by allergy or infection (Patient not taking: Reported on 6/9/2020) 10 mL 0    calcium-cholecalciferol, D3, (CALTRATE 600+D) tablet Take 1 Tab by mouth two (2) times a day. (Patient not taking: Reported on 6/9/2020) 60 Tab 11    GLUCOSAMINE/CHONDR BREWER A SOD (GLUCOSAMINE-CHONDROITIN) 750-600 mg tab One tab twice daily (Patient not taking: Reported on 6/9/2020) 60 Tab 6    fluticasone (FLONASE) 50 mcg/actuation nasal spray 2 sprays nighlty next 8 wks then every other night for 6 wks thereafter as needed (Patient not taking: Reported on 6/9/2020) 1 Bottle 5    albuterol (PROVENTIL VENTOLIN) 2.5 mg /3 mL (0.083 %) nebulizer solution 3 mL by Nebulization route once for 1 dose.  1 Each 0     Allergies   Allergen Reactions    Crestor [Rosuvastatin] Shortness of Breath    Nabumetone Shortness of Breath    Codeine Nausea Only    Gabapentin (Bulk) Shortness of Breath    Lyrica [Pregabalin] Swelling     Ankle swelling, foot pain , insomina    Percocet [Oxycodone-Acetaminophen] Other (comments)     loopy     Past Medical History:   Diagnosis Date    Acute right ankle pain 7/23/2018    Arthritis 8/3/2010    Asthma     Bunion of great toe of right foot 7/23/2018    Fall at home, initial encounter 9/18/2018    Hammer toe of second toe of right foot 7/23/2018    Hypercholesterolemia 8/18/2010    Postmenopausal 8/3/2010    Prediabetes 9/12/2013    Primary snoring 11/29/2017    Shoulder pain, bilateral 2/8/2018    Sinusitis, acute 12/12/2011    Wrist pain, acute, left 9/18/2018     Past Surgical History:   Procedure Laterality Date    HX GYN  1987    tubal pregancy; laparotomy    HX ORTHOPAEDIC  2006    neck    HX ORTHOPAEDIC  2008    cervical fusion of discs x4    HX PELVIC LAPAROSCOPY  1985    endometriosis    REVISE KNEE JOINT REPLACE,ALL PARTS  02/29/2012    tkr left     Family History   Problem Relation Age of Onset    Hypertension Father     Heart Disease Father         CAD    Diabetes Father     High Cholesterol Father     Other Mother         tumor in chest    Allergic Rhinitis Brother     Asthma Brother     Allergic Rhinitis Child     Sickle Cell Trait Child     GERD Child      Social History     Tobacco Use    Smoking status: Never Smoker    Smokeless tobacco: Never Used   Substance Use Topics    Alcohol use: No      Lab Results   Component Value Date/Time    Hemoglobin A1c 6.1 (H) 08/26/2013 08:47 AM    Glucose 89 05/29/2019 10:53 AM    Microalb/Creat ratio (ug/mg creat.) 6.2 02/08/2018 11:55 AM    LDL, calculated 162 (H) 05/29/2019 10:53 AM    Creatinine 0.97 05/29/2019 10:53 AM      Lab Results   Component Value Date/Time    TSH 0.558 05/29/2019 10:53 AM    T4, Total 7.7 08/04/2014 12:18 PM         Review of Systems   Constitutional: Negative for chills and fever. HENT: Negative for ear pain and nosebleeds. Eyes: Negative for blurred vision, pain and discharge. Respiratory: Negative for shortness of breath. Cardiovascular: Negative for chest pain and leg swelling. Gastrointestinal: Negative for constipation, diarrhea, nausea and vomiting. Genitourinary: Negative for frequency. Musculoskeletal: Positive for back pain, joint pain and myalgias.    Skin: Negative for itching and rash. Neurological: Positive for headaches. Psychiatric/Behavioral: Negative for depression. The patient has insomnia. The patient is not nervous/anxious. Physical Exam  Constitutional:       Appearance: She is obese. She is not ill-appearing or toxic-appearing. HENT:      Head: Normocephalic and atraumatic. Mouth/Throat:      Mouth: Mucous membranes are moist.   Neurological:      Mental Status: She is alert and oriented to person, place, and time. Psychiatric:         Attention and Perception: She is inattentive. She does not perceive auditory or visual hallucinations. Mood and Affect: Mood normal. Mood is not anxious, depressed or elated. Affect is flat. Affect is not labile, blunt, angry, tearful or inappropriate. Speech: She is communicative. Speech is rapid and pressured. Speech is not delayed, slurred or tangential.         Behavior: Behavior is slowed. Behavior is not agitated, aggressive, withdrawn, hyperactive or combative. Thought Content: Thought content normal. Thought content is not paranoid. Thought content does not include homicidal or suicidal ideation. Cognition and Memory: Memory is not impaired. She does not exhibit impaired recent memory or impaired remote memory. Judgment: Judgment normal. Judgment is not inappropriate. ASSESSMENT and PLAN  Diagnoses and all orders for this visit:    1. Cervical post-laminectomy syndrome    2. Bilateral shoulder pain, unspecified chronicity    3. Encounter to obtain excuse from work    4. Chronic pain of both shoulders  -     cyclobenzaprine (FLEXERIL) 10 mg tablet; Take 1 Tab by mouth nightly. Indications: muscle spasm    Other orders  -     diclofenac potassium (CATAFLAM) 50 mg tablet; Take 1 Tab by mouth two (2) times daily as needed for Pain.          Concern nancy SURESHID-19 addressed and detailed, pt was told that the best way to prevent illness is by protection, to Wear a facemask , having social distance, and to get tested if possible, Pursuant to the emergency declaration under the [employer] and [employer], 1135 waiver authority and the [employer] and [employer] Act, this Virtual Visit was conducted, with patient's consent, to reduce the patient's risk of exposure to COVID-19 and provide continuity of care for an established patient. Pt was also told if develop dyspnea needs to call 911 or go to er, call for furhter advise, pt agreed with todays recommendations, Services were provided through a Video synchronous discussion virtually to substitute for in-person appointment.

## 2020-06-12 ENCOUNTER — OFFICE VISIT (OUTPATIENT)
Dept: FAMILY MEDICINE CLINIC | Age: 60
End: 2020-06-12

## 2020-06-12 VITALS
SYSTOLIC BLOOD PRESSURE: 153 MMHG | TEMPERATURE: 98.3 F | HEIGHT: 62 IN | RESPIRATION RATE: 16 BRPM | HEART RATE: 73 BPM | OXYGEN SATURATION: 100 % | WEIGHT: 176.2 LBS | DIASTOLIC BLOOD PRESSURE: 76 MMHG | BODY MASS INDEX: 32.42 KG/M2

## 2020-06-12 DIAGNOSIS — M75.101 ROTATOR CUFF SYNDROME OF RIGHT SHOULDER: Primary | ICD-10-CM

## 2020-06-12 DIAGNOSIS — J45.20 MILD INTERMITTENT ASTHMA WITHOUT COMPLICATION: ICD-10-CM

## 2020-06-12 DIAGNOSIS — Z71.89 ADVICE GIVEN ABOUT COVID-19 VIRUS INFECTION: ICD-10-CM

## 2020-06-12 RX ORDER — METHYLPREDNISOLONE ACETATE 40 MG/ML
40 INJECTION, SUSPENSION INTRA-ARTICULAR; INTRALESIONAL; INTRAMUSCULAR; SOFT TISSUE ONCE
Qty: 1 ML | Refills: 0
Start: 2020-06-12 | End: 2020-06-12

## 2020-06-12 RX ORDER — MONTELUKAST SODIUM 10 MG/1
10 TABLET ORAL DAILY
Qty: 30 TAB | Refills: 3
Start: 2020-06-12 | End: 2021-12-20 | Stop reason: ALTCHOICE

## 2020-06-12 RX ORDER — NEBULIZER AND COMPRESSOR
1 EACH MISCELLANEOUS
Qty: 1 EACH | Refills: 0 | Status: SHIPPED | OUTPATIENT
Start: 2020-06-12

## 2020-06-12 NOTE — PATIENT INSTRUCTIONS
Joint Injections: Care Instructions Your Care Instructions Joint injections are shots into a joint, such as the knee. They may be used to put in medicines, such as pain relievers. A corticosteroid, or steroid, shot is used to reduce inflammation in tendons or joints. It is often used to treat problems such as arthritis, tendinitis, and bursitis. Steroids can be injected directly into a painful, inflamed joint. They can also help reduce inflammation of a bursa. A bursa is a sac of fluid. It cushions and lubricates areas where tendons, ligaments, skin, muscles, or bones rub against each other. A steroid shot can sometimes help with short-term pain relief when other treatments haven't worked. If steroid shots help, pain may improve for weeks or months. Follow-up care is a key part of your treatment and safety. Be sure to make and go to all appointments, and call your doctor if you are having problems. It's also a good idea to know your test results and keep a list of the medicines you take. How can you care for yourself at home? · Put ice or a cold pack on the area for 10 to 20 minutes at a time. Put a thin cloth between the ice and your skin. · Ask your doctor if you can take an over-the-counter pain medicine, such as acetaminophen (Tylenol), ibuprofen (Advil, Motrin), or naproxen (Aleve). Be safe with medicines. Read and follow all instructions on the label. · Avoid strenuous activities for several days. In particular, avoid ones that put stress on the area where you got the shot. · If you have dressings over the area, keep them clean and dry. You may remove them when your doctor tells you to. When should you call for help? Call your doctor now or seek immediate medical care if: 
· You have signs of infection, such as: 
? Increased pain, swelling, warmth, or redness. ? Red streaks leading from the site. ? Pus draining from the site. ? A fever. Watch closely for changes in your health, and be sure to contact your doctor if you have any problems. Where can you learn more? Go to http://esteban-tiffani.info/ Enter N616 in the search box to learn more about \"Joint Injections: Care Instructions. \" Current as of: March 2, 2020               Content Version: 12.5 © 2006-2020 [employer]. Care instructions adapted under license by [employer] (which disclaims liability or warranty for this information). If you have questions about a medical condition or th 
  
Rotator Cuff: Exercises Introduction Here are some examples of exercises for you to try. The exercises may be suggested for a condition or for rehabilitation. Start each exercise slowly. Ease off the exercises if you start to have pain. You will be told when to start these exercises and which ones will work best for you. How to do the exercises Pendulum swing If you have pain in your back, do not do this exercise. 1. Hold on to a table or the back of a chair with your good arm. Then bend forward a little and let your sore arm hang straight down. This exercise does not use the arm muscles. Rather, use your legs and your hips to create movement that makes your arm swing freely. 2. Use the movement from your hips and legs to guide the slightly swinging arm back and forth like a pendulum (or elephant trunk). Then guide it in circles that start small (about the size of a dinner plate). Make the circles a bit larger each day, as your pain allows. 3. Do this exercise for 5 minutes, 5 to 7 times each day. 4. As you have less pain, try bending over a little farther to do this exercise. This will increase the amount of movement at your shoulder. Posterior stretching exercise 1. Hold the elbow of your injured arm with your other hand. 2. Use your hand to pull your injured arm gently up and across your body. You will feel a gentle stretch across the back of your injured shoulder. 3. Hold for at least 15 to 30 seconds. Then slowly lower your arm. 4. Repeat 2 to 4 times. Up-the-back stretch Your doctor or physical therapist may want you to wait to do this stretch until you have regained most of your range of motion and strength. You can do this stretch in different ways. Hold any of these stretches for at least 15 to 30 seconds. Repeat them 2 to 4 times. 1. Light stretch: Put your hand in your back pocket. Let it rest there to stretch your shoulder. 2. Moderate stretch: With your other hand, hold your injured arm (palm outward) behind your back by the wrist. Pull your arm up gently to stretch your shoulder. 3. Advanced stretch: Put a towel over your other shoulder. Put the hand of your injured arm behind your back. Now hold the back end of the towel. With the other hand, hold the front end of the towel in front of your body. Pull gently on the front end of the towel. This will bring your hand farther up your back to stretch your shoulder. Overhead stretch 1. Standing about an arm's length away, grasp onto a solid surface. You could use a countertop, a doorknob, or the back of a sturdy chair. 2. With your knees slightly bent, bend forward with your arms straight. Lower your upper body, and let your shoulders stretch. 3. As your shoulders are able to stretch farther, you may need to take a step or two backward. 4. Hold for at least 15 to 30 seconds. Then stand up and relax. If you had stepped back during your stretch, step forward so you can keep your hands on the solid surface. 5. Repeat 2 to 4 times. Shoulder flexion (lying down) To make a wand for this exercise, use a piece of PVC pipe or a broom handle with the broom removed. Make the wand about a foot wider than your shoulders. 1. Lie on your back, holding a wand with both hands. Your palms should face down as you hold the wand. 2. Keeping your elbows straight, slowly raise your arms over your head. Raise them until you feel a stretch in your shoulders, upper back, and chest. 
3. Hold for 15 to 30 seconds. 4. Repeat 2 to 4 times. Shoulder rotation (lying down) To make a wand for this exercise, use a piece of PVC pipe or a broom handle with the broom removed. Make the wand about a foot wider than your shoulders. 1. Lie on your back. Hold a wand with both hands with your elbows bent and palms up. 2. Keep your elbows close to your body, and move the wand across your body toward the sore arm. 3. Hold for 8 to 12 seconds. 4. Repeat 2 to 4 times. Wall climbing (to the side) Avoid any movement that is straight to your side, and be careful not to arch your back. Your arm should stay about 30 degrees to the front of your side. 1. Stand with your side to a wall so that your fingers can just touch it at an angle about 30 degrees toward the front of your body. 2. Walk the fingers of your injured arm up the wall as high as pain permits. Try not to shrug your shoulder up toward your ear as you move your arm up. 3. Hold that position for a count of at least 15 to 20. 
4. Walk your fingers back down to the starting position. 5. Repeat at least 2 to 4 times. Try to reach higher each time. Wall climbing (to the front) During this stretching exercise, be careful not to arch your back. 1. Face a wall, and stand so your fingers can just touch it. 2. Keeping your shoulder down, walk the fingers of your injured arm up the wall as high as pain permits. (Don't shrug your shoulder up toward your ear.) 3. Hold your arm in that position for at least 15 to 30 seconds. 4. Slowly walk your fingers back down to where you started. 5. Repeat at least 2 to 4 times. Try to reach higher each time. Shoulder blade squeeze 1.  Stand with your arms at your sides, and squeeze your shoulder blades together. Do not raise your shoulders up as you squeeze. 2. Hold 6 seconds. 3. Repeat 8 to 12 times. Scapular exercise: Arm reach 1. Lie flat on your back. This exercise is a very slight motion that starts with your arms raised (elbows straight, arms straight). 2. From this position, reach higher toward the maria dolores or ceiling. Keep your elbows straight. All motion should be from your shoulder blade only. 3. Relax your arms back to where you started. 4. Repeat 8 to 12 times. Arm raise to the side During this strengthening exercise, your arm should stay about 30 degrees to the front of your side. 1. Slowly raise your injured arm to the side, with your thumb facing up. Raise your arm 60 degrees at the most (shoulder level is 90 degrees). 2. Hold the position for 3 to 5 seconds. Then lower your arm back to your side. If you need to, bring your \"good\" arm across your body and place it under the elbow as you lower your injured arm. Use your good arm to keep your injured arm from dropping down too fast. 
3. Repeat 8 to 12 times. 4. When you first start out, don't hold any extra weight in your hand. As you get stronger, you may use a 1-pound to 2-pound dumbbell or a small can of food. Shoulder flexor and extensor exercise These are isometric exercises. That means you contract your muscles without actually moving. 1. Push forward (flex): Stand facing a wall or doorjamb, about 6 inches or less back. Hold your injured arm against your body. Make a closed fist with your thumb on top. Then gently push your hand forward into the wall with about 25% to 50% of your strength. Don't let your body move backward as you push. Hold for about 6 seconds. Relax for a few seconds. Repeat 8 to 12 times. 2. Push backward (extend): Stand with your back flat against a wall. Your upper arm should be against the wall, with your elbow bent 90 degrees (your hand straight ahead).  Push your elbow gently back against the wall with about 25% to 50% of your strength. Don't let your body move forward as you push. Hold for about 6 seconds. Relax for a few seconds. Repeat 8 to 12 times. Scapular exercise: Wall push-ups This exercise is best done with your fingers somewhat turned out, rather than straight up and down. 1. Stand facing a wall, about 12 inches to 18 inches away. 2. Place your hands on the wall at shoulder height. 3. Slowly bend your elbows and bring your face to the wall. Keep your back and hips straight. 4. Push back to where you started. 5. Repeat 8 to 12 times. 6. When you can do this exercise against a wall comfortably, you can try it against a counter. You can then slowly progress to the end of a couch, then to a sturdy chair, and finally to the floor. Scapular exercise: Retraction For this exercise, you will need elastic exercise material, such as surgical tubing or Thera-Band. 1. Put the band around a solid object at about waist level. (A bedpost will work well.) Each hand should hold an end of the band. 2. With your elbows at your sides and bent to 90 degrees, pull the band back. Your shoulder blades should move toward each other. Then move your arms back where you started. 3. Repeat 8 to 12 times. 4. If you have good range of motion in your shoulders, try this exercise with your arms lifted out to the sides. Keep your elbows at a 90-degree angle. Raise the elastic band up to about shoulder level. Pull the band back to move your shoulder blades toward each other. Then move your arms back where you started. Internal rotator strengthening exercise 1. Start by tying a piece of elastic exercise material to a doorknob. You can use surgical tubing or Thera-Band. 2. Stand or sit with your shoulder relaxed and your elbow bent 90 degrees. Your upper arm should rest comfortably against your side. Squeeze a rolled towel between your elbow and your body for comfort.  This will help keep your arm at your side. 3. Hold one end of the elastic band in the hand of the painful arm. 4. Slowly rotate your forearm toward your body until it touches your belly. Slowly move it back to where you started. 5. Keep your elbow and upper arm firmly tucked against the towel roll or at your side. 6. Repeat 8 to 12 times. External rotator strengthening exercise 1. Start by tying a piece of elastic exercise material to a doorknob. You can use surgical tubing or Thera-Band. (You may also hold one end of the band in each hand.) 2. Stand or sit with your shoulder relaxed and your elbow bent 90 degrees. Your upper arm should rest comfortably against your side. Squeeze a rolled towel between your elbow and your body for comfort. This will help keep your arm at your side. 3. Hold one end of the elastic band with the hand of the painful arm. 4. Start with your forearm across your belly. Slowly rotate the forearm out away from your body. Keep your elbow and upper arm tucked against the towel roll or the side of your body until you begin to feel tightness in your shoulder. Slowly move your arm back to where you started. 5. Repeat 8 to 12 times. Follow-up care is a key part of your treatment and safety. Be sure to make and go to all appointments, and call your doctor if you are having problems. It's also a good idea to know your test results and keep a list of the medicines you take. Where can you learn more? Go to http://esteban-tiffani.info/ Enter Janneth Wilson in the search box to learn more about \"Rotator Cuff: Exercises. \" Current as of: March 2, 2020               Content Version: 12.5 © 0625-6797 [employer]. Care instructions adapted under license by [employer] (which disclaims liability or warranty for this information).  If you have questions about a medical condition or this instruction, always ask your healthcare professional. [place] [address] any warranty or liability for your use of this information. is instruction, always ask your healthcare professional. [place] [address] any warranty or liability for your use of this information.

## 2020-06-12 NOTE — PROGRESS NOTES
Oc OF PRESENT ILLNESS  Destiny Mcfarlane is a 63 y.o. female. HPI     Asthma  The patient has had no frequent daytime and nighttime asthma symptoms, patient currently has not been taking the short-acting bronchodilator over last couple weeks,  The patient is using short-acting beta agonists for symptom control , fortunately patient has no exacerbation over the last 12 months,  stating that last oral systemic corticosteroids was couple yrs ago, offered flu shot in 2018 but did not like it,  patient currently has no wheezing, no dry cough cough, no sob,  Also stating that there has not been night awakening for dyspnea over the last 30 days, compliant with meds, + RF needed,       Current Outpatient Medications   Medication Sig Dispense Refill    methylPREDNISolone acetate (DEPO-MedroL) 40 mg/mL injection 1 mL by IntraMUSCular route once for 1 dose. 1 mL 0    montelukast (SINGULAIR) 10 mg tablet Take 10 mg by mouth daily.  azelastine (ASTEPRO) 0.15 % (205.5 mcg) 2 Sprays by Both Nostrils route daily.  cyclobenzaprine (FLEXERIL) 10 mg tablet Take 1 Tab by mouth nightly. Indications: muscle spasm 30 Tab 1    diclofenac potassium (CATAFLAM) 50 mg tablet Take 1 Tab by mouth two (2) times daily as needed for Pain. 40 Tab 0    budesonide-formoterol (SYMBICORT) 160-4.5 mcg/actuation HFAA Take 2 Puffs by inhalation two (2) times a day. 1 Inhaler 5    multivitamin (HAIR,SKIN AND NAILS) tablet Take 1 Tab by mouth daily.  ascorbic acid, vitamin C, (VITAMIN C) 500 mg tablet Take  by mouth.  tiotropium bromide (SPIRIVA RESPIMAT) 2.5 mcg/actuation inhaler Take 2 Puffs by inhalation nightly. 1 Inhaler 6    albuterol (PROVENTIL HFA, VENTOLIN HFA, PROAIR HFA) 90 mcg/actuation inhaler Take 1 Puff by inhalation every four (4) hours as needed for Wheezing. 1 Inhaler 6    MULTIVITS,CA,MINERALS/IRON/FA (MULTI FOR HER PO) Take 1 Tab by mouth daily.       baclofen (LIORESAL) 10 mg tablet Take 1 Tab by mouth nightly as needed for Pain. (Patient not taking: Reported on 6/9/2020) 30 Tab 0    neomycin-polymyxin-hydrocortisone, buffered, (PEDIOTIC) 3.5-10,000-1 mg/mL-unit/mL-% otic suspension Administer 3 Drops in left ear four (4) times daily. Indications: outer ear inflammation caused by allergy or infection (Patient not taking: Reported on 6/9/2020) 10 mL 0    calcium-cholecalciferol, D3, (CALTRATE 600+D) tablet Take 1 Tab by mouth two (2) times a day. (Patient not taking: Reported on 6/9/2020) 60 Tab 11    GLUCOSAMINE/CHONDR BREWER A SOD (GLUCOSAMINE-CHONDROITIN) 750-600 mg tab One tab twice daily (Patient not taking: Reported on 6/9/2020) 60 Tab 6    fluticasone (FLONASE) 50 mcg/actuation nasal spray 2 sprays nighlty next 8 wks then every other night for 6 wks thereafter as needed (Patient not taking: Reported on 6/9/2020) 1 Bottle 5    albuterol (PROVENTIL VENTOLIN) 2.5 mg /3 mL (0.083 %) nebulizer solution 3 mL by Nebulization route once for 1 dose.  1 Each 0     Allergies   Allergen Reactions    Crestor [Rosuvastatin] Shortness of Breath    Nabumetone Shortness of Breath    Codeine Nausea Only    Gabapentin (Bulk) Shortness of Breath    Lyrica [Pregabalin] Swelling     Ankle swelling, foot pain , insomina    Percocet [Oxycodone-Acetaminophen] Other (comments)     loopy     Past Medical History:   Diagnosis Date    Acute right ankle pain 7/23/2018    Arthritis 8/3/2010    Asthma     Bunion of great toe of right foot 7/23/2018    Fall at home, initial encounter 9/18/2018    Hammer toe of second toe of right foot 7/23/2018    Hypercholesterolemia 8/18/2010    Postmenopausal 8/3/2010    Prediabetes 9/12/2013    Primary snoring 11/29/2017    Shoulder pain, bilateral 2/8/2018    Sinusitis, acute 12/12/2011    Wrist pain, acute, left 9/18/2018     Past Surgical History:   Procedure Laterality Date    HX GYN  1987    tubal pregancy; laparotomy    HX ORTHOPAEDIC  2006    neck    HX ORTHOPAEDIC  2008    cervical fusion of discs x4    HX PELVIC LAPAROSCOPY  1985    endometriosis    REVISE KNEE JOINT REPLACE,ALL PARTS  02/29/2012    tkr left     Family History   Problem Relation Age of Onset    Hypertension Father     Heart Disease Father         CAD    Diabetes Father     High Cholesterol Father     Other Mother         tumor in chest    Allergic Rhinitis Brother     Asthma Brother     Allergic Rhinitis Child     Sickle Cell Trait Child     GERD Child      Social History     Tobacco Use    Smoking status: Never Smoker    Smokeless tobacco: Never Used   Substance Use Topics    Alcohol use: No      Lab Results   Component Value Date/Time    WBC 5.2 05/29/2019 10:53 AM    HGB 13.9 05/29/2019 10:53 AM    HCT 43.9 05/29/2019 10:53 AM    PLATELET 520 35/93/2821 10:53 AM    MCV 86 05/29/2019 10:53 AM     Lab Results   Component Value Date/Time    TSH 0.558 05/29/2019 10:53 AM    T4, Total 7.7 08/04/2014 12:18 PM         Review of Systems   Constitutional: Negative for chills and fever. HENT: Negative for congestion and nosebleeds. Eyes: Negative for blurred vision and pain. Respiratory: Negative for cough, shortness of breath and wheezing. Cardiovascular: Negative for chest pain and leg swelling. Gastrointestinal: Negative for constipation, diarrhea, nausea and vomiting. Genitourinary: Negative for dysuria and frequency. Musculoskeletal: Positive for joint pain and myalgias. Skin: Negative for itching and rash. Neurological: Negative for dizziness, loss of consciousness and headaches. Psychiatric/Behavioral: Negative for depression. The patient is not nervous/anxious and does not have insomnia. Physical Exam  Vitals signs and nursing note reviewed. Constitutional:       Appearance: She is well-developed. HENT:      Head: Normocephalic and atraumatic. Eyes:      Conjunctiva/sclera: Conjunctivae normal.      Pupils: Pupils are equal, round, and reactive to light.    Neck:      Thyroid: No thyromegaly. Vascular: No JVD. Cardiovascular:      Rate and Rhythm: Normal rate and regular rhythm. Heart sounds: Normal heart sounds. No murmur. No friction rub. No gallop. Pulmonary:      Effort: Pulmonary effort is normal. No respiratory distress. Breath sounds: Normal breath sounds. No stridor. No wheezing or rales. Abdominal:      General: Bowel sounds are normal. There is no distension. Palpations: Abdomen is soft. There is no mass. Tenderness: There is no abdominal tenderness. Musculoskeletal:         General: Tenderness and deformity present. Left shoulder: She exhibits decreased range of motion, tenderness, bony tenderness, pain and spasm. Lymphadenopathy:      Cervical: No cervical adenopathy. Skin:     Findings: No erythema or rash. Neurological:      Mental Status: She is alert and oriented to person, place, and time. Cranial Nerves: No cranial nerve deficit. Deep Tendon Reflexes: Reflexes are normal and symmetric. Psychiatric:         Behavior: Behavior normal.         ASSESSMENT and PLAN  Diagnoses and all orders for this visit:    1. Rotator cuff syndrome of right shoulder  -     methylPREDNISolone acetate (DEPO-MedroL) 40 mg/mL injection; 1 mL by IntraMUSCular route once for 1 dose. -     METHYLPREDNISOLONE ACETATE INJECTION 40 MG  -     MO DRAIN/INJECT LARGE JOINT/BURSA  -     MO INJECTION,ANESTHETIC AGENT AND/OR STERIOD/PLANTER  -     THER/PROPH/DIAG INJECTION, SUBCUT/IM    2. Mild intermittent asthma without complication    3. Advice given about COVID-19 virus infection    Other orders  -     tiotropium bromide (Spiriva Respimat) 2.5 mcg/actuation inhaler; Take 2 Puffs by inhalation nightly. -     montelukast (SINGULAIR) 10 mg tablet; Take 1 Tab by mouth daily.  -     Nebulizer & Compressor machine; 1 Each by Does Not Apply route four (4) times daily as needed for Cough.          Concern abdout COVID-19 addressed and detailed, pt was told that the best way to prevent illness is by protection, to Wear a facemask , having social distance, and to get tested if possible, Pursuant to the emergency declaration under the [employer] and [facility] [place], [facility] waiver authority and the [employer] and [employer] Act, this Virtual Visit was conducted, with patient's consent, to reduce the patient's risk of exposure to COVID-19 and provide continuity of care for an established patient. Pt was also told if develop dyspnea needs to call 911 or go to er, call for mesha advise, pt agreed with todays recommendations, Services were provided through a Video synchronous discussion virtually to substitute for in-person appointment.

## 2020-06-12 NOTE — PROGRESS NOTES
After consent was obtained, using sterile technique the rt shoulder was prepped and The joint was entered and 0ml's of  fluid was withdrawn. Steroid 40 mg and 2 ml plain Lidocaine was then injected and the needle withdrawn. The procedure was well tolerated. The patient is asked to continue to rest the joint for a few more days before resuming regular activities. It may be more painful for the first 1-2 days. Watch for fever, or increased swelling or persistent pain in the joint. Call or return to clinic prn if such symptoms occur or there is failure to improve as anticipated.

## 2020-06-12 NOTE — PROGRESS NOTES
Chief Complaint   Patient presents with    Arm Pain     right     1. Have you been to the ER, urgent care clinic since your last visit? Hospitalized since your last visit? No    2. Have you seen or consulted any other health care providers outside of the [address] [place] [address] since your last visit? Include any pap smears or colon screening. No    Call placed to pt. Verified patient with two type of identifiers.  c/o throbbing right shoulder pain, taking meds with min relief    [address] MAIN [facility]  OFFICE PROCEDURE PROGRESS NOTE        Chart reviewed for the following:   Krystal Wu LPN, have reviewed the History, Physical and updated the Allergic reactions for Mammie Mannheim performed immediately prior to start of procedure:   Krystal Wu LPN, have performed the following reviews on Lo Nance prior to the start of the procedure:            * Patient was identified by name and date of birth   * Agreement on procedure being performed was verified  * Risks and Benefits explained to the patient  * Procedure site verified and marked as necessary  * Patient was positioned for comfort  * Consent was signed and verified     Time: 9:45am    Date of procedure: 6/12/2020    Procedure performed by:  Chio Starr MD    Provider assisted by: Satish Samuel    Patient assisted by: self    How tolerated by patient: tolerated the procedure well with no complications    Post Procedural Pain Scale: 2 - Hurts Little Bit    Comments: none          TIME ENDED: 10:00am    PROCEDURE: right shoulder steroid injection    PRE-PAIN: 6    POST-PAIN:2     SPECIMEN SENT TO LAB:no

## 2020-06-16 ENCOUNTER — OFFICE VISIT (OUTPATIENT)
Dept: URGENT CARE | Age: 60
End: 2020-06-16

## 2020-06-16 ENCOUNTER — VIRTUAL VISIT (OUTPATIENT)
Dept: FAMILY MEDICINE CLINIC | Age: 60
End: 2020-06-16

## 2020-06-16 VITALS — OXYGEN SATURATION: 99 % | TEMPERATURE: 98.7 F | RESPIRATION RATE: 16 BRPM | HEART RATE: 79 BPM

## 2020-06-16 DIAGNOSIS — R43.2 LOSS OF TASTE: Primary | ICD-10-CM

## 2020-06-16 DIAGNOSIS — Z11.59 SCREENING FOR VIRAL DISEASE: ICD-10-CM

## 2020-06-16 DIAGNOSIS — Z71.89 ADVICE GIVEN ABOUT COVID-19 VIRUS INFECTION: Primary | ICD-10-CM

## 2020-06-16 DIAGNOSIS — R05.9 COUGH: ICD-10-CM

## 2020-06-16 DIAGNOSIS — Z20.822 SUSPECTED COVID-19 VIRUS INFECTION: ICD-10-CM

## 2020-06-16 DIAGNOSIS — R09.81 SINUS CONGESTION: ICD-10-CM

## 2020-06-16 DIAGNOSIS — J01.00 ACUTE NON-RECURRENT MAXILLARY SINUSITIS: ICD-10-CM

## 2020-06-16 RX ORDER — IPRATROPIUM BROMIDE 42 UG/1
2 SPRAY, METERED NASAL 4 TIMES DAILY
Qty: 15 ML | Refills: 3 | Status: SHIPPED | OUTPATIENT
Start: 2020-06-16 | End: 2020-12-29 | Stop reason: SDUPTHER

## 2020-06-16 RX ORDER — AMOXICILLIN AND CLAVULANATE POTASSIUM 875; 125 MG/1; MG/1
1 TABLET, FILM COATED ORAL 2 TIMES DAILY
Qty: 20 TAB | Refills: 0 | Status: SHIPPED | OUTPATIENT
Start: 2020-06-16 | End: 2020-06-26

## 2020-06-16 NOTE — LETTER
NOTIFICATION RETURN TO WORK / SCHOOL 
 
 
 
 
6/16/2020 2:44 PM 
 
[facility] [address] [facility] [phone] To Whom It May Concern: 
 
Charbel Tidwell is currently under the care of Leander Feng [facility]. She will return to work/school on: 6/20/2020 If there are questions or concerns please have the patient contact our office.  
 
 
 
Sincerely, 
 
 
Meagan Vázquez MD

## 2020-06-16 NOTE — PROGRESS NOTES
Chief Complaint   Patient presents with    Sinus Pain     1. Have you been to the ER, urgent care clinic since your last visit? Hospitalized since your last visit? No    2. Have you seen or consulted any other health care providers outside of the [address] [place] [address] since your last visit? Include any pap smears or colon screening. No    Call placed to pt. Verified patient with two type of identifiers. c/o facial fullness and nasal drainage x 3 days,  Has not tried any otc meds.

## 2020-06-16 NOTE — PROGRESS NOTES
This patient was seen in Flu Clinic at [address] [place] [address] Urgent Care while in their vehicle due to COVID-19 pandemic with PPE and focused examination in order to decrease community viral transmission. The patient/guardian gave verbal consent to treat. Mandi Gonzalez is a 63 y.o. female who presents with sinus congestion, dry cough, loss of taste since yesterday. Had virtual visit with PCP who rx'ed augmentin and atrovent nasal spray and also advised to get COVID-19 testing. 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 Denies fever, SOB. PMH: Asthma. Non-smoker. The history is provided by the patient.         Past Medical History:   Diagnosis Date    Acute right ankle pain 7/23/2018    Arthritis 8/3/2010    Asthma     Bunion of great toe of right foot 7/23/2018    Fall at home, initial encounter 9/18/2018    Hammer toe of second toe of right foot 7/23/2018    Hypercholesterolemia 8/18/2010    Postmenopausal 8/3/2010    Prediabetes 9/12/2013    Primary snoring 11/29/2017    Shoulder pain, bilateral 2/8/2018    Sinusitis, acute 12/12/2011    Wrist pain, acute, left 9/18/2018        Past Surgical History:   Procedure Laterality Date    HX GYN  1987    tubal pregancy; laparotomy    HX ORTHOPAEDIC  2006    neck    HX ORTHOPAEDIC  2008    cervical fusion of discs x4    HX PELVIC LAPAROSCOPY  1985    endometriosis    REVISE KNEE JOINT REPLACE,ALL PARTS  02/29/2012    tkr left         Family History   Problem Relation Age of Onset    Hypertension Father     Heart Disease Father         CAD    Diabetes Father     High Cholesterol Father     Other Mother         tumor in chest    Allergic Rhinitis Brother     Asthma Brother     Allergic Rhinitis Child     Sickle Cell Trait Child     GERD Child         Social History     Socioeconomic History    Marital status:      Spouse name: Not on file    Number of children: Not on file    Years of education: Not on file   Magnolia Anguiano education level: Not on file   Occupational History    Not on file   Social Needs    Financial resource strain: Not on file    Food insecurity     Worry: Not on file     Inability: Not on file    Transportation needs     Medical: Not on file     Non-medical: Not on file   Tobacco Use    Smoking status: Never Smoker    Smokeless tobacco: Never Used   Substance and Sexual Activity    Alcohol use: No    Drug use: No    Sexual activity: Not Currently   Lifestyle    Physical activity     Days per week: Not on file     Minutes per session: Not on file    Stress: Not on file   Relationships    Social connections     Talks on phone: Not on file     Gets together: Not on file     Attends [employer] service: Not on file     Active member of club or organization: Not on file     Attends meetings of clubs or organizations: Not on file     Relationship status: Not on file    Intimate partner violence     Fear of current or ex partner: Not on file     Emotionally abused: Not on file     Physically abused: Not on file     Forced sexual activity: Not on file   Other Topics Concern    Not on file   Social History Narrative    Not on file                ALLERGIES: Crestor [rosuvastatin]; Nabumetone; Codeine; Gabapentin (bulk); Lyrica [pregabalin]; and Percocet [oxycodone-acetaminophen]    Review of Systems   Constitutional: Negative for activity change, appetite change, chills and fever. HENT: Positive for congestion. Negative for rhinorrhea and sore throat. Respiratory: Positive for cough. Negative for shortness of breath and wheezing. Cardiovascular: Negative for chest pain. Gastrointestinal: Negative for abdominal pain, diarrhea, nausea and vomiting. Musculoskeletal: Negative for myalgias. Neurological: Negative for headaches. Vitals:    06/16/20 1742   Pulse: 79   Resp: 16   Temp: 98.7 °F (37.1 °C)   SpO2: 99%       Physical Exam  Vitals signs and nursing note reviewed.    Constitutional: General: She is not in acute distress. Appearance: She is well-developed. She is not diaphoretic. Pulmonary:      Effort: Pulmonary effort is normal. No respiratory distress. Breath sounds: Normal breath sounds. No stridor. No wheezing, rhonchi or rales. Neurological:      Mental Status: She is alert. Psychiatric:         Behavior: Behavior normal.         Thought Content: Thought content normal.         Judgment: Judgment normal.         MDM    ICD-10-CM ICD-9-CM   1. Loss of taste R43.2 781.1   2. Sinus congestion R09.81 478.19   3. Cough R05 786.2   4. Screening for viral disease Z11.59 V73.99       Orders Placed This Encounter    NOVEL CORONAVIRUS (COVID-19)     Scheduling Instructions:      1) Due to current limited availability of the COVID-19 PCR test, tests will be prioritized and may not be completed.              2) Order only if the test result will change clinical management or necessary for a return to mission-critical employment decision.              3) Print and instruct patient to adhere to [facility] home isolation program. (Link Above)              4) Set up or refer patient for a monitoring program.              5) Have patient sign up for and leverage [employer] (if not previously done). Order Specific Question:   Status     Answer:   Symptomatic/Infection Suspected        Self Quarantine  Deep breathing exercises  Tylenol prn  Increase fluids    If signs and symptoms become worse the pt is to go to the ER.          Procedures

## 2020-06-16 NOTE — PROGRESS NOTES
HISTORY OF PRESENT ILLNESS  Lilliana Lou is a 63 y.o. female. HPI   Today's Pt main concerns were provided on virtual visit and Video telemed format,  Present on VV for the concern of the current medical conditions,  pt is w/ comorbid history and unaware if the pt has been exposed to covid-19 individual, with asthmatic state, no recent use of the INh,   Pt Have been staying at home for couple of wks,  pt has no fever no cough no dyspnea, no ha, not dizzy, nl smell nl taste, no N/V/D, no body ache, . Current Outpatient Medications   Medication Sig Dispense Refill    tiotropium bromide (Spiriva Respimat) 2.5 mcg/actuation inhaler Take 2 Puffs by inhalation nightly. 1 Inhaler 6    montelukast (SINGULAIR) 10 mg tablet Take 1 Tab by mouth daily. 30 Tab 3    Nebulizer & Compressor machine 1 Each by Does Not Apply route four (4) times daily as needed for Cough. 1 Each 0    azelastine (ASTEPRO) 0.15 % (205.5 mcg) 2 Sprays by Both Nostrils route daily.  cyclobenzaprine (FLEXERIL) 10 mg tablet Take 1 Tab by mouth nightly. Indications: muscle spasm 30 Tab 1    diclofenac potassium (CATAFLAM) 50 mg tablet Take 1 Tab by mouth two (2) times daily as needed for Pain. 40 Tab 0    budesonide-formoterol (SYMBICORT) 160-4.5 mcg/actuation HFAA Take 2 Puffs by inhalation two (2) times a day. 1 Inhaler 5    multivitamin (HAIR,SKIN AND NAILS) tablet Take 1 Tab by mouth daily.  ascorbic acid, vitamin C, (VITAMIN C) 500 mg tablet Take  by mouth.  albuterol (PROVENTIL HFA, VENTOLIN HFA, PROAIR HFA) 90 mcg/actuation inhaler Take 1 Puff by inhalation every four (4) hours as needed for Wheezing. 1 Inhaler 6    MULTIVITS,CA,MINERALS/IRON/FA (MULTI FOR HER PO) Take 1 Tab by mouth daily.  neomycin-polymyxin-hydrocortisone, buffered, (PEDIOTIC) 3.5-10,000-1 mg/mL-unit/mL-% otic suspension Administer 3 Drops in left ear four (4) times daily.  Indications: outer ear inflammation caused by allergy or infection (Patient not taking: Reported on 6/9/2020) 10 mL 0    calcium-cholecalciferol, D3, (CALTRATE 600+D) tablet Take 1 Tab by mouth two (2) times a day. (Patient not taking: Reported on 6/9/2020) 60 Tab 11    GLUCOSAMINE/CHONDR BREWER A SOD (GLUCOSAMINE-CHONDROITIN) 750-600 mg tab One tab twice daily (Patient not taking: Reported on 6/9/2020) 60 Tab 6    albuterol (PROVENTIL VENTOLIN) 2.5 mg /3 mL (0.083 %) nebulizer solution 3 mL by Nebulization route once for 1 dose.  1 Each 0     Allergies   Allergen Reactions    Crestor [Rosuvastatin] Shortness of Breath    Nabumetone Shortness of Breath    Codeine Nausea Only    Gabapentin (Bulk) Shortness of Breath    Lyrica [Pregabalin] Swelling     Ankle swelling, foot pain , insomina    Percocet [Oxycodone-Acetaminophen] Other (comments)     loopy     Past Medical History:   Diagnosis Date    Acute right ankle pain 7/23/2018    Arthritis 8/3/2010    Asthma     Bunion of great toe of right foot 7/23/2018    Fall at home, initial encounter 9/18/2018    Hammer toe of second toe of right foot 7/23/2018    Hypercholesterolemia 8/18/2010    Postmenopausal 8/3/2010    Prediabetes 9/12/2013    Primary snoring 11/29/2017    Shoulder pain, bilateral 2/8/2018    Sinusitis, acute 12/12/2011    Wrist pain, acute, left 9/18/2018     Past Surgical History:   Procedure Laterality Date    HX GYN  1987    tubal pregancy; laparotomy    HX ORTHOPAEDIC  2006    neck    HX ORTHOPAEDIC  2008    cervical fusion of discs x4    HX PELVIC LAPAROSCOPY  1985    endometriosis    REVISE KNEE JOINT REPLACE,ALL PARTS  02/29/2012    tkr left     Family History   Problem Relation Age of Onset    Hypertension Father     Heart Disease Father         CAD    Diabetes Father     High Cholesterol Father     Other Mother         tumor in chest    Allergic Rhinitis Brother     Asthma Brother     Allergic Rhinitis Child     Sickle Cell Trait Child     GERD Child      Social History Tobacco Use    Smoking status: Never Smoker    Smokeless tobacco: Never Used   Substance Use Topics    Alcohol use: No      Lab Results   Component Value Date/Time    WBC 5.2 05/29/2019 10:53 AM    HGB 13.9 05/29/2019 10:53 AM    HCT 43.9 05/29/2019 10:53 AM    PLATELET 546 88/87/9583 10:53 AM    MCV 86 05/29/2019 10:53 AM     Lab Results   Component Value Date/Time    TSH 0.558 05/29/2019 10:53 AM    T4, Total 7.7 08/04/2014 12:18 PM         Review of Systems   Constitutional: Positive for chills and malaise/fatigue. Negative for fever. HENT: Positive for congestion, ear pain, sinus pain and sore throat. Negative for nosebleeds. Eyes: Positive for redness. Negative for blurred vision, pain and discharge. Respiratory: Positive for cough and sputum production. Negative for shortness of breath. Cardiovascular: Negative for chest pain and leg swelling. Gastrointestinal: Negative for constipation, diarrhea, nausea and vomiting. Genitourinary: Negative for frequency. Musculoskeletal: Negative for joint pain. Skin: Negative for itching and rash. Neurological: Positive for headaches. Psychiatric/Behavioral: Negative for depression. The patient is not nervous/anxious. Physical Exam  Constitutional:       Appearance: She is obese. She is not ill-appearing or toxic-appearing. HENT:      Head: Normocephalic and atraumatic. Mouth/Throat:      Mouth: Mucous membranes are moist.   Neurological:      Mental Status: She is alert and oriented to person, place, and time. Psychiatric:         Mood and Affect: Mood normal.         Behavior: Behavior normal.         Thought Content: Thought content normal.         Judgment: Judgment normal.         ASSESSMENT and PLAN  Diagnoses and all orders for this visit:    1. Advice given about COVID-19 virus infection  -     amoxicillin-clavulanate (AUGMENTIN) 875-125 mg per tablet; Take 1 Tab by mouth two (2) times a day for 10 days.   - ipratropium (ATROVENT) 42 mcg (0.06 %) nasal spray; 2 Sprays by Both Nostrils route four (4) times daily. 2. Suspected COVID-19 virus infection  -     amoxicillin-clavulanate (AUGMENTIN) 875-125 mg per tablet; Take 1 Tab by mouth two (2) times a day for 10 days. -     ipratropium (ATROVENT) 42 mcg (0.06 %) nasal spray; 2 Sprays by Both Nostrils route four (4) times daily. 3. Acute non-recurrent maxillary sinusitis  -     amoxicillin-clavulanate (AUGMENTIN) 875-125 mg per tablet; Take 1 Tab by mouth two (2) times a day for 10 days. -     ipratropium (ATROVENT) 42 mcg (0.06 %) nasal spray; 2 Sprays by Both Nostrils route four (4) times daily. Concern abdout COVID-19 addressed and detailed, pt was told that the best way to prevent illness is by protection, to Wear a facemask , having social distance, and to get tested if possible, Pursuant to the emergency declaration under the [employer] [facility] [place], 1135 waiver authority and the [employer] Act, this Virtual Visit was conducted, with patient's consent, to reduce the patient's risk of exposure to COVID-19 and provide continuity of care for an established patient. Pt was also told if develop dyspnea needs to call 911 or go to er, call for mesha advise, pt agreed with todays recommendations, Services were provided through a Video synchronous discussion virtually to substitute for in-person appointment.

## 2020-06-16 NOTE — PATIENT INSTRUCTIONS
Sinusitis: Care Instructions Your Care Instructions Sinusitis is an infection of the lining of the sinus cavities in your head. Sinusitis often follows a cold. It causes pain and pressure in your head and face. In most cases, sinusitis gets better on its own in 1 to 2 weeks. But some mild symptoms may last for several weeks. Sometimes antibiotics are needed. Follow-up care is a key part of your treatment and safety. Be sure to make and go to all appointments, and call your doctor if you are having problems. It's also a good idea to know your test results and keep a list of the medicines you take. How can you care for yourself at home? · Take an over-the-counter pain medicine, such as acetaminophen (Tylenol), ibuprofen (Advil, Motrin), or naproxen (Aleve). Read and follow all instructions on the label. · If the doctor prescribed antibiotics, take them as directed. Do not stop taking them just because you feel better. You need to take the full course of antibiotics. · Be careful when taking over-the-counter cold or flu medicines and Tylenol at the same time. Many of these medicines have acetaminophen, which is Tylenol. Read the labels to make sure that you are not taking more than the recommended dose. Too much acetaminophen (Tylenol) can be harmful. · Breathe warm, moist air from a steamy shower, a hot bath, or a sink filled with hot water. Avoid cold, dry air. Using a humidifier in your home may help. Follow the directions for cleaning the machine. · Use saline (saltwater) nasal washes to help keep your nasal passages open and wash out mucus and bacteria. You can buy saline nose drops at a grocery store or drugstore. Or you can make your own at home by adding 1 teaspoon of salt and 1 teaspoon of baking soda to 2 cups of distilled water. If you make your own, fill a bulb syringe with the solution, insert the tip into your nostril, and squeeze gently. Ritu Stubbs your nose. · Put a hot, wet towel or a warm gel pack on your face 3 or 4 times a day for 5 to 10 minutes each time. · Try a decongestant nasal spray like oxymetazoline (Afrin). Do not use it for more than 3 days in a row. Using it for more than 3 days can make your congestion worse. When should you call for help? Call your doctor now or seek immediate medical care if: 
· You have new or worse swelling or redness in your face or around your eyes. · You have a new or higher fever. Watch closely for changes in your health, and be sure to contact your doctor if: 
· You have new or worse facial pain. · The mucus from your nose becomes thicker (like pus) or has new blood in it. · You are not getting better as expected. Where can you learn more? Go to http://esteban-tiffani.info/ Enter D297 in the search box to learn more about \"Sinusitis: Care Instructions. \" Current as of: July 29, 2019               Content Version: 12.5 © 6051-0253 [employer]. Care instructions adapted under license by [employer] (which disclaims liability or warranty for this information). If you have questions about a medical condition or this instruction, always ask your healthcare professional. [address] any warranty or liability for your use of this information.

## 2020-06-18 LAB — SARS-COV-2, NAA: NOT DETECTED

## 2020-07-21 ENCOUNTER — VIRTUAL VISIT (OUTPATIENT)
Dept: FAMILY MEDICINE CLINIC | Age: 60
End: 2020-07-21

## 2020-07-21 DIAGNOSIS — Z13.39 SCREENING FOR ALCOHOLISM: ICD-10-CM

## 2020-07-21 DIAGNOSIS — Z13.31 SCREENING FOR DEPRESSION: ICD-10-CM

## 2020-07-21 DIAGNOSIS — E78.00 HYPERCHOLESTEROLEMIA: ICD-10-CM

## 2020-07-21 DIAGNOSIS — Z00.00 MEDICARE ANNUAL WELLNESS VISIT, SUBSEQUENT: Primary | ICD-10-CM

## 2020-07-21 DIAGNOSIS — Z12.11 SCREEN FOR COLON CANCER: ICD-10-CM

## 2020-07-21 DIAGNOSIS — Z12.31 ENCOUNTER FOR SCREENING MAMMOGRAM FOR MALIGNANT NEOPLASM OF BREAST: ICD-10-CM

## 2020-07-21 DIAGNOSIS — Z71.89 ADVANCED DIRECTIVES, COUNSELING/DISCUSSION: ICD-10-CM

## 2020-07-21 NOTE — ACP (ADVANCE CARE PLANNING)
Advance Care Planning (ACP) Physician       Date of ACP Conversation: 4/24/2020    Conversation Conducted with:   Patient with Decision Making Capacity     Other Legally Authorized Decision Maker would be  Zia Rudder daughter as SDM     For My patients who has currently great Decision Making Capacity:     Patient is on presence of no family member,, stated that the pt wants to be not DNR at this time,  The pt likes to be a full code individual,  The patient states that there is a lot of will to live,  Pt was given the form,   will sign and bring us a copy on the later date.       Conversation Outcomes / Follow-Up Plan:   Completed new Advance Directive      Length of ACP Conversation in minutes:  16 minutes

## 2020-07-21 NOTE — PROGRESS NOTES
This is the Subsequent Medicare Annual Wellness Exam, performed 12 months or more after the Initial AWV or the last Subsequent AWV    I have reviewed the patient's medical history in detail and updated the computerized patient record. History       Present for CPE, last Complete Physical exam was 2017 Up todate w/ all vaccination, last tetanus vaccine was in 2012,      last mammog was nl and  In 2020,   last pap smear normal and was in one yr,     last colonoscopy was never,  Opted till now,   No past surgical hx,  last bone dexa scan was  2010 with osteopenia on caltrate  No family hx of breast cancer     no family hx of colon cancer,  +++sexaully active and uses Safe sex, +++ physically active,  compliant w/ meds, +++Rf needed for today for her meds.    Patient has good supportive care at home, and feels safe no physical mental abuse,    Medications causing fall and the risk for future fall screened specially if the continuation of some of the current meds continues is addressed,  the depression at this age addressed pt with improvig interests and enjoy to do things, not anxious not depressed, not wanted to heart self or others  pt at this visit, is physically functional with gait nl and nicely independent and walks w/out mobility device and, in addition mentaly is functional,  very Alert and oriented ,    Hemoglobin A1c (POC) 5.7  LDL, calculated 162   HDL Cholesterol 65     Currently opted on statin tx,       Patient Active Problem List   Diagnosis Code    Asthma J45.909    Postmenopausal Z78.0    Knee pain, bilateral M25.561, M25.562    Arthritis M19.90    Proteinuria R80.9    Hematuria, microscopic R31.29    Hypercholesterolemia E78.00    Screening for colon cancer Z12.11    Osteoporosis screening Z13.820    Myalgia M79.10    Onychomycosis of toenail B35.1    Acute bronchitis J20.9    Sinusitis, acute J01.90    Knee pain, left M25.562    Acid reflux K21.9    S/P total knee replacement Z96.659    Prediabetes R73.03    Vitamin D deficiency E55.9    Asthma exacerbation attacks J45. [address] exposure Z77.098    Lumbar back pain with radiculopathy affecting right lower extremity M54.16    Lumbar disc prolapse with compression radiculopathy M51.16    Cervical post-laminectomy syndrome M96.1    Cervical radiculopathy due to degenerative joint disease of spine M47.22    Cellulitis and abscess of lower extremity L03.119, L02.419    Primary snoring R06.83    Shoulder pain, bilateral M25.511, M25.512    Acute right ankle pain M25.571    Hammer toe of second toe of right foot M20.41    Bunion of great toe of right foot M21.611    Wrist pain, acute, left M25.532    Fall at home, initial encounter W19. Zack Kenny, L99.58     Past Medical History:   Diagnosis Date    Acute right ankle pain 7/23/2018    Arthritis 8/3/2010    Asthma     Bunion of great toe of right foot 7/23/2018    Fall at home, initial encounter 9/18/2018    Hammer toe of second toe of right foot 7/23/2018    Hypercholesterolemia 8/18/2010    Postmenopausal 8/3/2010    Prediabetes 9/12/2013    Primary snoring 11/29/2017    Shoulder pain, bilateral 2/8/2018    Sinusitis, acute 12/12/2011    Wrist pain, acute, left 9/18/2018      Past Surgical History:   Procedure Laterality Date    HX GYN  1987    tubal pregancy; laparotomy    HX ORTHOPAEDIC  2006    neck    HX ORTHOPAEDIC  2008    cervical fusion of discs x4    HX PELVIC LAPAROSCOPY  1985    endometriosis    REVISE KNEE JOINT REPLACE,ALL PARTS  02/29/2012    tkr left     Current Outpatient Medications   Medication Sig Dispense Refill    ipratropium (ATROVENT) 42 mcg (0.06 %) nasal spray 2 Sprays by Both Nostrils route four (4) times daily. 15 mL 3    tiotropium bromide (Spiriva Respimat) 2.5 mcg/actuation inhaler Take 2 Puffs by inhalation nightly. 1 Inhaler 6    montelukast (SINGULAIR) 10 mg tablet Take 1 Tab by mouth daily.  30 Tab 3    Nebulizer & Compressor machine 1 Each by Does Not Apply route four (4) times daily as needed for Cough. 1 Each 0    azelastine (ASTEPRO) 0.15 % (205.5 mcg) 2 Sprays by Both Nostrils route daily.  cyclobenzaprine (FLEXERIL) 10 mg tablet Take 1 Tab by mouth nightly. Indications: muscle spasm (Patient taking differently: Take 10 mg by mouth daily as needed. Indications: muscle spasm) 30 Tab 1    diclofenac potassium (CATAFLAM) 50 mg tablet Take 1 Tab by mouth two (2) times daily as needed for Pain. 40 Tab 0    budesonide-formoterol (SYMBICORT) 160-4.5 mcg/actuation HFAA Take 2 Puffs by inhalation two (2) times a day. 1 Inhaler 5    ascorbic acid, vitamin C, (VITAMIN C) 500 mg tablet Take 500 mg by mouth daily.  albuterol (PROVENTIL HFA, VENTOLIN HFA, PROAIR HFA) 90 mcg/actuation inhaler Take 1 Puff by inhalation every four (4) hours as needed for Wheezing. 1 Inhaler 6    MULTIVITS,CA,MINERALS/IRON/FA (MULTI FOR HER PO) Take 1 Tab by mouth daily.  multivitamin (HAIR,SKIN AND NAILS) tablet Take 1 Tab by mouth daily.  neomycin-polymyxin-hydrocortisone, buffered, (PEDIOTIC) 3.5-10,000-1 mg/mL-unit/mL-% otic suspension Administer 3 Drops in left ear four (4) times daily. Indications: outer ear inflammation caused by allergy or infection (Patient not taking: Reported on 6/9/2020) 10 mL 0    calcium-cholecalciferol, D3, (CALTRATE 600+D) tablet Take 1 Tab by mouth two (2) times a day. (Patient not taking: Reported on 6/9/2020) 60 Tab 11    GLUCOSAMINE/CHONDR BREWER A SOD (GLUCOSAMINE-CHONDROITIN) 750-600 mg tab One tab twice daily (Patient not taking: Reported on 6/9/2020) 60 Tab 6    albuterol (PROVENTIL VENTOLIN) 2.5 mg /3 mL (0.083 %) nebulizer solution 3 mL by Nebulization route once for 1 dose.  1 Each 0     Allergies   Allergen Reactions    Crestor [Rosuvastatin] Shortness of Breath    Nabumetone Shortness of Breath    Codeine Nausea Only    Gabapentin (Bulk) Shortness of Breath    Lyrica [Pregabalin] Swelling     Ankle swelling, foot pain , insomina    Percocet [Oxycodone-Acetaminophen] Other (comments)     loopy       Family History   Problem Relation Age of Onset    Hypertension Father     Heart Disease Father         CAD    Diabetes Father     High Cholesterol Father     Other Mother         tumor in chest    Allergic Rhinitis Brother     Asthma Brother     Allergic Rhinitis Child     Sickle Cell Trait Child     GERD Child      Social History     Tobacco Use    Smoking status: Never Smoker    Smokeless tobacco: Never Used   Substance Use Topics    Alcohol use: No       Depression Risk Factor Screening:     3 most recent PHQ Screens 7/21/2020   Little interest or pleasure in doing things Not at all   Feeling down, depressed, irritable, or hopeless Not at all   Total Score PHQ 2 0       Alcohol Risk Factor Screening:   Do you average 1 drink per night or more than 7 drinks a week:  No    On any one occasion in the past three months have you have had more than 3 drinks containing alcohol:  No      Functional Ability and Level of Safety:   Hearing: Hearing is good. Activities of Daily Living: The home contains: handrails, grab bars and rugs  Patient does total self care     Ambulation: with no difficulty     Fall Risk:  Fall Risk Assessment, last 12 mths 7/21/2020   Able to walk? Yes   Fall in past 12 months?  No     Abuse Screen:  Patient is not abused       Cognitive Screening   Has your family/caregiver stated any concerns about your memory: no    Cognitive Screening: Normal - MMSE (Mini Mental Status Exam)    Patient Care Team   Patient Care Team:  Winona Angelucci, MD as PCP - General  Winona Angelucci, MD as PCP - REHABILITATION [facility] Empaneled Provider    Assessment/Plan   Education and counseling provided:  Are appropriate based on today's review and evaluation  End-of-Life planning (with patient's consent)  Pneumococcal Vaccine  Screening Mammography  Screening Pap and pelvic (covered once every 2 years)  Colorectal cancer screening tests  Medical nutrition therapy for individuals with diabetes or renal disease    Diagnoses and all orders for this visit:    1. Medicare annual wellness visit, subsequent    2. Advanced directives, counseling/discussion  -     ADVANCE CARE PLANNING FIRST 30 MINS  -     FULL CODE    3. Screening for alcoholism  -     MT ANNUAL ALCOHOL SCREEN 15 MIN    4. Screening for depression  -     DEPRESSION SCREEN ANNUAL    5. Screen for colon cancer  -     REFERRAL TO GASTROENTEROLOGY    6. Encounter for screening mammogram for malignant neoplasm of breast    7. Hypercholesterolemia  -     CBC W/O DIFF  -     HEMOGLOBIN A1C WITH EAG  -     METABOLIC PANEL, COMPREHENSIVE  -     TSH 3RD GENERATION  -     LIPID PANEL  -     URINALYSIS W/ RFLX MICROSCOPIC         Health Maintenance Due   Topic Date Due    PAP AKA CERVICAL CYTOLOGY  05/10/2020    A1C test (Diabetic or Prediabetic)  05/29/2020       Niall Pedraza, who was evaluated through a synchronous (real-time) audio-video encounter, and/or her healthcare decision maker, is aware that it is a billable service, with coverage as determined by her insurance carrier. She provided verbal consent to proceed: Yes, and patient identification was verified. It was conducted pursuant to the emergency declaration under the [facility], [address] [place] [address] [facility] authority and the [employer] and [employer] Act. A caregiver was present when appropriate. Ability to conduct physical exam was limited. I was at home. The patient was at home.     Josee Mcmanus MD

## 2020-07-21 NOTE — PATIENT INSTRUCTIONS
Well Visit, Women 48 to 72: Care Instructions  Your Care Instructions     Physical exams can help you stay healthy. Your doctor has checked your overall health and may have suggested ways to take good care of yourself. He or she also may have recommended tests. At home, you can help prevent illness with healthy eating, regular exercise, and other steps. Follow-up care is a key part of your treatment and safety. Be sure to make and go to all appointments, and call your doctor if you are having problems. It's also a good idea to know your test results and keep a list of the medicines you take. How can you care for yourself at home? · Reach and stay at a healthy weight. This will lower your risk for many problems, such as obesity, diabetes, heart disease, and high blood pressure. · Get at least 30 minutes of exercise on most days of the week. Walking is a good choice. You also may want to do other activities, such as running, swimming, cycling, or playing tennis or team sports. · Do not smoke. Smoking can make health problems worse. If you need help quitting, talk to your doctor about stop-smoking programs and medicines. These can increase your chances of quitting for good. · Protect your skin from too much sun. When you're outdoors from 10 a.m. to 4 p.m., stay in the shade or cover up with clothing and a hat with a wide brim. Wear sunglasses that block UV rays. Even when it's cloudy, put broad-spectrum sunscreen (SPF 30 or higher) on any exposed skin. · See a dentist one or two times a year for checkups and to have your teeth cleaned. · Wear a seat belt in the car. Follow your doctor's advice about when to have certain tests. These tests can spot problems early. · Cholesterol. Your doctor will tell you how often to have this done based on your age, family history, or other things that can increase your risk for heart attack and stroke. · Blood pressure.  Have your blood pressure checked during a routine doctor visit. Your doctor will tell you how often to check your blood pressure based on your age, your blood pressure results, and other factors. · Mammogram. Ask your doctor how often you should have a mammogram, which is an X-ray of your breasts. A mammogram can spot breast cancer before it can be felt and when it is easiest to treat. · Pap test and pelvic exam. Ask your doctor how often you should have a Pap test. You may not need to have a Pap test as often as you used to. · Vision. Have your eyes checked every year or two or as often as your doctor suggests. Some experts recommend that you have yearly exams for glaucoma and other age-related eye problems starting at age 48. · Hearing. Tell your doctor if you notice any change in your hearing. You can have tests to find out how well you hear. · Diabetes. Ask your doctor whether you should have tests for diabetes. · Colorectal cancer. Your risk for colorectal cancer gets higher as you get older. Some experts say that adults should start regular screening at age 48 and stop at age 76. Others say to start before age 48 or continue after age 76. Talk with your doctor about your risk and when to start and stop screening. · Thyroid disease. Talk to your doctor about whether to have your thyroid checked as part of a regular physical exam. Women have an increased chance of a thyroid problem. · Osteoporosis. You should begin tests for bone density at age 72. If you are younger than 72, ask your doctor whether you have factors that may increase your risk for this disease. You may want to have this test before age 72. · Heart attack and stroke risk. At least every 4 to 6 years, you should have your risk for heart attack and stroke assessed. Your doctor uses factors such as your age, blood pressure, cholesterol, and whether you smoke or have diabetes to show what your risk for a heart attack or stroke is over the next 10 years.   When should you call for help?  Watch closely for changes in your health, and be sure to contact your doctor if you have any problems or symptoms that concern you. Where can you learn more? Go to http://esteban-tiffani.info/  Enter [id number] in the search box to learn more about \"Well Visit, Women 50 to 72: Care Instructions. \"  Current as of: August 22, 2019               Content Version: 12.5  © 6336-3213 [employer]. Care instructions adapted under license by [employer] (which disclaims liability or warranty for this information). If you have questions about a medical condition or this instruction, always ask your healthcare professional. [place] [address] any warranty or liability for your use of this information. Medicare Wellness Visit, Female     The best way to live healthy is to have a lifestyle where you eat a well-balanced diet, exercise regularly, limit alcohol use, and quit all forms of tobacco/nicotine, if applicable. Regular preventive services are another way to keep healthy. Preventive services (vaccines, screening tests, monitoring & exams) can help personalize your care plan, which helps you manage your own care. Screening tests can find health problems at the earliest stages, when they are easiest to treat. [facility] follows the current, evidence-based guidelines published by the [facility] Abdirahman Jake (USPSTF) when recommending preventive services for our patients. Because we follow these guidelines, sometimes recommendations change over time as research supports it. (For example, mammograms used to be recommended annually. Even though Medicare will still pay for an annual mammogram, the newer guidelines recommend a mammogram every two years for women of average risk).   Of course, you and your doctor may decide to screen more often for some diseases, based on your risk and your co-morbidities (chronic disease you are already diagnosed with). Preventive services for you include:  - Medicare offers their members a free annual wellness visit, which is time for you and your primary care provider to discuss and plan for your preventive service needs. Take advantage of this benefit every year!  -All adults over the age of 72 should receive the recommended pneumonia vaccines. Current USPSTF guidelines recommend a series of two vaccines for the best pneumonia protection.   -All adults should have a flu vaccine yearly and a tetanus vaccine every 10 years.   -All adults age 48 and older should receive the shingles vaccines (series of two vaccines). -All adults age 38-68 who are overweight should have a diabetes screening test once every three years.   -All adults born between 80 and 1965 should be screened once for Hepatitis C.  -Other screening tests and preventive services for persons with diabetes include: an eye exam to screen for diabetic retinopathy, a kidney function test, a foot exam, and stricter control over your cholesterol.   -Cardiovascular screening for adults with routine risk involves an electrocardiogram (ECG) at intervals determined by your doctor.   -Colorectal cancer screenings should be done for adults age 54-65 with no increased risk factors for colorectal cancer. There are a number of acceptable methods of screening for this type of cancer. Each test has its own benefits and drawbacks. Discuss with your doctor what is most appropriate for you during your annual wellness visit. The different tests include: colonoscopy (considered the best screening method), a fecal occult blood test, a fecal DNA test, and sigmoidoscopy.    -A bone mass density test is recommended when a woman turns 65 to screen for osteoporosis. This test is only recommended one time, as a screening. Some providers will use this same test as a disease monitoring tool if you already have osteoporosis.   -Breast cancer screenings are recommended every other year for women of normal risk, age 54-69.  -Cervical cancer screenings for women over age 72 are only recommended with certain risk factors. Here is a list of your current Health Maintenance items (your personalized list of preventive services) with a due date:  Health Maintenance Due   Topic Date Due    Pap Test  05/10/2020    Hemoglobin A1C    05/29/2020              Learning About Breast Cancer Screening  What is breast cancer screening? Breast cancer occurs when cells that are not normal grow in one or both of your breasts. Screening tests can help find breast cancer early. Cancer is easier to treat when it's found early. Having concerns about breast cancer is common. That's why it's important to talk with your doctor about when to start and how often to get screened for breast cancer. How is breast cancer screening done? Several screening tests can be used to check for breast cancer. · Mammograms check for signs of cancer using X-rays. They can show tumors that are too small for you or your doctor to feel. During a mammogram, a machine squeezes your breasts to make them flatter and easier to X-ray. At least two pictures are taken of each breast. One is taken from the top and one from the side. · 3-D mammograms are also called digital breast tomosynthesis. Your breast is positioned on a flat plate. A top plate is pressed against your breast to keep it in position. The X-ray arm then moves in an arc above the breast and takes many pictures. A computer uses these X-rays to create a three-dimensional image. · Clinical breast exams are a doctor's exam. Your doctor carefully feels your breasts and under your arms to check for lumps or other changes. After the screening, your doctor will tell you the results. You will also be told if you need any follow-up tests. When should you get screened? Talk with your doctor about when you should start being tested for breast cancer.  How often you get tested and the kind of tests you get will depend on your age and your risk. The guidelines that follow are for women who have an average risk for breast cancer. If you have a higher risk for breast cancer, such as having a family history of breast cancer in multiple relatives or at a young age, your doctor may recommend different screening for you. · Ages 21 to 44: Some experts recommend that women have a clinical breast exam every 3 years, starting at age 21. Ask your doctor how often you should have this test. If you have a high risk for breast cancer, talk with your doctor about when to start yearly mammograms and other screening tests. · Ages 36 and older: Talk with your doctor about how often you should have mammograms and clinical breast exams. What is your risk for breast cancer? If you don't already know your risk of breast cancer, you can ask your doctor about it. You can also look it up at www.cancer.gov/bcrisktool/. If your doctor says that you have a high or very high risk, ask about ways to reduce your risk. These could include getting extra screening, taking medicine, or having surgery. If you have a strong family history of breast cancer, ask your doctor about genetic testing. What steps can you take to stay healthy? Some things that increase your risk of breast cancer, such as your age and being female, cannot be controlled. But you can do some things to stay as healthy as you can. · Learn what your breasts normally look and feel like. If you notice any changes, tell your doctor. · Drink alcohol wisely. Your risk goes up the more you drink. For the best health, women should have no more than 1 drink a day or 7 drinks a week. · If you smoke, quit. When you quit smoking, you lower your chances of getting many types of cancer. You can also do your best to eat well, be active, and stay at a healthy weight.  Eating healthy foods and being active every day, as well as staying at a healthy weight, may help prevent cancer. Where can you learn more? Go to http://www.gray.com/. Enter T750 in the search box to learn more about \"Learning About Breast Cancer Screening. \"  Current as of: March 28, 2018  Content Version: 11.8  © 5101-8104 [employer]. Care instructions adapted under license by [employer] (which disclaims liability or warranty for this information). If you have questions about a medical condition or this instruction, always ask your healthcare professional. [address] any warranty or liability for your use of this information. Learning About Cervical Cancer Screening  What is a cervical cancer screening test?     The cervix is the lower part of the uterus that opens into the vagina. Cervical cancer screening tests check the cells on the cervix for changes that could lead to cancer. Two tests can be used to screen for cervical cancer. They may be used alone or together. A Pap test.   This test looks for changes in the cells of the cervix. Some of these cell changes could lead to cancer. A human papillomavirus (HPV) test.   This test looks for the HPV virus. Some high-risk types of HPV can cause cell changes that could lead to cervical cancer. The HPV test may be used with the Pap test in women ages 27 and older. When should you have a screening test?  If you have a cervix and are at average risk for cervical cancer, your doctor will likely suggest starting screening at age 24.   Ages 24 to 34  If you're in this age group, your doctor will likely suggest that you get a Pap test. If your Pap test results are normal, you can wait 3 years to have another test.  Ages 27 to 59  Screening options for this age group may include:  · A Pap test. If your results are normal, you can wait 3 years to have another test.  · An HPV test. If your results are normal, you can wait 5 years to have another test.  · A Pap test and an HPV test. If your results are normal, you can wait 5 years to be tested again. Ages 72 and older  If you are age 72 or older, you may no longer need this screening test. Talk to your doctor. What do the results of cervical cancer screening mean? Your test results may be normal. Or the results may show minor or serious changes to the cells on your cervix. Minor changes may go away on their own, especially if you are younger than 30. You may have an abnormal test because you have an infection of the vagina or cervix or because you have low estrogen levels after menopause that are causing the cells to change. If you have a high-risk type of human papillomavirus (HPV) or cell changes that could turn into cancer, you may need more tests. The next step may be a colposcopy or treatment to remove or destroy the abnormal cells. If you have a Pap test, an HPV test, or both, your doctor will recommend a follow-up plan based on your results and your age. Follow-up care is a key part of your treatment and safety. Be sure to make and go to all appointments, and call your doctor if you are having problems. It's also a good idea to know your test results and keep a list of the medicines you take. Where can you learn more? Go to http://esteban-tiffani.info/  Enter P919 in the search box to learn more about \"Learning About Cervical Cancer Screening. \"  Current as of: August 22, 2019               Content Version: 12.5  © 9446-9851 [employer]. Care instructions adapted under license by [employer] (which disclaims liability or warranty for this information). If you have questions about a medical condition or this instruction, always ask your healthcare professional. [place] [address] any warranty or liability for your use of this information.            Colon Cancer Screening: Care Instructions  Your Care Instructions    Colorectal cancer occurs in the colon or rectum. That's the lower part of your digestive system. It is the second-leading cause of cancer deaths in the [place]. It often starts with small growths called polyps in the colon or rectum. Polyps are usually found with screening tests. Depending on the type of test, any polyps found may be removed during the tests. Colorectal cancer usually does not cause symptoms at first. But regular tests can help find it early, before it spreads and becomes harder to treat. Experts advise routine tests for colon cancer for people starting at age 48. And they advise people with a higher risk of colon cancer to get tested sooner. Talk with your doctor about when you should start testing. Discuss which tests you need. Follow-up care is a key part of your treatment and safety. Be sure to make and go to all appointments, and call your doctor if you are having problems. It's also a good idea to know your test results and keep a list of the medicines you take. What are the main screening tests for colon cancer? · Stool tests. These include the fecal immunochemical test (FIT) and the fecal occult blood test (FOBT). These tests check stool samples for signs of cancer. If your test is positive, you will need to have a colonoscopy. · Sigmoidoscopy. This test lets your doctor look at the lining of your rectum and the lowest part of your colon. Your doctor uses a lighted tube called a sigmoidoscope. This test can't find cancers or polyps in the upper part of your colon. In some cases, polyps that are found can be removed. But if your doctor finds polyps, you will need to have a colonoscopy to check the upper part of your colon. · Colonoscopy. This test lets your doctor look at the lining of your rectum and your entire colon. The doctor uses a thin, flexible tool called a colonoscope. It can also be used to remove polyps or get a tissue sample (biopsy). What tests do you need?   The following guidelines are for people age 48 and over who are not at high risk for colorectal cancer. You may have at least one of these tests as directed by your doctor. · Fecal immunochemical test (FIT) or fecal occult blood test (FOBT) every year  · Sigmoidoscopy every 5 years  · Colonoscopy every 10 years  If you are age 68 to 80, you can work with your doctor to decide if screening is a good option. If you are age 80 or older, your doctor will likely advise that screening is not helpful. Talk with your doctor about when you need to be tested. And discuss which tests are right for you. Your doctor may recommend earlier or more frequent testing if you:  · Have had colorectal cancer before. · Have had colon polyps. · Have symptoms of colorectal cancer. These include blood in your stool and changes in your bowel habits. · Have a parent, brother or sister, or child with colon polyps or colorectal cancer. · Have a bowel disease. This includes ulcerative colitis and Crohn's disease. · Have a rare polyp syndrome that runs in families, such as familial adenomatous polyposis (FAP). · Have had radiation treatments to the belly or pelvis. When should you call for help? Watch closely for changes in your health, and be sure to contact your doctor if:    · You have any changes in your bowel habits.     · You have any problems. Where can you learn more? Go to http://esteban-tiffani.info/. Enter M541 in the search box to learn more about \"Colon Cancer Screening: Care Instructions. \"  Current as of: March 28, 2018  Content Version: 11.8  © 7191-7682 [employer]wise[employer] Incorporated. Care instructions adapted under license by [employer] (which disclaims liability or warranty for this information). If you have questions about a medical condition or this instruction, always ask your healthcare professional. [place] [address] any warranty or liability for your use of this information.            Osteoporosis: Care Instructions  Your Care Instructions     Osteoporosis causes bones to become thin and weak. It is much more common in women than in men. Osteoporosis may be very advanced before you know you have it. Sometimes the first sign is a broken bone in the hip, spine, or wrist or sudden pain in your middle or lower back. Follow-up care is a key part of your treatment and safety. Be sure to make and go to all appointments, and call your doctor if you are having problems. It's also a good idea to know your test results and keep a list of the medicines you take. How can you care for yourself at home? · Your doctor may prescribe a bisphosphonate, such as risedronate (Actonel) or alendronate (Fosamax), for osteoporosis. If you are taking one of these medicines by mouth:  ? Take your medicine with a full glass of water when you first get up in the morning. ? Do not lie down, eat, drink a beverage, or take any other medicine for at least 30 minutes after taking the drug. This helps prevent stomach problems. ? Do not take your medicine late in the day if you forgot to take it in the morning. Skip it, and take the usual dose the next morning. ? If you have side effects, tell your doctor. He or she may prescribe another medicine. · Get enough calcium and vitamin D. The [employer] [employer] recommends adults younger than age 46 need 1,000 mg of calcium and 600 IU of vitamin D each day. Women ages 46 to 79 need 1,200 mg of calcium and 600 IU of vitamin D each day. Men ages 46 to 79 need 1,000 mg of calcium and 600 IU of vitamin D each day. Adults 71 and older need 1,200 mg of calcium and 800 IU of vitamin D each day. It's not clear if people who already have osteoporosis need more calcium and vitamin D than this. Talk to your doctor about what's right for you.  ? Eat foods rich in calcium, like yogurt, cheese, milk, and dark green vegetables. This is a good way to get the calcium you need.  You can get vitamin D from eggs, fatty fish, cereal, and milk. ? Ask your doctor if you need to take a calcium plus vitamin D supplement. You may be able to get enough calcium and vitamin D through your diet. Be careful with supplements. Adults ages 23 to 48 should not get more than 2,500 mg of calcium and 4,000 IU of vitamin D each day, whether it is from supplements and/or food. Adults ages 46 and older should not get more than 2,000 mg of calcium and 4,000 IU of vitamin D each day from supplements and/or food. · Limit alcohol to 2 drinks a day for men and 1 drink a day for women. Too much alcohol can cause health problems. · Do not smoke. Smoking puts you at a much higher risk for osteoporosis. If you need help quitting, talk to your doctor about stop-smoking programs and medicines. These can increase your chances of quitting for good. · Get regular bone-building exercise. Weight-bearing and resistance exercises keep bones healthy by working the muscles and bones against gravity. Start out at an exercise level that feels right for you. Add a little at a time until you can do the following:  ? Do 30 minutes of weight-bearing exercise on most days of the week. Walking, jogging, stair climbing, and dancing are good choices. ? Do resistance exercises with weights or elastic bands 2 to 3 days a week. · Reduce your risk of falls:  ? Wear supportive shoes with low heels and nonslip soles. ? Use a cane or walker, if you need it. Use shower chairs and bath benches. Put in handrails on stairways, around your shower or tub area, and near the toilet. ? Keep stairs, porches, and walkways well lit. Use night-lights. ? Remove throw rugs and other objects that are in the way. ? Avoid icy, wet, or slippery surfaces. ? Keep a cordless phone and a flashlight with new batteries by your bed. When should you call for help? Watch closely for changes in your health, and be sure to contact your doctor if you have any problems. Where can you learn more?   Go to http://esteban-tiffani.info/  Enter K100 in the search box to learn more about \"Osteoporosis: Care Instructions. \"  Current as of: August 7, 2019               Content Version: 12.5  © 1057-8663 [employer]. Care instructions adapted under license by [employer] (which disclaims liability or warranty for this information). If you have questions about a medical condition or this instruction, always ask your healthcare professional. [address] any warranty or liability for your use of this information. Learning About Living Bradley Wick  What is a living will? A living will is a legal form you use to write down the kind of care you want at the end of your life. It is used by the health professionals who will treat you if you aren't able to decide for yourself. If you put your wishes in writing, your loved ones and others will know what kind of care you want. They won't need to guess. This can ease your mind and be helpful to others. A living will is not the same as an estate or property will. An estate will explains what you want to happen with your money and property after you die. Is a living will a legal document? A living will is a legal document. Each state has its own laws about living mock. If you move to another state, make sure that your living will is legal in the state where you now live. Or you might use a universal form that has been approved by many states. This kind of form can sometimes be completed and stored online. Your electronic copy will then be available wherever you have a connection to the Internet. In most cases, doctors will respect your wishes even if you have a form from a different state. · You don't need an  to complete a living will. But legal advice can be helpful if your state's laws are unclear, your health history is complicated, or your family can't agree on what should be in your living will.   · You can change your living will at any time. Some people find that their wishes about end-of-life care change as their health changes. · In addition to making a living will, think about completing a medical power of  form. This form lets you name the person you want to make end-of-life treatment decisions for you (your \"health care agent\") if you're not able to. Many hospitals and nursing homes will give you the forms you need to complete a living will and a medical power of . · Your living will is used only if you can't make or communicate decisions for yourself anymore. If you become able to make decisions again, you can accept or refuse any treatment, no matter what you wrote in your living will. · Your state may offer an online registry. This is a place where you can store your living will online so the doctors and nurses who need to treat you can find it right away. What should you think about when creating a living will? Talk about your end-of-life wishes with your family members and your doctor. Let them know what you want. That way the people making decisions for you won't be surprised by your choices. Think about these questions as you make your living will:  · Do you know enough about life support methods that might be used? If not, talk to your doctor so you know what might be done if you can't breathe on your own, your heart stops, or you're unable to swallow. · What things would you still want to be able to do after you receive life-support methods? Would you want to be able to walk? To speak? To eat on your own? To live without the help of machines? · If you have a choice, where do you want to be cared for? In your home? At a hospital or nursing home? · Do you want certain [employer] practices performed if you become very ill? · If you have a choice at the end of your life, where would you prefer to die? At home? In a hospital or nursing home? Somewhere else?   · Would you prefer to be buried or cremated? · Do you want your organs to be donated after you die? What should you do with your living will? · Make sure that your family members and your health care agent have copies of your living will. · Give your doctor a copy of your living will to keep in your medical record. If you have more than one doctor, make sure that each one has a copy. · You may want to put a copy of your living will where it can be easily found. Where can you learn more? Go to http://esteban-tiffani.info/. Enter T035 in the search box to learn more about \"Learning About Living [place]roy. \"  Current as of: August 8, 2016  Content Version: 11.3  © 6547-2445 [employer]. Care instructions adapted under license by [employer] (which disclaims liability or warranty for this information). If you have questions about a medical condition or this instruction, always ask your healthcare professional. [place] [address] any warranty or liability for your use of this information. Preventing Falls: Care Instructions  Your Care Instructions    Getting around your home safely can be a challenge if you have injuries or health problems that make it easy for you to fall. Loose rugs and furniture in walkways are among the dangers for many older people who have problems walking or who have poor eyesight. People who have conditions such as arthritis, osteoporosis, or dementia also have to be careful not to fall. You can make your home safer with a few simple measures. Follow-up care is a key part of your treatment and safety. Be sure to make and go to all appointments, and call your doctor if you are having problems. It's also a good idea to know your test results and keep a list of the medicines you take. How can you care for yourself at home? Taking care of yourself  · You may get dizzy if you do not drink enough water.  To prevent dehydration, drink plenty of fluids, enough so that your urine is light yellow or clear like water. Choose water and other caffeine-free clear liquids. If you have kidney, heart, or liver disease and have to limit fluids, talk with your doctor before you increase the amount of fluids you drink. · Exercise regularly to improve your strength, muscle tone, and balance. Walk if you can. Swimming may be a good choice if you cannot walk easily. · Have your vision and hearing checked each year or any time you notice a change. If you have trouble seeing and hearing, you might not be able to avoid objects and could lose your balance. · Know the side effects of the medicines you take. Ask your doctor or pharmacist whether the medicines you take can affect your balance. Sleeping pills or sedatives can affect your balance. · Limit the amount of alcohol you drink. Alcohol can impair your balance and other senses. · Ask your doctor whether calluses or corns on your feet need to be removed. If you wear loose-fitting shoes because of calluses or corns, you can lose your balance and fall. · Talk to your doctor if you have numbness in your feet. Preventing falls at home  · Remove raised doorway thresholds, throw rugs, and clutter. Repair loose carpet or raised areas in the floor. · Move furniture and electrical cords to keep them out of walking paths. · Use nonskid floor wax, and wipe up spills right away, especially on ceramic tile floors. · If you use a walker or cane, put rubber tips on it. If you use crutches, clean the bottoms of them regularly with an abrasive pad, such as steel wool. · Keep your house well lit, especially [facility], and outside walkways. Use night-lights in areas such as hallways and bathrooms. Add extra light switches or use remote switches (such as switches that go on or off when you clap your hands) to make it easier to turn lights on if you have to get up during the night. · Install sturdy handrails on stairways.   · Move items in your cabinets so that the things you use a lot are on the lower shelves (about waist level). · Keep a cordless phone and a flashlight with new batteries by your bed. If possible, put a phone in each of the main rooms of your house, or carry a cell phone in case you fall and cannot reach a phone. Or, you can wear a device around your neck or wrist. You push a button that sends a signal for help. · Wear low-heeled shoes that fit well and give your feet good support. Use footwear with nonskid soles. Check the heels and soles of your shoes for wear. Repair or replace worn heels or soles. · Do not wear socks without shoes on wood floors. · Walk on the grass when the sidewalks are slippery. If you live in an area that gets snow and ice in the winter, sprinkle salt on slippery steps and sidewalks. Preventing falls in the bath  · Install grab bars and nonskid mats inside and outside your shower or tub and near the toilet and sinks. · Use shower chairs and bath benches. · Use a hand-held shower head that will allow you to sit while showering. · Get into a tub or shower by putting the weaker leg in first. Get out of a tub or shower with your strong side first.  · Repair loose toilet seats and consider installing a raised toilet seat to make getting on and off the toilet easier. · Keep your bathroom door unlocked while you are in the shower. Where can you learn more? Go to http://www.patterson.com/. Enter [id number] [id number] [phone] in the search box to learn more about \"Preventing Falls: Care Instructions. \"  Current as of: March 16, 2018  Content Version: 11.8  © 3869-4859 [employer]. Care instructions adapted under license by [employer] (which disclaims liability or warranty for this information).  If you have questions about a medical condition or this instruction, always ask your healthcare professional. [facility] any warranty or liability for your use of this information.

## 2020-07-21 NOTE — PROGRESS NOTES
Chief Complaint   Patient presents with    Complete Physical     1. Have you been to the ER, urgent care clinic since your last visit? Hospitalized since your last visit? No    2. Have you seen or consulted any other health care providers outside of the [address] [place] [address] since your last visit? Include any pap smears or colon screening. Yes When: 07/02/2020 Where: Dr Evelio Collins (eye) Reason for visit: routine eye      Call placed to pt. Verified patient with two type of identifiers. c/o increased acid reflux during the morning , taking meds as prescribed    Advised to see PCP due to eye MD saw a grey Hughes around the eye related to high cholesterol.

## 2020-07-30 ENCOUNTER — APPOINTMENT (OUTPATIENT)
Dept: FAMILY MEDICINE CLINIC | Age: 60
End: 2020-07-30

## 2020-07-31 LAB
ALBUMIN SERPL-MCNC: 4.4 G/DL (ref 3.8–4.8)
ALBUMIN/GLOB SERPL: 2.1 {RATIO} (ref 1.2–2.2)
ALP SERPL-CCNC: 58 IU/L (ref 39–117)
ALT SERPL-CCNC: 14 IU/L (ref 0–32)
APPEARANCE UR: CLEAR
AST SERPL-CCNC: 17 IU/L (ref 0–40)
BILIRUB SERPL-MCNC: 0.5 MG/DL (ref 0–1.2)
BILIRUB UR QL STRIP: NEGATIVE
BUN SERPL-MCNC: 16 MG/DL (ref 8–27)
BUN/CREAT SERPL: 15 (ref 12–28)
CALCIUM SERPL-MCNC: 9.4 MG/DL (ref 8.7–10.3)
CHLORIDE SERPL-SCNC: 102 MMOL/L (ref 96–106)
CHOLEST SERPL-MCNC: 241 MG/DL (ref 100–199)
CO2 SERPL-SCNC: 22 MMOL/L (ref 20–29)
COLOR UR: YELLOW
CREAT SERPL-MCNC: 1.05 MG/DL (ref 0.57–1)
ERYTHROCYTE [DISTWIDTH] IN BLOOD BY AUTOMATED COUNT: 14.4 % (ref 11.7–15.4)
EST. AVERAGE GLUCOSE BLD GHB EST-MCNC: 114 MG/DL
GLOBULIN SER CALC-MCNC: 2.1 G/DL (ref 1.5–4.5)
GLUCOSE SERPL-MCNC: 83 MG/DL (ref 65–99)
GLUCOSE UR QL: NEGATIVE
HBA1C MFR BLD: 5.6 % (ref 4.8–5.6)
HCT VFR BLD AUTO: 43.7 % (ref 34–46.6)
HDLC SERPL-MCNC: 73 MG/DL
HGB BLD-MCNC: 13.5 G/DL (ref 11.1–15.9)
HGB UR QL STRIP: NEGATIVE
INTERPRETATION: NORMAL
KETONES UR QL STRIP: NEGATIVE
LDLC SERPL CALC-MCNC: 155 MG/DL (ref 0–99)
LEUKOCYTE ESTERASE UR QL STRIP: NEGATIVE
MCH RBC QN AUTO: 26.8 PG (ref 26.6–33)
MCHC RBC AUTO-ENTMCNC: 30.9 G/DL (ref 31.5–35.7)
MCV RBC AUTO: 87 FL (ref 79–97)
MICRO URNS: NORMAL
NITRITE UR QL STRIP: NEGATIVE
PH UR STRIP: 5.5 [PH] (ref 5–7.5)
PLATELET # BLD AUTO: 258 X10E3/UL (ref 150–450)
POTASSIUM SERPL-SCNC: 4.4 MMOL/L (ref 3.5–5.2)
PROT SERPL-MCNC: 6.5 G/DL (ref 6–8.5)
PROT UR QL STRIP: NEGATIVE
RBC # BLD AUTO: 5.04 X10E6/UL (ref 3.77–5.28)
SODIUM SERPL-SCNC: 142 MMOL/L (ref 134–144)
SP GR UR: 1.02 (ref 1–1.03)
TRIGL SERPL-MCNC: 63 MG/DL (ref 0–149)
TSH SERPL DL<=0.005 MIU/L-ACNC: 0.3 UIU/ML (ref 0.45–4.5)
UROBILINOGEN UR STRIP-MCNC: 0.2 MG/DL (ref 0.2–1)
VLDLC SERPL CALC-MCNC: 13 MG/DL (ref 5–40)
WBC # BLD AUTO: 5.2 X10E3/UL (ref 3.4–10.8)

## 2020-12-29 ENCOUNTER — VIRTUAL VISIT (OUTPATIENT)
Dept: FAMILY MEDICINE CLINIC | Age: 60
End: 2020-12-29
Payer: COMMERCIAL

## 2020-12-29 DIAGNOSIS — J45.20 MILD INTERMITTENT ASTHMA WITHOUT COMPLICATION: Primary | ICD-10-CM

## 2020-12-29 DIAGNOSIS — Z20.822 SUSPECTED COVID-19 VIRUS INFECTION: ICD-10-CM

## 2020-12-29 DIAGNOSIS — J01.00 ACUTE NON-RECURRENT MAXILLARY SINUSITIS: ICD-10-CM

## 2020-12-29 DIAGNOSIS — Z71.89 ADVICE GIVEN ABOUT COVID-19 VIRUS INFECTION: ICD-10-CM

## 2020-12-29 PROCEDURE — 99213 OFFICE O/P EST LOW 20 MIN: CPT | Performed by: FAMILY MEDICINE

## 2020-12-29 RX ORDER — ALBUTEROL SULFATE 90 UG/1
1 AEROSOL, METERED RESPIRATORY (INHALATION)
Qty: 1 INHALER | Refills: 6
Start: 2020-12-29

## 2020-12-29 RX ORDER — METHYLPREDNISOLONE 4 MG/1
TABLET ORAL
Qty: 1 DOSE PACK | Refills: 0 | Status: SHIPPED | OUTPATIENT
Start: 2020-12-29 | End: 2021-03-29 | Stop reason: ALTCHOICE

## 2020-12-29 RX ORDER — AZITHROMYCIN 250 MG/1
TABLET, FILM COATED ORAL
Qty: 6 TAB | Refills: 0 | Status: SHIPPED | OUTPATIENT
Start: 2020-12-29 | End: 2021-03-29 | Stop reason: ALTCHOICE

## 2020-12-29 RX ORDER — IPRATROPIUM BROMIDE 42 UG/1
2 SPRAY, METERED NASAL 4 TIMES DAILY
Qty: 15 ML | Refills: 3 | Status: SHIPPED | OUTPATIENT
Start: 2020-12-29 | End: 2022-01-31 | Stop reason: ALTCHOICE

## 2020-12-29 NOTE — PROGRESS NOTES
Chief Complaint   Patient presents with    Sore Throat    Nasal Congestion     1. Have you been to the ER, urgent care clinic since your last visit? Hospitalized since your last visit? No    2. Have you seen or consulted any other health care providers outside of the [address] [place] [address] since your last visit? Include any pap smears or colon screening. No    Call placed to pt. Verified patient with two type of identifiers. C/o sinuses are draining. Dry, scratchy throat, ears draining, no pressure currently. Glands are also swollen. denies  COVID19

## 2020-12-29 NOTE — PROGRESS NOTES
HISTORY OF PRESENT ILLNESS  Janette Justice is a 63 y.o. female. Pt's main concerns were provided on virtual visit, a telemed format,  pt is w/ comorbid medical history and unaware of been exposed to covid-19 individual, Pt Have been staying at home for couple of wks,  pt has no fever no cough no dyspnea, no ha, not dizzy, nl smell nl taste, no N/V/D, no body ache. Upper respiratory problem    Started >few wks ago not better, otc not helping, have no Sore throat, with Cough, ++ hx of asthma , there has been a lot of decrease in the patient's sleep pattern because of the congestion worsening by laying down, there has not been some muscle ache, pt is currently not responding to OTC , no diarhea, no ear ache, also there has not been a decrease in the appetite, has not had an exposure to sick person, fortunately a none smoker      Current Outpatient Medications   Medication Sig Dispense Refill    ipratropium (ATROVENT) 42 mcg (0.06 %) nasal spray 2 Sprays by Both Nostrils route four (4) times daily. 15 mL 3    tiotropium bromide (Spiriva Respimat) 2.5 mcg/actuation inhaler Take 2 Puffs by inhalation nightly. 1 Inhaler 6    montelukast (SINGULAIR) 10 mg tablet Take 1 Tab by mouth daily. 30 Tab 3    Nebulizer & Compressor machine 1 Each by Does Not Apply route four (4) times daily as needed for Cough. 1 Each 0    azelastine (ASTEPRO) 0.15 % (205.5 mcg) 2 Sprays by Both Nostrils route daily.  diclofenac potassium (CATAFLAM) 50 mg tablet Take 1 Tab by mouth two (2) times daily as needed for Pain. 40 Tab 0    budesonide-formoterol (SYMBICORT) 160-4.5 mcg/actuation HFAA Take 2 Puffs by inhalation two (2) times a day. 1 Inhaler 5    multivitamin (HAIR,SKIN AND NAILS) tablet Take 1 Tab by mouth daily.  ascorbic acid, vitamin C, (VITAMIN C) 500 mg tablet Take 500 mg by mouth daily.       albuterol (PROVENTIL HFA, VENTOLIN HFA, PROAIR HFA) 90 mcg/actuation inhaler Take 1 Puff by inhalation every four (4) hours as needed for Wheezing. 1 Inhaler 6    MULTIVITS,CA,MINERALS/IRON/FA (MULTI FOR HER PO) Take 1 Tab by mouth daily.  cyclobenzaprine (FLEXERIL) 10 mg tablet Take 1 Tab by mouth nightly. Indications: muscle spasm (Patient taking differently: Take 10 mg by mouth daily as needed. Indications: muscle spasm) 30 Tab 1    neomycin-polymyxin-hydrocortisone, buffered, (PEDIOTIC) 3.5-10,000-1 mg/mL-unit/mL-% otic suspension Administer 3 Drops in left ear four (4) times daily. Indications: outer ear inflammation caused by allergy or infection (Patient not taking: Reported on 6/9/2020) 10 mL 0    calcium-cholecalciferol, D3, (CALTRATE 600+D) tablet Take 1 Tab by mouth two (2) times a day. (Patient not taking: Reported on 6/9/2020) 60 Tab 11    GLUCOSAMINE/CHONDR BREWER A SOD (GLUCOSAMINE-CHONDROITIN) 750-600 mg tab One tab twice daily (Patient not taking: Reported on 6/9/2020) 60 Tab 6    albuterol (PROVENTIL VENTOLIN) 2.5 mg /3 mL (0.083 %) nebulizer solution 3 mL by Nebulization route once for 1 dose.  1 Each 0     Allergies   Allergen Reactions    Crestor [Rosuvastatin] Shortness of Breath    Nabumetone Shortness of Breath    Codeine Nausea Only    Gabapentin (Bulk) Shortness of Breath    Lyrica [Pregabalin] Swelling     Ankle swelling, foot pain , insomina    Percocet [Oxycodone-Acetaminophen] Other (comments)     loopy     Past Medical History:   Diagnosis Date    Acute right ankle pain 7/23/2018    Arthritis 8/3/2010    Asthma     Bunion of great toe of right foot 7/23/2018    Fall at home, initial encounter 9/18/2018    Hammer toe of second toe of right foot 7/23/2018    Hypercholesterolemia 8/18/2010    Postmenopausal 8/3/2010    Prediabetes 9/12/2013    Primary snoring 11/29/2017    Shoulder pain, bilateral 2/8/2018    Sinusitis, acute 12/12/2011    Wrist pain, acute, left 9/18/2018     Past Surgical History:   Procedure Laterality Date    HX GYN  1987    tubal pregancy; laparotomy    HX ORTHOPAEDIC  2006    neck    HX ORTHOPAEDIC  2008    cervical fusion of discs x4    HX PELVIC LAPAROSCOPY  1985    endometriosis    REVISE KNEE JOINT REPLACE,ALL PARTS  02/29/2012    tkr left     Family History   Problem Relation Age of Onset    Hypertension Father     Heart Disease Father         CAD    Diabetes Father     High Cholesterol Father     Other Mother         tumor in chest    Allergic Rhinitis Brother     Asthma Brother     Allergic Rhinitis Child     Sickle Cell Trait Child     GERD Child      Social History     Tobacco Use    Smoking status: Never Smoker    Smokeless tobacco: Never Used   Substance Use Topics    Alcohol use: No      Lab Results   Component Value Date/Time    WBC 5.2 07/30/2020 10:00 AM    HGB 13.5 07/30/2020 10:00 AM    HCT 43.7 07/30/2020 10:00 AM    PLATELET 156 90/84/1255 10:00 AM    MCV 87 07/30/2020 10:00 AM     Lab Results   Component Value Date/Time    TSH 0.297 (L) 07/30/2020 10:00 AM    T4, Total 7.7 08/04/2014 12:18 PM         Review of Systems   Constitutional: Positive for malaise/fatigue. Negative for chills and fever. HENT: Positive for congestion. Negative for ear discharge, ear pain, hearing loss, nosebleeds, sinus pain and tinnitus. Eyes: Negative for pain. Respiratory: Positive for cough and wheezing. Negative for stridor. Cardiovascular: Negative for chest pain and leg swelling. Gastrointestinal: Negative for constipation, diarrhea and nausea. Genitourinary: Negative for frequency. Musculoskeletal: Negative for joint pain and myalgias. Skin: Negative for rash. Neurological: Negative for loss of consciousness and headaches. Endo/Heme/Allergies: Does not bruise/bleed easily. Psychiatric/Behavioral: Negative for depression. The patient is not nervous/anxious and does not have insomnia. All other systems reviewed and are negative. Physical Exam  Constitutional:       Appearance: She is obese.  She is not ill-appearing or toxic-appearing. HENT:      Head: Normocephalic and atraumatic. Mouth/Throat:      Mouth: Mucous membranes are moist.   Neurological:      Mental Status: She is alert and oriented to person, place, and time. Psychiatric:         Mood and Affect: Mood normal.         Behavior: Behavior normal.         Thought Content: Thought content normal.         Judgment: Judgment normal.         ASSESSMENT and PLAN  Diagnoses and all orders for this visit:    1. Mild intermittent asthma without complication  -     albuterol (PROVENTIL HFA, VENTOLIN HFA, PROAIR HFA) 90 mcg/actuation inhaler; Take 1 Puff by inhalation every four (4) hours as needed for Wheezing.  -     azithromycin (ZITHROMAX) 250 mg tablet; 2 first day then one tab daily till finished  -     methylPREDNISolone (MEDROL DOSEPACK) 4 mg tablet; As directed for 6 days one package    2. Advice given about COVID-19 virus infection  -     ipratropium (ATROVENT) 42 mcg (0.06 %) nasal spray; 2 Sprays by Both Nostrils route four (4) times daily. -     azithromycin (ZITHROMAX) 250 mg tablet; 2 first day then one tab daily till finished  -     methylPREDNISolone (MEDROL DOSEPACK) 4 mg tablet; As directed for 6 days one package    3. Suspected COVID-19 virus infection  -     ipratropium (ATROVENT) 42 mcg (0.06 %) nasal spray; 2 Sprays by Both Nostrils route four (4) times daily. -     azithromycin (ZITHROMAX) 250 mg tablet; 2 first day then one tab daily till finished  -     methylPREDNISolone (MEDROL DOSEPACK) 4 mg tablet; As directed for 6 days one package    4. Acute non-recurrent maxillary sinusitis  -     ipratropium (ATROVENT) 42 mcg (0.06 %) nasal spray; 2 Sprays by Both Nostrils route four (4) times daily. -     azithromycin (ZITHROMAX) 250 mg tablet; 2 first day then one tab daily till finished  -     methylPREDNISolone (MEDROL DOSEPACK) 4 mg tablet;  As directed for 6 days one package           Concern abdout COVID-19 addressed and detailed, pt was told that the best way to prevent illness is by protection, to Wear a facemask , having social distance, and to get tested and re-tested, with contact tracing,  Also was advised to stay in isolation for 10-14 days if any cold sxs present, Pt was also told if develop dyspnea needs to call 911 or go to er, call for mesha advise, pt agreed with today's visit. Pursuant to the emergency declaration under the [address] and [place] [address] waiver authority and the [employer] and [employer] Act, this Virtual Visit was conducted, with patient's consent, to reduce the patient's risk of exposure to COVID-19 and provide continuity of care for an established patient. Today's recommendations, Services were provided through a Video synchronous discussion virtually to substitute for in-person appointment.

## 2020-12-30 ENCOUNTER — OFFICE VISIT (OUTPATIENT)
Dept: URGENT CARE | Age: 60
End: 2020-12-30
Payer: COMMERCIAL

## 2020-12-30 VITALS — HEART RATE: 79 BPM | RESPIRATION RATE: 18 BRPM | OXYGEN SATURATION: 97 % | TEMPERATURE: 98.5 F

## 2020-12-30 DIAGNOSIS — Z20.822 SUSPECTED COVID-19 VIRUS INFECTION: ICD-10-CM

## 2020-12-30 DIAGNOSIS — Z20.822 EXPOSURE TO COVID-19 VIRUS: Primary | ICD-10-CM

## 2020-12-30 PROCEDURE — 99213 OFFICE O/P EST LOW 20 MIN: CPT | Performed by: FAMILY MEDICINE

## 2020-12-30 NOTE — PROGRESS NOTES
This patient was seen at [address] [place] [address] Urgent Care while in their vehicle due to COVID-19 pandemic with PPE and focused examination in order to decrease community viral transmission. The patient/guardian gave verbal consent to treat. Cold Symptoms  The history is provided by the patient. This is a new problem. The current episode started more than 2 days ago. The problem occurs every few minutes. The cough is non-productive. There has been no fever. Associated symptoms include headaches and rhinorrhea. Pertinent negatives include no chills and no wheezing. She has tried nothing for the symptoms. She is not a smoker. Her past medical history does not include asthma.         Past Medical History:   Diagnosis Date    Acute right ankle pain 7/23/2018    Arthritis 8/3/2010    Asthma     Bunion of great toe of right foot 7/23/2018    Fall at home, initial encounter 9/18/2018    Hammer toe of second toe of right foot 7/23/2018    Hypercholesterolemia 8/18/2010    Postmenopausal 8/3/2010    Prediabetes 9/12/2013    Primary snoring 11/29/2017    Shoulder pain, bilateral 2/8/2018    Sinusitis, acute 12/12/2011    Wrist pain, acute, left 9/18/2018        Past Surgical History:   Procedure Laterality Date    HX GYN  1987    tubal pregancy; laparotomy    HX ORTHOPAEDIC  2006    neck    HX ORTHOPAEDIC  2008    cervical fusion of discs x4    HX PELVIC LAPAROSCOPY  1985    endometriosis    REVISE KNEE JOINT REPLACE,ALL PARTS  02/29/2012    tkr left         Family History   Problem Relation Age of Onset    Hypertension Father     Heart Disease Father         CAD    Diabetes Father     High Cholesterol Father     Other Mother         tumor in chest    Allergic Rhinitis Brother     Asthma Brother     Allergic Rhinitis Child     Sickle Cell Trait Child     GERD Child         Social History     Socioeconomic History    Marital status:      Spouse name: Not on file    Number of children: Not on file    Years of education: Not on file    Highest education level: Not on file   Occupational History    Not on file   Social Needs    Financial resource strain: Not on file    Food insecurity     Worry: Not on file     Inability: Not on file    Transportation needs     Medical: Not on file     Non-medical: Not on file   Tobacco Use    Smoking status: Never Smoker    Smokeless tobacco: Never Used   Substance and Sexual Activity    Alcohol use: No    Drug use: No    Sexual activity: Not Currently   Lifestyle    Physical activity     Days per week: Not on file     Minutes per session: Not on file    Stress: Not on file   Relationships    Social connections     Talks on phone: Not on file     Gets together: Not on file     Attends [employer]ism service: Not on file     Active member of club or organization: Not on file     Attends meetings of clubs or organizations: Not on file     Relationship status: Not on file    Intimate partner violence     Fear of current or ex partner: Not on file     Emotionally abused: Not on file     Physically abused: Not on file     Forced sexual activity: Not on file   Other Topics Concern    Not on file   Social History Narrative    Not on file                ALLERGIES: Crestor [rosuvastatin], Nabumetone, Codeine, Gabapentin (bulk), Lyrica [pregabalin], and Percocet [oxycodone-acetaminophen]    Review of Systems   Constitutional: Negative for chills. HENT: Positive for congestion, rhinorrhea and sinus pressure. Respiratory: Negative for wheezing. Neurological: Positive for headaches. All other systems reviewed and are negative. Vitals:    12/30/20 1226   Pulse: 79   Resp: 18   Temp: 98.5 °F (36.9 °C)   SpO2: 97%       Physical Exam  Vitals signs and nursing note reviewed. Constitutional:       General: She is not in acute distress. Appearance: She is not ill-appearing. Pulmonary:      Effort: Pulmonary effort is normal. No respiratory distress. Breath sounds: No wheezing. MDM    Procedures      ICD-10-CM ICD-9-CM    1. Exposure to COVID-19 virus  Z20.828 V01.79    2. Suspected COVID-19 virus infection  Z20.828 V01.79 NOVEL CORONAVIRUS (COVID-19)     No orders of the defined types were placed in this encounter. No results found for any visits on 12/30/20. The patients condition was discussed with the patient and they understand. The patient is to follow up with primary care doctor. If signs and symptoms become worse the pt is to go to the ER. The patient is to take medications as prescribed.

## 2020-12-31 ENCOUNTER — TELEPHONE (OUTPATIENT)
Dept: FAMILY MEDICINE CLINIC | Age: 60
End: 2020-12-31

## 2020-12-31 NOTE — TELEPHONE ENCOUNTER
Patient is calling about antibiotic that was given on Tuesday. It is making her sick at her stomach. Please call @[phone].

## 2020-12-31 NOTE — TELEPHONE ENCOUNTER
Verified patient with two types of identifiers. States that last night she had diarrhea and now has a headache. Advised that no changes are needed at this time as she went to urgent care and had covid test done yesterday. Advised that this may take a while to go away. If anything gets worse advised to go to ED. Advised to try yogurt or probiotic for diarrhea.

## 2021-01-01 LAB — SARS-COV-2, NAA: NOT DETECTED

## 2021-01-26 ENCOUNTER — PATIENT MESSAGE (OUTPATIENT)
Dept: FAMILY MEDICINE CLINIC | Age: 61
End: 2021-01-26

## 2021-03-02 ENCOUNTER — TRANSCRIBE ORDER (OUTPATIENT)
Dept: SCHEDULING | Age: 61
End: 2021-03-02

## 2021-03-02 DIAGNOSIS — Z12.31 VISIT FOR SCREENING MAMMOGRAM: Primary | ICD-10-CM

## 2021-03-29 ENCOUNTER — OFFICE VISIT (OUTPATIENT)
Dept: FAMILY MEDICINE CLINIC | Age: 61
End: 2021-03-29
Payer: COMMERCIAL

## 2021-03-29 VITALS
WEIGHT: 170.3 LBS | BODY MASS INDEX: 31.34 KG/M2 | HEIGHT: 62 IN | OXYGEN SATURATION: 98 % | RESPIRATION RATE: 18 BRPM | DIASTOLIC BLOOD PRESSURE: 80 MMHG | HEART RATE: 67 BPM | SYSTOLIC BLOOD PRESSURE: 118 MMHG | TEMPERATURE: 97 F

## 2021-03-29 DIAGNOSIS — Z71.89 ADVICE GIVEN ABOUT COVID-19 VIRUS INFECTION: ICD-10-CM

## 2021-03-29 DIAGNOSIS — M25.511 CHRONIC PAIN OF BOTH SHOULDERS: Primary | ICD-10-CM

## 2021-03-29 DIAGNOSIS — G89.29 CHRONIC PAIN OF BOTH SHOULDERS: Primary | ICD-10-CM

## 2021-03-29 DIAGNOSIS — M25.512 CHRONIC PAIN OF BOTH SHOULDERS: Primary | ICD-10-CM

## 2021-03-29 PROCEDURE — 99213 OFFICE O/P EST LOW 20 MIN: CPT | Performed by: FAMILY MEDICINE

## 2021-03-29 RX ORDER — METHOCARBAMOL 500 MG/1
500 TABLET, FILM COATED ORAL AS NEEDED
COMMUNITY
Start: 2021-01-19 | End: 2021-06-07 | Stop reason: ALTCHOICE

## 2021-03-29 RX ORDER — DICLOFENAC SODIUM 10 MG/G
4 GEL TOPICAL 4 TIMES DAILY
Qty: 150 G | Refills: 3 | Status: SHIPPED | OUTPATIENT
Start: 2021-03-29 | End: 2021-06-07 | Stop reason: ALTCHOICE

## 2021-03-29 RX ORDER — NORTRIPTYLINE HYDROCHLORIDE 10 MG/1
10 CAPSULE ORAL
Qty: 30 CAP | Refills: 2 | Status: SHIPPED | OUTPATIENT
Start: 2021-03-29 | End: 2021-12-20 | Stop reason: ALTCHOICE

## 2021-03-29 RX ORDER — FLUTICASONE FUROATE AND VILANTEROL TRIFENATATE 100; 25 UG/1; UG/1
1 POWDER RESPIRATORY (INHALATION) DAILY
Qty: 1 INHALER | Refills: 3 | Status: SHIPPED | OUTPATIENT
Start: 2021-03-29 | End: 2021-12-20 | Stop reason: ALTCHOICE

## 2021-03-29 NOTE — PROGRESS NOTES
HISTORY OF PRESENT ILLNESS  Kaylie Naqvi is a 63 y.o. female. Shoulder Pain   The history is provided by the patient. This is a chronic problem. Episode onset: few yrs ago, not obese, patient has a lot of difficulty with overhead activity, raising arm is very painful and the pain  awakens the patient at night,  The problem occurs constantly. The problem has not changed since onset. The pain is present in the lt shoulder. The quality of the pain is described as dull. The pain is at a severity of 8/10. Associated symptoms include limited range of motion. Pertinent negatives include no numbness, ++stiffness, no tingling, no itching, no back pain and + neck pain. The symptoms are aggravated by movement and palpation. There has been no history of extremity trauma, but does a lot of box lifting at work  Opted on pt b/c of wrk,    Current Outpatient Medications   Medication Sig Dispense Refill    methocarbamoL (ROBAXIN) 500 mg tablet Take 500 mg by mouth as needed.  albuterol (PROVENTIL HFA, VENTOLIN HFA, PROAIR HFA) 90 mcg/actuation inhaler Take 1 Puff by inhalation every four (4) hours as needed for Wheezing. 1 Inhaler 6    ipratropium (ATROVENT) 42 mcg (0.06 %) nasal spray 2 Sprays by Both Nostrils route four (4) times daily. 15 mL 3    tiotropium bromide (Spiriva Respimat) 2.5 mcg/actuation inhaler Take 2 Puffs by inhalation nightly. 1 Inhaler 6    montelukast (SINGULAIR) 10 mg tablet Take 1 Tab by mouth daily. 30 Tab 3    Nebulizer & Compressor machine 1 Each by Does Not Apply route four (4) times daily as needed for Cough. 1 Each 0    azelastine (ASTEPRO) 0.15 % (205.5 mcg) 2 Sprays by Both Nostrils route daily.  diclofenac potassium (CATAFLAM) 50 mg tablet Take 1 Tab by mouth two (2) times daily as needed for Pain. 40 Tab 0    budesonide-formoterol (SYMBICORT) 160-4.5 mcg/actuation HFAA Take 2 Puffs by inhalation two (2) times a day.  1 Inhaler 5    multivitamin (HAIR,SKIN AND NAILS) tablet Take 1 Tab by mouth daily.  ascorbic acid, vitamin C, (VITAMIN C) 500 mg tablet Take 500 mg by mouth daily.  calcium-cholecalciferol, D3, (CALTRATE 600+D) tablet Take 1 Tab by mouth two (2) times a day. 60 Tab 11    GLUCOSAMINE/CHONDR BREWER A SOD (GLUCOSAMINE-CHONDROITIN) 750-600 mg tab One tab twice daily 60 Tab 6    cyclobenzaprine (FLEXERIL) 10 mg tablet Take 1 Tab by mouth nightly. Indications: muscle spasm (Patient taking differently: Take 10 mg by mouth daily as needed. Indications: muscle spasm) 30 Tab 1    neomycin-polymyxin-hydrocortisone, buffered, (PEDIOTIC) 3.5-10,000-1 mg/mL-unit/mL-% otic suspension Administer 3 Drops in left ear four (4) times daily. Indications: outer ear inflammation caused by allergy or infection (Patient not taking: Reported on 3/29/2021) 10 mL 0    albuterol (PROVENTIL VENTOLIN) 2.5 mg /3 mL (0.083 %) nebulizer solution 3 mL by Nebulization route once for 1 dose. 1 Each 0    MULTIVITS,CA,MINERALS/IRON/FA (MULTI FOR HER PO) Take 1 Tab by mouth daily.        Allergies   Allergen Reactions    Crestor [Rosuvastatin] Shortness of Breath    Nabumetone Shortness of Breath    Codeine Nausea Only    Gabapentin (Bulk) Shortness of Breath    Lyrica [Pregabalin] Swelling     Ankle swelling, foot pain , insomina    Percocet [Oxycodone-Acetaminophen] Other (comments)     loopy     Past Medical History:   Diagnosis Date    Acute right ankle pain 7/23/2018    Arthritis 8/3/2010    Asthma     Bunion of great toe of right foot 7/23/2018    Fall at home, initial encounter 9/18/2018    Hammer toe of second toe of right foot 7/23/2018    Hypercholesterolemia 8/18/2010    Postmenopausal 8/3/2010    Prediabetes 9/12/2013    Primary snoring 11/29/2017    Shoulder pain, bilateral 2/8/2018    Sinusitis, acute 12/12/2011    Wrist pain, acute, left 9/18/2018     Past Surgical History:   Procedure Laterality Date    HX GYN  1987    tubal pregancy; laparotomy    HX ORTHOPAEDIC  2006 neck    HX ORTHOPAEDIC  2008    cervical fusion of discs x4    HX PELVIC LAPAROSCOPY  1985    endometriosis    MD REVISE KNEE JOINT REPLACE,ALL PARTS  02/29/2012    tkr left     Family History   Problem Relation Age of Onset    Hypertension Father     Heart Disease Father         CAD    Diabetes Father     High Cholesterol Father     Other Mother         tumor in chest    Allergic Rhinitis Brother     Asthma Brother     Allergic Rhinitis Child     Sickle Cell Trait Child     GERD Child      Social History     Tobacco Use    Smoking status: Never Smoker    Smokeless tobacco: Never Used   Substance Use Topics    Alcohol use: No      Lab Results   Component Value Date/Time    WBC 5.2 07/30/2020 10:00 AM    HGB 13.5 07/30/2020 10:00 AM    HCT 43.7 07/30/2020 10:00 AM    PLATELET 104 01/34/6385 10:00 AM    MCV 87 07/30/2020 10:00 AM     Lab Results   Component Value Date/Time    TSH 0.297 (L) 07/30/2020 10:00 AM    T4, Total 7.7 08/04/2014 12:18 PM      Lab Results   Component Value Date/Time    Glucose 83 07/30/2020 10:00 AM         Review of Systems   Constitutional: Negative for chills and fever. HENT: Negative for ear pain and nosebleeds. Eyes: Negative for blurred vision, pain and discharge. Respiratory: Negative for shortness of breath. Cardiovascular: Negative for chest pain and leg swelling. Gastrointestinal: Negative for constipation, diarrhea, nausea and vomiting. Genitourinary: Negative for frequency. Musculoskeletal: Positive for joint pain, myalgias and neck pain. Negative for back pain. Skin: Negative for itching and rash. Neurological: Negative for weakness and headaches. Psychiatric/Behavioral: Negative for depression. The patient has insomnia. The patient is not nervous/anxious. Physical Exam  Vitals signs and nursing note reviewed. Constitutional:       Appearance: She is well-developed. HENT:      Head: Normocephalic and atraumatic.    Eyes: Conjunctiva/sclera: Conjunctivae normal.      Pupils: Pupils are equal, round, and reactive to light.   Neck:      Thyroid: No thyromegaly.      Vascular: No JVD.   Cardiovascular:      Rate and Rhythm: Normal rate and regular rhythm.      Heart sounds: Normal heart sounds. No murmur. No friction rub. No gallop.    Pulmonary:      Effort: Pulmonary effort is normal. No respiratory distress.      Breath sounds: Normal breath sounds. No stridor. No wheezing or rales.   Abdominal:      General: Bowel sounds are normal. There is no distension.      Palpations: Abdomen is soft. There is no mass.      Tenderness: There is no abdominal tenderness.   Musculoskeletal:         General: Tenderness present. No deformity.      Left shoulder: She exhibits decreased range of motion, tenderness, bony tenderness, pain and spasm.   Lymphadenopathy:      Cervical: No cervical adenopathy.   Skin:     Findings: No erythema or rash.   Neurological:      Mental Status: She is alert and oriented to person, place, and time.      Cranial Nerves: No cranial nerve deficit.      Deep Tendon Reflexes: Reflexes are normal and symmetric.   Psychiatric:         Behavior: Behavior normal.         ASSESSMENT and PLAN  Diagnoses and all orders for this visit:    1. Chronic pain of both shoulders  -     diclofenac (VOLTAREN) 1 % gel; Apply 4 g to affected area four (4) times daily.  -     nortriptyline (PAMELOR) 10 mg capsule; Take 1 Cap by mouth nightly.    2. Advice given about COVID-19 virus infection    Other orders  -     fluticasone furoate-vilanteroL (Breo Ellipta) 100-25 mcg/dose inhaler; Take 1 Puff by inhalation daily.        was told to help with weight reduction, spinal manipulations, massage therapy, exercise therapy, to remain active but to avoid heavy lifting and pushing at this time for the next 6 weeks ,   Advised for self cared options such as:  NSAID's and Tylenol for pain, take meds w/ food and water, if develop abdominal upsets,  such as heart burn and sour stomach. Please take some OTC antacids or Nexium 30 min before the first meal once daily and watch for discolored stool. Also told to do the exercise therapy: Ice therapy 2-30min tid daily, daily stretching x2 daily for 5-10 min, rom strengthening with resistance banding 3-4 times per week, Dependency and tolerancy were also addressed,  meds side effects and compliancy advised,  Call or rtc if worsens,   Pt agreed with today's recommendations.

## 2021-03-29 NOTE — PROGRESS NOTES
Chief Complaint   Patient presents with    Shoulder Pain     bilateral      1. Have you been to the ER, urgent care clinic since your last visit? Hospitalized since your last visit? Yes When: 02/19/21 [facility]  Reason for visit: right leg pain    2. Have you seen or consulted any other health care providers outside of the [address] [place] [address] since your last visit? Include any pap smears or colon screening. No    Verified patient with two type of identifiers. C/o n bilateral shoulder  Pain,  States \"Locks up\" when reaching at times.  Taking robaxin  With some relief

## 2021-03-29 NOTE — PATIENT INSTRUCTIONS
Rotator Cuff: Exercises  Introduction  Here are some examples of exercises for you to try. The exercises may be suggested for a condition or for rehabilitation. Start each exercise slowly. Ease off the exercises if you start to have pain. You will be told when to start these exercises and which ones will work best for you. How to do the exercises  Pendulum swing   If you have pain in your back, do not do this exercise. 1. Hold on to a table or the back of a chair with your good arm. Then bend forward a little and let your sore arm hang straight down. This exercise does not use the arm muscles. Rather, use your legs and your hips to create movement that makes your arm swing freely. 2. Use the movement from your hips and legs to guide the slightly swinging arm back and forth like a pendulum (or elephant trunk). Then guide it in circles that start small (about the size of a dinner plate). Make the circles a bit larger each day, as your pain allows. 3. Do this exercise for 5 minutes, 5 to 7 times each day. 4. As you have less pain, try bending over a little farther to do this exercise. This will increase the amount of movement at your shoulder. Posterior stretching exercise   1. Hold the elbow of your injured arm with your other hand. 2. Use your hand to pull your injured arm gently up and across your body. You will feel a gentle stretch across the back of your injured shoulder. 3. Hold for at least 15 to 30 seconds. Then slowly lower your arm. 4. Repeat 2 to 4 times. Up-the-back stretch   Your doctor or physical therapist may want you to wait to do this stretch until you have regained most of your range of motion and strength. You can do this stretch in different ways. Hold any of these stretches for at least 15 to 30 seconds. Repeat them 2 to 4 times. 1. Light stretch: Put your hand in your back pocket. Let it rest there to stretch your shoulder. 2. Moderate stretch:  With your other hand, hold your injured arm (palm outward) behind your back by the wrist. Pull your arm up gently to stretch your shoulder. 3. Advanced stretch: Put a towel over your other shoulder. Put the hand of your injured arm behind your back. Now hold the back end of the towel. With the other hand, hold the front end of the towel in front of your body. Pull gently on the front end of the towel. This will bring your hand farther up your back to stretch your shoulder. Overhead stretch   1. Standing about an arm's length away, grasp onto a solid surface. You could use a countertop, a doorknob, or the back of a sturdy chair. 2. With your knees slightly bent, bend forward with your arms straight. Lower your upper body, and let your shoulders stretch. 3. As your shoulders are able to stretch farther, you may need to take a step or two backward. 4. Hold for at least 15 to 30 seconds. Then stand up and relax. If you had stepped back during your stretch, step forward so you can keep your hands on the solid surface. 5. Repeat 2 to 4 times. Shoulder flexion (lying down)   To make a wand for this exercise, use a piece of PVC pipe or a broom handle with the broom removed. Make the wand about a foot wider than your shoulders. 1. Lie on your back, holding a wand with both hands. Your palms should face down as you hold the wand. 2. Keeping your elbows straight, slowly raise your arms over your head. Raise them until you feel a stretch in your shoulders, upper back, and chest.  3. Hold for 15 to 30 seconds. 4. Repeat 2 to 4 times. Shoulder rotation (lying down)   To make a wand for this exercise, use a piece of PVC pipe or a broom handle with the broom removed. Make the wand about a foot wider than your shoulders. 1. Lie on your back. Hold a wand with both hands with your elbows bent and palms up. 2. Keep your elbows close to your body, and move the wand across your body toward the sore arm. 3. Hold for 8 to 12 seconds.   4. Repeat 2 to 4 times. Wall climbing (to the side)   Avoid any movement that is straight to your side, and be careful not to arch your back. Your arm should stay about 30 degrees to the front of your side. 1. Stand with your side to a wall so that your fingers can just touch it at an angle about 30 degrees toward the front of your body. 2. Walk the fingers of your injured arm up the wall as high as pain permits. Try not to shrug your shoulder up toward your ear as you move your arm up. 3. Hold that position for a count of at least 15 to 20.  4. Walk your fingers back down to the starting position. 5. Repeat at least 2 to 4 times. Try to reach higher each time. Wall climbing (to the front)   During this stretching exercise, be careful not to arch your back. 1. Face a wall, and stand so your fingers can just touch it. 2. Keeping your shoulder down, walk the fingers of your injured arm up the wall as high as pain permits. (Don't shrug your shoulder up toward your ear.)  3. Hold your arm in that position for at least 15 to 30 seconds. 4. Slowly walk your fingers back down to where you started. 5. Repeat at least 2 to 4 times. Try to reach higher each time. Shoulder blade squeeze   1. Stand with your arms at your sides, and squeeze your shoulder blades together. Do not raise your shoulders up as you squeeze. 2. Hold 6 seconds. 3. Repeat 8 to 12 times. Scapular exercise: Arm reach   1. Lie flat on your back. This exercise is a very slight motion that starts with your arms raised (elbows straight, arms straight). 2. From this position, reach higher toward the [place] dol[place] or ceiling. Keep your elbows straight. All motion should be from your shoulder blade only. 3. Relax your arms back to where you started. 4. Repeat 8 to 12 times. Arm raise to the side   During this strengthening exercise, your arm should stay about 30 degrees to the front of your side.   1. Slowly raise your injured arm to the side, with your thumb facing up. Raise your arm 60 degrees at the most (shoulder level is 90 degrees). 2. Hold the position for 3 to 5 seconds. Then lower your arm back to your side. If you need to, bring your \"good\" arm across your body and place it under the elbow as you lower your injured arm. Use your good arm to keep your injured arm from dropping down too fast.  3. Repeat 8 to 12 times. 4. When you first start out, don't hold any extra weight in your hand. As you get stronger, you may use a 1-pound to 2-pound dumbbell or a small can of food. Shoulder flexor and extensor exercise   These are isometric exercises. That means you contract your muscles without actually moving. 1. Push forward (flex): Stand facing a wall or doorjamb, about 6 inches or less back. Hold your injured arm against your body. Make a closed fist with your thumb on top. Then gently push your hand forward into the wall with about 25% to 50% of your strength. Don't let your body move backward as you push. Hold for about 6 seconds. Relax for a few seconds. Repeat 8 to 12 times. 2. Push backward (extend): Stand with your back flat against a wall. Your upper arm should be against the wall, with your elbow bent 90 degrees (your hand straight ahead). Push your elbow gently back against the wall with about 25% to 50% of your strength. Don't let your body move forward as you push. Hold for about 6 seconds. Relax for a few seconds. Repeat 8 to 12 times. Scapular exercise: Wall push-ups   This exercise is best done with your fingers somewhat turned out, rather than straight up and down. 1. Stand facing a wall, about 12 inches to 18 inches away. 2. Place your hands on the wall at shoulder height. 3. Slowly bend your elbows and bring your face to the wall. Keep your back and hips straight. 4. Push back to where you started. 5. Repeat 8 to 12 times. 6. When you can do this exercise against a wall comfortably, you can try it against a counter.  You can then slowly progress to the end of a couch, then to a sturdy chair, and finally to the floor. Scapular exercise: Retraction   For this exercise, you will need elastic exercise material, such as surgical tubing or Thera-Band. 1. Put the band around a solid object at about waist level. (A bedpost will work well.) Each hand should hold an end of the band. 2. With your elbows at your sides and bent to 90 degrees, pull the band back. Your shoulder blades should move toward each other. Then move your arms back where you started. 3. Repeat 8 to 12 times. 4. If you have good range of motion in your shoulders, try this exercise with your arms lifted out to the sides. Keep your elbows at a 90-degree angle. Raise the elastic band up to about shoulder level. Pull the band back to move your shoulder blades toward each other. Then move your arms back where you started. Internal rotator strengthening exercise   1. Start by tying a piece of elastic exercise material to a doorknob. You can use surgical tubing or Thera-Band. 2. Stand or sit with your shoulder relaxed and your elbow bent 90 degrees. Your upper arm should rest comfortably against your side. Squeeze a rolled towel between your elbow and your body for comfort. This will help keep your arm at your side. 3. Hold one end of the elastic band in the hand of the painful arm. 4. Slowly rotate your forearm toward your body until it touches your belly. Slowly move it back to where you started. 5. Keep your elbow and upper arm firmly tucked against the towel roll or at your side. 6. Repeat 8 to 12 times. External rotator strengthening exercise   1. Start by tying a piece of elastic exercise material to a doorknob. You can use surgical tubing or Thera-Band. (You may also hold one end of the band in each hand.)  2. Stand or sit with your shoulder relaxed and your elbow bent 90 degrees. Your upper arm should rest comfortably against your side.  Squeeze a rolled towel between your elbow and your body for comfort. This will help keep your arm at your side. 3. Hold one end of the elastic band with the hand of the painful arm. 4. Start with your forearm across your belly. Slowly rotate the forearm out away from your body. Keep your elbow and upper arm tucked against the towel roll or the side of your body until you begin to feel tightness in your shoulder. Slowly move your arm back to where you started. 5. Repeat 8 to 12 times. Follow-up care is a key part of your treatment and safety. Be sure to make and go to all appointments, and call your doctor if you are having problems. It's also a good idea to know your test results and keep a list of the medicines you take. Where can you learn more? Go to http://www.gray.com/  Enter J005 in the search box to learn more about \"Rotator Cuff: Exercises. \"  Current as of: March 2, 2020               Content Version: 12.6  © 2006-2020 [employer]. Care instructions adapted under license by [employer] (which disclaims liability or warranty for this information). If you have questions about a medical condition or this instruction, always ask your healthcare professional. [place] [address] any warranty or liability for your use of this information.

## 2021-03-31 ENCOUNTER — HOSPITAL ENCOUNTER (OUTPATIENT)
Dept: MAMMOGRAPHY | Age: 61
Discharge: HOME | End: 2021-03-31
Attending: FAMILY MEDICINE
Payer: COMMERCIAL

## 2021-03-31 DIAGNOSIS — Z12.31 VISIT FOR SCREENING MAMMOGRAM: ICD-10-CM

## 2021-03-31 PROCEDURE — 77067 SCR MAMMO BI INCL CAD: CPT

## 2021-04-17 ENCOUNTER — IMMUNIZATION (OUTPATIENT)
Dept: FAMILY MEDICINE CLINIC | Age: 61
End: 2021-04-17
Payer: COMMERCIAL

## 2021-04-17 DIAGNOSIS — Z23 ENCOUNTER FOR IMMUNIZATION: Primary | ICD-10-CM

## 2021-04-17 PROCEDURE — 0001A COVID-19, MRNA, LNP-S, PF, 30MCG/0.3ML DOSE(PFIZER): CPT | Performed by: FAMILY MEDICINE

## 2021-04-17 PROCEDURE — 91300 COVID-19, MRNA, LNP-S, PF, 30MCG/0.3ML DOSE(PFIZER): CPT | Performed by: FAMILY MEDICINE

## 2021-06-04 LAB — SARS-COV-2, NAA: NOT DETECTED

## 2021-06-07 ENCOUNTER — VIRTUAL VISIT (OUTPATIENT)
Dept: FAMILY MEDICINE CLINIC | Age: 61
End: 2021-06-07
Payer: COMMERCIAL

## 2021-06-07 ENCOUNTER — APPOINTMENT (OUTPATIENT)
Dept: GENERAL RADIOLOGY | Age: 61
End: 2021-06-07
Attending: EMERGENCY MEDICINE
Payer: COMMERCIAL

## 2021-06-07 ENCOUNTER — APPOINTMENT (OUTPATIENT)
Dept: CT IMAGING | Age: 61
End: 2021-06-07
Attending: EMERGENCY MEDICINE
Payer: COMMERCIAL

## 2021-06-07 ENCOUNTER — HOSPITAL ENCOUNTER (EMERGENCY)
Age: 61
Discharge: HOME | End: 2021-06-07
Attending: EMERGENCY MEDICINE
Payer: COMMERCIAL

## 2021-06-07 VITALS
OXYGEN SATURATION: 100 % | SYSTOLIC BLOOD PRESSURE: 155 MMHG | BODY MASS INDEX: 30.71 KG/M2 | WEIGHT: 166.89 LBS | HEIGHT: 62 IN | RESPIRATION RATE: 16 BRPM | HEART RATE: 64 BPM | DIASTOLIC BLOOD PRESSURE: 73 MMHG | TEMPERATURE: 97.9 F

## 2021-06-07 DIAGNOSIS — J45.41 MODERATE PERSISTENT ASTHMA WITH ACUTE EXACERBATION: Primary | ICD-10-CM

## 2021-06-07 DIAGNOSIS — R07.9 ACUTE CHEST PAIN: Primary | ICD-10-CM

## 2021-06-07 DIAGNOSIS — R07.9 CHEST PAIN AT REST: ICD-10-CM

## 2021-06-07 DIAGNOSIS — Z71.89 ADVICE GIVEN ABOUT COVID-19 VIRUS INFECTION: ICD-10-CM

## 2021-06-07 DIAGNOSIS — J45.901 ASTHMA EXACERBATION: ICD-10-CM

## 2021-06-07 DIAGNOSIS — R42 VERTIGO: ICD-10-CM

## 2021-06-07 DIAGNOSIS — R42 DIZZINESS: ICD-10-CM

## 2021-06-07 DIAGNOSIS — J20.9 ACUTE BRONCHITIS, UNSPECIFIED ORGANISM: ICD-10-CM

## 2021-06-07 LAB
ALBUMIN SERPL-MCNC: 3.5 G/DL (ref 3.5–5)
ALBUMIN/GLOB SERPL: 1 {RATIO} (ref 1.1–2.2)
ALP SERPL-CCNC: 55 U/L (ref 45–117)
ALT SERPL-CCNC: 29 U/L (ref 12–78)
ANION GAP SERPL CALC-SCNC: 4 MMOL/L (ref 5–15)
AST SERPL-CCNC: 15 U/L (ref 15–37)
ATRIAL RATE: 62 BPM
BASOPHILS # BLD: 0 K/UL (ref 0–0.1)
BASOPHILS NFR BLD: 0 % (ref 0–1)
BILIRUB SERPL-MCNC: 0.4 MG/DL (ref 0.2–1)
BUN SERPL-MCNC: 23 MG/DL (ref 6–20)
BUN/CREAT SERPL: 24 (ref 12–20)
CALCIUM SERPL-MCNC: 8.8 MG/DL (ref 8.5–10.1)
CALCULATED P AXIS, ECG09: 64 DEGREES
CALCULATED R AXIS, ECG10: 40 DEGREES
CALCULATED T AXIS, ECG11: 50 DEGREES
CHLORIDE SERPL-SCNC: 106 MMOL/L (ref 97–108)
CO2 SERPL-SCNC: 29 MMOL/L (ref 21–32)
CREAT SERPL-MCNC: 0.94 MG/DL (ref 0.55–1.02)
D DIMER PPP FEU-MCNC: 0.79 MG/L FEU (ref 0–0.65)
DIAGNOSIS, 93000: NORMAL
DIFFERENTIAL METHOD BLD: ABNORMAL
EOSINOPHIL # BLD: 0 K/UL (ref 0–0.4)
EOSINOPHIL NFR BLD: 0 % (ref 0–7)
ERYTHROCYTE [DISTWIDTH] IN BLOOD BY AUTOMATED COUNT: 14.8 % (ref 11.5–14.5)
GLOBULIN SER CALC-MCNC: 3.6 G/DL (ref 2–4)
GLUCOSE SERPL-MCNC: 106 MG/DL (ref 65–100)
HCT VFR BLD AUTO: 44.3 % (ref 35–47)
HGB BLD-MCNC: 14.1 G/DL (ref 11.5–16)
IMM GRANULOCYTES # BLD AUTO: 0.1 K/UL (ref 0–0.04)
IMM GRANULOCYTES NFR BLD AUTO: 1 % (ref 0–0.5)
LYMPHOCYTES # BLD: 1.1 K/UL (ref 0.8–3.5)
LYMPHOCYTES NFR BLD: 14 % (ref 12–49)
MCH RBC QN AUTO: 27.6 PG (ref 26–34)
MCHC RBC AUTO-ENTMCNC: 31.8 G/DL (ref 30–36.5)
MCV RBC AUTO: 86.7 FL (ref 80–99)
MONOCYTES # BLD: 0.2 K/UL (ref 0–1)
MONOCYTES NFR BLD: 2 % (ref 5–13)
NEUTS SEG # BLD: 6.6 K/UL (ref 1.8–8)
NEUTS SEG NFR BLD: 83 % (ref 32–75)
NRBC # BLD: 0 K/UL (ref 0–0.01)
NRBC BLD-RTO: 0 PER 100 WBC
P-R INTERVAL, ECG05: 152 MS
PLATELET # BLD AUTO: 272 K/UL (ref 150–400)
PMV BLD AUTO: 9.3 FL (ref 8.9–12.9)
POTASSIUM SERPL-SCNC: 4.2 MMOL/L (ref 3.5–5.1)
PROT SERPL-MCNC: 7.1 G/DL (ref 6.4–8.2)
Q-T INTERVAL, ECG07: 408 MS
QRS DURATION, ECG06: 76 MS
QTC CALCULATION (BEZET), ECG08: 414 MS
RBC # BLD AUTO: 5.11 M/UL (ref 3.8–5.2)
SODIUM SERPL-SCNC: 139 MMOL/L (ref 136–145)
TROPONIN I SERPL-MCNC: <0.05 NG/ML
VENTRICULAR RATE, ECG03: 62 BPM
WBC # BLD AUTO: 7.9 K/UL (ref 3.6–11)

## 2021-06-07 PROCEDURE — 99214 OFFICE O/P EST MOD 30 MIN: CPT | Performed by: FAMILY MEDICINE

## 2021-06-07 PROCEDURE — 80053 COMPREHEN METABOLIC PANEL: CPT

## 2021-06-07 PROCEDURE — 85379 FIBRIN DEGRADATION QUANT: CPT

## 2021-06-07 PROCEDURE — 85025 COMPLETE CBC W/AUTO DIFF WBC: CPT

## 2021-06-07 PROCEDURE — 71045 X-RAY EXAM CHEST 1 VIEW: CPT

## 2021-06-07 PROCEDURE — 74011250636 HC RX REV CODE- 250/636: Performed by: EMERGENCY MEDICINE

## 2021-06-07 PROCEDURE — 74011250637 HC RX REV CODE- 250/637: Performed by: EMERGENCY MEDICINE

## 2021-06-07 PROCEDURE — 84484 ASSAY OF TROPONIN QUANT: CPT

## 2021-06-07 PROCEDURE — 71275 CT ANGIOGRAPHY CHEST: CPT

## 2021-06-07 PROCEDURE — 99284 EMERGENCY DEPT VISIT MOD MDM: CPT

## 2021-06-07 PROCEDURE — 74011000636 HC RX REV CODE- 636: Performed by: EMERGENCY MEDICINE

## 2021-06-07 PROCEDURE — 93005 ELECTROCARDIOGRAM TRACING: CPT

## 2021-06-07 PROCEDURE — 70450 CT HEAD/BRAIN W/O DYE: CPT

## 2021-06-07 PROCEDURE — 36415 COLL VENOUS BLD VENIPUNCTURE: CPT

## 2021-06-07 RX ORDER — ALBUTEROL SULFATE 0.83 MG/ML
2.5 SOLUTION RESPIRATORY (INHALATION) ONCE
Qty: 70 EACH | Refills: 1
Start: 2021-06-07 | End: 2021-06-07

## 2021-06-07 RX ORDER — METHOCARBAMOL 750 MG/1
750 TABLET, FILM COATED ORAL
Status: COMPLETED | OUTPATIENT
Start: 2021-06-07 | End: 2021-06-07

## 2021-06-07 RX ORDER — METHOCARBAMOL 750 MG/1
750 TABLET, FILM COATED ORAL
Qty: 20 TABLET | Refills: 0 | Status: SHIPPED | OUTPATIENT
Start: 2021-06-07 | End: 2021-06-11 | Stop reason: ALTCHOICE

## 2021-06-07 RX ORDER — MECLIZINE HCL 12.5 MG 12.5 MG/1
25 TABLET ORAL
Status: COMPLETED | OUTPATIENT
Start: 2021-06-07 | End: 2021-06-07

## 2021-06-07 RX ORDER — MECLIZINE HYDROCHLORIDE 25 MG/1
25 TABLET ORAL
Qty: 20 TABLET | Refills: 0 | Status: SHIPPED | OUTPATIENT
Start: 2021-06-07 | End: 2021-12-20 | Stop reason: ALTCHOICE

## 2021-06-07 RX ORDER — DOXYCYCLINE 100 MG/1
100 CAPSULE ORAL 2 TIMES DAILY
Qty: 20 CAPSULE | Refills: 0 | Status: SHIPPED | OUTPATIENT
Start: 2021-06-07 | End: 2021-06-17

## 2021-06-07 RX ADMIN — MECLIZINE 25 MG: 12.5 TABLET ORAL at 15:21

## 2021-06-07 RX ADMIN — IOPAMIDOL 100 ML: 755 INJECTION, SOLUTION INTRAVENOUS at 16:49

## 2021-06-07 RX ADMIN — METHOCARBAMOL TABLETS 750 MG: 750 TABLET, COATED ORAL at 15:21

## 2021-06-07 RX ADMIN — SODIUM CHLORIDE 500 ML: 9 INJECTION, SOLUTION INTRAVENOUS at 15:30

## 2021-06-07 NOTE — ED NOTES
Tina Victoria RN reviewed discharge instructions with the patient. The patient verbalized understanding. All questions and concerns were addressed. The patient declined a wheelchair and is discharged ambulatory in the care of family members with instructions and prescriptions in hand. Pt is alert and oriented x 4. Respirations are clear and unlabored.

## 2021-06-07 NOTE — PROGRESS NOTES
HISTORY OF PRESENT ILLNESS  Deysi Heard is a 57 y.o. female, patient present with history of postmenopausal state multiple joint pain, asthmatic state, compliant with her daily medication, today, her main concerns of chest pain and dizziness were provided on virtual visit, a telemed format, present with a complaint of chest pain dull with pressure all over not substernal 5 out of 10 nonradiating denies any sweating,  with asthmatic state and recurrent cough not wheezing denies any shortness of breath, talking nicely, in addition, she states that she has been feeling very dizzy and lightheadedness denies any loss of consciousness, she has had no history of vertigo and has no spinning sensation at this time , patient currently at home feeling safe denies any suicidal homicidal ideation taking nortriptyline at night for chronic pain,  patient is nicely vaccinated last COVID-19 was May 13, 2021, patient went to urgent care few days ago was given some steroid and Tessalon was tested for COVID-19 with negative result patient today presents stating that the cough not gone away patient has been compliant with regular medication fever broke, overall not getting better or worsening with chest pressure and dizziness patient denies any recent imaging study, fortunately throughout the conversation on this visit patient did not cough complaint of sore throat stating that she is supposed to go back to work on Wednesday not feeling right and she is requesting a work note as well,     She denies any fever at this time and she has not had any traveling and no COVID-19 exposure             Current Outpatient Medications   Medication Sig Dispense Refill    albuterol (PROVENTIL VENTOLIN) 2.5 mg /3 mL (0.083 %) nebu 3 mL by Nebulization route once for 1 dose. 70 Each 1    doxycycline (VIBRAMYCIN) 100 mg capsule Take 1 Capsule by mouth two (2) times a day for 10 days.  20 Capsule 0    nortriptyline (PAMELOR) 10 mg capsule Take 1 Cap by mouth nightly. 30 Cap 2    fluticasone furoate-vilanteroL (Breo Ellipta) 100-25 mcg/dose inhaler Take 1 Puff by inhalation daily. 1 Inhaler 3    albuterol (PROVENTIL HFA, VENTOLIN HFA, PROAIR HFA) 90 mcg/actuation inhaler Take 1 Puff by inhalation every four (4) hours as needed for Wheezing. 1 Inhaler 6    ipratropium (ATROVENT) 42 mcg (0.06 %) nasal spray 2 Sprays by Both Nostrils route four (4) times daily. 15 mL 3    tiotropium bromide (Spiriva Respimat) 2.5 mcg/actuation inhaler Take 2 Puffs by inhalation nightly. 1 Inhaler 6    montelukast (SINGULAIR) 10 mg tablet Take 1 Tab by mouth daily. 30 Tab 3    Nebulizer & Compressor machine 1 Each by Does Not Apply route four (4) times daily as needed for Cough. 1 Each 0    azelastine (ASTEPRO) 0.15 % (205.5 mcg) 2 Sprays by Both Nostrils route daily.  multivitamin (HAIR,SKIN AND NAILS) tablet Take 1 Tab by mouth daily.  ascorbic acid, vitamin C, (VITAMIN C) 500 mg tablet Take 500 mg by mouth daily.  calcium-cholecalciferol, D3, (CALTRATE 600+D) tablet Take 1 Tab by mouth two (2) times a day. 60 Tab 11    GLUCOSAMINE/CHONDR BREWER A SOD (GLUCOSAMINE-CHONDROITIN) 750-600 mg tab One tab twice daily 60 Tab 6    MULTIVITS,CA,MINERALS/IRON/FA (MULTI FOR HER PO) Take 1 Tab by mouth daily.        Allergies   Allergen Reactions    Crestor [Rosuvastatin] Shortness of Breath    Nabumetone Shortness of Breath    Codeine Nausea Only    Gabapentin (Bulk) Shortness of Breath    Lyrica [Pregabalin] Swelling     Ankle swelling, foot pain , insomina    Percocet [Oxycodone-Acetaminophen] Other (comments)     loopy     Past Medical History:   Diagnosis Date    Acute right ankle pain 7/23/2018    Arthritis 8/3/2010    Asthma     Bunion of great toe of right foot 7/23/2018    Fall at home, initial encounter 9/18/2018    Hammer toe of second toe of right foot 7/23/2018    Hypercholesterolemia 8/18/2010    Postmenopausal 8/3/2010    Prediabetes 9/12/2013    Primary snoring 11/29/2017    Shoulder pain, bilateral 2/8/2018    Sinusitis, acute 12/12/2011    Wrist pain, acute, left 9/18/2018     Past Surgical History:   Procedure Laterality Date    HX GYN  1987    tubal pregancy; laparotomy    HX ORTHOPAEDIC  2006    neck    HX ORTHOPAEDIC  2008    cervical fusion of discs x4    HX PELVIC LAPAROSCOPY  1985    endometriosis    CT REVISE KNEE JOINT REPLACE,ALL PARTS  02/29/2012    tkr left     Family History   Problem Relation Age of Onset    Hypertension Father     Heart Disease Father         CAD    Diabetes Father     High Cholesterol Father     Other Mother         tumor in chest    Allergic Rhinitis Brother     Asthma Brother     Allergic Rhinitis Child     Sickle Cell Trait Child     GERD Child      Social History     Tobacco Use    Smoking status: Never Smoker    Smokeless tobacco: Never Used   Substance Use Topics    Alcohol use: No      Lab Results   Component Value Date/Time    WBC 5.2 07/30/2020 10:00 AM    HGB 13.5 07/30/2020 10:00 AM    HCT 43.7 07/30/2020 10:00 AM    PLATELET 355 37/76/3065 10:00 AM    MCV 87 07/30/2020 10:00 AM     Lab Results   Component Value Date/Time    GFR est non-AA 57 (L) 07/30/2020 10:00 AM    GFR est AA 66 07/30/2020 10:00 AM    Creatinine 1.05 (H) 07/30/2020 10:00 AM    BUN 16 07/30/2020 10:00 AM    Sodium 142 07/30/2020 10:00 AM    Potassium 4.4 07/30/2020 10:00 AM    Chloride 102 07/30/2020 10:00 AM    CO2 22 07/30/2020 10:00 AM        Review of Systems   Constitutional: Negative for chills and fever. HENT: Positive for sore throat. Negative for congestion and nosebleeds. Eyes: Negative for blurred vision and pain. Respiratory: Positive for cough. Negative for shortness of breath and wheezing. Cardiovascular: Positive for chest pain. Negative for palpitations, orthopnea, claudication, leg swelling and PND. Gastrointestinal: Negative for constipation, diarrhea, nausea and vomiting.    Genitourinary: Negative for dysuria and frequency. Musculoskeletal: Negative for joint pain and myalgias. Skin: Negative for itching and rash. Neurological: Positive for dizziness. Negative for loss of consciousness and headaches. Psychiatric/Behavioral: Negative for depression. The patient is not nervous/anxious and does not have insomnia. Physical Exam  Constitutional:       Appearance: She is obese. She is not ill-appearing or toxic-appearing. HENT:      Head: Normocephalic and atraumatic. Mouth/Throat:      Mouth: Mucous membranes are moist.   Neurological:      Mental Status: She is alert and oriented to person, place, and time. Psychiatric:         Mood and Affect: Mood normal.         Behavior: Behavior normal.         Thought Content: Thought content normal.         Judgment: Judgment normal.         ASSESSMENT and PLAN  Diagnoses and all orders for this visit:    1. Moderate persistent asthma with acute exacerbation  -     albuterol (PROVENTIL VENTOLIN) 2.5 mg /3 mL (0.083 %) nebu; 3 mL by Nebulization route once for 1 dose.  -     doxycycline (VIBRAMYCIN) 100 mg capsule; Take 1 Capsule by mouth two (2) times a day for 10 days. 2. Acute bronchitis, unspecified organism  -     albuterol (PROVENTIL VENTOLIN) 2.5 mg /3 mL (0.083 %) nebu; 3 mL by Nebulization route once for 1 dose.  -     doxycycline (VIBRAMYCIN) 100 mg capsule; Take 1 Capsule by mouth two (2) times a day for 10 days. 3. Asthma exacerbation  -     albuterol (PROVENTIL VENTOLIN) 2.5 mg /3 mL (0.083 %) nebu; 3 mL by Nebulization route once for 1 dose.  -     doxycycline (VIBRAMYCIN) 100 mg capsule; Take 1 Capsule by mouth two (2) times a day for 10 days. 4. Dizziness  -     albuterol (PROVENTIL VENTOLIN) 2.5 mg /3 mL (0.083 %) nebu; 3 mL by Nebulization route once for 1 dose.  -     doxycycline (VIBRAMYCIN) 100 mg capsule; Take 1 Capsule by mouth two (2) times a day for 10 days.     5. Chest pain at rest  -     albuterol (PROVENTIL VENTOLIN) 2.5 mg /3 mL (0.083 %) nebu; 3 mL by Nebulization route once for 1 dose.  -     doxycycline (VIBRAMYCIN) 100 mg capsule; Take 1 Capsule by mouth two (2) times a day for 10 days. 6. Advice given about COVID-19 virus infection  -     albuterol (PROVENTIL VENTOLIN) 2.5 mg /3 mL (0.083 %) nebu; 3 mL by Nebulization route once for 1 dose.  -     doxycycline (VIBRAMYCIN) 100 mg capsule; Take 1 Capsule by mouth two (2) times a day for 10 days. At this time patient was told that it would not be wise to treat her chest pain and dizziness on virtual visit patient was offered to call [phone] eleven for worsening chest pain and dizziness ASAP patient acknowledged and agreed with today's recommendation,     In addition work note was provided for patient,       Concern abdout COVID-19 addressed and detailed, pt was told that the best way to prevent illness is by protection, to Wear a facemask , having social distance, and to get tested and re-tested, with contact tracing,    Pt was told that currently,there is no antiviral medication which is recommended to treat COVID-19. Current treatment is aimed to relieve sxs such as pain relievers no ibuprofen but mostly acetaminophen, anti Cough medication , plenty of Rest and a lot of oral hydration. Isolation and Contact tracing at this time     Also was advised to stay in isolation for 10-14 days at this time with cold sxs presentation, Pt was also told if develop dyspnea needs to call 911 or go to er, call for mesha advise, pt agreed with today's visit. Pursuant to the emergency declaration under the [employer] and [facility], 1135 waiver authority and the [employer] Act, this Virtual Visit was conducted, with patient's consent, to reduce the patient's risk of exposure to COVID-19 and provide continuity of care for an established patient.  Today's recommendations, Services were provided through a Video synchronous discussion virtually to substitute for in-person appointment.

## 2021-06-07 NOTE — LETTER
[address]  [facility] EMERGENCY DEPT  [address] [place] [address]  Mayra Potter [id number] [phone]    Work/School Note    Date: 6/7/2021    To Whom It May concern:    Nehal Calzada was seen and treated today in the emergency room by the following provider(s):  Attending Provider: Flor Em MD.      Nehal Calzada may return to work on 6/14/2021.     Sincerely,          Daja Wakefield MD

## 2021-06-07 NOTE — LETTER
NOTIFICATION RETURN TO WORK / SCHOOL 
 
 
 
 
6/7/2021 12:37 PM 
 
Ms. Brewer [address] [phone] To Whom It May Concern: 
 
Nisa Willson is currently under the care of [address] OFF[facility]. She will return to work/school on: 6/14/2021 If there are questions or concerns please have the patient contact our office.  
 
 
 
Sincerely, 
 
 
Marisel Taylor MD

## 2021-06-07 NOTE — ED NOTES
Complaint of cough and cold sx with fever on Wednesday. She started with chest pressure and room-spinning dizziness on Saturday. She was [facility] without relief. Pt is still having cp this morning with room-spinning dizzines and sob on exertion. PCP advised evaluation here in the ER today.

## 2021-06-07 NOTE — ED PROVIDER NOTES
EMERGENCY DEPARTMENT HISTORY AND PHYSICAL EXAM      Date: 6/7/2021  Patient Name: Corky Felty    History of Presenting Illness     Chief Complaint   Patient presents with    Chest Pain     CP at mid chest worse since Sat, treated for asthma last week. History Provided By: Patient and office visit note    HPI: Corky Felty, 57 y.o. female presents to the ED with cc of chest pain and dizziness. Patient states that she has had a nonproductive cough and had a low-grade fever of 100.8 five days ago. [facility] and had a negative chest x-ray. She was prescribed steroids and Tessalon Perles. They told her to follow-up if she did not feel better within the next few days. She developed dizziness and lightheadedness 2 days ago. She states that she has ear problems normally and she feels that that may have exacerbated her symptoms of dizziness. She has had both a spinning sensation and lightheadedness. She also has had chest pain off and on in the last 2 days. She states that it is a tightness and a squeezing sensation in her chest which occasionally radiates to the shoulder blades. She has associated shortness of breath as well as pleuritic symptoms. She denies leg pain or leg edema. She denies nausea, states she has had diaphoresis. She denies any radiation to the neck, jaw or arms. Her last COVID-19 vaccine was on May 13, 2021. She was also tested for COVID-19 five days ago, and her result was negative. Pain is currently a 9 out of 10 in severity. She has not taken anything for pain today. Her pain worsens when she lays flat. She has had occasional pounding sensation in her head today which is a 6 out of 10 in severity. There are no other complaints, changes, or physical findings at this time. PCP: Franki Ward MD    No current facility-administered medications on file prior to encounter.      Current Outpatient Medications on File Prior to Encounter   Medication Sig Dispense Refill    albuterol (PROVENTIL VENTOLIN) 2.5 mg /3 mL (0.083 %) nebu 3 mL by Nebulization route once for 1 dose. 70 Each 1    doxycycline (VIBRAMYCIN) 100 mg capsule Take 1 Capsule by mouth two (2) times a day for 10 days. 20 Capsule 0    nortriptyline (PAMELOR) 10 mg capsule Take 1 Cap by mouth nightly. 30 Cap 2    fluticasone furoate-vilanteroL (Breo Ellipta) 100-25 mcg/dose inhaler Take 1 Puff by inhalation daily. 1 Inhaler 3    [DISCONTINUED] methocarbamoL (ROBAXIN) 500 mg tablet Take 500 mg by mouth as needed. (Patient not taking: Reported on 6/7/2021)      [DISCONTINUED] diclofenac (VOLTAREN) 1 % gel Apply 4 g to affected area four (4) times daily. (Patient not taking: Reported on 6/7/2021) 150 g 3    albuterol (PROVENTIL HFA, VENTOLIN HFA, PROAIR HFA) 90 mcg/actuation inhaler Take 1 Puff by inhalation every four (4) hours as needed for Wheezing. 1 Inhaler 6    ipratropium (ATROVENT) 42 mcg (0.06 %) nasal spray 2 Sprays by Both Nostrils route four (4) times daily. 15 mL 3    tiotropium bromide (Spiriva Respimat) 2.5 mcg/actuation inhaler Take 2 Puffs by inhalation nightly. 1 Inhaler 6    montelukast (SINGULAIR) 10 mg tablet Take 1 Tab by mouth daily. 30 Tab 3    Nebulizer & Compressor machine 1 Each by Does Not Apply route four (4) times daily as needed for Cough. 1 Each 0    azelastine (ASTEPRO) 0.15 % (205.5 mcg) 2 Sprays by Both Nostrils route daily.  [DISCONTINUED] cyclobenzaprine (FLEXERIL) 10 mg tablet Take 1 Tab by mouth nightly. Indications: muscle spasm (Patient taking differently: Take 10 mg by mouth daily as needed. Indications: muscle spasm) 30 Tab 1    [DISCONTINUED] diclofenac potassium (CATAFLAM) 50 mg tablet Take 1 Tab by mouth two (2) times daily as needed for Pain. (Patient not taking: Reported on 6/7/2021) 40 Tab 0    multivitamin (HAIR,SKIN AND NAILS) tablet Take 1 Tab by mouth daily.       ascorbic acid, vitamin C, (VITAMIN C) 500 mg tablet Take 500 mg by mouth daily.      [DISCONTINUED] neomycin-polymyxin-hydrocortisone, buffered, (PEDIOTIC) 3.5-10,000-1 mg/mL-unit/mL-% otic suspension Administer 3 Drops in left ear four (4) times daily. Indications: outer ear inflammation caused by allergy or infection (Patient taking differently: Administer 3 Drops in left ear four (4) times daily. As needed  Indications: outer ear inflammation due to allergy or infection) 10 mL 0    calcium-cholecalciferol, D3, (CALTRATE 600+D) tablet Take 1 Tab by mouth two (2) times a day. 60 Tab 11    GLUCOSAMINE/CHONDR BREWER A SOD (GLUCOSAMINE-CHONDROITIN) 750-600 mg tab One tab twice daily 60 Tab 6    [DISCONTINUED] albuterol (PROVENTIL VENTOLIN) 2.5 mg /3 mL (0.083 %) nebulizer solution 3 mL by Nebulization route once for 1 dose. 1 Each 0    MULTIVITS,CA,MINERALS/IRON/FA (MULTI FOR HER PO) Take 1 Tab by mouth daily.          Past History     Past Medical History:  Past Medical History:   Diagnosis Date    Acute right ankle pain 7/23/2018    Arthritis 8/3/2010    Asthma     Bunion of great toe of right foot 7/23/2018    Fall at home, initial encounter 9/18/2018    Hammer toe of second toe of right foot 7/23/2018    Hypercholesterolemia 8/18/2010    Postmenopausal 8/3/2010    Prediabetes 9/12/2013    Primary snoring 11/29/2017    Shoulder pain, bilateral 2/8/2018    Sinusitis, acute 12/12/2011    Wrist pain, acute, left 9/18/2018       Past Surgical History:  Past Surgical History:   Procedure Laterality Date    HX GYN  1987    tubal pregancy; laparotomy    HX ORTHOPAEDIC  2006    neck    HX ORTHOPAEDIC  2008    cervical fusion of discs x4    HX PELVIC LAPAROSCOPY  1985    endometriosis    DC REVISE KNEE JOINT REPLACE,ALL PARTS  02/29/2012    tkr left       Family History:  Family History   Problem Relation Age of Onset    Hypertension Father     Heart Disease Father         CAD    Diabetes Father     High Cholesterol Father     Other Mother         tumor in chest    Allergic Rhinitis Brother     Asthma Brother     Allergic Rhinitis Child     Sickle Cell Trait Child     GERD Child        Social History:  Social History     Tobacco Use    Smoking status: Never Smoker    Smokeless tobacco: Never Used   Vaping Use    Vaping Use: Never used   Substance Use Topics    Alcohol use: No    Drug use: No       Allergies: Allergies   Allergen Reactions    Crestor [Rosuvastatin] Shortness of Breath    Nabumetone Shortness of Breath    Codeine Nausea Only    Gabapentin (Bulk) Shortness of Breath    Lyrica [Pregabalin] Swelling     Ankle swelling, foot pain , insomina    Percocet [Oxycodone-Acetaminophen] Other (comments)     loopy         Review of Systems   Review of Systems   Constitutional: Positive for fever. HENT: Negative for congestion. Eyes: Negative. Respiratory: Positive for cough, chest tightness and shortness of breath. Cardiovascular: Positive for chest pain. Gastrointestinal: Negative for abdominal pain. Endocrine: Negative for heat intolerance. Genitourinary: Negative for dysuria. Musculoskeletal: Positive for back pain. Skin: Negative for rash. Allergic/Immunologic: Negative for immunocompromised state. Neurological: Positive for dizziness, light-headedness and headaches. Hematological: Does not bruise/bleed easily. Psychiatric/Behavioral: Negative. All other systems reviewed and are negative. Physical Exam   Physical Exam  Vitals and nursing note reviewed. Constitutional:       General: She is not in acute distress. Appearance: She is well-developed. HENT:      Head: Normocephalic. Eyes:      Extraocular Movements: Extraocular movements intact. Cardiovascular:      Rate and Rhythm: Normal rate and regular rhythm. Heart sounds: Normal heart sounds. Pulmonary:      Effort: Pulmonary effort is normal.      Breath sounds: Normal breath sounds. Chest:      Chest wall: Tenderness present.       Comments: Somewhat reproducible chest wall tenderness  Abdominal:      General: Bowel sounds are normal.      Palpations: Abdomen is soft. Tenderness: There is no abdominal tenderness. Musculoskeletal:         General: Normal range of motion. Cervical back: Normal range of motion and neck supple. Skin:     General: Skin is warm and dry. Neurological:      General: No focal deficit present. Mental Status: She is alert and oriented to person, place, and time. Psychiatric:         Mood and Affect: Mood normal.         Behavior: Behavior normal.         Diagnostic Study Results     Labs -     Recent Results (from the past 12 hour(s))   EKG, 12 LEAD, INITIAL    Collection Time: 06/07/21  2:03 PM   Result Value Ref Range    Ventricular Rate 62 BPM    Atrial Rate 62 BPM    P-R Interval 152 ms    QRS Duration 76 ms    Q-T Interval 408 ms    QTC Calculation (Bezet) 414 ms    Calculated P Axis 64 degrees    Calculated R Axis 40 degrees    Calculated T Axis 50 degrees    Diagnosis       Normal sinus rhythm  Possible Left atrial enlargement  Septal infarct , age undetermined  When compared with ECG of 26-JAN-2014 19:51,  No significant change was found     CBC WITH AUTOMATED DIFF    Collection Time: 06/07/21  2:14 PM   Result Value Ref Range    WBC 7.9 3.6 - 11.0 K/uL    RBC 5.11 3.80 - 5.20 M/uL    HGB 14.1 11.5 - 16.0 g/dL    HCT 44.3 35.0 - 47.0 %    MCV 86.7 80.0 - 99.0 FL    MCH 27.6 26.0 - 34.0 PG    MCHC 31.8 30.0 - 36.5 g/dL    RDW 14.8 (H) 11.5 - 14.5 %    PLATELET 531 281 - 340 K/uL    MPV 9.3 8.9 - 12.9 FL    NRBC 0.0 0  WBC    ABSOLUTE NRBC 0.00 0.00 - 0.01 K/uL    NEUTROPHILS 83 (H) 32 - 75 %    LYMPHOCYTES 14 12 - 49 %    MONOCYTES 2 (L) 5 - 13 %    EOSINOPHILS 0 0 - 7 %    BASOPHILS 0 0 - 1 %    IMMATURE GRANULOCYTES 1 (H) 0.0 - 0.5 %    ABS. NEUTROPHILS 6.6 1.8 - 8.0 K/UL    ABS. LYMPHOCYTES 1.1 0.8 - 3.5 K/UL    ABS. MONOCYTES 0.2 0.0 - 1.0 K/UL    ABS. EOSINOPHILS 0.0 0.0 - 0.4 K/UL    ABS. BASOPHILS 0.0 0.0 - 0.1 K/UL    ABS. IMM. GRANS. 0.1 (H) 0.00 - 0.04 K/UL    DF AUTOMATED     METABOLIC PANEL, COMPREHENSIVE    Collection Time: 06/07/21  2:14 PM   Result Value Ref Range    Sodium 139 136 - 145 mmol/L    Potassium 4.2 3.5 - 5.1 mmol/L    Chloride 106 97 - 108 mmol/L    CO2 29 21 - 32 mmol/L    Anion gap 4 (L) 5 - 15 mmol/L    Glucose 106 (H) 65 - 100 mg/dL    BUN 23 (H) 6 - 20 MG/DL    Creatinine 0.94 0.55 - 1.02 MG/DL    BUN/Creatinine ratio 24 (H) 12 - 20      GFR est AA >60 >60 ml/min/1.73m2    GFR est non-AA >60 >60 ml/min/1.73m2    Calcium 8.8 8.5 - 10.1 MG/DL    Bilirubin, total 0.4 0.2 - 1.0 MG/DL    ALT (SGPT) 29 12 - 78 U/L    AST (SGOT) 15 15 - 37 U/L    Alk. phosphatase 55 45 - 117 U/L    Protein, total 7.1 6.4 - 8.2 g/dL    Albumin 3.5 3.5 - 5.0 g/dL    Globulin 3.6 2.0 - 4.0 g/dL    A-G Ratio 1.0 (L) 1.1 - 2.2     TROPONIN I    Collection Time: 06/07/21  2:14 PM   Result Value Ref Range    Troponin-I, Qt. <0.05 <0.05 ng/mL       Radiologic Studies -   XR CHEST PORT   Final Result   No acute findings. CT Results  (Last 48 hours)    None        CXR Results  (Last 48 hours)               06/07/21 1448  XR CHEST PORT Final result    Impression:  No acute findings. Narrative:  EXAM: XR CHEST PORT       INDICATION: chest pain       COMPARISON: None. FINDINGS: A portable AP radiograph of the chest was obtained at 14:33 hours. The   lungs are clear. The cardiac and mediastinal contours and pulmonary vascularity   are normal.  The chest wall structures and visualized upper abdomen show no   acute findings with incidental note of degenerative spine and shoulder changes   as well as actual visualization of lower cervical ACDF plate and screws and   posterior abigail and screw fixation hardware. Medical Decision Making   I am the first provider for this patient.     I reviewed the vital signs, available nursing notes, past medical history, past surgical history, family history and social history. Vital Signs-Reviewed the patient's vital signs. Patient Vitals for the past 12 hrs:   Temp Pulse Resp BP SpO2   06/07/21 1359 97.9 °F (36.6 °C) 64 16 (!) 155/73 100 %       EKG interpretation: (Preliminary)  Rhythm: normal sinus rhythm; and regular . Rate (approx.): 62; Axis: normal; WI interval: normal; QRS interval: normal ; ST/T wave: non-specific changes; Other findings: .    Records Reviewed: Nursing Notes and Old Medical Records    Provider Notes (Medical Decision Making):   Pleurisy, pulmonary embolism, costochondritis, bronchitis, CAD    ED Course:   Initial assessment performed. The patients presenting problems have been discussed, and they are in agreement with the care plan formulated and outlined with them. I have encouraged them to ask questions as they arise throughout their visit. Progress note:    Patient is feeling better. Her results were reviewed. She is advised to follow-up and return to ER if worse             Critical Care Time:   0      Disposition:  home    DISCHARGE PLAN:  1. Discharge Medication List as of 6/7/2021  5:37 PM      START taking these medications    Details   meclizine (ANTIVERT) 25 mg tablet Take 1 Tablet by mouth three (3) times daily as needed for Dizziness., Normal, Disp-20 Tablet, R-0         CONTINUE these medications which have CHANGED    Details   methocarbamoL (Robaxin-750) 750 mg tablet Take 1 Tablet by mouth four (4) times daily as needed for Muscle Spasm(s). , Normal, Disp-20 Tablet, R-0         CONTINUE these medications which have NOT CHANGED    Details   albuterol (PROVENTIL VENTOLIN) 2.5 mg /3 mL (0.083 %) nebu 3 mL by Nebulization route once for 1 dose., No Print, Disp-70 Each, R-1      doxycycline (VIBRAMYCIN) 100 mg capsule Take 1 Capsule by mouth two (2) times a day for 10 days. , Normal, Disp-20 Capsule, R-0      nortriptyline (PAMELOR) 10 mg capsule Take 1 Cap by mouth nightly., Normal, Disp-30 Cap, R-2 fluticasone furoate-vilanteroL (Breo Ellipta) 100-25 mcg/dose inhaler Take 1 Puff by inhalation daily. , Normal, Disp-1 Inhaler, R-3      albuterol (PROVENTIL HFA, VENTOLIN HFA, PROAIR HFA) 90 mcg/actuation inhaler Take 1 Puff by inhalation every four (4) hours as needed for Wheezing., No Print, Disp-1 Inhaler, R-6      ipratropium (ATROVENT) 42 mcg (0.06 %) nasal spray 2 Sprays by Both Nostrils route four (4) times daily. , Normal, Disp-15 mL, R-3      tiotropium bromide (Spiriva Respimat) 2.5 mcg/actuation inhaler Take 2 Puffs by inhalation nightly., No Print, Disp-1 Inhaler, R-6      montelukast (SINGULAIR) 10 mg tablet Take 1 Tab by mouth daily. , No Print, Disp-30 Tab, R-3      Nebulizer & Compressor machine 1 Each by Does Not Apply route four (4) times daily as needed for Cough., Normal, Disp-1 Each, R-0      azelastine (ASTEPRO) 0.15 % (205.5 mcg) 2 Sprays by Both Nostrils route daily. , Historical Med      multivitamin (HAIR,SKIN AND NAILS) tablet Take 1 Tab by mouth daily. , Historical Med      ascorbic acid, vitamin C, (VITAMIN C) 500 mg tablet Take 500 mg by mouth daily. , Historical Med      calcium-cholecalciferol, D3, (CALTRATE 600+D) tablet Take 1 Tab by mouth two (2) times a day., Print, Disp-60 Tab, R-11      GLUCOSAMINE/CHONDR BREWER A SOD (GLUCOSAMINE-CHONDROITIN) 750-600 mg tab One tab twice daily, Print, Disp-60 Tab, R-6      MULTIVITS,CA,MINERALS/IRON/FA (MULTI FOR HER PO) Take 1 Tab by mouth daily. , Historical Med         STOP taking these medications       diclofenac (VOLTAREN) 1 % gel Comments:   Reason for Stopping:         cyclobenzaprine (FLEXERIL) 10 mg tablet Comments:   Reason for Stopping:         diclofenac potassium (CATAFLAM) 50 mg tablet Comments:   Reason for Stopping:         neomycin-polymyxin-hydrocortisone, buffered, (PEDIOTIC) 3.5-10,000-1 mg/mL-unit/mL-% otic suspension Comments:   Reason for Stoppin.   Follow-up Information     Follow up With Specialties Details Why Contact Info    Inna Charlton MD Family Medicine  As needed [address]       [facility] Cardiology  As needed [address] [facility] [address]   [address]     [address] [facility] EMERGENCY DEPT Emergency Medicine  If symptoms worsen [address]  [address] [facility]  [phone]        3. Return to ED if worse     Diagnosis     Clinical Impression:   1. Acute chest pain    2. Vertigo        Attestations:    Tsering Bowie MD    Please note that this dictation was completed with [employer], the computer voice recognition software. Quite often unanticipated grammatical, syntax, homophones, and other interpretive errors are inadvertently transcribed by the computer software. Please disregard these errors. Please excuse any errors that have escaped final proofreading. Thank you.

## 2021-06-07 NOTE — PROGRESS NOTES
Chief Complaint   Patient presents with   [facility] ED Follow-up     chest tightness, shortness of breath     1. Have you been to the ER, urgent care clinic since your last visit? Hospitalized since your last visit? Yes When: 6/2/21 [facility]  Reason for visit: chest tightness, shortness of breath    2. Have you seen or consulted any other health care providers outside of the [address] [place] [address] since your last visit? Include any pap smears or colon screening. No    Call placed to pt. Verified patient with two type of identifiers. [facility] for chest tightness and shortness of breath,  covid negative,  Given prednisone , advised to follow up with pcp. Still having cough and chest tightness.

## 2021-06-07 NOTE — PATIENT INSTRUCTIONS
Chest Pain: Care Instructions Your Care Instructions There are many things that can cause chest pain. Some are not serious and will get better on their own in a few days. But some kinds of chest pain need more testing and treatment. Your doctor may have recommended a follow-up visit in the next 8 to 12 hours. If you are not getting better, you may need more tests or treatment. Even though your doctor has released you, you still need to watch for any problems. The doctor carefully checked you, but sometimes problems can develop later. If you have new symptoms or if your symptoms do not get better, get medical care right away. If you have worse or different chest pain or pressure that lasts more than 5 minutes or you passed out (lost consciousness), call 911 or seek other emergency help right away. A medical visit is only one step in your treatment. Even if you feel better, you still need to do what your doctor recommends, such as going to all suggested follow-up appointments and taking medicines exactly as directed. This will help you recover and help prevent future problems. How can you care for yourself at home? · Rest until you feel better. · Take your medicine exactly as prescribed. Call your doctor if you think you are having a problem with your medicine. · Do not drive after taking a prescription pain medicine. When should you call for help? Call 911 if:  
  · You passed out (lost consciousness).  
  · You have severe difficulty breathing.  
  · You have symptoms of a heart attack. These may include: 
? Chest pain or pressure, or a strange feeling in your chest. 
? Sweating. ? Shortness of breath. ? Nausea or vomiting. ? Pain, pressure, or a strange feeling in your back, neck, jaw, or upper belly or in one or both shoulders or arms. ? Lightheadedness or sudden weakness. ? A fast or irregular heartbeat.  
After you call 911, the  may tell you to chew 1 adult-strength or 2 to 4 low-dose aspirin. Wait for an ambulance. Do not try to drive yourself. Call your doctor today if:  
  · You have any trouble breathing.  
  · Your chest pain gets worse.  
  · You are dizzy or lightheaded, or you feel like you may faint.  
  · You are not getting better as expected.  
  · You are having new or different chest pain. Where can you learn more? Go to http://www.pattreson.com/ Enter A120 in the search box to learn more about \"Chest Pain: Care Instructions. \" Current as of: February 26, 2020               Content Version: 12.8 © 6964-4555 [employer]. Care instructions adapted under license by [employer] (which disclaims liability or warranty for this information). If you have questions about a medical condition or this instruction, always ask your healthcare professional. [address] any warranty or liability for your use of this information. Dizziness: Care Instructions Your Care Instructions Dizziness is the feeling of unsteadiness or fuzziness in your head. It is different than having vertigo, which is a feeling that the room is spinning or that you are moving or falling. It is also different from lightheadedness, which is the feeling that you are about to faint. It can be hard to know what causes dizziness. Some people feel dizzy when they have migraine headaches. Sometimes bouts of flu can make you feel dizzy. Some medical conditions, such as heart problems or high blood pressure, can make you feel dizzy. Many medicines can cause dizziness, including medicines for high blood pressure, pain, or anxiety. If a medicine causes your symptoms, your doctor may recommend that you stop or change the medicine. If it is a problem with your heart, you may need medicine to help your heart work better.  If there is no clear reason for your symptoms, your doctor may suggest watching and waiting for a while to see if the dizziness goes away on its own. Follow-up care is a key part of your treatment and safety. Be sure to make and go to all appointments, and call your doctor if you are having problems. It's also a good idea to know your test results and keep a list of the medicines you take. How can you care for yourself at home? · If your doctor recommends or prescribes medicine, take it exactly as directed. Call your doctor if you think you are having a problem with your medicine. · Do not drive while you feel dizzy. · Try to prevent falls. Steps you can take include: ? Using nonskid mats, adding grab bars near the tub, and using night-lights. ? Clearing your home so that walkways are free of anything you might trip on. 
? Letting family and friends know that you have been feeling dizzy. This will help them know how to help you. When should you call for help? Call 911 anytime you think you may need emergency care. For example, call if: 
  · You passed out (lost consciousness).  
  · You have dizziness along with symptoms of a heart attack. These may include: 
? Chest pain or pressure, or a strange feeling in the chest. 
? Sweating. ? Shortness of breath. ? Nausea or vomiting. ? Pain, pressure, or a strange feeling in the back, neck, jaw, or upper belly or in one or both shoulders or arms. ? Lightheadedness or sudden weakness. ? A fast or irregular heartbeat.  
  · You have symptoms of a stroke. These may include: 
? Sudden numbness, tingling, weakness, or loss of movement in your face, arm, or leg, especially on only one side of your body. ? Sudden vision changes. ? Sudden trouble speaking. ? Sudden confusion or trouble understanding simple statements. ? Sudden problems with walking or balance. ? A sudden, severe headache that is different from past headaches.   
Call your doctor now or seek immediate medical care if: 
  · You feel dizzy and have a fever, headache, or ringing in your ears.  
  · You have new or increased nausea and vomiting.  
  · Your dizziness does not go away or comes back. Watch closely for changes in your health, and be sure to contact your doctor if: 
  · You do not get better as expected. Where can you learn more? Go to http://www.gray.com/ Enter C917 in the search box to learn more about \"Dizziness: Care Instructions. \" Current as of: February 26, 2020               Content Version: 12.8 © 2006-2021 [employer]. Care instructions adapted under license by [employer] (which disclaims liability or warranty for this information). If you have questions about a medical condition or this instruction, always ask your healthcare professional. [place] [address] any warranty or liability for your use of this information.

## 2021-06-11 ENCOUNTER — OFFICE VISIT (OUTPATIENT)
Dept: FAMILY MEDICINE CLINIC | Age: 61
End: 2021-06-11
Payer: COMMERCIAL

## 2021-06-11 VITALS
BODY MASS INDEX: 31.28 KG/M2 | RESPIRATION RATE: 16 BRPM | WEIGHT: 170 LBS | DIASTOLIC BLOOD PRESSURE: 84 MMHG | HEIGHT: 62 IN | HEART RATE: 70 BPM | SYSTOLIC BLOOD PRESSURE: 122 MMHG | OXYGEN SATURATION: 98 % | TEMPERATURE: 98.7 F

## 2021-06-11 DIAGNOSIS — Z02.89 ENCOUNTER TO OBTAIN EXCUSE FROM WORK: ICD-10-CM

## 2021-06-11 DIAGNOSIS — R07.9 CHEST PAIN AT REST: Primary | ICD-10-CM

## 2021-06-11 DIAGNOSIS — Z71.89 ADVICE GIVEN ABOUT COVID-19 VIRUS INFECTION: ICD-10-CM

## 2021-06-11 PROCEDURE — 99213 OFFICE O/P EST LOW 20 MIN: CPT | Performed by: FAMILY MEDICINE

## 2021-06-11 NOTE — LETTER
NOTIFICATION RETURN TO WORK / SCHOOL 
 
6/11/2021 1:01 PM 
 
[address] [facility] [id number] To Whom It May Concern: 
 
Theresa Brown is currently under the care of [address] [facility]. She will return to work/school on: 6/14/2021 in a stable condition. If there are questions or concerns please have the patient contact our office.  
 
 
 
Sincerely, 
 
 
Iona Dance, MD

## 2021-06-11 NOTE — PROGRESS NOTES
HISTORY OF PRESENT ILLNESS  Librado Davis is a 57 y.o. female. Went o ER for CP and dizziness, with nl work up, today present with rt shoulder pain, patient also states that secondary to most of the recent problems her  Last day of work was [employer], is supposed to go back to work on 6/14/2021,  Need a letter that she is cleared to go back to  Work, patient currently denies any complaint that prevent her from working pain is 1 out of 10 nonradiating overall getting better patient states that she needed to rest, otherwise patient has not seen a heart doctor for the most recent chest pain for which patient was sent to emergency room denies any chest pain at this time,   NEUTROPHILS 83   BUN/Creatinine ratio 24     D-dimer 0.79   IMPRESSION CTA of the chest   No pulmonary embolus or other acute cardiopulmonary process. IMPRESSION of the head  No acute intracranial process. Current Outpatient Medications   Medication Sig Dispense Refill    doxycycline (VIBRAMYCIN) 100 mg capsule Take 1 Capsule by mouth two (2) times a day for 10 days. 20 Capsule 0    meclizine (ANTIVERT) 25 mg tablet Take 1 Tablet by mouth three (3) times daily as needed for Dizziness. 20 Tablet 0    albuterol (PROVENTIL HFA, VENTOLIN HFA, PROAIR HFA) 90 mcg/actuation inhaler Take 1 Puff by inhalation every four (4) hours as needed for Wheezing. 1 Inhaler 6    tiotropium bromide (Spiriva Respimat) 2.5 mcg/actuation inhaler Take 2 Puffs by inhalation nightly. 1 Inhaler 6    montelukast (SINGULAIR) 10 mg tablet Take 1 Tab by mouth daily. 30 Tab 3    Nebulizer & Compressor machine 1 Each by Does Not Apply route four (4) times daily as needed for Cough. 1 Each 0    azelastine (ASTEPRO) 0.15 % (205.5 mcg) 2 Sprays by Both Nostrils route daily.  multivitamin (HAIR,SKIN AND NAILS) tablet Take 1 Tab by mouth daily.  ascorbic acid, vitamin C, (VITAMIN C) 500 mg tablet Take 500 mg by mouth daily.       MULTIVITS,CA,MINERALS/IRON/FA (MULTI FOR HER PO) Take 1 Tab by mouth daily.  albuterol (PROVENTIL VENTOLIN) 2.5 mg /3 mL (0.083 %) nebu 3 mL by Nebulization route once for 1 dose. 70 Each 1    methocarbamoL (Robaxin-750) 750 mg tablet Take 1 Tablet by mouth four (4) times daily as needed for Muscle Spasm(s). (Patient not taking: Reported on 6/11/2021) 20 Tablet 0    nortriptyline (PAMELOR) 10 mg capsule Take 1 Cap by mouth nightly. (Patient not taking: Reported on 6/11/2021) 30 Cap 2    fluticasone furoate-vilanteroL (Breo Ellipta) 100-25 mcg/dose inhaler Take 1 Puff by inhalation daily. (Patient not taking: Reported on 6/11/2021) 1 Inhaler 3    ipratropium (ATROVENT) 42 mcg (0.06 %) nasal spray 2 Sprays by Both Nostrils route four (4) times daily. (Patient not taking: Reported on 6/11/2021) 15 mL 3    calcium-cholecalciferol, D3, (CALTRATE 600+D) tablet Take 1 Tab by mouth two (2) times a day.  (Patient not taking: Reported on 6/11/2021) 60 Tab 11    GLUCOSAMINE/CHONDR BREWER A SOD (GLUCOSAMINE-CHONDROITIN) 750-600 mg tab One tab twice daily (Patient not taking: Reported on 6/11/2021) 60 Tab 6     Allergies   Allergen Reactions    Crestor [Rosuvastatin] Shortness of Breath    Nabumetone Shortness of Breath    Codeine Nausea Only    Gabapentin (Bulk) Shortness of Breath    Lyrica [Pregabalin] Swelling     Ankle swelling, foot pain , insomina    Percocet [Oxycodone-Acetaminophen] Other (comments)     loopy     Past Medical History:   Diagnosis Date    Acute right ankle pain 7/23/2018    Arthritis 8/3/2010    Asthma     Bunion of great toe of right foot 7/23/2018    Fall at home, initial encounter 9/18/2018    Hammer toe of second toe of right foot 7/23/2018    Hypercholesterolemia 8/18/2010    Postmenopausal 8/3/2010    Prediabetes 9/12/2013    Primary snoring 11/29/2017    Shoulder pain, bilateral 2/8/2018    Sinusitis, acute 12/12/2011    Wrist pain, acute, left 9/18/2018     Past Surgical History:   Procedure Laterality Date    HX GYN  1987    tubal pregancy; laparotomy    HX ORTHOPAEDIC  2006    neck    HX ORTHOPAEDIC  2008    cervical fusion of discs x4    HX PELVIC LAPAROSCOPY  1985    endometriosis    [place] REVISE KNEE JOINT REPLACE,ALL PARTS  02/29/2012    tkr left     Family History   Problem Relation Age of Onset    Hypertension Father     Heart Disease Father         CAD    Diabetes Father     High Cholesterol Father     Other Mother         tumor in chest    Allergic Rhinitis Brother     Asthma Brother     Allergic Rhinitis Child     Sickle Cell Trait Child     GERD Child      Social History     Tobacco Use    Smoking status: Never Smoker    Smokeless tobacco: Never Used   Substance Use Topics    Alcohol use: No      Lab Results   Component Value Date/Time    WBC 7.9 06/07/2021 02:14 PM    HGB 14.1 06/07/2021 02:14 PM    HCT 44.3 06/07/2021 02:14 PM    PLATELET 385 25/09/8524 02:14 PM    MCV 86.7 06/07/2021 02:14 PM     Lab Results   Component Value Date/Time    Hemoglobin A1c 5.6 07/30/2020 10:00 AM    Hemoglobin A1c 6.1 (H) 08/26/2013 08:47 AM    Glucose 106 (H) 06/07/2021 02:14 PM    Microalb/Creat ratio (ug/mg creat.) 6.2 02/08/2018 11:55 AM    LDL, calculated 155 (H) 07/30/2020 10:00 AM    Creatinine 0.94 06/07/2021 02:14 PM         Review of Systems   Constitutional: Negative for chills, fever and malaise/fatigue. HENT: Negative for nosebleeds. Eyes: Negative for pain. Respiratory: Negative for cough and wheezing. Cardiovascular: Negative for chest pain and leg swelling. Gastrointestinal: Negative for constipation, diarrhea and nausea. Genitourinary: Negative for frequency. Musculoskeletal: Negative for joint pain and myalgias. Skin: Negative for rash. Neurological: Negative for loss of consciousness and headaches. Endo/Heme/Allergies: Does not bruise/bleed easily. Psychiatric/Behavioral: Negative for depression.  The patient is not nervous/anxious and does not have insomnia. All other systems reviewed and are negative. Physical Exam  Vitals and nursing note reviewed. Constitutional:       Appearance: She is well-developed. HENT:      Head: Normocephalic and atraumatic. Eyes:      Conjunctiva/sclera: Conjunctivae normal.      Pupils: Pupils are equal, round, and reactive to light. Neck:      Thyroid: No thyromegaly. Vascular: No JVD. Cardiovascular:      Rate and Rhythm: Normal rate and regular rhythm. Heart sounds: Normal heart sounds. No murmur heard. No friction rub. No gallop. Pulmonary:      Effort: Pulmonary effort is normal. No respiratory distress. Breath sounds: Normal breath sounds. No stridor. No wheezing or rales. Abdominal:      General: Bowel sounds are normal. There is no distension. Palpations: Abdomen is soft. There is no mass. Tenderness: There is no abdominal tenderness. Musculoskeletal:         General: No tenderness. Normal range of motion. Lymphadenopathy:      Cervical: No cervical adenopathy. Skin:     Findings: No erythema or rash. Neurological:      Mental Status: She is alert and oriented to person, place, and time. Cranial Nerves: No cranial nerve deficit. Deep Tendon Reflexes: Reflexes are normal and symmetric. Psychiatric:         Behavior: Behavior normal.         ASSESSMENT and PLAN  Diagnoses and all orders for this visit:    1. Chest pain at rest  -     REFERRAL TO CARDIOLOGY    2. Encounter to obtain excuse from work    3. Advice given about COVID-19 virus infection      kft not at target goal, the appropriate diet discussed. Avoid nephrotoxin meds, low salt diet,  lifelong compliance to reduce risk is stressed. A regular exercise program,  maintain normal body weight, fitness,  to avoid fast food Advised, recheck for KFT and electrolytes in 3 months rtc fasting, meds side effect and compliance advised.        At this time patient was told to lose weight, so that the current body mass index would get into a close to 25 or between 20-25, patient was told that the patient can achieve this by starting an active life style modifications, working on the diet, increase physical activity, limit alcohol consumption, stop secondhand tobacco exposure,    for which includes creating a an interesting delightful to do list,  such as start of a light physical activity with a brisk daily walking 30 minutes most days of the week, most likely to total of 150 minutes per week, then the patient was told to try to avoid fatty fast foods, have a low-fat low-cholesterol diet, include seafood such as adding fatty fish such as Thedora [place], Mackerel, Channing to the diet, increase vegetables and fruits, nuts 3-4 times per week and finally have a low-salt and K rich food intake for a good 4-6 months possibly for ever for the best outcome, patient was told that either a DASH diet or Mediterranean diet but satisfies the need     Routine labs ordered, and the needed abnormal labs will be discussed soon and they can be repeated in 3-6 months. In addition relevant handouts were given to the patient for a better understanding,    patient was told to call if any problems. Patient acknowledged understanding and  agreed with today's recommendations.

## 2021-06-11 NOTE — PROGRESS NOTES
1. Have you been to the ER, urgent care clinic since your last visit? [facility] since your last visit? [id number] and vertigo    2. Have you seen or consulted any other health care providers outside of the [address] [place] [address] Gianluca since your last visit? Include any pap smears or colon screening. No     Chief Complaint   Patient presents with    Shoulder Pain     R should pain. Stated yesterday was very painful and had limited ROM. [facility] Follow Up     Was in the ED on 06/07/2021 for chest pain and vertigo.

## 2021-06-11 NOTE — PATIENT INSTRUCTIONS
Electrolyte Supplement (By mouth) Treats and prevents dehydration. Replaces water, salts, and minerals lost through diarrhea or vomiting. Brand Name(s): CeraLyte 50, CeraLyte 70, CeraLyte 90, CeraORS 75, CeraSport, CeraSport EX1, CeraSport Electrolyte Drink Mix, CeraSport Electrolyte Hydration Drink Mix, Drip Drop Hydration Powder, Enfamil 5% Glucose in Water, Enfamil Enfalyte, Clorox Coupang, [facility] Pediatric Electrolyte There may be other brand names for this medicine. When This Medicine Should Not Be Used: You should not use this medicine if you have had an allergic reaction to potassium citrate, sodium chloride (salt), or sodium citrate. Do not give this medicine to a child who has had an allergic reaction to potassium citrate, sodium chloride (salt), or sodium citrate. How to Use This Medicine:  
Liquid, Tablet, Packet · Your doctor will tell you how much medicine to use. Do not use more than directed. · Follow the instructions on the medicine label if you are using this medicine without a prescription. · Some liquid brands of this medicine should not be used more than 48 hours (2 days) after the medicine was opened. Follow the instructions on the medicine label, and throw out medicine that has been open for more than 48 hours. · The liquid medicine that comes in a plastic sleeve can be frozen and eaten as a popsicle. You can also pour the medicine from the sleeve into a cup or glass to drink. Throw out any of the medicine that you do not use right away. · Do not add additional water or juice to the liquid brands of this medicine. Adding more liquids can cause the medicine not to work as well. · Do not heat the liquid medicine. · To use the powder medicine, follow the directions on the packet. Mix the powder with the correct amount of water in a cup or glass, and stir until all the powder is dissolved.  Refrigerate any unused medicine in a closed container. Throw out any unused medicine after 24 hours (1 day). How to Store and Dispose of This Medicine: · Store the medicine in a closed container at room temperature, away from heat, moisture, and direct light. · Dispose of outdated medicine or medicine no longer needed. Throw out any liquid medicine that has been open for longer than 48 hours (2 days). Throw out any of the powder medicine that has been open for longer than 24 hours (1 days). · After using the liquid medicine from the reclosable bottle, put the cap back on the bottle, and store any unused portion in the refrigerator. Drugs and Foods to Avoid: Ask your doctor or pharmacist before using any other medicine, including over-the-counter medicines, vitamins, and herbal products. Warnings While Using This Medicine: · Make sure your doctor knows if you are pregnant or breastfeeding, or if you or your child has kidney failure. · If your or your child's diarrhea or vomiting continues or gets worse, or if you or your child has sunken eyes, extreme thirst, or severe drowsiness, call your doctor or healthcare provider right away. Possible Side Effects While Using This Medicine:  
Call your doctor right away if you notice any of these side effects: · Allergic reaction: Itching or hives, swelling in your face or hands, swelling or tingling in your mouth or throat, chest tightness, trouble breathing · Diarrhea lasting more than 24 hours. If you notice other side effects that you think are caused by this medicine, tell your doctor. Call your doctor for medical advice about side effects. You may report side effects to FDA at 5-036-FDA-4443 © 2017 [address] Information is for End User's use only and may not be sold, redistributed or otherwise used for commercial purposes. The above information is an  only. It is not intended as medical advice for individual conditions or treatments.  Talk to your doctor, nurse or pharmacist before following any medical regimen to see if it is safe and effective for you.

## 2021-06-22 ENCOUNTER — VIRTUAL VISIT (OUTPATIENT)
Dept: FAMILY MEDICINE CLINIC | Age: 61
End: 2021-06-22
Payer: COMMERCIAL

## 2021-06-22 DIAGNOSIS — Z02.89 ENCOUNTER FOR COMPLETION OF FORM WITH PATIENT: ICD-10-CM

## 2021-06-22 DIAGNOSIS — Z02.89 ENCOUNTER TO OBTAIN EXCUSE FROM WORK: ICD-10-CM

## 2021-06-22 DIAGNOSIS — Z20.822 SUSPECTED COVID-19 VIRUS INFECTION: Primary | ICD-10-CM

## 2021-06-22 PROCEDURE — 99213 OFFICE O/P EST LOW 20 MIN: CPT | Performed by: FAMILY MEDICINE

## 2021-06-22 NOTE — PROGRESS NOTES
Chief Complaint   Patient presents with    Documentation     1. Have you been to the ER, urgent care clinic since your last visit? Hospitalized since your last visit? No    2. Have you seen or consulted any other health care providers outside of the [address] [place] [address] since your last visit? Include any pap smears or colon screening. No    Call placed to pt. Verified patient with two type of identifiers. requesting la documentation completed.

## 2021-06-22 NOTE — PROGRESS NOTES
HISTORY OF PRESENT ILLNESS  Marcela Allen is a 57 y.o. female. Patient presents stating that given letter for work excuse was no secretion patient need to complete a family leave of absence for 12 days duration while she was a COVID-19 suspect with chest pain and shortness of breath she has not seen the cardiologist yet otherwise she was stable enough to go back to work on June 14 with negative test and normal work-up patient today denies any other complaints she has a form with multiple pages of multiple questionnaire is to be answered accordingly  Current Outpatient Medications   Medication Sig Dispense Refill    meclizine (ANTIVERT) 25 mg tablet Take 1 Tablet by mouth three (3) times daily as needed for Dizziness. 20 Tablet 0    albuterol (PROVENTIL HFA, VENTOLIN HFA, PROAIR HFA) 90 mcg/actuation inhaler Take 1 Puff by inhalation every four (4) hours as needed for Wheezing. 1 Inhaler 6    tiotropium bromide (Spiriva Respimat) 2.5 mcg/actuation inhaler Take 2 Puffs by inhalation nightly. 1 Inhaler 6    montelukast (SINGULAIR) 10 mg tablet Take 1 Tab by mouth daily. 30 Tab 3    Nebulizer & Compressor machine 1 Each by Does Not Apply route four (4) times daily as needed for Cough. 1 Each 0    azelastine (ASTEPRO) 0.15 % (205.5 mcg) 2 Sprays by Both Nostrils route daily.  multivitamin (HAIR,SKIN AND NAILS) tablet Take 1 Tab by mouth daily.  ascorbic acid, vitamin C, (VITAMIN C) 500 mg tablet Take 500 mg by mouth daily.  MULTIVITS,CA,MINERALS/IRON/FA (MULTI FOR HER PO) Take 1 Tab by mouth daily.  albuterol (PROVENTIL VENTOLIN) 2.5 mg /3 mL (0.083 %) nebu 3 mL by Nebulization route once for 1 dose. 70 Each 1    nortriptyline (PAMELOR) 10 mg capsule Take 1 Cap by mouth nightly. (Patient not taking: Reported on 6/11/2021) 30 Cap 2    fluticasone furoate-vilanteroL (Breo Ellipta) 100-25 mcg/dose inhaler Take 1 Puff by inhalation daily.  (Patient not taking: Reported on 6/11/2021) 1 Inhaler 3    ipratropium (ATROVENT) 42 mcg (0.06 %) nasal spray 2 Sprays by Both Nostrils route four (4) times daily. (Patient not taking: Reported on 6/11/2021) 15 mL 3    calcium-cholecalciferol, D3, (CALTRATE 600+D) tablet Take 1 Tab by mouth two (2) times a day.  (Patient not taking: Reported on 6/11/2021) 60 Tab 11     Allergies   Allergen Reactions    Crestor [Rosuvastatin] Shortness of Breath    Nabumetone Shortness of Breath    Codeine Nausea Only    Gabapentin (Bulk) Shortness of Breath    Lyrica [Pregabalin] Swelling     Ankle swelling, foot pain , insomina    Percocet [Oxycodone-Acetaminophen] Other (comments)     loopy     Past Medical History:   Diagnosis Date    Acute right ankle pain 7/23/2018    Arthritis 8/3/2010    Asthma     Bunion of great toe of right foot 7/23/2018    Fall at home, initial encounter 9/18/2018    Hammer toe of second toe of right foot 7/23/2018    Hypercholesterolemia 8/18/2010    Postmenopausal 8/3/2010    Prediabetes 9/12/2013    Primary snoring 11/29/2017    Shoulder pain, bilateral 2/8/2018    Sinusitis, acute 12/12/2011    Wrist pain, acute, left 9/18/2018     Past Surgical History:   Procedure Laterality Date    HX GYN  1987    tubal pregancy; laparotomy    HX ORTHOPAEDIC  2006    neck    HX ORTHOPAEDIC  2008    cervical fusion of discs x4    HX PELVIC LAPAROSCOPY  1985    endometriosis    [place] REVISE KNEE JOINT REPLACE,ALL PARTS  02/29/2012    tkr left     Family History   Problem Relation Age of Onset    Hypertension Father     Heart Disease Father         CAD    Diabetes Father     High Cholesterol Father     Other Mother         tumor in chest    Allergic Rhinitis Brother     Asthma Brother     Allergic Rhinitis Child     Sickle Cell Trait Child     GERD Child      Social History     Tobacco Use    Smoking status: Never Smoker    Smokeless tobacco: Never Used   Substance Use Topics    Alcohol use: No      Lab Results   Component Value Date/Time WBC 7.9 06/07/2021 02:14 PM    HGB 14.1 06/07/2021 02:14 PM    HCT 44.3 06/07/2021 02:14 PM    PLATELET 305 65/69/2591 02:14 PM    MCV 86.7 06/07/2021 02:14 PM     Lab Results   Component Value Date/Time    Hemoglobin A1c 5.6 07/30/2020 10:00 AM    Hemoglobin A1c 6.1 (H) 08/26/2013 08:47 AM    Glucose 106 (H) 06/07/2021 02:14 PM    Microalb/Creat ratio (ug/mg creat.) 6.2 02/08/2018 11:55 AM    LDL, calculated 155 (H) 07/30/2020 10:00 AM    Creatinine 0.94 06/07/2021 02:14 PM         Review of Systems   Constitutional: Negative for chills, fever and malaise/fatigue. HENT: Negative for nosebleeds. Eyes: Negative for pain. Respiratory: Negative for cough and wheezing. Cardiovascular: Negative for chest pain and leg swelling. Gastrointestinal: Negative for constipation, diarrhea and nausea. Genitourinary: Negative for frequency. Musculoskeletal: Negative for joint pain and myalgias. Skin: Negative for rash. Neurological: Negative for loss of consciousness and headaches. Endo/Heme/Allergies: Does not bruise/bleed easily. Psychiatric/Behavioral: Negative for depression. The patient is not nervous/anxious and does not have insomnia. All other systems reviewed and are negative. Physical Exam  Constitutional:       Appearance: She is obese. She is not ill-appearing or toxic-appearing. HENT:      Head: Normocephalic and atraumatic. Mouth/Throat:      Mouth: Mucous membranes are moist.   Neurological:      Mental Status: She is alert and oriented to person, place, and time. Psychiatric:         Mood and Affect: Mood normal.         Behavior: Behavior normal.         Thought Content: Thought content normal.         Judgment: Judgment normal.         ASSESSMENT and PLAN  Diagnoses and all orders for this visit:    1. Suspected COVID-19 virus infection    2. Encounter to obtain excuse from work    3.  Encounter for completion of form with patient           Secondary to the patient chronic medical conditions,   form was completed, w/ with multiple questions answered to the best knowledge will keep a copy in the chart for further correspondence patient was given signed completed patient was informed about the safety and  regulations regarding this condition patient was also advised to follow-up with HR for further guidelines patient acknowledged understanding and agreed with today's recommendations  Patient advised to have the mask on most of the time, social distance and handwashing avoid crowded area pursuant to the emergency declaration under the [employer] and [facility], 1135 waiver authority and the [employer] and [employer] Act, this Virtual Visit was conducted, with patient's consent, to reduce the patient's risk of exposure to COVID-19 and provide continuity of care for an established patient  Services were provided through a Video synchronous discussion virtually to substitute for in-person appointment.

## 2021-08-04 ENCOUNTER — OFFICE VISIT (OUTPATIENT)
Dept: FAMILY MEDICINE CLINIC | Age: 61
End: 2021-08-04
Payer: COMMERCIAL

## 2021-08-04 VITALS
BODY MASS INDEX: 29.85 KG/M2 | OXYGEN SATURATION: 100 % | DIASTOLIC BLOOD PRESSURE: 74 MMHG | HEIGHT: 62 IN | SYSTOLIC BLOOD PRESSURE: 109 MMHG | RESPIRATION RATE: 16 BRPM | WEIGHT: 162.2 LBS | HEART RATE: 67 BPM

## 2021-08-04 DIAGNOSIS — Z71.89 ADVICE GIVEN ABOUT COVID-19 VIRUS INFECTION: ICD-10-CM

## 2021-08-04 DIAGNOSIS — M21.612 BILATERAL BUNIONS: Primary | ICD-10-CM

## 2021-08-04 DIAGNOSIS — M20.41 HAMMER TOES, BILATERAL: ICD-10-CM

## 2021-08-04 DIAGNOSIS — M79.671 PAIN IN BOTH FEET: ICD-10-CM

## 2021-08-04 DIAGNOSIS — M20.42 HAMMER TOES, BILATERAL: ICD-10-CM

## 2021-08-04 DIAGNOSIS — M21.611 BILATERAL BUNIONS: Primary | ICD-10-CM

## 2021-08-04 DIAGNOSIS — M79.672 PAIN IN BOTH FEET: ICD-10-CM

## 2021-08-04 DIAGNOSIS — Z02.89 ENCOUNTER TO OBTAIN EXCUSE FROM WORK: ICD-10-CM

## 2021-08-04 PROCEDURE — 99213 OFFICE O/P EST LOW 20 MIN: CPT | Performed by: FAMILY MEDICINE

## 2021-08-04 RX ORDER — GLUCOSAMINE SULFATE 1500 MG
POWDER IN PACKET (EA) ORAL DAILY
COMMUNITY

## 2021-08-04 RX ORDER — LORATADINE 10 MG/1
10 TABLET ORAL
COMMUNITY

## 2021-08-04 NOTE — LETTER
NOTIFICATION RETURN TO WORK / SCHOOL          8/4/2021 12:12 PM    Ms. [address] [facility] [id number]      To Whom It May Concern:    Vahid Coleman is currently under the care of [address] [facility]. She will be required to wear Orthopedic Boot on the left leg at work for the next 4 weeks starting from     8/6/2021 to 9/06/2021. If there are questions or concerns please have the patient contact our office.         Sincerely,      Ivory Atkinson MD

## 2021-08-04 NOTE — PATIENT INSTRUCTIONS
Hammer Toe: Care Instructions  Your Care Instructions  A hammer toe is a toe that bends up at the middle joint, while the end of the toe points down. The problem usually happens to the second toe. A hammer toe can hurt a lot, especially as the toe rubs against your shoe when you walk. Shoes that are too tight can cause hammer toes. If a shoe forces a toe to stay bent for a long time, the muscles in your toe get tight and the tendons that connect the muscles to the bone get shorter. Over time, the muscles cannot straighten your toe. Sometimes, diseases such as rheumatoid arthritis also can cause hammer toes. Early treatment can help your toe straighten before it gets badly bent. You can wear roomy shoes and use pads to keep the toe from rubbing against your shoes. If your toe is badly bent, you may need surgery to straighten it. Follow-up care is a key part of your treatment and safety. Be sure to make and go to all appointments, and call your doctor if you are having problems. It's also a good idea to know your test results and keep a list of the medicines you take. How can you care for yourself at home? · Wear shoes that have lots of room in the toes. Do not wear narrow, high-heeled shoes. · Follow your doctor's directions for wearing a splint on your toe, if you are given one. · Gently stretch your toe with your fingers. · Use toe pads or corn cushions to keep the toe from rubbing against your shoes. This may keep a corn from forming on the top of the toe. · Wear a shoe insert, or orthotic, to cushion the bottom of the bent toe. When should you call for help? Watch closely for changes in your health, and be sure to contact your doctor if:    · Your pain gets worse.     · Your toe still bothers you even after you wear proper shoes and pads or cushions.     · You want to know more about surgery to straighten your toe. Where can you learn more?   Go to http://www.gray.com/  Enter Q483 in the search box to learn more about \"Hammer Toe: Care Instructions. \"  Current as of: November 16, 2020               Content Version: 12.8  © 2006-2021 Healthwise, [facility]. Care instructions adapted under license by [employer] (which disclaims liability or warranty for this information). If you have questions about a medical condition or this instruction, always ask your healthcare professional. [address] any warranty or liability for your use of this information. Bunions: Care Instructions  Your Care Instructions     A bunion is a bump on the outside of the joint at the bottom of your big toe. It can cause pain and swelling in the toe. A bunion forms when bone or tissue around the joint becomes swollen from too much pressure. You also can have a bunionette, or tailor's bunion, which forms on the joint of the little toe. Sometimes, a bunion on the big toe turns the toe in toward the second toe. This is called displacement. It can lead to problems with the other toes. You can get a bunion from having an unusual walking style, having flatfeet, or wearing tight-fitting shoes. You can treat most bunions at home with a few simple steps. If you have a lot of pain, your doctor may inject medicine into the bunion to reduce swelling for a while. If you still have pain, you may need to have surgery. Follow-up care is a key part of your treatment and safety. Be sure to make and go to all appointments, and call your doctor if you are having problems. It's also a good idea to know your test results and keep a list of the medicines you take. How can you care for yourself at home? · Ask your doctor if you can take an over-the-counter pain medicine, such as acetaminophen (Tylenol), ibuprofen (Advil, Motrin), or naproxen (Aleve). Be safe with medicines. Read and follow all instructions on the label.   · Wear shoes that have a wide and deep space for the toes. Also, wear shoes that have low or flat heels and good arch supports. Do not wear tight, narrow, or high-heeled shoes. · Try bunion pads, arch supports, toe spacers, or shoe inserts. They can help shift your weight when you walk to take pressure off your big toe. · Put moleskin or another type of cushion on or around the bunion to keep it from rubbing against your shoe. · Put ice or a cold pack on the area for 10 to 20 minutes at a time as needed. Put a thin cloth between the ice and your skin. · Prop up your foot on a pillow when you ice your toe or anytime you sit or lie down. Try to keep it above the level of your heart. This will help reduce swelling. When should you call for help? Call your doctor now or seek immediate medical care if:    · You have severe pain.     · Your toe is cool or pale or changes color.     · You have tingling, weakness, or numbness in the toe. Watch closely for changes in your health, and be sure to contact your doctor if:    · Pain and swelling get worse.     · You do not get better as expected. Where can you learn more? Go to http://www.gray.com/  Enter H210 in the search box to learn more about \"Bunions: Care Instructions. \"  Current as of: November 16, 2020               Content Version: 12.8  © 2561-6843 Healthwise[employer]. Care instructions adapted under license by [employer] (which disclaims liability or warranty for this information). If you have questions about a medical condition or this instruction, always ask your healthcare professional. [place] [address] any warranty or liability for your use of this information.

## 2021-08-04 NOTE — PROGRESS NOTES
Chief Complaint   Patient presents with    Ankle swelling     left     1. Have you been to the ER, urgent care clinic since your last visit? Hospitalized since your last visit? No    2. Have you seen or consulted any other health care providers outside of the [address] [place] [address] since your last visit? Include any pap smears or colon screening. No    Verified patient with two type of identifiers.    left ankle swelling since Sunday,  some bruising, unsure of injury, unable to bear weight,, taking tylenol as needed for pain

## 2021-08-04 NOTE — PROGRESS NOTES
HISTORY OF PRESENT ILLNESS  Summer Jauregui is a 57 y.o. female. B/l foot pain  Has been bothering the patient for few months, Has no history of being flat-footed patient does have history of long periods of standing and walking on hard concrete floor, currently has no calluses or plantar warts, unfortunately patient has abnormal BMI the pain is worsened with walking more than 1-2 blocks, and going up and down the steps and worsening since onset. Patient states that it is a dull nonradiating no numbness ++++ tingling sensation disabling, morning stiffness which gets better with activity and with the severity of 8/10. So for patient denies any history of extremity trauma, and has no leg swelling no skin lesion noted. Current Outpatient Medications   Medication Sig Dispense Refill    loratadine (Claritin) 10 mg tablet Take 10 mg by mouth.  cholecalciferol (Vitamin D3) 25 mcg (1,000 unit) cap Take  by mouth daily.  albuterol (PROVENTIL HFA, VENTOLIN HFA, PROAIR HFA) 90 mcg/actuation inhaler Take 1 Puff by inhalation every four (4) hours as needed for Wheezing. 1 Inhaler 6    tiotropium bromide (Spiriva Respimat) 2.5 mcg/actuation inhaler Take 2 Puffs by inhalation nightly. 1 Inhaler 6    Nebulizer & Compressor machine 1 Each by Does Not Apply route four (4) times daily as needed for Cough. 1 Each 0    multivitamin (HAIR,SKIN AND NAILS) tablet Take 1 Tab by mouth daily.  ascorbic acid, vitamin C, (VITAMIN C) 500 mg tablet Take 500 mg by mouth daily.  MULTIVITS,CA,MINERALS/IRON/FA (MULTI FOR HER PO) Take 1 Tab by mouth daily.  albuterol (PROVENTIL VENTOLIN) 2.5 mg /3 mL (0.083 %) nebu 3 mL by Nebulization route once for 1 dose. 70 Each 1    meclizine (ANTIVERT) 25 mg tablet Take 1 Tablet by mouth three (3) times daily as needed for Dizziness. (Patient not taking: Reported on 8/4/2021) 20 Tablet 0    nortriptyline (PAMELOR) 10 mg capsule Take 1 Cap by mouth nightly.  (Patient not taking: Reported on 6/11/2021) 30 Cap 2    fluticasone furoate-vilanteroL (Breo Ellipta) 100-25 mcg/dose inhaler Take 1 Puff by inhalation daily. (Patient not taking: Reported on 6/11/2021) 1 Inhaler 3    ipratropium (ATROVENT) 42 mcg (0.06 %) nasal spray 2 Sprays by Both Nostrils route four (4) times daily. (Patient not taking: Reported on 6/11/2021) 15 mL 3    montelukast (SINGULAIR) 10 mg tablet Take 1 Tab by mouth daily. (Patient not taking: Reported on 8/4/2021) 30 Tab 3    azelastine (ASTEPRO) 0.15 % (205.5 mcg) 2 Sprays by Both Nostrils route daily. As needed      calcium-cholecalciferol, D3, (CALTRATE 600+D) tablet Take 1 Tab by mouth two (2) times a day.  (Patient not taking: Reported on 6/11/2021) 60 Tab 11     Allergies   Allergen Reactions    Crestor [Rosuvastatin] Shortness of Breath    Nabumetone Shortness of Breath    Codeine Nausea Only    Gabapentin (Bulk) Shortness of Breath    Lyrica [Pregabalin] Swelling     Ankle swelling, foot pain , insomina    Percocet [Oxycodone-Acetaminophen] Other (comments)     loopy     Past Medical History:   Diagnosis Date    Acute right ankle pain 7/23/2018    Arthritis 8/3/2010    Asthma     Bunion of great toe of right foot 7/23/2018    Fall at home, initial encounter 9/18/2018    Hammer toe of second toe of right foot 7/23/2018    Hypercholesterolemia 8/18/2010    Postmenopausal 8/3/2010    Prediabetes 9/12/2013    Primary snoring 11/29/2017    Shoulder pain, bilateral 2/8/2018    Sinusitis, acute 12/12/2011    Wrist pain, acute, left 9/18/2018     Past Surgical History:   Procedure Laterality Date    HX GYN  1987    tubal pregancy; laparotomy    HX ORTHOPAEDIC  2006    neck    HX ORTHOPAEDIC  2008    cervical fusion of discs x4    HX PELVIC LAPAROSCOPY  1985    endometriosis    DC REVISE KNEE JOINT REPLACE,ALL PARTS  02/29/2012    tkr left     Family History   Problem Relation Age of Onset    Hypertension Father     Heart Disease Father         CAD    Diabetes Father     High Cholesterol Father     Other Mother         tumor in chest    Allergic Rhinitis Brother     Asthma Brother     Allergic Rhinitis Child     Sickle Cell Trait Child     GERD Child       Lab Results   Component Value Date/Time    WBC 7.9 06/07/2021 02:14 PM    HGB 14.1 06/07/2021 02:14 PM    HCT 44.3 06/07/2021 02:14 PM    PLATELET 220 31/87/5424 02:14 PM    MCV 86.7 06/07/2021 02:14 PM     Lab Results   Component Value Date/Time    Hemoglobin A1c 5.6 07/30/2020 10:00 AM    Hemoglobin A1c 6.1 (H) 08/26/2013 08:47 AM    Glucose 106 (H) 06/07/2021 02:14 PM    Microalb/Creat ratio (ug/mg creat.) 6.2 02/08/2018 11:55 AM    LDL, calculated 155 (H) 07/30/2020 10:00 AM    Creatinine 0.94 06/07/2021 02:14 PM      Lab Results   Component Value Date/Time    Cholesterol, total 241 (H) 07/30/2020 10:00 AM    HDL Cholesterol 73 07/30/2020 10:00 AM    LDL, calculated 155 (H) 07/30/2020 10:00 AM    Triglyceride 63 07/30/2020 10:00 AM    CHOL/HDL Ratio 4.1 08/18/2010 10:31 AM        Review of Systems   Constitutional: Negative for chills, fever and malaise/fatigue. HENT: Negative for nosebleeds. Eyes: Negative for pain. Respiratory: Negative for cough and wheezing. Cardiovascular: Negative for chest pain and leg swelling. Gastrointestinal: Negative for constipation, diarrhea and nausea. Genitourinary: Negative for frequency. Musculoskeletal: Positive for back pain and joint pain. Negative for myalgias. Skin: Negative for rash. Neurological: Negative for loss of consciousness and headaches. Endo/Heme/Allergies: Does not bruise/bleed easily. Psychiatric/Behavioral: Negative for depression. The patient is not nervous/anxious and does not have insomnia. All other systems reviewed and are negative. Physical Exam  Vitals and nursing note reviewed. Constitutional:       Appearance: She is well-developed. HENT:      Head: Normocephalic and atraumatic. Eyes:      Conjunctiva/sclera: Conjunctivae normal.      Pupils: Pupils are equal, round, and reactive to light. Neck:      Thyroid: No thyromegaly. Vascular: No JVD. Cardiovascular:      Rate and Rhythm: Normal rate and regular rhythm. Heart sounds: Normal heart sounds. No murmur heard. No friction rub. No gallop. Pulmonary:      Effort: Pulmonary effort is normal. No respiratory distress. Breath sounds: Normal breath sounds. No stridor. No wheezing or rales. Abdominal:      General: Bowel sounds are normal. There is no distension. Palpations: Abdomen is soft. There is no mass. Tenderness: There is no abdominal tenderness. Musculoskeletal:         General: Tenderness, deformity and signs of injury present. Normal range of motion. Lymphadenopathy:      Cervical: No cervical adenopathy. Skin:     Findings: No erythema or rash. Neurological:      Mental Status: She is alert and oriented to person, place, and time. Cranial Nerves: No cranial nerve deficit. Deep Tendon Reflexes: Reflexes are normal and symmetric. Psychiatric:         Behavior: Behavior normal.         ASSESSMENT and PLAN  Diagnoses and all orders for this visit:    1. Bilateral bunions  -     REFERRAL TO ORTHOPEDICS    2. Hammer toes, bilateral  -     REFERRAL TO ORTHOPEDICS    3. Advice given about COVID-19 virus infection  -     REFERRAL TO ORTHOPEDICS    4. Encounter to obtain excuse from work  -     REFERRAL TO ORTHOPEDICS    5.  Pain in both feet  -     REFERRAL TO ORTHOPEDICS        Secondary to the patient acute medical conditions, a letter on the pt's behalf was completed, pt's multiple questions answered to the best knowledge,  will keep a copy in the chart for further correspondence, patient was informed about the safety and  regulations regarding this condition patient was also advised to follow-up with HR for further guidelines patient acknowledged understanding and agreed with today's recommendations

## 2021-12-20 ENCOUNTER — OFFICE VISIT (OUTPATIENT)
Dept: FAMILY MEDICINE CLINIC | Age: 61
End: 2021-12-20
Payer: COMMERCIAL

## 2021-12-20 VITALS
WEIGHT: 160.8 LBS | RESPIRATION RATE: 14 BRPM | BODY MASS INDEX: 29.59 KG/M2 | OXYGEN SATURATION: 97 % | HEIGHT: 62 IN | HEART RATE: 80 BPM | DIASTOLIC BLOOD PRESSURE: 84 MMHG | SYSTOLIC BLOOD PRESSURE: 138 MMHG

## 2021-12-20 DIAGNOSIS — Z71.89 ADVICE GIVEN ABOUT COVID-19 VIRUS INFECTION: ICD-10-CM

## 2021-12-20 DIAGNOSIS — E78.00 HYPERCHOLESTEREMIA: ICD-10-CM

## 2021-12-20 DIAGNOSIS — E83.42 HYPOMAGNESEMIA: ICD-10-CM

## 2021-12-20 DIAGNOSIS — E53.8 B12 DEFICIENCY: ICD-10-CM

## 2021-12-20 DIAGNOSIS — N30.01 ACUTE CYSTITIS WITH HEMATURIA: ICD-10-CM

## 2021-12-20 DIAGNOSIS — E61.1 IRON DEFICIENCY: ICD-10-CM

## 2021-12-20 DIAGNOSIS — M53.9 LOW BACK PROBLEM: Primary | ICD-10-CM

## 2021-12-20 LAB
ALBUMIN SERPL-MCNC: 3.9 G/DL (ref 3.5–5)
ALBUMIN/GLOB SERPL: 1.3 {RATIO} (ref 1.1–2.2)
ALP SERPL-CCNC: 55 U/L (ref 45–117)
ALT SERPL-CCNC: 21 U/L (ref 12–78)
ANION GAP SERPL CALC-SCNC: 5 MMOL/L (ref 5–15)
AST SERPL-CCNC: 18 U/L (ref 15–37)
BILIRUB SERPL-MCNC: 0.4 MG/DL (ref 0.2–1)
BILIRUB UR QL STRIP: NEGATIVE
BUN SERPL-MCNC: 17 MG/DL (ref 6–20)
BUN/CREAT SERPL: 18 (ref 12–20)
CALCIUM SERPL-MCNC: 9.2 MG/DL (ref 8.5–10.1)
CHLORIDE SERPL-SCNC: 111 MMOL/L (ref 97–108)
CHOLEST SERPL-MCNC: 226 MG/DL
CO2 SERPL-SCNC: 25 MMOL/L (ref 21–32)
CREAT SERPL-MCNC: 0.97 MG/DL (ref 0.55–1.02)
ERYTHROCYTE [DISTWIDTH] IN BLOOD BY AUTOMATED COUNT: 14.6 % (ref 11.5–14.5)
FERRITIN SERPL-MCNC: 67 NG/ML (ref 8–252)
FOLATE SERPL-MCNC: 25.1 NG/ML (ref 5–21)
GLOBULIN SER CALC-MCNC: 3 G/DL (ref 2–4)
GLUCOSE SERPL-MCNC: 87 MG/DL (ref 65–100)
GLUCOSE UR-MCNC: NEGATIVE MG/DL
HCT VFR BLD AUTO: 42.7 % (ref 35–47)
HDLC SERPL-MCNC: 77 MG/DL
HDLC SERPL: 2.9 {RATIO} (ref 0–5)
HGB BLD-MCNC: 12.9 G/DL (ref 11.5–16)
IRON SATN MFR SERPL: 13 % (ref 20–50)
IRON SERPL-MCNC: 42 UG/DL (ref 35–150)
KETONES P FAST UR STRIP-MCNC: NEGATIVE MG/DL
LDLC SERPL CALC-MCNC: 135.8 MG/DL (ref 0–100)
MAGNESIUM SERPL-MCNC: 2.1 MG/DL (ref 1.6–2.4)
MCH RBC QN AUTO: 26.9 PG (ref 26–34)
MCHC RBC AUTO-ENTMCNC: 30.2 G/DL (ref 30–36.5)
MCV RBC AUTO: 89 FL (ref 80–99)
NRBC # BLD: 0 K/UL (ref 0–0.01)
NRBC BLD-RTO: 0 PER 100 WBC
PH UR STRIP: 5.5 [PH] (ref 4.6–8)
PLATELET # BLD AUTO: 210 K/UL (ref 150–400)
PMV BLD AUTO: 10.7 FL (ref 8.9–12.9)
POTASSIUM SERPL-SCNC: 4.4 MMOL/L (ref 3.5–5.1)
PROT SERPL-MCNC: 6.9 G/DL (ref 6.4–8.2)
PROT UR QL STRIP: NEGATIVE
RBC # BLD AUTO: 4.8 M/UL (ref 3.8–5.2)
SODIUM SERPL-SCNC: 141 MMOL/L (ref 136–145)
SP GR UR STRIP: 1.03 (ref 1–1.03)
T4 FREE SERPL-MCNC: 0.9 NG/DL (ref 0.8–1.5)
TIBC SERPL-MCNC: 313 UG/DL (ref 250–450)
TRIGL SERPL-MCNC: 66 MG/DL (ref ?–150)
TSH SERPL DL<=0.05 MIU/L-ACNC: 0.45 UIU/ML (ref 0.36–3.74)
UA UROBILINOGEN AMB POC: NORMAL (ref 0.2–1)
URINALYSIS CLARITY POC: CLEAR
URINALYSIS COLOR POC: YELLOW
URINE BLOOD POC: NORMAL
URINE LEUKOCYTES POC: NORMAL
URINE NITRITES POC: NEGATIVE
VIT B12 SERPL-MCNC: 1685 PG/ML (ref 193–986)
VLDLC SERPL CALC-MCNC: 13.2 MG/DL
WBC # BLD AUTO: 5.3 K/UL (ref 3.6–11)

## 2021-12-20 PROCEDURE — 99214 OFFICE O/P EST MOD 30 MIN: CPT | Performed by: FAMILY MEDICINE

## 2021-12-20 PROCEDURE — 81003 URINALYSIS AUTO W/O SCOPE: CPT | Performed by: FAMILY MEDICINE

## 2021-12-20 RX ORDER — CYCLOBENZAPRINE HCL 10 MG
10 TABLET ORAL
Qty: 30 TABLET | Refills: 1
Start: 2021-12-20 | End: 2022-01-31 | Stop reason: ALTCHOICE

## 2021-12-20 RX ORDER — METHYLPREDNISOLONE 4 MG/1
TABLET ORAL
Qty: 1 DOSE PACK | Refills: 0 | Status: SHIPPED | OUTPATIENT
Start: 2021-12-20 | End: 2022-02-17 | Stop reason: ALTCHOICE

## 2021-12-20 RX ORDER — NITROFURANTOIN (MACROCRYSTALS) 100 MG/1
100 CAPSULE ORAL 2 TIMES DAILY
Qty: 10 CAPSULE | Refills: 0 | Status: SHIPPED | OUTPATIENT
Start: 2021-12-20 | End: 2021-12-25

## 2021-12-20 NOTE — PROGRESS NOTES
Chief Complaint   Patient presents with    Leg Pain     cramping       1. Have you been to the ER, urgent care clinic since your last visit? Hospitalized since your last visit? No    2. Have you seen or consulted any other health care providers outside of the [address] [place] [address] since your last visit? Include any pap smears or colon screening. No     Verified patient with two type of identifiers. c/o lower back pain and leg cramping since Saturday.   Requesting to be checked for uti

## 2021-12-20 NOTE — PATIENT INSTRUCTIONS
Therapeutic Ball: Back Exercises  Introduction  Here are some examples of typical exercises for your condition. Start each exercise slowly. Ease off the exercise if you start to have pain. Your doctor or physical therapist will tell you when you can start these exercises and which ones will work best for you. To prepare, make sure that your ball is the right size for you. When inflated and firm, it should allow you to sit with your hips and knees bent at about a 90-degree angle (like the letter L). How to do the exercises  Seated position on ball    1. Use this exercise to get used to moving on the ball and to find your best sitting position. 2. Sit comfortably on the ball with your feet about hip-width apart. If you feel unsteady, rest your hands on the ball near your hips. 3. As you do this exercise, try to keep your shoulders and upper body relaxed and still. 4. Using your stomach and back muscles to move your pelvis, roll the ball forward. This will round your back. 5. Still using your stomach and back muscles, roll the ball back. You will arch your back. 6. Repeat this rounding-arching motion a few times. 7. Stop in between the two positions, where your back is not rounded or arched. This is called your neutral position. Pelvic rotation    1. Sit tall on the ball. 2. Slowly rotate your hips in a [place] pattern. Keep the movement focused at your hips. 3. Repeat, but [place] in the other direction. 4. Repeat 8 to 12 times. Postural sitting    1. Use this position to find a stable, relaxed posture on the ball. You can use this position as your starting point for other ball exercises. If you feel unsteady on the ball, start on a chair first.  2. Sit on a ball or chair, with your feet planted straight in front of you. 3. Imagine that a string at the top of your head is pulling you straight up. Think of yourself as 2 inches taller than you are.  4. Slightly tuck your chin.   5. Keep your shoulders back and relaxed. Knee extension    1. Sit tall on the ball with your feet planted in front of you, hip-width apart. As you do this exercise, avoid slumping your shoulders and arching your back. 2. Rest your hands on the ball near your hip or a steady object next to you. (If you feel very stable on the ball, rest your hands in your lap or at your side.)  3. Slowly straighten one leg at the knee. Slowly lower it back down. Repeat with the other leg. 4. Repeat this exercise 8 to 12 times. Roll-ups    1. Lie on your back with your knees bent, feet resting on the floor. 2. Lay the ball on your thighs. Rest your hands up high on the ball. 3. Raising your head and shoulder blades, roll the ball up your thighs. Exhale as you roll up. 4. If this is hard on your neck, gently support your lower head and upper neck with one hand. Don't use that hand to pull your head up. 5. Repeat 8 to 12 times. Ball curls    1. Lie on your back with your ankles resting on the ball, knees straight. 2. Use your legs to roll the exercise ball toward you. Allow your knees to bend and move closer to your chest.  3. Pause briefly, and then roll the ball to the starting position. Try to keep the ball rolling straight. You will feel the muscles in your lower belly working. 4. Repeat 8 to 12 times. Bridge with ball under legs    1. Lie on your back with your legs up, calves resting on the ball. For more challenge, rest your heels on the ball. 2. Look up at the ceiling, and keep your chin relaxed. You can place a small pillow under your head or neck for comfort. 3. With your arms by your side, press your hands onto the floor for stability. 4. Tighten your belly muscles by pulling in your belly button toward your spine. 5. Push your heels down toward the floor, squeeze your buttocks, and lift your hips off the floor until your shoulders, hips, and knees are all in a straight line. 6. Try to keep the ball steady.  Hold for about 6 seconds as you continue to breathe normally. 7. Slowly lower your hips back down to the floor. 8. Repeat 8 to 12 times. Ball curls with bridge    1. Start flat on your back with your ankles resting on the ball. 2. Look up at the ceiling, and keep your chin relaxed. You can place a small pillow under your head or neck for comfort. 3. With your arms by your side, press your hands onto the floor for stability. 4. Tighten your belly muscles by pulling in your belly button toward your spine. 5. Push your heels down toward the floor, squeeze your buttocks, and lift your hips off the floor until your shoulders, hips, and knees are all in a straight line. 6. While holding the bridge position, roll the ball toward you with your heels. Keep your hips as level as you can. 7. Pause briefly, and then roll the ball back out. Try to keep the ball rolling straight. You will feel the muscles in your lower belly working as you straighten your legs. 8. Lower your hips, and return to your starting position. 9. Repeat 8 to 12 times. 10. When you can keep your body and the ball steady throughout this exercise, you're ready for more challenge. Try keeping your hips raised while rolling the ball out, holding the bridge, and rolling back, a few times in a row. Praying armando    1. Kneel upright with the ball in front of you. 2. To start, clasp your hands together. Rest them on the ball in front of you. 3. As you do this exercise, keep your back and hips straight and tighten your belly and buttocks muscles. Keep your knees in place. 4. Press on the ball with your arms. Lean forward from the knees. This rolls the ball forward. You will bear most of your weight on your arms. 5. If your back starts to ache, you've gone too far. Pull back a bit. 6. Roll back to the start position. 7. Repeat 8 to 12 times. Walk-out plank on ball    1. Kneel over the ball. Place your hands on the floor in front of you.   2. Walk your hands forward until your legs are straight on the ball. This is the plank position. 3. When in plank position, hold your body straight and tighten your belly and buttocks muscles. Keep your chin slightly tucked. 4. Roll as far forward as you can without losing your balance or letting your hips drop. You may stop with the ball under your thighs, or even under your knees or shins. 5. Hold a few seconds, then walk your hands back and return to the start position. 6. Repeat 8 to 12 times. Push-up with thighs on ball    1. Kneel over the ball. Place your hands on the floor in front of you. 2. Walk your hands forward until your legs are straight on the ball. This is the plank position. 3. When in plank position, hold your body straight and tighten your belly and buttocks muscles. Keep your chin slightly tucked. 4. Roll as far forward as you can without losing your balance or letting your hips drop. You may stop with the ball under your thighs, or even under your knees or shins. 5. Bend your elbows. Slowly lower your body toward the ground as far as you can without losing your balance. 6. If your wrists hurt, try moving your hands a little farther apart so they're not right under your shoulders. 7. Slowly straighten your arms. 8. Do 8 to 12 of these push-ups. Wall squat with ball    1. Stand facing away from a wall. Place your feet about shoulder-width apart. 2. Place the ball between your middle back and the wall. Move your feet out in front of you so they are about a foot in front of your hips. 3. Keep your arms at your sides, or put your hands on your hips. 4. Slowly squat down as if you are going to sit in a chair, rolling your back over the ball as you squat. The ball should move with you but stay pressed into the wall. 5. Be sure that your knees do not go in front of your toes as you squat. 6. Hold for 6 seconds. 7. Slowly rise to your standing position. 8. Repeat 8 to 12 times. Child's pose with ball    1.  Kneeling upright with your back straight, rest your hands on the ball in front of you. 2. Breathe out as you bend at the hips, and roll the ball forward. Lower your chest toward the ground, and drop your hips back toward your heels. 3. To stretch your upper back and shoulders, hold this position for 15 to 30 seconds. 4. Repeat 2 to 4 times. Follow-up care is a key part of your treatment and safety. Be sure to make and go to all appointments, and call your doctor if you are having problems. It's also a good idea to know your test results and keep a list of the medicines you take. Where can you learn more? Go to http://www.gray.com/  Enter A974 in the search box to learn more about \"Therapeutic Ball: Back Exercises. \"  Current as of: July 1, 2021               Content Version: 13.0  © 2006-2021 [employer] Care instructions adapted under license by [employer] (which disclaims liability or warranty for this information). If you have questions about a medical condition or this instruction, always ask your healthcare professional. [place] [address] any warranty or liability for your use of this information. Urinary Tract Infection (UTI) in Women: Care Instructions  Overview     A urinary tract infection, or UTI, is a general term for an infection anywhere between the kidneys and the urethra (where urine comes out). Most UTIs are bladder infections. They often cause pain or burning when you urinate. UTIs are caused by bacteria and can be cured with antibiotics. Be sure to complete your treatment so that the infection does not get worse. Follow-up care is a key part of your treatment and safety. Be sure to make and go to all appointments, and call your doctor if you are having problems. It's also a good idea to know your test results and keep a list of the medicines you take. How can you care for yourself at home?   · Take your antibiotics as directed. Do not stop taking them just because you feel better. You need to take the full course of antibiotics. · Drink extra water and other fluids for the next day or two. This will help make the urine less concentrated and help wash out the bacteria that are causing the infection. (If you have kidney, heart, or liver disease and have to limit fluids, talk with your doctor before you increase the amount of fluids you drink.)  · Avoid drinks that are carbonated or have caffeine. They can irritate the bladder. · Urinate often. Try to empty your bladder each time. · To relieve pain, take a hot bath or lay a heating pad set on low over your lower belly or genital area. Never go to sleep with a heating pad in place. To prevent UTIs  · Drink plenty of water each day. This helps you urinate often, which clears bacteria from your system. (If you have kidney, heart, or liver disease and have to limit fluids, talk with your doctor before you increase the amount of fluids you drink.)  · Urinate when you need to. · If you are sexually active, urinate right after you have sex. · Change sanitary pads often. · Avoid douches, bubble baths, feminine hygiene sprays, and other feminine hygiene products that have deodorants. · After going to the bathroom, wipe from front to back. When should you call for help? Call your doctor now or seek immediate medical care if:    · Symptoms such as fever, chills, nausea, or vomiting get worse or appear for the first time.     · You have new pain in your back just below your rib cage. This is called flank pain.     · There is new blood or pus in your urine.     · You have any problems with your antibiotic medicine. Watch closely for changes in your health, and be sure to contact your doctor if:    · You are not getting better after taking an antibiotic for 2 days.     · Your symptoms go away but then come back. Where can you learn more?   Go to http://www.gray.com/  Enter [id number] in the search box to learn more about \"Urinary Tract Infection (UTI) in Women: Care Instructions. \"  Current as of: February 10, 2021               Content Version: 13.0  © 6615-7480 [employer]. Care instructions adapted under license by [employer] (which disclaims liability or warranty for this information). If you have questions about a medical condition or this instruction, always ask your healthcare professional. [address] any warranty or liability for your use of this information.

## 2021-12-20 NOTE — PROGRESS NOTES
HISTORY OF PRESENT ILLNESS  Jaylene Zuniga is a 57 y.o. female. Today's Covid vaccinated Pt main concerns were provided in the clinic,    pt is w/ comorbid history and   Pt has been trying to wear mask most of the times,  pt has no fever no cough no dyspnea, no ha, not dizzy, nl smell nl taste, no N/V/D, has no orbital pain,  no body ache. Back pain  The history is provided by the patient. This is a chronic problem. 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 The problem occurs constantly. The problem has changed and worsening since onset. The pain is present in the lower back. The quality of the pain is described as dull. The pain is at a severity of 7/10. Associated symptoms include limited range of motion. Pertinent negatives include no numbness, ++ stiffness, no tingling b/l, no itching, + back pain and no neck pain. The symptoms are aggravated by movement and palpation. There has been no history of extremity trauma, the patient has no incontinence of urine nor of stool,   Urinary Frequency   current history is provided by the patient, stating that this is a new but recurrent problem. The current episode started more than 1 week ago. The problem occurs every urination. The problem has been gradually worsening. The quality of the pain is described as burning. The pain is at a severity of 2/10. There has been no fever. is not sexually active. There is no history of pyelonephritis. Associated symptoms include frequency, hesitancy and urgency             Current Outpatient Medications   Medication Sig Dispense Refill    loratadine (Claritin) 10 mg tablet Take 10 mg by mouth.  cholecalciferol (Vitamin D3) 25 mcg (1,000 unit) cap Take  by mouth daily.       albuterol (PROVENTIL HFA, VENTOLIN HFA, PROAIR HFA) 90 mcg/actuation inhaler Take 1 Puff by inhalation every four (4) hours as needed for Wheezing. 1 Inhaler 6    tiotropium bromide (Spiriva Respimat) 2.5 mcg/actuation inhaler Take 2 Puffs by inhalation nightly. 1 Inhaler 6    Nebulizer & Compressor machine 1 Each by Does Not Apply route four (4) times daily as needed for Cough. 1 Each 0    azelastine (ASTEPRO) 0.15 % (205.5 mcg) 2 Sprays by Both Nostrils route daily. As needed      ascorbic acid, vitamin C, (VITAMIN C) 500 mg tablet Take 500 mg by mouth daily.  MULTIVITS,CA,MINERALS/IRON/FA (MULTI FOR HER PO) Take 1 Tab by mouth daily.  albuterol (PROVENTIL VENTOLIN) 2.5 mg /3 mL (0.083 %) nebu 3 mL by Nebulization route once for 1 dose. 70 Each 1    ipratropium (ATROVENT) 42 mcg (0.06 %) nasal spray 2 Sprays by Both Nostrils route four (4) times daily.  (Patient not taking: Reported on 6/11/2021) 15 mL 3     Allergies   Allergen Reactions    Crestor [Rosuvastatin] Shortness of Breath    Nabumetone Shortness of Breath    Codeine Nausea Only    Gabapentin (Bulk) Shortness of Breath    Lyrica [Pregabalin] Swelling     Ankle swelling, foot pain , insomina    Percocet [Oxycodone-Acetaminophen] Other (comments)     loopy     Past Medical History:   Diagnosis Date    Acute right ankle pain 7/23/2018    Arthritis 8/3/2010    Asthma     Bunion of great toe of right foot 7/23/2018    Fall at home, initial encounter 9/18/2018    Hammer toe of second toe of right foot 7/23/2018    Hypercholesterolemia 8/18/2010    Postmenopausal 8/3/2010    Prediabetes 9/12/2013    Primary snoring 11/29/2017    Shoulder pain, bilateral 2/8/2018    Sinusitis, acute 12/12/2011    Wrist pain, acute, left 9/18/2018     Past Surgical History:   Procedure Laterality Date    HX GYN  1987    tubal pregancy; laparotomy    HX ORTHOPAEDIC  2006    neck    HX ORTHOPAEDIC  2008    cervical fusion of discs x4    HX PELVIC LAPAROSCOPY  1985    endometriosis    MS REVISE KNEE JOINT REPLACE,ALL PARTS  02/29/2012    tkr left     Family History   Problem Relation Age of Onset    Hypertension Father     Heart Disease Father         CAD    Diabetes Father     High Cholesterol Father     Other Mother         tumor in chest    Allergic Rhinitis Brother     Asthma Brother     Allergic Rhinitis Child     Sickle Cell Trait Child     GERD Child      Social History     Tobacco Use    Smoking status: Never Smoker    Smokeless tobacco: Never Used   Substance Use Topics    Alcohol use: No      Lab Results   Component Value Date/Time    Hemoglobin A1c 5.6 07/30/2020 10:00 AM    Hemoglobin A1c 6.1 (H) 08/26/2013 08:47 AM    Glucose 106 (H) 06/07/2021 02:14 PM    Microalb/Creat ratio (ug/mg creat.) 6.2 02/08/2018 11:55 AM    LDL, calculated 155 (H) 07/30/2020 10:00 AM    Creatinine 0.94 06/07/2021 02:14 PM      Lab Results   Component Value Date/Time    Cholesterol, total 241 (H) 07/30/2020 10:00 AM    HDL Cholesterol 73 07/30/2020 10:00 AM    LDL, calculated 155 (H) 07/30/2020 10:00 AM    Triglyceride 63 07/30/2020 10:00 AM    CHOL/HDL Ratio 4.1 08/18/2010 10:31 AM        Review of Systems   Constitutional: Negative for chills, fever and malaise/fatigue. HENT: Negative for nosebleeds. Eyes: Negative for pain. Respiratory: Negative for cough and wheezing. Cardiovascular: Negative for chest pain and leg swelling. Gastrointestinal: Negative for constipation, diarrhea and nausea. Genitourinary: Negative for frequency. Musculoskeletal: Negative for joint pain and myalgias. Skin: Negative for rash. Neurological: Negative for loss of consciousness and headaches. Endo/Heme/Allergies: Does not bruise/bleed easily. Psychiatric/Behavioral: Negative for depression. The patient is not nervous/anxious and does not have insomnia. All other systems reviewed and are negative. Physical Exam  Vitals and nursing note reviewed. Constitutional:       Appearance: She is well-developed. HENT:      Head: Normocephalic and atraumatic.    Eyes: Conjunctiva/sclera: Conjunctivae normal.      Pupils: Pupils are equal, round, and reactive to light. Neck:      Thyroid: No thyromegaly. Vascular: No JVD. Cardiovascular:      Rate and Rhythm: Normal rate and regular rhythm. Heart sounds: Normal heart sounds. No murmur heard. No friction rub. No gallop. Pulmonary:      Effort: Pulmonary effort is normal. No respiratory distress. Breath sounds: Normal breath sounds. No stridor. No wheezing or rales. Abdominal:      General: Bowel sounds are normal. There is no distension. Palpations: Abdomen is soft. There is no mass. Tenderness: There is no abdominal tenderness. Musculoskeletal:         General: Tenderness present. Lymphadenopathy:      Cervical: No cervical adenopathy. Skin:     Findings: No erythema or rash. Neurological:      Mental Status: She is alert and oriented to person, place, and time. Cranial Nerves: No cranial nerve deficit. Deep Tendon Reflexes: Reflexes are normal and symmetric. Psychiatric:         Behavior: Behavior normal.         ASSESSMENT and PLAN  Diagnoses and all orders for this visit:    1. Low back problem  -     AMB POC URINALYSIS DIP STICK AUTO W/O MICRO  -     cyclobenzaprine (FLEXERIL) 10 mg tablet; Take 1 Tablet by mouth nightly. Indications: muscle spasm  -     CBC W/O DIFF; Future  -     LIPID PANEL; Future  -     MAGNESIUM; Future  -     METABOLIC PANEL, COMPREHENSIVE; Future  -     VITAMIN B12 & FOLATE; Future  -     TSH 3RD GENERATION; Future  -     T4, FREE; Future  -     FERRITIN; Future  -     IRON PROFILE; Future    2. Acute cystitis with hematuria  -     AMB POC URINALYSIS DIP STICK AUTO W/O MICRO  -     cyclobenzaprine (FLEXERIL) 10 mg tablet; Take 1 Tablet by mouth nightly. Indications: muscle spasm    3. Advice given about COVID-19 virus infection    4. Hypercholesteremia  -     LIPID PANEL; Future  -     METABOLIC PANEL, COMPREHENSIVE;  Future  -     VITAMIN B12 & FOLATE; Future  -     TSH 3RD GENERATION; Future    5. B12 deficiency  -     VITAMIN B12 & FOLATE; Future    6. Iron deficiency  -     FERRITIN; Future  -     IRON PROFILE; Future    7. Hypomagnesemia  -     MAGNESIUM; Future    Other orders  -     methylPREDNISolone (MEDROL DOSEPACK) 4 mg tablet; As directed for 6 days one package  -     nitrofurantoin (MACRODANTIN) 100 mg capsule; Take 1 Capsule by mouth two (2) times a day for 5 days.         Small meal more frequent, increase po fluid obtain cbc, cmp  To ensure anemia and electrolytes def  Stop colrit for now,  Pedialyte form pharm dilution with half 1:1 for the next 2-3 days  Look for improvement       Concern abdout COVID-19 addressed and detailed, pt was told that the best way to prevent illness is by protection with vaccination, and to Wear a facemask , having social distance, and to get tested if possible,   Pt was also told if develop dyspnea needs to call 911 or go to er, call for furhter advise, pt agreed with todays recommendations,

## 2022-01-02 ENCOUNTER — PATIENT MESSAGE (OUTPATIENT)
Dept: FAMILY MEDICINE CLINIC | Age: 62
End: 2022-01-02

## 2022-01-03 NOTE — TELEPHONE ENCOUNTER
----- Message from [address]. Ibrahima sent at 1/2/2022  6:44 PM EST -----  Regarding: Question regarding LIPID PANEL  Don't understand any of my test results. Please explain my test results.

## 2022-01-07 ENCOUNTER — HOSPITAL ENCOUNTER (EMERGENCY)
Age: 62
Discharge: HOME | End: 2022-01-07
Attending: EMERGENCY MEDICINE
Payer: COMMERCIAL

## 2022-01-07 ENCOUNTER — APPOINTMENT (OUTPATIENT)
Dept: GENERAL RADIOLOGY | Age: 62
End: 2022-01-07
Attending: EMERGENCY MEDICINE
Payer: COMMERCIAL

## 2022-01-07 VITALS
HEART RATE: 65 BPM | RESPIRATION RATE: 18 BRPM | DIASTOLIC BLOOD PRESSURE: 85 MMHG | OXYGEN SATURATION: 100 % | BODY MASS INDEX: 30.02 KG/M2 | SYSTOLIC BLOOD PRESSURE: 191 MMHG | HEIGHT: 62 IN | WEIGHT: 163.14 LBS | TEMPERATURE: 97.6 F

## 2022-01-07 DIAGNOSIS — M19.011 OSTEOARTHRITIS OF RIGHT SHOULDER, UNSPECIFIED OSTEOARTHRITIS TYPE: ICD-10-CM

## 2022-01-07 DIAGNOSIS — S49.91XA INJURY OF RIGHT SHOULDER, INITIAL ENCOUNTER: Primary | ICD-10-CM

## 2022-01-07 PROCEDURE — 74011250636 HC RX REV CODE- 250/636: Performed by: PHYSICIAN ASSISTANT

## 2022-01-07 PROCEDURE — 74011250637 HC RX REV CODE- 250/637: Performed by: PHYSICIAN ASSISTANT

## 2022-01-07 PROCEDURE — 99283 EMERGENCY DEPT VISIT LOW MDM: CPT

## 2022-01-07 PROCEDURE — 96372 THER/PROPH/DIAG INJ SC/IM: CPT

## 2022-01-07 PROCEDURE — 73030 X-RAY EXAM OF SHOULDER: CPT

## 2022-01-07 RX ORDER — HYDROCODONE BITARTRATE AND ACETAMINOPHEN 5; 325 MG/1; MG/1
1 TABLET ORAL
Qty: 10 TABLET | Refills: 0 | Status: SHIPPED | OUTPATIENT
Start: 2022-01-07 | End: 2022-01-10

## 2022-01-07 RX ORDER — ONDANSETRON 4 MG/1
4 TABLET, ORALLY DISINTEGRATING ORAL
Status: COMPLETED | OUTPATIENT
Start: 2022-01-07 | End: 2022-01-07

## 2022-01-07 RX ORDER — HYDROCODONE BITARTRATE AND ACETAMINOPHEN 5; 325 MG/1; MG/1
1 TABLET ORAL
Status: COMPLETED | OUTPATIENT
Start: 2022-01-07 | End: 2022-01-07

## 2022-01-07 RX ORDER — KETOROLAC TROMETHAMINE 30 MG/ML
30 INJECTION, SOLUTION INTRAMUSCULAR; INTRAVENOUS
Status: COMPLETED | OUTPATIENT
Start: 2022-01-07 | End: 2022-01-07

## 2022-01-07 RX ADMIN — KETOROLAC TROMETHAMINE 30 MG: 30 INJECTION, SOLUTION INTRAMUSCULAR; INTRAVENOUS at 13:12

## 2022-01-07 RX ADMIN — HYDROCODONE BITARTRATE AND ACETAMINOPHEN 1 TABLET: 5; 325 TABLET ORAL at 13:11

## 2022-01-07 RX ADMIN — ONDANSETRON 4 MG: 4 TABLET, ORALLY DISINTEGRATING ORAL at 13:12

## 2022-01-07 NOTE — Clinical Note
Nery Josephinenatalie was seen and treated in our emergency department on 1/7/2022. Please excuse her from work for 3 days.           Radha Ordonez, [address]

## 2022-02-17 ENCOUNTER — OFFICE VISIT (OUTPATIENT)
Dept: CARDIOLOGY CLINIC | Age: 62
End: 2022-02-17
Payer: COMMERCIAL

## 2022-02-17 VITALS
HEIGHT: 62 IN | RESPIRATION RATE: 16 BRPM | OXYGEN SATURATION: 97 % | BODY MASS INDEX: 30.22 KG/M2 | WEIGHT: 164.2 LBS | SYSTOLIC BLOOD PRESSURE: 130 MMHG | HEART RATE: 70 BPM | DIASTOLIC BLOOD PRESSURE: 82 MMHG

## 2022-02-17 DIAGNOSIS — E78.00 HYPERCHOLESTEROLEMIA: ICD-10-CM

## 2022-02-17 DIAGNOSIS — R07.9 CHEST PAIN, UNSPECIFIED TYPE: Primary | ICD-10-CM

## 2022-02-17 DIAGNOSIS — R07.89 CHEST TIGHTNESS: ICD-10-CM

## 2022-02-17 PROCEDURE — 93000 ELECTROCARDIOGRAM COMPLETE: CPT | Performed by: INTERNAL MEDICINE

## 2022-02-17 PROCEDURE — 99204 OFFICE O/P NEW MOD 45 MIN: CPT | Performed by: INTERNAL MEDICINE

## 2022-02-17 NOTE — PROGRESS NOTES
2/17/2022 3:17 PM      Subjective:     Ryan Medina is seen in office today further evaluation. Since last summer she has been c/o heart burn and chest discomfort, especially at night. Has been attributing it to asthma. Seen by PCP and referred to us. Denies any symptoms. SOB only during asthma episodes. She denies palpitations, irregular heart beats, near-syncope, syncope, fatigue, orthopnea, paroxysmal nocturnal dyspnea, claudication, lower extremity edema, tachypnea. Visit Vitals  /82 (BP 1 Location: Right upper arm, BP Patient Position: Sitting, BP Cuff Size: Adult)   Pulse 70   Resp 16   Ht 5' 2\" (1.575 m)   Wt 164 lb 3.2 oz (74.5 kg)   SpO2 97%   BMI 30.03 kg/m²     Current Outpatient Medications   Medication Sig    loratadine (Claritin) 10 mg tablet Take 10 mg by mouth daily as needed.  cholecalciferol (Vitamin D3) 25 mcg (1,000 unit) cap Take  by mouth daily.  albuterol (PROVENTIL HFA, VENTOLIN HFA, PROAIR HFA) 90 mcg/actuation inhaler Take 1 Puff by inhalation every four (4) hours as needed for Wheezing.  tiotropium bromide (Spiriva Respimat) 2.5 mcg/actuation inhaler Take 2 Puffs by inhalation nightly.  Nebulizer & Compressor machine 1 Each by Does Not Apply route four (4) times daily as needed for Cough.  azelastine (ASTEPRO) 0.15 % (205.5 mcg) 2 Sprays by Both Nostrils route daily. As needed    ascorbic acid, vitamin C, (VITAMIN C) 500 mg tablet Take 500 mg by mouth daily.  MULTIVITS,CA,MINERALS/IRON/FA (MULTI FOR HER PO) Take 1 Tab by mouth daily.  albuterol (PROVENTIL VENTOLIN) 2.5 mg /3 mL (0.083 %) nebu 3 mL by Nebulization route once for 1 dose. No current facility-administered medications for this visit.          Objective:      Visit Vitals  /82 (BP 1 Location: Right upper arm, BP Patient Position: Sitting, BP Cuff Size: Adult)   Pulse 70   Resp 16   Ht 5' 2\" (1.575 m)   Wt 164 lb 3.2 oz (74.5 kg)   SpO2 97%   BMI 30.03 kg/m² Data Review:     EKG: Normal sinus rhythm, no acute st/t changes      Past Medical History:   Diagnosis Date    Acute right ankle pain 7/23/2018    Arthritis 8/3/2010    Asthma     Bunion of great toe of right foot 7/23/2018    Fall at home, initial encounter 9/18/2018    Hammer toe of second toe of right foot 7/23/2018    Hypercholesterolemia 8/18/2010    Postmenopausal 8/3/2010    Prediabetes 9/12/2013    Primary snoring 11/29/2017    Shoulder pain, bilateral 2/8/2018    Sinusitis, acute 12/12/2011    Wrist pain, acute, left 9/18/2018      Past Surgical History:   Procedure Laterality Date    HX GYN  1987    tubal pregancy; laparotomy    HX ORTHOPAEDIC  2006    neck    HX ORTHOPAEDIC  2008    cervical fusion of discs x4    HX PELVIC LAPAROSCOPY  1985    endometriosis    [place] REVISE KNEE JOINT REPLACE,ALL PARTS  02/29/2012    tkr left     Allergies   Allergen Reactions    Crestor [Rosuvastatin] Shortness of Breath    Nabumetone Shortness of Breath    Codeine Nausea Only    Gabapentin (Bulk) Shortness of Breath    Lyrica [Pregabalin] Swelling     Ankle swelling, foot pain , insomina    Percocet [Oxycodone-Acetaminophen] Other (comments)     loopy      Family History   Problem Relation Age of Onset    Hypertension Father     Heart Disease Father         CAD    Diabetes Father     High Cholesterol Father     Other Mother         tumor in chest    Allergic Rhinitis Brother     Asthma Brother     Allergic Rhinitis Child     Sickle Cell Trait Child     GERD Child       Social History     Socioeconomic History    Marital status:      Spouse name: Not on file    Number of children: Not on file    Years of education: Not on file    Highest education level: Not on file   Occupational History    Not on file   Tobacco Use    Smoking status: Never Smoker    Smokeless tobacco: Never Used   Vaping Use    Vaping Use: Never used   Substance and Sexual Activity    Alcohol use: Yes     Comment: wine sometimes    Drug use: No    Sexual activity: Not Currently   Other Topics Concern    Not on file   Social History Narrative    Not on file     Social Determinants of Health     Financial Resource Strain:     Difficulty of Paying Living Expenses: Not on file   Food Insecurity:     Worried About Running Out of Food in the Last Year: Not on file    Kera of Food in the Last Year: Not on file   Transportation Needs:     Lack of Transportation (Medical): Not on file    Lack of Transportation (Non-Medical): Not on file   Physical Activity:     Days of Exercise per Week: Not on file    Minutes of Exercise per Session: Not on file   Stress:     Feeling of Stress : Not on file   Social Connections:     Frequency of Communication with Friends and Family: Not on file    Frequency of Social Gatherings with Friends and Family: Not on file    Attends Bahai Services: Not on file    Active Member of [address] [place] [address] or Organizations: Not on file    Attends Club or Organization Meetings: Not on file    Marital Status: Not on file   Intimate Partner Violence:     Fear of Current or Ex-Partner: Not on file    Emotionally Abused: Not on file    Physically Abused: Not on file    Sexually Abused: Not on file   Housing Stability:     Unable to Pay for Housing in the Last Year: Not on file    Number of [place] in the Last Year: Not on file    Unstable Housing in the Last Year: Not on file       Review of Systems     General: Not Present- Anorexia, Chills, Dietary Changes, Fatigue, Fever, Medication Changes, Night Sweats, Weight Gain > 10lbs. and Weight Loss > 10lbs. .  Skin: Not Present- Bruising and Excessive Sweating. HEENT: Not Present- Headache, Visual Loss and Vertigo. Respiratory: Not Present- Cough, Decreased Exercise Tolerance, Difficulty Breathing, Snoring and Wheezing.   Cardiovascular: Not Present- Abnormal Blood Pressure, Claudications, Difficulty Breathing On Exertion, Edema, Fainting / Blacking Out, Irregular Heart Beat, Night Cramps, Orthopnea, Palpitations, Paroxysmal Nocturnal Dyspnea, Rapid Heart Rate, Shortness of Breath and Swelling of Extremities. Gastrointestinal: Not Present- Black, Tarry Stool, Bloody Stool, Diarrhea, Hematemesis, Rectal Bleeding and Vomiting. Musculoskeletal: Not Present- Muscle Pain and Muscle Weakness. Neurological: Not Present- Dizziness. Psychiatric: Not Present- Depression. Endocrine: Not Present- Cold Intolerance, Heat Intolerance and Thyroid Problems. Hematology: Not Present- Abnormal Bleeding, Anemia, Blood Clots and Easy Bruising. Physical Exam   The physical exam findings are as follows:       General   Mental Status - Alert. General Appearance - Cooperative and Well groomed. Not in acute distress. Orientation - Oriented to time, Oriented to place and Oriented to person. Build & Nutrition - Well developed. HEENT  Head - Normal.  Eye - Normal.      Neck   Carotid Arteries - normal upstroke. No Bruits. Chest and Lung Exam   Inspection:   Chest Wall: - Normal. Accessory muscles - No use of accessory muscles in breathing. Auscultation:   Breath sounds: - Normal.      Cardiovascular   Inspection: Jugular vein - Bilateral - Inspection Normal.  Palpation/Percussion:   Apical Impulse: - Normal.  Auscultation: Rhythm - Regular. Heart Sounds - S1 WNL and S2 WNL. No S3 or S4. Murmurs & Other Heart Sounds: Auscultation of the heart reveals - No Murmurs. Abdomen   Palpation/Percussion: Palpation and Percussion of the abdomen reveal - No Palpable abdominal masses. Auscultation: Auscultation of the abdomen reveals - Bowel sounds normal.      Peripheral Vascular   Upper Extremity: Inspection - Bilateral - No Cyanotic nailbeds or Digital clubbing. Palpation: Radial pulse - Bilateral - Normal.  Lower Extremity:   Palpation: Edema - Bilateral - No edema.       Neurologic   Mental Status: Affect - normal.  Motor: - Normal. Gait - Normal.        Assessment:       ICD-10-CM ICD-9-CM    1. Chest pain, unspecified type  R07.9 786.50 AMB POC EKG ROUTINE W/ 12 LEADS, INTER & REP      ECHO STRESS   2. Hypercholesterolemia  E78.00 272.0 AMB POC EKG ROUTINE W/ 12 LEADS, INTER & REP      ECHO STRESS   3. Chest tightness  R07.89 786.59 ECHO STRESS       Plan:     Chest tightness and heart burn: exercise stress Echo. If normal then f/u with me as needed and f/u with pulmonary and PCP. Recent labs reviewed.

## 2022-02-17 NOTE — PROGRESS NOTES
1. Have you been to the ER, urgent care clinic since your last visit? Hospitalized since your last visit? Seen on 1/7/22 for shoulder pain. 2. Have you seen or consulted any other health care providers outside of the [address] [place] [address] since your last visit? Include any pap smears or colon screening.    No.        Chief Complaint   Patient presents with    New Patient     referred by PCP- f/u from June ER visit for chest pain- chest pain/heaviness but thinks it is asthma related

## 2022-02-22 ENCOUNTER — TELEPHONE (OUTPATIENT)
Dept: FAMILY MEDICINE CLINIC | Age: 62
End: 2022-02-22

## 2022-02-22 NOTE — TELEPHONE ENCOUNTER
----- Message from Donovan West sent at 2/22/2022 11:30 AM EST -----  Subject: Message to Provider    QUESTIONS  Information for Provider? pt is having muscle spasms it went to her calf   and feet she took half of a muscle relaxer but has work at 2 and wants to   speak with the nurse  ---------------------------------------------------------------------------  --------------  [address]  What is the best way for the office to contact you? OK to leave message on   voicemail  Preferred Call Back Phone Number? [phone]  ---------------------------------------------------------------------------  --------------  SCRIPT ANSWERS  Relationship to Patient?  Self

## 2022-02-24 ENCOUNTER — OFFICE VISIT (OUTPATIENT)
Dept: FAMILY MEDICINE CLINIC | Age: 62
End: 2022-02-24
Payer: COMMERCIAL

## 2022-02-24 VITALS
HEIGHT: 62 IN | SYSTOLIC BLOOD PRESSURE: 129 MMHG | RESPIRATION RATE: 20 BRPM | WEIGHT: 161.7 LBS | DIASTOLIC BLOOD PRESSURE: 75 MMHG | BODY MASS INDEX: 29.76 KG/M2 | TEMPERATURE: 97.7 F | OXYGEN SATURATION: 100 % | HEART RATE: 66 BPM

## 2022-02-24 DIAGNOSIS — Z71.89 ADVICE GIVEN ABOUT COVID-19 VIRUS INFECTION: ICD-10-CM

## 2022-02-24 DIAGNOSIS — M21.41 PES PLANUS OF BOTH FEET: Primary | ICD-10-CM

## 2022-02-24 DIAGNOSIS — M21.42 PES PLANUS OF BOTH FEET: Primary | ICD-10-CM

## 2022-02-24 PROCEDURE — 99213 OFFICE O/P EST LOW 20 MIN: CPT | Performed by: FAMILY MEDICINE

## 2022-02-24 RX ORDER — DICLOFENAC SODIUM 10 MG/G
4 GEL TOPICAL 4 TIMES DAILY
Qty: 200 G | Refills: 2 | Status: SHIPPED | OUTPATIENT
Start: 2022-02-24

## 2022-02-24 NOTE — PROGRESS NOTES
Chief Complaint   Patient presents with    Leg Pain     Patient states that she has been having bilateral leg pain for 2 1/2wks. 1. Have you been to the ER, urgent care clinic since your last visit? Hospitalized since your last visit? Yes When: shoulder pain-[facility]-2wks ago    2. Have you seen or consulted any other health care providers outside of the [address] [place] [address] since your last visit? Include any pap smears or colon screening.  No

## 2022-02-24 NOTE — LETTER
NOTIFICATION RETURN TO WORK / SCHOOL        2/24/2022 2:23 PM    Ms. [address] [facility] [id number]      To Whom It May Concern:    Olegario Bustillos is currently under the care of [address] [facility]. She will return to work/school on: 2/25/2022    If there are questions or concerns please have the patient contact our office.         Sincerely,      Kathryn Velez MD

## 2022-02-24 NOTE — PROGRESS NOTES
HISTORY OF PRESENT ILLNESS  Lynette Mena is a 57 y.o. female. Today's Covid vaccinated Pt main concerns were provided in the clinic,    pt is w/ comorbid history and   Pt has been trying to wear mask most of the times,    pt has no fever no cough no dyspnea, no ha, not dizzy, nl smell nl taste, no N/V/D, has no orbital pain,  no body ache. 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  cholecalciferol (Vitamin D3) 25 mcg (1,000 unit) cap Take  by mouth daily.  albuterol (PROVENTIL HFA, VENTOLIN HFA, PROAIR HFA) 90 mcg/actuation inhaler Take 1 Puff by inhalation every four (4) hours as needed for Wheezing. 1 Inhaler 6    tiotropium bromide (Spiriva Respimat) 2.5 mcg/actuation inhaler Take 2 Puffs by inhalation nightly. 1 Inhaler 6    Nebulizer & Compressor machine 1 Each by Does Not Apply route four (4) times daily as needed for Cough. 1 Each 0    ascorbic acid, vitamin C, (VITAMIN C) 500 mg tablet Take 500 mg by mouth daily.  MULTIVITS,CA,MINERALS/IRON/FA (MULTI FOR HER PO) Take 1 Tab by mouth daily.  albuterol (PROVENTIL VENTOLIN) 2.5 mg /3 mL (0.083 %) nebu 3 mL by Nebulization route once for 1 dose. 70 Each 1    azelastine (ASTEPRO) 0.15 % (205.5 mcg) 2 Sprays by Both Nostrils route daily.  As needed (Patient not taking: Reported on 2/24/2022)       Allergies   Allergen Reactions    Crestor [Rosuvastatin] Shortness of Breath    Nabumetone Shortness of Breath    Codeine Nausea Only    Gabapentin (Bulk) Shortness of Breath    Lyrica [Pregabalin] Swelling     Ankle swelling, foot pain , insomina    Percocet [Oxycodone-Acetaminophen] Other (comments)     loopy     Past Medical History:   Diagnosis Date    Acute right ankle pain 7/23/2018    Arthritis 8/3/2010    Asthma     Bunion of great toe of right foot 7/23/2018    Fall at home, initial encounter 9/18/2018    Hammer toe of second toe of right foot 7/23/2018    Hypercholesterolemia 8/18/2010    Postmenopausal 8/3/2010    Prediabetes 9/12/2013    Primary snoring 11/29/2017    Shoulder pain, bilateral 2/8/2018    Sinusitis, acute 12/12/2011    Wrist pain, acute, left 9/18/2018     Past Surgical History:   Procedure Laterality Date    HX GYN  1987    tubal pregancy; laparotomy    HX ORTHOPAEDIC  2006    neck    HX ORTHOPAEDIC  2008    cervical fusion of discs x4    HX PELVIC LAPAROSCOPY  1985    endometriosis    [place] REVISE KNEE JOINT REPLACE,ALL PARTS  02/29/2012    tkr left     Family History   Problem Relation Age of Onset    Hypertension Father     Heart Disease Father         CAD    Diabetes Father     High Cholesterol Father     Other Mother         tumor in chest    Allergic Rhinitis Brother     Asthma Brother     Allergic Rhinitis Child     Sickle Cell Trait Child     GERD Child      Social History     Tobacco Use    Smoking status: Never Smoker    Smokeless tobacco: Never Used   Substance Use Topics    Alcohol use: Yes     Comment: wine sometimes      Lab Results   Component Value Date/Time    WBC 5.3 12/20/2021 10:07 AM    HGB 12.9 12/20/2021 10:07 AM    HCT 42.7 12/20/2021 10:07 AM    PLATELET 024 89/89/4168 10:07 AM    MCV 89.0 12/20/2021 10:07 AM     Lab Results   Component Value Date/Time    Hemoglobin A1c 5.6 07/30/2020 10:00 AM    Hemoglobin A1c 6.1 (H) 08/26/2013 08:47 AM    Glucose 87 12/20/2021 10:07 AM    Microalb/Creat ratio (ug/mg creat.) 6.2 02/08/2018 11:55 AM    LDL, calculated 135.8 (H) 12/20/2021 10:07 AM    Creatinine 0.97 12/20/2021 10:07 AM         Review of Systems   Constitutional: Negative for chills and fever. HENT: Negative for congestion and nosebleeds. Eyes: Negative for blurred vision and pain. Respiratory: Negative for cough, shortness of breath and wheezing. Cardiovascular: Negative for chest pain and leg swelling. Gastrointestinal: Negative for constipation, diarrhea, nausea and vomiting. Genitourinary: Negative for dysuria and frequency. Musculoskeletal: Positive for myalgias. Negative for joint pain. Skin: Negative for itching and rash. Neurological: Negative for dizziness, loss of consciousness and headaches. Psychiatric/Behavioral: Negative for depression. The patient is not nervous/anxious and does not have insomnia. Physical Exam  Vitals and nursing note reviewed. Constitutional:       Appearance: She is well-developed. HENT:      Head: Normocephalic and atraumatic. Eyes:      Conjunctiva/sclera: Conjunctivae normal.      Pupils: Pupils are equal, round, and reactive to light. Neck:      Thyroid: No thyromegaly. Vascular: No JVD. Cardiovascular:      Rate and Rhythm: Normal rate and regular rhythm. Heart sounds: Normal heart sounds. No murmur heard. No friction rub. No gallop. Pulmonary:      Effort: Pulmonary effort is normal. No respiratory distress. Breath sounds: Normal breath sounds. No stridor. No wheezing or rales. Abdominal:      General: Bowel sounds are normal. There is no distension. Palpations: Abdomen is soft. There is no mass. Tenderness: There is no abdominal tenderness. Musculoskeletal:         General: No tenderness. Normal range of motion. Lymphadenopathy:      Cervical: No cervical adenopathy. Skin:     Findings: No erythema or rash. Neurological:      Mental Status: She is alert and oriented to person, place, and time. Cranial Nerves: No cranial nerve deficit. Deep Tendon Reflexes: Reflexes are normal and symmetric. Psychiatric:         Behavior: Behavior normal.         ASSESSMENT and PLAN  Diagnoses and all orders for this visit:    1. Pes planus of both feet  -     diclofenac (VOLTAREN) 1 % gel; Apply 4 g to affected area four (4) times daily.     2. Advice given about COVID-19 virus infection       was told to help with weight reduction, feet manipulations, massage therapy, exercise therapy, to remain active but to avoid heavy lifting and pushing at this time for the next 6 weeks ,   Advised for self cared options such as:  NSAID's and Tylenol for pain, take meds w/ food and water,     Concern abdout COVID-19 addressed and detailed, pt was told that the best way to prevent illness is by protection with vaccination, and to Wear a facemask , having social distance, and to get tested if possible,   Pt was also told if develop dyspnea needs to call 911 or go to er, call for mesha advise, pt agreed with todays recommendations,

## 2022-02-24 NOTE — PATIENT INSTRUCTIONS
Flatfoot: Care Instructions  Your Care Instructions  A flatfoot means that the bottom of your foot does not have the usual arch. One or both of your feet may be flat. You may have inherited flatfeet. Having an injury, being very overweight, or having a condition such as rheumatoid arthritis, stroke, or diabetes also can cause the arch to flatten. Flatfoot usually is not a serious problem. But some people do have pain if they gain weight or stand a lot. You also can have pain when walking or running. You can do exercises and wear pads and roomy shoes to help support your feet. Follow-up care is a key part of your treatment and safety. Be sure to make and go to all appointments, and call your doctor if you are having problems. It's also a good idea to know your test results and keep a list of the medicines you take. How can you care for yourself at home? · Wear shoes with good arch support and lots of room in the toes. · Put heel padding (called a heel cup) or inserts (called orthotics) in your shoes to raise the heel. Orthotics are molded pieces of rubber, leather, or other material that can help cushion and balance your feet. · Try these exercises to stretch your feet and make them stronger, if your doctor says it is okay. ? Stretch your calf muscles: Stand about 1 foot from a wall and place the palms of both hands against the wall at chest level. Step back with one foot. Keep that leg straight at the knee, and keep both feet flat on the floor. Your feet should point at the wall or slightly toward the center of your body. Bend your front leg at the knee, and press the wall with both hands until you feel a gentle stretch in your back leg. Hold for at least 15 seconds. Increase to 30 seconds over time. Switch legs and repeat. Do this 2 to 4 times a day. ? Stretch your feet: Sit on the floor or a mat with your feet stretched in front of you.  Roll up a towel lengthwise and loop it around the ball of one foot. Hold one end of the towel in each hand and gently pull the towel toward your body. Hold for at least 15 to 30 seconds. Repeat with the other foot. Do this 2 to 4 times a day. ? Make your feet stronger: Place a towel on the floor. Sit in a chair in front of the towel with both feet flat on the towel at one end.  the towel with the toes of one foot while keeping the heel of that foot on the floor. (Use your other foot to anchor the towel). Curl your toes to pull the towel toward you. Repeat with the other foot. Do this 2 to 4 times a day. · Take anti-inflammatory medicines such as ibuprofen (Advil, Motrin) and naproxen (Aleve) to reduce pain if your feet or legs hurt. Read and follow all instructions on the label. · Try heat or massage on the area that is causing you pain. Use a heating pad set on low or a warm cloth. Put a thin cloth between the heating pad and your skin. When should you call for help? Watch closely for changes in your health, and be sure to contact your doctor if:    · You have pain in your feet or legs.     · You want help to find orthotics to fit your feet. Where can you learn more? Go to http://www.gray.com/  Enter T358 in the search box to learn more about \"Flatfoot: Care Instructions. \"  Current as of: July 1, 2021               Content Version: 13.0  © 2006-2021 [employer]. Care instructions adapted under license by [employer] (which disclaims liability or warranty for this information). If you have questions about a medical condition or this instruction, always ask your healthcare professional. [place] [address] any warranty or liability for your use of this information.

## 2022-03-11 ENCOUNTER — TRANSCRIBE ORDER (OUTPATIENT)
Dept: SCHEDULING | Age: 62
End: 2022-03-11

## 2022-03-11 DIAGNOSIS — Z12.31 ENCOUNTER FOR SCREENING MAMMOGRAM FOR MALIGNANT NEOPLASM OF BREAST: Primary | ICD-10-CM

## 2022-03-18 PROBLEM — M21.611 BUNION OF GREAT TOE OF RIGHT FOOT: Status: ACTIVE | Noted: 2018-07-23

## 2022-03-18 PROBLEM — R06.83 PRIMARY SNORING: Status: ACTIVE | Noted: 2017-11-29

## 2022-03-18 PROBLEM — M25.532 WRIST PAIN, ACUTE, LEFT: Status: ACTIVE | Noted: 2018-09-18

## 2022-03-19 PROBLEM — R07.9 CHEST PAIN: Status: ACTIVE | Noted: 2022-02-17

## 2022-03-19 PROBLEM — M25.512 SHOULDER PAIN, BILATERAL: Status: ACTIVE | Noted: 2018-02-08

## 2022-03-19 PROBLEM — M25.511 SHOULDER PAIN, BILATERAL: Status: ACTIVE | Noted: 2018-02-08
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
## 2022-03-19 PROBLEM — M25.571 ACUTE RIGHT ANKLE PAIN: Status: ACTIVE | Noted: 2018-07-23

## 2022-03-19 PROBLEM — M20.41 HAMMER TOE OF SECOND TOE OF RIGHT FOOT: Status: ACTIVE | Noted: 2018-07-23

## 2022-03-19 PROBLEM — R07.89 CHEST TIGHTNESS: Status: ACTIVE | Noted: 2022-02-17

## 2022-03-20 PROBLEM — L03.119 CELLULITIS AND ABSCESS OF LOWER EXTREMITY: Status: ACTIVE | Noted: 2017-01-16

## 2022-03-20 PROBLEM — L02.419 CELLULITIS AND ABSCESS OF LOWER EXTREMITY: Status: ACTIVE | Noted: 2017-01-16

## 2022-03-24 ENCOUNTER — ANCILLARY PROCEDURE (OUTPATIENT)
Dept: CARDIOLOGY CLINIC | Age: 62
End: 2022-03-24
Payer: COMMERCIAL

## 2022-03-24 VITALS
WEIGHT: 161 LBS | BODY MASS INDEX: 29.63 KG/M2 | HEIGHT: 62 IN | SYSTOLIC BLOOD PRESSURE: 129 MMHG | DIASTOLIC BLOOD PRESSURE: 75 MMHG

## 2022-03-24 DIAGNOSIS — R07.9 CHEST PAIN, UNSPECIFIED TYPE: ICD-10-CM

## 2022-03-24 DIAGNOSIS — E78.00 HYPERCHOLESTEROLEMIA: ICD-10-CM

## 2022-03-24 DIAGNOSIS — R07.89 CHEST TIGHTNESS: ICD-10-CM

## 2022-03-24 LAB
STRESS ANGINA INDEX: 0
STRESS BASELINE DIAS BP: 78 MMHG
STRESS BASELINE HR: 69 BPM
STRESS BASELINE ST DEPRESSION: 0 MM
STRESS BASELINE SYS BP: 140 MMHG
STRESS ESTIMATED WORKLOAD: 9.3 METS
STRESS EXERCISE DUR MIN: 7 MIN
STRESS EXERCISE DUR SEC: 31 SEC
STRESS O2 SAT PEAK: 97 %
STRESS O2 SAT REST: 100 %
STRESS PEAK DIAS BP: 80 MMHG
STRESS PEAK SYS BP: 160 MMHG
STRESS PERCENT HR ACHIEVED: 94 %
STRESS POST PEAK HR: 148 BPM
STRESS RATE PRESSURE PRODUCT: NORMAL BPM*MMHG
STRESS STAGE 1 BP: NORMAL MMHG
STRESS STAGE 1 DURATION: NORMAL MIN:SEC
STRESS STAGE 1 HR: 123 BPM
STRESS STAGE 2 BP: NORMAL MMHG
STRESS STAGE 2 DURATION: NORMAL MIN:SEC
STRESS STAGE 2 HR: 136 BPM
STRESS STAGE 3 DURATION: NORMAL MIN:SEC
STRESS STAGE 3 HR: 148 BPM
STRESS STAGE RECOVERY 1 BP: NORMAL MMHG
STRESS STAGE RECOVERY 1 DURATION: NORMAL MIN:SEC
STRESS STAGE RECOVERY 1 HR: 88 BPM
STRESS TARGET HR: 158 BPM

## 2022-03-24 PROCEDURE — 93351 STRESS TTE COMPLETE: CPT | Performed by: INTERNAL MEDICINE

## 2022-03-25 ENCOUNTER — TELEPHONE (OUTPATIENT)
Dept: CARDIOLOGY CLINIC | Age: 62
End: 2022-03-25

## 2022-03-25 NOTE — TELEPHONE ENCOUNTER
Spoke with patient. Verified with two patient identifiers. Advised pt per Dr. Lani Laurent her stress test was ok. Patient verbalized understanding.

## 2022-03-29 LAB — SARS-COV-2, NAA: NOT DETECTED

## 2022-03-31 ENCOUNTER — HOSPITAL ENCOUNTER (OUTPATIENT)
Dept: GENERAL RADIOLOGY | Age: 62
Discharge: HOME | End: 2022-03-31
Payer: COMMERCIAL

## 2022-03-31 ENCOUNTER — TRANSCRIBE ORDER (OUTPATIENT)
Dept: REGISTRATION | Age: 62
End: 2022-03-31

## 2022-03-31 DIAGNOSIS — R05.9 COUGH: ICD-10-CM

## 2022-03-31 DIAGNOSIS — R05.9 COUGH: Primary | ICD-10-CM

## 2022-03-31 PROCEDURE — 71046 X-RAY EXAM CHEST 2 VIEWS: CPT

## 2022-04-11 ENCOUNTER — HOSPITAL ENCOUNTER (OUTPATIENT)
Dept: MAMMOGRAPHY | Age: 62
Discharge: HOME | End: 2022-04-11
Attending: FAMILY MEDICINE
Payer: COMMERCIAL

## 2022-04-11 DIAGNOSIS — Z12.31 ENCOUNTER FOR SCREENING MAMMOGRAM FOR MALIGNANT NEOPLASM OF BREAST: ICD-10-CM

## 2022-04-11 PROCEDURE — 77067 SCR MAMMO BI INCL CAD: CPT

## 2022-06-02 LAB — SARS-COV-2, NAA: POSITIVE

## 2022-07-08 ENCOUNTER — OFFICE VISIT (OUTPATIENT)
Dept: FAMILY MEDICINE CLINIC | Age: 62
End: 2022-07-08
Payer: COMMERCIAL

## 2022-07-08 VITALS
RESPIRATION RATE: 17 BRPM | TEMPERATURE: 98.7 F | SYSTOLIC BLOOD PRESSURE: 111 MMHG | WEIGHT: 165 LBS | DIASTOLIC BLOOD PRESSURE: 74 MMHG | HEIGHT: 62 IN | BODY MASS INDEX: 30.36 KG/M2 | HEART RATE: 70 BPM | OXYGEN SATURATION: 97 %

## 2022-07-08 DIAGNOSIS — G89.29 HIP PAIN, CHRONIC, RIGHT: Primary | ICD-10-CM

## 2022-07-08 DIAGNOSIS — M25.562 CHRONIC PAIN OF BOTH KNEES: ICD-10-CM

## 2022-07-08 DIAGNOSIS — M25.551 HIP PAIN, CHRONIC, RIGHT: Primary | ICD-10-CM

## 2022-07-08 DIAGNOSIS — R93.7 ABNORMAL X-RAY OF LUMBAR SPINE: ICD-10-CM

## 2022-07-08 DIAGNOSIS — G89.29 CHRONIC PAIN OF BOTH KNEES: ICD-10-CM

## 2022-07-08 DIAGNOSIS — M25.561 CHRONIC PAIN OF BOTH KNEES: ICD-10-CM

## 2022-07-08 PROCEDURE — 99213 OFFICE O/P EST LOW 20 MIN: CPT | Performed by: FAMILY MEDICINE

## 2022-07-08 RX ORDER — METHYLPREDNISOLONE 4 MG/1
TABLET ORAL
Qty: 1 DOSE PACK | Refills: 0 | Status: SHIPPED | OUTPATIENT
Start: 2022-07-08 | End: 2022-09-13 | Stop reason: ALTCHOICE

## 2022-07-08 NOTE — PROGRESS NOTES
Chief Complaint   Patient presents with    Leg Pain     could not sleep on either side, she does has bulging disk. 1. \"Have you been to the ER, urgent care clinic since your last visit? Hospitalized since your last visit? \"Yes Tested positive for Covid     2. \"Have you seen or consulted any other health care providers outside of the [address] [place] [address] since your last visit? \" Yes Where: [facility]      3. For patients aged 39-70: Has the patient had a colonoscopy / FIT/ Cologuard? No      If the patient is female:    4. For patients aged 41-77: Has the patient had a mammogram within the past 2 years? No      5. For patients aged 21-65: Has the patient had a pap smear?  No    Health Maintenance Due   Topic Date Due    A1C test (Diabetic or Prediabetic)  07/30/2021    Cervical cancer screen  05/10/2022

## 2022-07-08 NOTE — PROGRESS NOTES
HISTORY OF PRESENT ILLNESS  Leann Man is a 60 y.o. female , with history of s/p b/l knee replacemt, and Working at [employer] for 29 yrs presents stating that this has been an chronic problem. pt's with history of sports activity, has also difficulty with walking and that the pain is worsening specially by going up and down the steps . The pain is present in the RT Hip area. is a dull pain and  is at a severity of 7 /10. the pt has the AM stiffness, but denies numbness and tingling sensations  There has been no history of extremity trauma. no incontinence of urine nor of stool, and no lower ext Weaknesses          Current Outpatient Medications   Medication Sig Dispense Refill    diclofenac (VOLTAREN) 1 % gel Apply 4 g to affected area four (4) times daily. 200 g 2    loratadine (Claritin) 10 mg tablet Take 10 mg by mouth daily as needed.  cholecalciferol (Vitamin D3) 25 mcg (1,000 unit) cap Take  by mouth daily.  albuterol (PROVENTIL HFA, VENTOLIN HFA, PROAIR HFA) 90 mcg/actuation inhaler Take 1 Puff by inhalation every four (4) hours as needed for Wheezing. 1 Inhaler 6    tiotropium bromide (Spiriva Respimat) 2.5 mcg/actuation inhaler Take 2 Puffs by inhalation nightly. 1 Inhaler 6    Nebulizer & Compressor machine 1 Each by Does Not Apply route four (4) times daily as needed for Cough. 1 Each 0    ascorbic acid, vitamin C, (VITAMIN C) 500 mg tablet Take 500 mg by mouth daily.  MULTIVITS,CA,MINERALS/IRON/FA (MULTI FOR HER PO) Take 1 Tab by mouth daily.  albuterol (PROVENTIL VENTOLIN) 2.5 mg /3 mL (0.083 %) nebu 3 mL by Nebulization route once for 1 dose. 70 Each 1    azelastine (ASTEPRO) 0.15 % (205.5 mcg) 2 Sprays by Both Nostrils route daily.  As needed (Patient not taking: Reported on 2/24/2022)       Allergies   Allergen Reactions    Crestor [Rosuvastatin] Shortness of Breath    Nabumetone Shortness of Breath    Codeine Nausea Only    Gabapentin (Bulk) Shortness of Breath    Lyrica [Pregabalin] Swelling     Ankle swelling, foot pain , insomina    Percocet [Oxycodone-Acetaminophen] Other (comments)     loopy     Past Medical History:   Diagnosis Date    Acute right ankle pain 7/23/2018    Arthritis 8/3/2010    Asthma     Bunion of great toe of right foot 7/23/2018    Fall at home, initial encounter 9/18/2018    Hammer toe of second toe of right foot 7/23/2018    Hypercholesterolemia 8/18/2010    Postmenopausal 8/3/2010    Prediabetes 9/12/2013    Primary snoring 11/29/2017    Shoulder pain, bilateral 2/8/2018    Sinusitis, acute 12/12/2011    Wrist pain, acute, left 9/18/2018     Past Surgical History:   Procedure Laterality Date    HX GYN  1987    tubal pregancy; laparotomy    HX ORTHOPAEDIC  2006    neck    HX ORTHOPAEDIC  2008    cervical fusion of discs x4    HX PELVIC LAPAROSCOPY  1985    endometriosis    IN REVISE KNEE JOINT REPLACE,ALL PARTS  02/29/2012    tkr left     Family History   Problem Relation Age of Onset    Hypertension Father     Heart Disease Father         CAD    Diabetes Father     High Cholesterol Father     Other Mother         tumor in chest    Allergic Rhinitis Brother     Asthma Brother     Allergic Rhinitis Child     Sickle Cell Trait Child     GERD Child      Social History     Tobacco Use    Smoking status: Never Smoker    Smokeless tobacco: Never Used   Substance Use Topics    Alcohol use: Yes     Comment: wine sometimes      Lab Results   Component Value Date/Time    Hemoglobin A1c 5.6 07/30/2020 10:00 AM    Hemoglobin A1c 6.1 (H) 08/26/2013 08:47 AM    Glucose 87 12/20/2021 10:07 AM    Microalb/Creat ratio (ug/mg creat.) 6.2 02/08/2018 11:55 AM    LDL, calculated 135.8 (H) 12/20/2021 10:07 AM    Creatinine 0.97 12/20/2021 10:07 AM      Lab Results   Component Value Date/Time    Cholesterol, total 226 (H) 12/20/2021 10:07 AM    HDL Cholesterol 77 12/20/2021 10:07 AM    LDL, calculated 135.8 (H) 12/20/2021 10:07 AM Triglyceride 66 12/20/2021 10:07 AM    CHOL/HDL Ratio 2.9 12/20/2021 10:07 AM        Review of Systems   Constitutional: Negative for chills and fever. HENT: Negative for congestion and nosebleeds. Eyes: Negative for blurred vision and pain. Respiratory: Negative for cough, shortness of breath and wheezing. Cardiovascular: Negative for chest pain and leg swelling. Gastrointestinal: Negative for constipation, diarrhea, nausea and vomiting. Genitourinary: Negative for dysuria and frequency. Musculoskeletal: Positive for back pain and joint pain. Negative for myalgias. Skin: Negative for itching and rash. Neurological: Negative for dizziness, loss of consciousness and headaches. Psychiatric/Behavioral: Negative for depression. The patient is not nervous/anxious and does not have insomnia. Physical Exam  Vitals and nursing note reviewed. Constitutional:       Appearance: She is well-developed. HENT:      Head: Normocephalic and atraumatic. Eyes:      Conjunctiva/sclera: Conjunctivae normal.      Pupils: Pupils are equal, round, and reactive to light. Neck:      Thyroid: No thyromegaly. Vascular: No JVD. Cardiovascular:      Rate and Rhythm: Normal rate and regular rhythm. Heart sounds: Normal heart sounds. No murmur heard. No friction rub. No gallop. Pulmonary:      Effort: Pulmonary effort is normal. No respiratory distress. Breath sounds: Normal breath sounds. No stridor. No wheezing or rales. Abdominal:      General: Bowel sounds are normal. There is no distension. Palpations: Abdomen is soft. There is no mass. Tenderness: There is no abdominal tenderness. Musculoskeletal:         General: Tenderness present. Cervical back: Normal range of motion and neck supple. Right hip: Tenderness and crepitus present. Decreased range of motion. Decreased strength. Lymphadenopathy:      Cervical: No cervical adenopathy.    Skin:     Findings: No erythema or rash. Neurological:      Mental Status: She is alert and oriented to person, place, and time. Cranial Nerves: No cranial nerve deficit. Deep Tendon Reflexes: Reflexes are normal and symmetric. Psychiatric:         Behavior: Behavior normal.         ASSESSMENT and PLAN  Diagnoses and all orders for this visit:    1. Hip pain, chronic, right  -     XR HIP RT W OR WO PELV 2-3 VWS; Future  -     methylPREDNISolone (MEDROL DOSEPACK) 4 mg tablet; As directed for 6 days one package  -     REFERRAL TO PHYSICAL THERAPY    2. Chronic pain of both knees  -     methylPREDNISolone (MEDROL DOSEPACK) 4 mg tablet; As directed for 6 days one package    3. Abnormal x-ray of lumbar spine  -     REFERRAL TO ORTHOPEDICS    was told to help with weight reduction, massage therapy, exercise therapy, to remain active but to avoid heavy lifting and pushing at this time for the next 6 weeks ,   Advised for self cared options such as:  NSAID's and Tylenol for pain, take meds w/ food and water, if develop abdominal upsets, such as heart burn and sour stomach. Please take some OTC antacids or Nexium 30 min before the first meal once daily and watch for discolored stool. Also told to do the exercise therapy: Ice therapy 2-30min tid daily, daily stretching x2 daily for 5-10 min, rom strengthening with resistance banding 3-4 times per week, Dependency and tolerancy were also addressed,  meds side effects and compliancy advised,  Call or rtc if worsens,   Pt agreed with today's recommendations.

## 2022-07-27 ENCOUNTER — HOSPITAL ENCOUNTER (OUTPATIENT)
Dept: LAB | Age: 62
Discharge: HOME | End: 2022-07-27
Payer: COMMERCIAL

## 2022-07-27 ENCOUNTER — OFFICE VISIT (OUTPATIENT)
Dept: FAMILY MEDICINE CLINIC | Age: 62
End: 2022-07-27
Payer: COMMERCIAL

## 2022-07-27 VITALS
BODY MASS INDEX: 30.8 KG/M2 | WEIGHT: 167.4 LBS | OXYGEN SATURATION: 100 % | DIASTOLIC BLOOD PRESSURE: 72 MMHG | TEMPERATURE: 98.3 F | RESPIRATION RATE: 20 BRPM | HEART RATE: 63 BPM | HEIGHT: 62 IN | SYSTOLIC BLOOD PRESSURE: 132 MMHG

## 2022-07-27 DIAGNOSIS — Z12.31 ENCOUNTER FOR SCREENING MAMMOGRAM FOR MALIGNANT NEOPLASM OF BREAST: ICD-10-CM

## 2022-07-27 DIAGNOSIS — R73.03 PREDIABETES: ICD-10-CM

## 2022-07-27 DIAGNOSIS — E78.00 HYPERCHOLESTEREMIA: ICD-10-CM

## 2022-07-27 DIAGNOSIS — N89.8 VAGINAL DISCHARGE: ICD-10-CM

## 2022-07-27 DIAGNOSIS — Z12.11 SCREEN FOR COLON CANCER: ICD-10-CM

## 2022-07-27 DIAGNOSIS — Z01.419 WELL WOMAN EXAM WITH ROUTINE GYNECOLOGICAL EXAM: Primary | ICD-10-CM

## 2022-07-27 LAB
ALBUMIN SERPL-MCNC: 3.6 G/DL (ref 3.5–5)
ALBUMIN/GLOB SERPL: 1.2 {RATIO} (ref 1.1–2.2)
ALP SERPL-CCNC: 54 U/L (ref 45–117)
ALT SERPL-CCNC: 23 U/L (ref 12–78)
ANION GAP SERPL CALC-SCNC: 6 MMOL/L (ref 5–15)
AST SERPL-CCNC: 20 U/L (ref 15–37)
BILIRUB SERPL-MCNC: 0.7 MG/DL (ref 0.2–1)
BUN SERPL-MCNC: 16 MG/DL (ref 6–20)
BUN/CREAT SERPL: 18 (ref 12–20)
CALCIUM SERPL-MCNC: 9.2 MG/DL (ref 8.5–10.1)
CHLORIDE SERPL-SCNC: 108 MMOL/L (ref 97–108)
CHOLEST SERPL-MCNC: 218 MG/DL
CO2 SERPL-SCNC: 28 MMOL/L (ref 21–32)
CREAT SERPL-MCNC: 0.89 MG/DL (ref 0.55–1.02)
ERYTHROCYTE [DISTWIDTH] IN BLOOD BY AUTOMATED COUNT: 15.3 % (ref 11.5–14.5)
EST. AVERAGE GLUCOSE BLD GHB EST-MCNC: 120 MG/DL
GLOBULIN SER CALC-MCNC: 3.1 G/DL (ref 2–4)
GLUCOSE SERPL-MCNC: 82 MG/DL (ref 65–100)
HBA1C MFR BLD: 5.8 % (ref 4–5.6)
HCT VFR BLD AUTO: 41.8 % (ref 35–47)
HDLC SERPL-MCNC: 76 MG/DL
HDLC SERPL: 2.9 {RATIO} (ref 0–5)
HGB BLD-MCNC: 12.9 G/DL (ref 11.5–16)
LDLC SERPL CALC-MCNC: 125.8 MG/DL (ref 0–100)
MCH RBC QN AUTO: 27.4 PG (ref 26–34)
MCHC RBC AUTO-ENTMCNC: 30.9 G/DL (ref 30–36.5)
MCV RBC AUTO: 88.7 FL (ref 80–99)
NRBC # BLD: 0 K/UL (ref 0–0.01)
NRBC BLD-RTO: 0 PER 100 WBC
PLATELET # BLD AUTO: 222 K/UL (ref 150–400)
PMV BLD AUTO: 9.4 FL (ref 8.9–12.9)
POTASSIUM SERPL-SCNC: 4.1 MMOL/L (ref 3.5–5.1)
PROT SERPL-MCNC: 6.7 G/DL (ref 6.4–8.2)
RBC # BLD AUTO: 4.71 M/UL (ref 3.8–5.2)
SODIUM SERPL-SCNC: 142 MMOL/L (ref 136–145)
T4 FREE SERPL-MCNC: 0.9 NG/DL (ref 0.8–1.5)
TRIGL SERPL-MCNC: 81 MG/DL (ref ?–150)
TSH SERPL DL<=0.05 MIU/L-ACNC: 0.13 UIU/ML (ref 0.36–3.74)
VLDLC SERPL CALC-MCNC: 16.2 MG/DL
WBC # BLD AUTO: 4.6 K/UL (ref 3.6–11)

## 2022-07-27 PROCEDURE — 88175 CYTOPATH C/V AUTO FLUID REDO: CPT

## 2022-07-27 PROCEDURE — 87624 HPV HI-RISK TYP POOLED RSLT: CPT

## 2022-07-27 PROCEDURE — 99396 PREV VISIT EST AGE 40-64: CPT | Performed by: FAMILY MEDICINE

## 2022-07-27 NOTE — PROGRESS NOTES
Chief Complaint   Patient presents with    Well Woman       1. \"Have you been to the ER, urgent care clinic since your last visit? Hospitalized since your last visit? \" No    2. \"Have you seen or consulted any other health care providers outside of the [address] [place] [address] since your last visit? \" No     3. For patients aged 39-70: Has the patient had a colonoscopy / FIT/ Cologuard? No      If the patient is female:    4. For patients aged 41-77: Has the patient had a mammogram within the past 2 years? Yes - no Care Gap present      5. For patients aged 21-65: Has the patient had a pap smear?  No    Health Maintenance Due   Topic Date Due    A1C test (Diabetic or Prediabetic)  07/30/2021    COVID-19 Vaccine (4 - Booster for Pfizer series) 04/30/2022    Cervical cancer screen  05/10/2022

## 2022-07-27 NOTE — PROGRESS NOTES
Subjective:   60 y.o. female for Well Woman Check. She is postmenopausal.  Social History: has sex with males. Pertinent past medical hstory: no history of HTN, DVT, CAD, DM, liver disease, migraines or smoking. Current Outpatient Medications   Medication Sig Dispense Refill    diclofenac (VOLTAREN) 1 % gel Apply 4 g to affected area four (4) times daily. 200 g 2    loratadine (CLARITIN) 10 mg tablet Take 10 mg by mouth daily as needed. cholecalciferol (VITAMIN D3) 25 mcg (1,000 unit) cap Take  by mouth daily. albuterol (PROVENTIL HFA, VENTOLIN HFA, PROAIR HFA) 90 mcg/actuation inhaler Take 1 Puff by inhalation every four (4) hours as needed for Wheezing. 1 Inhaler 6    tiotropium bromide (Spiriva Respimat) 2.5 mcg/actuation inhaler Take 2 Puffs by inhalation nightly. 1 Inhaler 6    Nebulizer & Compressor machine 1 Each by Does Not Apply route four (4) times daily as needed for Cough. 1 Each 0    ascorbic acid, vitamin C, (VITAMIN C) 500 mg tablet Take 500 mg by mouth daily. MULTIVITS,CA,MINERALS/IRON/FA (MULTI FOR HER PO) Take 1 Tab by mouth daily. methylPREDNISolone (MEDROL DOSEPACK) 4 mg tablet As directed for 6 days one package (Patient not taking: Reported on 7/27/2022) 1 Dose Pack 0    albuterol (PROVENTIL VENTOLIN) 2.5 mg /3 mL (0.083 %) nebu 3 mL by Nebulization route once for 1 dose. 70 Each 1    azelastine (ASTEPRO) 0.15 % (205.5 mcg) 2 Sprays by Both Nostrils route daily.  As needed (Patient not taking: Reported on 2/24/2022)       Allergies   Allergen Reactions    Crestor [Rosuvastatin] Shortness of Breath    Nabumetone Shortness of Breath    Codeine Nausea Only    Gabapentin (Bulk) Shortness of Breath    Lyrica [Pregabalin] Swelling     Ankle swelling, foot pain , insomina    Percocet [Oxycodone-Acetaminophen] Other (comments)     loopy     Past Medical History:   Diagnosis Date    Acute right ankle pain 7/23/2018    Arthritis 8/3/2010    Asthma     Bunion of great toe of right foot 7/23/2018    Fall at home, initial encounter 9/18/2018    Hammer toe of second toe of right foot 7/23/2018    Hypercholesterolemia 8/18/2010    Postmenopausal 8/3/2010    Prediabetes 9/12/2013    Primary snoring 11/29/2017    Shoulder pain, bilateral 2/8/2018    Sinusitis, acute 12/12/2011    Wrist pain, acute, left 9/18/2018     Past Surgical History:   Procedure Laterality Date    HX GYN  1987    tubal pregancy; laparotomy    HX ORTHOPAEDIC  2006    neck    HX ORTHOPAEDIC  2008    cervical fusion of discs x4    HX PELVIC LAPAROSCOPY  1985    endometriosis    MT REVISE KNEE JOINT REPLACE,ALL PARTS  02/29/2012    tkr left     Family History   Problem Relation Age of Onset    Hypertension Father     Heart Disease Father         CAD    Diabetes Father     High Cholesterol Father     Other Mother         tumor in chest    Allergic Rhinitis Brother     Asthma Brother     Allergic Rhinitis Child     Sickle Cell Trait Child     GERD Child      Social History     Tobacco Use    Smoking status: Never    Smokeless tobacco: Never   Substance Use Topics    Alcohol use: Yes     Comment: wine sometimes        ROS:  Feeling well. No dyspnea or chest pain on exertion. No abdominal pain, change in bowel habits, black or bloody stools. No urinary tract symptoms. GYN ROS: no breast pain or new or enlarging lumps on self exam, no vaginal bleeding, no discharge or pelvic pain, no hot flashes. Menopausal symptoms: none, breast tenderness, bloating, and hot flashes. No neurological complaints. Last DEXA scan and T-score: none  Last Colonoscopy: 2020  Last Mammogram: 2022    Objective:   Visit Vitals  Wt 167 lb 6.4 oz (75.9 kg)   BMI 30.62 kg/m²     The patient appears well, alert, oriented x 3, in no distress. ENT normal.  Neck supple. No adenopathy or thyromegaly. MICK. Lungs are clear, good air entry, no wheezes, rhonchi or rales. S1 and S2 normal, no murmurs, regular rate and rhythm.  Abdomen soft without tenderness, guarding, mass or organomegaly. Extremities show no edema, normal peripheral pulses. Neurological is normal, no focal findings. BREAST EXAM: risk and benefit of breast self-exam was discussed, patient declines to have breast exam    PELVIC EXAM: normal external genitalia, vulva, vagina, cervix, uterus and adnexa    Assessment/Plan:   well woman  postmenopausal  mammogram  pap smear  additional lab tests per orders  return annually or prn  bone density screening ordered  screening colonoscopy referral written    ICD-10-CM ICD-9-CM    1. Well woman exam with routine gynecological exam  Z01.419 V72.31 PAP IG, APTIMA HPV AND RFX 16/18,45 ([id number])      NUSWAB VAGINITIS PLUS    [V72.31]      2. Encounter for screening mammogram for malignant neoplasm of breast  Z12.31 V76.12       3. Screen for colon cancer  Z12.11 V76.51       4. Hypercholesteremia  E78.00 272.0 CBC W/O DIFF      LIPID PANEL      METABOLIC PANEL, COMPREHENSIVE      TSH 3RD GENERATION      T4, FREE      HEMOGLOBIN A1C WITH EAG      HEMOGLOBIN A1C WITH EAG      T4, FREE      TSH 3RD GENERATION      METABOLIC PANEL, COMPREHENSIVE      LIPID PANEL      CBC W/O DIFF      5. Prediabetes  R73.03 790.29 CBC W/O DIFF      LIPID PANEL      METABOLIC PANEL, COMPREHENSIVE      TSH 3RD GENERATION      T4, FREE      HEMOGLOBIN A1C WITH EAG      HEMOGLOBIN A1C WITH EAG      T4, FREE      TSH 3RD GENERATION      METABOLIC PANEL, COMPREHENSIVE      LIPID PANEL      CBC W/O DIFF      6. Vaginal discharge  N89.8 623.5 NUSWAB VAGINITIS PLUS        lab results and schedule of future lab studies reviewed with patient  Only significant for low level of TSH could have been medication induced repeat on next visit if still abnormal your referral to endocrinologist    reviewed diet, exercise and weight control.

## 2022-07-27 NOTE — PATIENT INSTRUCTIONS
Pap Test: Care Instructions  Overview     The Pap test (also called a Pap smear) is a screening test for cancer of the cervix, which is the lower part of the uterus that opens into the vagina. The test can help your doctor find early changes in the cells that could lead to cancer. The sample of cells taken during your test has been sent to a lab so that an expert can look at the cells. It usually takes a week or two to get the results back. Follow-up care is a key part of your treatment and safety. Be sure to make and go to all appointments, and call your doctor if you are having problems. It's also a good idea to know your test results and keep a list of the medicines you take. What do the results mean? A normal result means that the test did not find any abnormal cells in the sample. An abnormal result can mean many things. Most of these are not cancer. The results of your test may be abnormal because: You have an infection of the vagina or cervix, such as a yeast infection. You have an IUD (intrauterine device for birth control). You have low estrogen levels after menopause that are causing the cells to change. You have cell changes that may be a sign of precancer or cancer. The results are ranked based on how serious the changes might be. There are many other reasons why you might not get a normal result. If the results were abnormal, you may need to get another test within a few weeks or months. If the results show changes that could be a sign of cancer, you may need a test called a colposcopy, which provides a more complete view of the cervix. Sometimes the lab cannot use the sample because it does not contain enough cells or was not preserved well. If so, you may need to have the test again. This is not common, but it does happen from time to time. When should you call for help?   Watch closely for changes in your health, and be sure to contact your doctor if:    You have vaginal bleeding or pain for more than 2 days after the test. It is normal to have a small amount of bleeding for a day or two after the test.   Where can you learn more? Go to http://www.gray.com/  Enter P407 in the search box to learn more about \"Pap Test: Care Instructions. \"  Current as of: September 8, 2021               Content Version: 13.2  © 2910-0642 [employer] Care instructions adapted under license by [employer] (which disclaims liability or warranty for this information). If you have questions about a medical condition or this instruction, always ask your healthcare professional. [place] [address] any warranty or liability for your use of this information.

## 2022-07-30 LAB
A VAGINAE DNA VAG QL NAA+PROBE: NORMAL SCORE
BVAB2 DNA VAG QL NAA+PROBE: NORMAL SCORE
C ALBICANS DNA VAG QL NAA+PROBE: NEGATIVE
C GLABRATA DNA VAG QL NAA+PROBE: NEGATIVE
C TRACH RRNA SPEC QL NAA+PROBE: NEGATIVE
MEGA1 DNA VAG QL NAA+PROBE: NORMAL SCORE
N GONORRHOEA RRNA SPEC QL NAA+PROBE: NEGATIVE
SPECIMEN SOURCE: NORMAL
T VAGINALIS RRNA SPEC QL NAA+PROBE: NEGATIVE

## 2022-09-08 ENCOUNTER — NURSE TRIAGE (OUTPATIENT)
Dept: OTHER | Facility: CLINIC | Age: 62
End: 2022-09-08

## 2022-09-08 NOTE — TELEPHONE ENCOUNTER
Received call from [address] at [facility] with [facility]. Current Symptoms: bilateral ankle and leg swelling, left greater than right, swelling extends to mid calf    Onset: 6 days ago;       Pain Severity: 0/10; Temperature: denies     Denies - chest pain / shortness of breath / red streaking or bruising on legs / swelling to face arms or hands     What has been tried: drinking water and cranberry juice    Recommended disposition: See PCP within 3 Days    Care advice provided, patient verbalizes understanding; denies any other questions or concerns; instructed to call back for any new or worsening symptoms. Call disconnected prior to transfer to [facility]) for scheduling. Triage RN called [phone] and left voicemail asking for patient to call the office for appointment scheduling. Attention Provider: Thank you for allowing me to participate in the care of your patient. The patient was connected to triage in response to information provided to the [facility]. Please do not respond through this encounter as the response is not directed to a shared pool.         Reason for Disposition   MILD swelling of both ankles (i.e., pedal edema) AND new-onset or worsening    Protocols used: Leg Swelling and Edema-ADULT-[place]

## 2022-09-13 ENCOUNTER — OFFICE VISIT (OUTPATIENT)
Dept: FAMILY MEDICINE CLINIC | Age: 62
End: 2022-09-13
Payer: COMMERCIAL

## 2022-09-13 VITALS
BODY MASS INDEX: 32.06 KG/M2 | DIASTOLIC BLOOD PRESSURE: 74 MMHG | HEART RATE: 75 BPM | OXYGEN SATURATION: 97 % | WEIGHT: 174.2 LBS | RESPIRATION RATE: 17 BRPM | SYSTOLIC BLOOD PRESSURE: 135 MMHG | HEIGHT: 62 IN | TEMPERATURE: 98.6 F

## 2022-09-13 DIAGNOSIS — R22.43 LOCALIZED SWELLING OF BOTH LOWER LEGS: Primary | ICD-10-CM

## 2022-09-13 DIAGNOSIS — Z71.89 ADVICE GIVEN ABOUT COVID-19 VIRUS INFECTION: ICD-10-CM

## 2022-09-13 PROCEDURE — 99213 OFFICE O/P EST LOW 20 MIN: CPT | Performed by: FAMILY MEDICINE

## 2022-09-13 RX ORDER — FUROSEMIDE 20 MG/1
20 TABLET ORAL DAILY
Qty: 14 TABLET | Refills: 0 | Status: SHIPPED | OUTPATIENT
Start: 2022-09-13 | End: 2022-09-29 | Stop reason: SDUPTHER

## 2022-09-13 RX ORDER — POTASSIUM CHLORIDE 20 MEQ/1
20 TABLET, EXTENDED RELEASE ORAL DAILY
Qty: 14 TABLET | Refills: 0 | Status: SHIPPED | OUTPATIENT
Start: 2022-09-13 | End: 2022-09-29 | Stop reason: SDUPTHER

## 2022-09-13 NOTE — PROGRESS NOTES
HISTORY OF PRESENT ILLNESS  Destiny Mcfarlane is a 60 y.o. 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 200 g 2    loratadine (CLARITIN) 10 mg tablet Take 10 mg by mouth daily as needed. cholecalciferol (VITAMIN D3) 25 mcg (1,000 unit) cap Take  by mouth daily. albuterol (PROVENTIL HFA, VENTOLIN HFA, PROAIR HFA) 90 mcg/actuation inhaler Take 1 Puff by inhalation every four (4) hours as needed for Wheezing. 1 Inhaler 6    tiotropium bromide (Spiriva Respimat) 2.5 mcg/actuation inhaler Take 2 Puffs by inhalation nightly. 1 Inhaler 6    Nebulizer & Compressor machine 1 Each by Does Not Apply route four (4) times daily as needed for Cough. 1 Each 0    ascorbic acid, vitamin C, (VITAMIN C) 500 mg tablet Take 500 mg by mouth daily. MULTIVITS,CA,MINERALS/IRON/FA (MULTI FOR HER PO) Take 1 Tab by mouth daily. albuterol (PROVENTIL VENTOLIN) 2.5 mg /3 mL (0.083 %) nebu 3 mL by Nebulization route once for 1 dose.  70 Each 1     Allergies   Allergen Reactions    Crestor [Rosuvastatin] Shortness of Breath    Nabumetone Shortness of Breath    Codeine Nausea Only Gabapentin (Bulk) Shortness of Breath    Lyrica [Pregabalin] Swelling     Ankle swelling, foot pain , insomina    Percocet [Oxycodone-Acetaminophen] Other (comments)     loopy     Past Medical History:   Diagnosis Date    Acute right ankle pain 7/23/2018    Arthritis 8/3/2010    Asthma     Bunion of great toe of right foot 7/23/2018    Fall at home, initial encounter 9/18/2018    Hammer toe of second toe of right foot 7/23/2018    Hypercholesterolemia 8/18/2010    Postmenopausal 8/3/2010    Prediabetes 9/12/2013    Primary snoring 11/29/2017    Shoulder pain, bilateral 2/8/2018    Sinusitis, acute 12/12/2011    Wrist pain, acute, left 9/18/2018     Past Surgical History:   Procedure Laterality Date    HX GYN  1987    tubal pregancy; laparotomy    HX ORTHOPAEDIC  2006    neck    HX ORTHOPAEDIC  2008    cervical fusion of discs x4    HX PELVIC LAPAROSCOPY  1985    endometriosis    [place] REVISE KNEE JOINT REPLACE,ALL PARTS  02/29/2012    tkr left     Family History   Problem Relation Age of Onset    Hypertension Father     Heart Disease Father         CAD    Diabetes Father     High Cholesterol Father     Other Mother         tumor in chest    Allergic Rhinitis Brother     Asthma Brother     Allergic Rhinitis Child     Sickle Cell Trait Child     GERD Child      Social History     Tobacco Use    Smoking status: Never    Smokeless tobacco: Never   Substance Use Topics    Alcohol use: Yes     Comment: wine sometimes      Lab Results   Component Value Date/Time    Cholesterol, total 218 (H) 07/27/2022 10:14 AM    HDL Cholesterol 76 07/27/2022 10:14 AM    LDL, calculated 125.8 (H) 07/27/2022 10:14 AM    Triglyceride 81 07/27/2022 10:14 AM    CHOL/HDL Ratio 2.9 07/27/2022 10:14 AM     Lab Results   Component Value Date/Time    ALT (SGPT) 23 07/27/2022 10:14 AM    Alk.  phosphatase 54 07/27/2022 10:14 AM    Bilirubin, total 0.7 07/27/2022 10:14 AM    Albumin 3.6 07/27/2022 10:14 AM    Protein, total 6.7 07/27/2022 10:14 AM PLATELET 848 31/14/5017 10:14 AM        Review of Systems   Constitutional:  Negative for chills, fever and malaise/fatigue. HENT:  Negative for nosebleeds. Eyes:  Negative for pain. Respiratory:  Negative for cough and wheezing. Cardiovascular:  Positive for leg swelling. Negative for chest pain. Gastrointestinal:  Negative for constipation, diarrhea and nausea. Genitourinary:  Negative for frequency. Musculoskeletal:  Negative for joint pain and myalgias. Skin:  Negative for rash. Neurological:  Negative for loss of consciousness and headaches. Endo/Heme/Allergies:  Does not bruise/bleed easily. Psychiatric/Behavioral:  Negative for depression. The patient is not nervous/anxious and does not have insomnia. All other systems reviewed and are negative. Physical Exam  Vitals and nursing note reviewed. Constitutional:       Appearance: She is well-developed. HENT:      Head: Normocephalic and atraumatic. Eyes:      Conjunctiva/sclera: Conjunctivae normal.      Pupils: Pupils are equal, round, and reactive to light. Neck:      Thyroid: No thyromegaly. Vascular: No JVD. Cardiovascular:      Rate and Rhythm: Normal rate and regular rhythm. Heart sounds: Normal heart sounds. No murmur heard. No friction rub. No gallop. Pulmonary:      Effort: Pulmonary effort is normal. No respiratory distress. Breath sounds: Normal breath sounds. No stridor. No wheezing or rales. Abdominal:      General: Bowel sounds are normal. There is no distension. Palpations: Abdomen is soft. There is no mass. Tenderness: no abdominal tenderness   Musculoskeletal:         General: No tenderness. Normal range of motion. Right lower leg: Edema present. Left lower leg: Edema present. Lymphadenopathy:      Cervical: No cervical adenopathy. Skin:     Findings: No erythema or rash. Neurological:      Mental Status: She is alert and oriented to person, place, and time. Cranial Nerves: No cranial nerve deficit. Deep Tendon Reflexes: Reflexes are normal and symmetric. Psychiatric:         Behavior: Behavior normal.       ASSESSMENT and PLAN    ICD-10-CM ICD-9-CM    1. Localized swelling of both lower legs  R22.43 729.81 DUPLEX LOWER EXT VENOUS BILAT      furosemide (LASIX) 20 mg tablet      potassium chloride (K-DUR, KLOR-CON M20) 20 mEq tablet      2.  Advice given about COVID-19 virus infection  Z71.89 V65.49       pt was advised about not to have an  increased salt intake, avoid long flying or long car trips, used the diuretics as needed, and do the support stockings daily and off night, elevation of involved area, if any shortness of breath, high weight gain call for advise, avoid smoking/ tobacco exposure,and  sedentary life style, Leg elevation at all times or most of the times, use of two pillows at nights while in bed, ambulation as possible, pt agreed    lab results and schedule of future lab studies reviewed with patient  reviewed medications and side effects in detail

## 2022-09-14 ENCOUNTER — HOSPITAL ENCOUNTER (OUTPATIENT)
Dept: ULTRASOUND IMAGING | Age: 62
Discharge: HOME | End: 2022-09-14
Attending: FAMILY MEDICINE
Payer: COMMERCIAL

## 2022-09-14 DIAGNOSIS — R22.43 LOCALIZED SWELLING OF BOTH LOWER LEGS: ICD-10-CM

## 2022-09-14 PROCEDURE — 93970 EXTREMITY STUDY: CPT

## 2022-09-29 ENCOUNTER — OFFICE VISIT (OUTPATIENT)
Dept: FAMILY MEDICINE CLINIC | Age: 62
End: 2022-09-29

## 2022-09-29 VITALS
HEART RATE: 81 BPM | SYSTOLIC BLOOD PRESSURE: 122 MMHG | DIASTOLIC BLOOD PRESSURE: 78 MMHG | RESPIRATION RATE: 14 BRPM | HEIGHT: 62 IN | BODY MASS INDEX: 31.73 KG/M2 | OXYGEN SATURATION: 96 % | TEMPERATURE: 98.3 F | WEIGHT: 172.4 LBS

## 2022-09-29 DIAGNOSIS — Z23 ENCOUNTER FOR IMMUNIZATION: ICD-10-CM

## 2022-09-29 DIAGNOSIS — Z23 NEEDS FLU SHOT: ICD-10-CM

## 2022-09-29 DIAGNOSIS — R22.43 LOCALIZED SWELLING OF BOTH LOWER LEGS: Primary | ICD-10-CM

## 2022-09-29 RX ORDER — POTASSIUM CHLORIDE 20 MEQ/1
20 TABLET, EXTENDED RELEASE ORAL DAILY
Qty: 14 TABLET | Refills: 0 | Status: SHIPPED | OUTPATIENT
Start: 2022-09-29

## 2022-09-29 RX ORDER — FUROSEMIDE 20 MG/1
20 TABLET ORAL DAILY
Qty: 14 TABLET | Refills: 0 | Status: SHIPPED | OUTPATIENT
Start: 2022-09-29

## 2022-09-29 NOTE — PROGRESS NOTES
HISTORY OF PRESENT ILLNESS  Jose Callaway is a 60 y.o. female, present with a complaint of moderate amount of leg swelling, mostly lower legs bilateral, ankles bilateral, The edema has been moderate. The patient states the problem is long-standing, comes and go unfortunately worsened with salty intake,today the patient denies leg pain, denies rash, and legs lesion,and the denial of dizziness,   the wt went up , patient also present for flu vaccination never had any allergic reaction toward any influenza vaccine    Current Outpatient Medications   Medication Sig Dispense Refill    furosemide (LASIX) 20 mg tablet Take 1 Tablet by mouth daily. 14 Tablet 0    potassium chloride (K-DUR, KLOR-CON M20) 20 mEq tablet Take 1 Tablet by mouth daily. Indications: prevention of low potassium in the blood 14 Tablet 0    diclofenac (VOLTAREN) 1 % gel Apply 4 g to affected area four (4) times daily. 200 g 2    loratadine (CLARITIN) 10 mg tablet Take 10 mg by mouth daily as needed. cholecalciferol (VITAMIN D3) 25 mcg (1,000 unit) cap Take  by mouth daily. albuterol (PROVENTIL HFA, VENTOLIN HFA, PROAIR HFA) 90 mcg/actuation inhaler Take 1 Puff by inhalation every four (4) hours as needed for Wheezing. 1 Inhaler 6    tiotropium bromide (Spiriva Respimat) 2.5 mcg/actuation inhaler Take 2 Puffs by inhalation nightly. 1 Inhaler 6    Nebulizer & Compressor machine 1 Each by Does Not Apply route four (4) times daily as needed for Cough. 1 Each 0    ascorbic acid, vitamin C, (VITAMIN C) 500 mg tablet Take 500 mg by mouth daily. MULTIVITS,CA,MINERALS/IRON/FA (MULTI FOR HER PO) Take 1 Tab by mouth daily. albuterol (PROVENTIL VENTOLIN) 2.5 mg /3 mL (0.083 %) nebu 3 mL by Nebulization route once for 1 dose.  70 Each 1     Allergies   Allergen Reactions    Crestor [Rosuvastatin] Shortness of Breath    Nabumetone Shortness of Breath    Codeine Nausea Only    Doxycycline Unknown (comments)    Gabapentin (Bulk) Shortness of Breath    Lyrica [Pregabalin] Swelling     Ankle swelling, foot pain , insomina    Percocet [Oxycodone-Acetaminophen] Other (comments)     loopy     Past Medical History:   Diagnosis Date    Acute right ankle pain 7/23/2018    Arthritis 8/3/2010    Asthma     Bunion of great toe of right foot 7/23/2018    Fall at home, initial encounter 9/18/2018    Hammer toe of second toe of right foot 7/23/2018    Hypercholesterolemia 8/18/2010    Postmenopausal 8/3/2010    Prediabetes 9/12/2013    Primary snoring 11/29/2017    Shoulder pain, bilateral 2/8/2018    Sinusitis, acute 12/12/2011    Wrist pain, acute, left 9/18/2018     Past Surgical History:   Procedure Laterality Date    HX GYN  1987    tubal pregancy; laparotomy    HX ORTHOPAEDIC  2006    neck    HX ORTHOPAEDIC  2008    cervical fusion of discs x4    HX PELVIC LAPAROSCOPY  1985    endometriosis    [place] REVISE KNEE JOINT REPLACE,ALL PARTS  02/29/2012    tkr left     Family History   Problem Relation Age of Onset    Hypertension Father     Heart Disease Father         CAD    Diabetes Father     High Cholesterol Father     Other Mother         tumor in chest    Allergic Rhinitis Brother     Asthma Brother     Allergic Rhinitis Child     Sickle Cell Trait Child     GERD Child      Social History     Tobacco Use    Smoking status: Never    Smokeless tobacco: Never   Substance Use Topics    Alcohol use: Yes     Comment: wine sometimes      Lab Results   Component Value Date/Time    WBC 4.6 07/27/2022 10:14 AM    HGB 12.9 07/27/2022 10:14 AM    HCT 41.8 07/27/2022 10:14 AM    PLATELET 811 70/85/7797 10:14 AM    MCV 88.7 07/27/2022 10:14 AM     Lab Results   Component Value Date/Time    GFR est non-AA >60 07/27/2022 10:14 AM    GFR est AA >60 07/27/2022 10:14 AM    Creatinine 0.89 07/27/2022 10:14 AM    BUN 16 07/27/2022 10:14 AM    Sodium 142 07/27/2022 10:14 AM    Potassium 4.1 07/27/2022 10:14 AM    Chloride 108 07/27/2022 10:14 AM    CO2 28 07/27/2022 10:14 AM Magnesium 2.1 12/20/2021 10:07 AM        Review of Systems   Constitutional:  Negative for chills, fever and malaise/fatigue. HENT:  Negative for nosebleeds. Eyes:  Negative for pain. Respiratory:  Negative for cough and wheezing. Cardiovascular:  Positive for leg swelling. Negative for chest pain. Gastrointestinal:  Negative for constipation, diarrhea and nausea. Genitourinary:  Negative for frequency. Musculoskeletal:  Negative for joint pain and myalgias. Skin:  Negative for rash. Neurological:  Negative for loss of consciousness and headaches. Endo/Heme/Allergies:  Does not bruise/bleed easily. Psychiatric/Behavioral:  Negative for depression. The patient is not nervous/anxious and does not have insomnia. All other systems reviewed and are negative. Physical Exam  Vitals and nursing note reviewed. Constitutional:       Appearance: She is well-developed. HENT:      Head: Normocephalic and atraumatic. Eyes:      Conjunctiva/sclera: Conjunctivae normal.      Pupils: Pupils are equal, round, and reactive to light. Neck:      Thyroid: No thyromegaly. Vascular: No JVD. Cardiovascular:      Rate and Rhythm: Normal rate and regular rhythm. Heart sounds: Normal heart sounds. No murmur heard. No friction rub. No gallop. Pulmonary:      Effort: Pulmonary effort is normal. No respiratory distress. Breath sounds: Normal breath sounds. No stridor. No wheezing or rales. Abdominal:      General: Bowel sounds are normal. There is no distension. Palpations: Abdomen is soft. There is no mass. Tenderness: There is no abdominal tenderness. Musculoskeletal:         General: No tenderness. Normal range of motion. Right lower leg: Edema present. Left lower leg: Edema present. Lymphadenopathy:      Cervical: No cervical adenopathy. Skin:     Findings: No erythema or rash.    Neurological:      Mental Status: She is alert and oriented to person, place, and time. Cranial Nerves: No cranial nerve deficit. Deep Tendon Reflexes: Reflexes are normal and symmetric. Psychiatric:         Behavior: Behavior normal.       ASSESSMENT and PLAN    ICD-10-CM ICD-9-CM    1. Localized swelling of both lower legs  R22.43 729.81 furosemide (LASIX) 20 mg tablet      potassium chloride (K-DUR, KLOR-CON M20) 20 mEq tablet      2. Encounter for immunization  Z23 V03.89 COVID-19, PFIZER BIVALENT BOOSTER, (AGE 12Y+), IM, 30 MCG/0.3 ML      CANCELED: INFLUENZA, FLUAD, (AGE 65 Y+), IM, PF, 0.5 ML      3.  Needs flu shot  Z23 V04.81 INFLUENZA, FLUARIX, FLULAVAL, FLUZONE (AGE 6 MO+), AFLURIA(AGE 3Y+) IM, PF, 0.5 ML        lab results and schedule of future lab studies reviewed with patient  reviewed medications and side effects in detail  pt was advised about not to have an  increased salt intake, avoid long flying or long car trips, used the diuretics as needed, and do the support stockings daily and off night, elevation of involved area, if any shortness of breath, high weight gain call for advise, avoid smoking/ tobacco exposure,and  sedentary life style, Leg elevation at all times or most of the times, use of two pillows at nights while in bed, ambulation as possible, pt agreed

## 2022-09-29 NOTE — PATIENT INSTRUCTIONS
Vaccine Information Statement    Influenza (Flu) Vaccine (Inactivated or Recombinant): What You Need to Know    Many vaccine information statements are available in Greek and other languages. See www.immunize.org/vis. Hojas de información sobre vacunas están disponibles en español y en muchos otros idiomas. Visite www.immunize.org/vis. 1. Why get vaccinated? Influenza vaccine can prevent influenza (flu). Flu is a contagious disease that spreads around the [facility] every year, usually between October and May. Anyone can get the flu, but it is more dangerous for some people. Infants and young children, people 72 years and older, pregnant people, and people with certain health conditions or a weakened immune system are at greatest risk of flu complications. Pneumonia, bronchitis, sinus infections, and ear infections are examples of flu-related complications. If you have a medical condition, such as heart disease, cancer, or diabetes, flu can make it worse. Flu can cause fever and chills, sore throat, muscle aches, fatigue, cough, headache, and runny or stuffy nose. Some people may have vomiting and diarrhea, though this is more common in children than adults. In an average year, thousands of people in the [facility] die from flu, and many more are hospitalized. Flu vaccine prevents millions of illnesses and flu-related visits to the doctor each year. 2. Influenza vaccines     CDC recommends everyone 6 months and older get vaccinated every flu season. Children 6 months through 6years of age may need 2 doses during a single flu season. Everyone else needs only 1 dose each flu season. It takes about 2 weeks for protection to develop after vaccination. There are many flu viruses, and they are always changing. Each year a new flu vaccine is made to protect against the influenza viruses believed to be likely to cause disease in the upcoming flu season.  Even when the vaccine doesnt exactly match these viruses, it may still provide some protection. Influenza vaccine does not cause flu. Influenza vaccine may be given at the same time as other vaccines. 3. Talk with your health care provider    Tell your vaccination provider if the person getting the vaccine:  Has had an allergic reaction after a previous dose of influenza vaccine, or has any severe, life-threatening allergies   Has ever had Guillain-Barré Syndrome (also called GBS)    In some cases, your health care provider may decide to postpone influenza vaccination until a future visit. Influenza vaccine can be administered at any time during pregnancy. People who are or will be pregnant during influenza season should receive inactivated influenza vaccine. People with minor illnesses, such as a cold, may be vaccinated. People who are moderately or severely ill should usually wait until they recover before getting influenza vaccine. Your health care provider can give you more information. 4. Risks of a vaccine reaction    Soreness, redness, and swelling where the shot is given, fever, muscle aches, and headache can happen after influenza vaccination. There may be a very small increased risk of Guillain-Barré Syndrome (GBS) after inactivated influenza vaccine (the flu shot). Shelia Samir children who get the flu shot along with pneumococcal vaccine (PCV13) and/or DTaP vaccine at the same time might be slightly more likely to have a seizure caused by fever. Tell your health care provider if a child who is getting flu vaccine has ever had a seizure. People sometimes faint after medical procedures, including vaccination. Tell your provider if you feel dizzy or have vision changes or ringing in the ears. As with any medicine, there is a very remote chance of a vaccine causing a severe allergic reaction, other serious injury, or death. 5. What if there is a serious problem?     An allergic reaction could occur after the vaccinated person leaves the clinic. If you see signs of a severe allergic reaction (hives, swelling of the face and throat, difficulty breathing, a fast heartbeat, dizziness, or weakness), call 9-1-1 and get the person to the nearest hospital.    For other signs that concern you, call your health care provider. Adverse reactions should be reported to the Vaccine Adverse Event Reporting System (VAERS). Your health care provider will usually file this report, or you can do it yourself. Visit the VAERS website at www.vaers. [facility].gov or call 9-785.529.6645. VAERS is only for reporting reactions, and VAERS staff members do not give medical advice. 6. The National Vaccine Injury Compensation Program    The [facility] Vaccine Injury Compensation Program (VICP) is a federal program that was created to compensate people who may have been injured by certain vaccines. Claims regarding alleged injury or death due to vaccination have a time limit for filing, which may be as short as two years. Visit the VICP website at www.[facility].gov/vaccinecompensation or call 4-850.553.9702 to learn about the program and about filing a claim. 7. How can I learn more? Ask your health care provider. Call your local or state health department. Visit the website of the Food and Drug Administration (FDA) for vaccine package inserts and additional information at www.fda.gov/vaccines-blood-biologics/vaccines. Contact the Centers for Disease Control and Prevention (CDC): Call 9-160.499.6591 (1-800-CDC-INFO) or  Visit CDCs influenza website at www.cdc.gov/flu. Vaccine Information Statement   Inactivated Influenza Vaccine   8/6/2021  42 PATTI Coe 194JR-00   Department of Health and Human Services  Centers for Disease Control and Prevention    Office Use Only

## 2022-09-29 NOTE — PROGRESS NOTES
Patient identified by 2 identifiers. Chief Complaint   Patient presents with    Follow-up     Ankle swelling     Advised patient to monitor ingredients in canned foods    1. Have you been to the ER, urgent care clinic since your last visit? Hospitalized since your last visit? No    2. Have you seen or consulted any other health care providers outside of the [address] [place] [address] since your last visit? Include any pap smears or colon screening. No    Reports eating spam and salt and vinegar potato chips.

## 2023-04-01 ENCOUNTER — APPOINTMENT (OUTPATIENT)
Dept: GENERAL RADIOLOGY | Age: 63
DRG: 176 | End: 2023-04-01
Payer: COMMERCIAL

## 2023-04-01 ENCOUNTER — HOSPITAL ENCOUNTER (INPATIENT)
Age: 63
LOS: 2 days | Discharge: HOME | DRG: 176 | End: 2023-04-03
Attending: STUDENT IN AN ORGANIZED HEALTH CARE EDUCATION/TRAINING PROGRAM | Admitting: STUDENT IN AN ORGANIZED HEALTH CARE EDUCATION/TRAINING PROGRAM
Payer: COMMERCIAL

## 2023-04-01 ENCOUNTER — APPOINTMENT (OUTPATIENT)
Dept: ULTRASOUND IMAGING | Age: 63
DRG: 176 | End: 2023-04-01
Payer: COMMERCIAL

## 2023-04-01 ENCOUNTER — APPOINTMENT (OUTPATIENT)
Dept: CT IMAGING | Age: 63
DRG: 176 | End: 2023-04-01
Payer: COMMERCIAL

## 2023-04-01 DIAGNOSIS — J45.901 MILD ASTHMA WITH ACUTE EXACERBATION, UNSPECIFIED WHETHER PERSISTENT: ICD-10-CM

## 2023-04-01 DIAGNOSIS — I50.9 CONGESTIVE HEART FAILURE, UNSPECIFIED HF CHRONICITY, UNSPECIFIED HEART FAILURE TYPE (HCC): ICD-10-CM

## 2023-04-01 DIAGNOSIS — I26.99 OTHER ACUTE PULMONARY EMBOLISM, UNSPECIFIED WHETHER ACUTE COR PULMONALE PRESENT (HCC): Primary | ICD-10-CM

## 2023-04-01 DIAGNOSIS — R07.9 EXERTIONAL CHEST PAIN: ICD-10-CM

## 2023-04-01 LAB
ALBUMIN SERPL-MCNC: 3.4 G/DL (ref 3.5–5)
ALBUMIN/GLOB SERPL: 1.1 (ref 1.1–2.2)
ALP SERPL-CCNC: 56 U/L (ref 45–117)
ALT SERPL-CCNC: 25 U/L (ref 12–78)
ANION GAP SERPL CALC-SCNC: 8 MMOL/L (ref 5–15)
AST SERPL-CCNC: 22 U/L (ref 15–37)
BASOPHILS # BLD: 0 K/UL (ref 0–0.1)
BASOPHILS NFR BLD: 0 % (ref 0–1)
BILIRUB SERPL-MCNC: 0.9 MG/DL (ref 0.2–1)
BNP SERPL-MCNC: 138 PG/ML
BUN SERPL-MCNC: 17 MG/DL (ref 6–20)
BUN/CREAT SERPL: 17 (ref 12–20)
CALCIUM SERPL-MCNC: 8.9 MG/DL (ref 8.5–10.1)
CHLORIDE SERPL-SCNC: 103 MMOL/L (ref 97–108)
CO2 SERPL-SCNC: 25 MMOL/L (ref 21–32)
CREAT SERPL-MCNC: 1.03 MG/DL (ref 0.55–1.02)
D DIMER PPP FEU-MCNC: 3.09 MG/L FEU (ref 0–0.65)
DIFFERENTIAL METHOD BLD: ABNORMAL
EOSINOPHIL # BLD: 0.1 K/UL (ref 0–0.4)
EOSINOPHIL NFR BLD: 1 % (ref 0–7)
ERYTHROCYTE [DISTWIDTH] IN BLOOD BY AUTOMATED COUNT: 16 % (ref 11.5–14.5)
FLUAV AG NPH QL IA: NEGATIVE
FLUBV AG NOSE QL IA: NEGATIVE
GLOBULIN SER CALC-MCNC: 3.2 G/DL (ref 2–4)
GLUCOSE SERPL-MCNC: 86 MG/DL (ref 65–100)
HCT VFR BLD AUTO: 39 % (ref 35–47)
HGB BLD-MCNC: 12.2 G/DL (ref 11.5–16)
IMM GRANULOCYTES # BLD AUTO: 0.1 K/UL (ref 0–0.04)
IMM GRANULOCYTES NFR BLD AUTO: 1 % (ref 0–0.5)
LYMPHOCYTES # BLD: 1.3 K/UL (ref 0.8–3.5)
LYMPHOCYTES NFR BLD: 16 % (ref 12–49)
MCH RBC QN AUTO: 27.8 PG (ref 26–34)
MCHC RBC AUTO-ENTMCNC: 31.3 G/DL (ref 30–36.5)
MCV RBC AUTO: 88.8 FL (ref 80–99)
MONOCYTES # BLD: 0.8 K/UL (ref 0–1)
MONOCYTES NFR BLD: 10 % (ref 5–13)
NEUTS SEG # BLD: 5.9 K/UL (ref 1.8–8)
NEUTS SEG NFR BLD: 72 % (ref 32–75)
NRBC # BLD: 0 K/UL (ref 0–0.01)
NRBC BLD-RTO: 0 PER 100 WBC
PLATELET # BLD AUTO: 168 K/UL (ref 150–400)
POTASSIUM SERPL-SCNC: 4 MMOL/L (ref 3.5–5.1)
PROT SERPL-MCNC: 6.6 G/DL (ref 6.4–8.2)
RBC # BLD AUTO: 4.39 M/UL (ref 3.8–5.2)
RBC MORPH BLD: ABNORMAL
SARS-COV-2 RDRP RESP QL NAA+PROBE: NOT DETECTED
SODIUM SERPL-SCNC: 136 MMOL/L (ref 136–145)
SOURCE, COVRS: NORMAL
TROPONIN I SERPL HS-MCNC: 10 NG/L (ref 0–51)
TROPONIN I SERPL HS-MCNC: 7 NG/L (ref 0–51)
TROPONIN I SERPL HS-MCNC: 7 NG/L (ref 0–51)
WBC # BLD AUTO: 8.2 K/UL (ref 3.6–11)

## 2023-04-01 PROCEDURE — 87804 INFLUENZA ASSAY W/OPTIC: CPT

## 2023-04-01 PROCEDURE — 74011250636 HC RX REV CODE- 250/636: Performed by: STUDENT IN AN ORGANIZED HEALTH CARE EDUCATION/TRAINING PROGRAM

## 2023-04-01 PROCEDURE — 85025 COMPLETE CBC W/AUTO DIFF WBC: CPT

## 2023-04-01 PROCEDURE — 96374 THER/PROPH/DIAG INJ IV PUSH: CPT

## 2023-04-01 PROCEDURE — 74011000636 HC RX REV CODE- 636: Performed by: STUDENT IN AN ORGANIZED HEALTH CARE EDUCATION/TRAINING PROGRAM

## 2023-04-01 PROCEDURE — 93970 EXTREMITY STUDY: CPT

## 2023-04-01 PROCEDURE — 36415 COLL VENOUS BLD VENIPUNCTURE: CPT

## 2023-04-01 PROCEDURE — 74011000250 HC RX REV CODE- 250

## 2023-04-01 PROCEDURE — 84484 ASSAY OF TROPONIN QUANT: CPT

## 2023-04-01 PROCEDURE — 93005 ELECTROCARDIOGRAM TRACING: CPT

## 2023-04-01 PROCEDURE — 83880 ASSAY OF NATRIURETIC PEPTIDE: CPT

## 2023-04-01 PROCEDURE — 74011250636 HC RX REV CODE- 250/636

## 2023-04-01 PROCEDURE — 71275 CT ANGIOGRAPHY CHEST: CPT

## 2023-04-01 PROCEDURE — 74011000250 HC RX REV CODE- 250: Performed by: STUDENT IN AN ORGANIZED HEALTH CARE EDUCATION/TRAINING PROGRAM

## 2023-04-01 PROCEDURE — 71046 X-RAY EXAM CHEST 2 VIEWS: CPT

## 2023-04-01 PROCEDURE — 96372 THER/PROPH/DIAG INJ SC/IM: CPT

## 2023-04-01 PROCEDURE — 87635 SARS-COV-2 COVID-19 AMP PRB: CPT

## 2023-04-01 PROCEDURE — 85379 FIBRIN DEGRADATION QUANT: CPT

## 2023-04-01 PROCEDURE — 99285 EMERGENCY DEPT VISIT HI MDM: CPT

## 2023-04-01 PROCEDURE — 80053 COMPREHEN METABOLIC PANEL: CPT

## 2023-04-01 PROCEDURE — 74011636637 HC RX REV CODE- 636/637

## 2023-04-01 PROCEDURE — 65270000046 HC RM TELEMETRY

## 2023-04-01 RX ORDER — FUROSEMIDE 20 MG/1
20 TABLET ORAL DAILY
Status: DISCONTINUED | OUTPATIENT
Start: 2023-04-02 | End: 2023-04-03 | Stop reason: HOSPADM

## 2023-04-01 RX ORDER — SODIUM CHLORIDE 0.9 % (FLUSH) 0.9 %
5-40 SYRINGE (ML) INJECTION AS NEEDED
Status: DISCONTINUED | OUTPATIENT
Start: 2023-04-01 | End: 2023-04-03 | Stop reason: HOSPADM

## 2023-04-01 RX ORDER — MULTIVITAMIN
1 TABLET ORAL DAILY
COMMUNITY

## 2023-04-01 RX ORDER — ONDANSETRON 4 MG/1
4 TABLET, ORALLY DISINTEGRATING ORAL
Status: DISCONTINUED | OUTPATIENT
Start: 2023-04-01 | End: 2023-04-03 | Stop reason: HOSPADM

## 2023-04-01 RX ORDER — IPRATROPIUM BROMIDE 0.5 MG/2.5ML
0.5 SOLUTION RESPIRATORY (INHALATION)
Status: DISCONTINUED | OUTPATIENT
Start: 2023-04-01 | End: 2023-04-03 | Stop reason: HOSPADM

## 2023-04-01 RX ORDER — KETOROLAC TROMETHAMINE 30 MG/ML
15 INJECTION, SOLUTION INTRAMUSCULAR; INTRAVENOUS
Status: COMPLETED | OUTPATIENT
Start: 2023-04-01 | End: 2023-04-01

## 2023-04-01 RX ORDER — IPRATROPIUM BROMIDE AND ALBUTEROL SULFATE 2.5; .5 MG/3ML; MG/3ML
3 SOLUTION RESPIRATORY (INHALATION)
Status: DISCONTINUED | OUTPATIENT
Start: 2023-04-01 | End: 2023-04-01

## 2023-04-01 RX ORDER — MORPHINE SULFATE 2 MG/ML
2 INJECTION, SOLUTION INTRAMUSCULAR; INTRAVENOUS ONCE
Status: COMPLETED | OUTPATIENT
Start: 2023-04-01 | End: 2023-04-01

## 2023-04-01 RX ORDER — PREDNISONE 20 MG/1
40 TABLET ORAL ONCE
Status: COMPLETED | OUTPATIENT
Start: 2023-04-01 | End: 2023-04-01

## 2023-04-01 RX ORDER — ONDANSETRON 2 MG/ML
4 INJECTION INTRAMUSCULAR; INTRAVENOUS
Status: DISCONTINUED | OUTPATIENT
Start: 2023-04-01 | End: 2023-04-03 | Stop reason: HOSPADM

## 2023-04-01 RX ORDER — ENOXAPARIN SODIUM 100 MG/ML
80 INJECTION SUBCUTANEOUS EVERY 12 HOURS
Status: DISCONTINUED | OUTPATIENT
Start: 2023-04-01 | End: 2023-04-02

## 2023-04-01 RX ORDER — ALBUTEROL SULFATE 2.5 MG/.5ML
5 SOLUTION RESPIRATORY (INHALATION)
Status: COMPLETED | OUTPATIENT
Start: 2023-04-01 | End: 2023-04-01

## 2023-04-01 RX ORDER — IPRATROPIUM BROMIDE 0.5 MG/2.5ML
0.5 SOLUTION RESPIRATORY (INHALATION)
Status: DISCONTINUED | OUTPATIENT
Start: 2023-04-01 | End: 2023-04-01

## 2023-04-01 RX ORDER — POLYETHYLENE GLYCOL 3350 17 G/17G
17 POWDER, FOR SOLUTION ORAL DAILY PRN
Status: DISCONTINUED | OUTPATIENT
Start: 2023-04-01 | End: 2023-04-03 | Stop reason: HOSPADM

## 2023-04-01 RX ORDER — SODIUM CHLORIDE 0.9 % (FLUSH) 0.9 %
5-40 SYRINGE (ML) INJECTION EVERY 8 HOURS
Status: DISCONTINUED | OUTPATIENT
Start: 2023-04-01 | End: 2023-04-03 | Stop reason: HOSPADM

## 2023-04-01 RX ORDER — MORPHINE SULFATE 2 MG/ML
1 INJECTION, SOLUTION INTRAMUSCULAR; INTRAVENOUS ONCE
Status: COMPLETED | OUTPATIENT
Start: 2023-04-01 | End: 2023-04-01

## 2023-04-01 RX ORDER — IPRATROPIUM BROMIDE AND ALBUTEROL SULFATE 2.5; .5 MG/3ML; MG/3ML
3 SOLUTION RESPIRATORY (INHALATION)
Status: COMPLETED | OUTPATIENT
Start: 2023-04-01 | End: 2023-04-01

## 2023-04-01 RX ADMIN — ONDANSETRON 4 MG: 2 INJECTION INTRAMUSCULAR; INTRAVENOUS at 15:19

## 2023-04-01 RX ADMIN — SODIUM CHLORIDE, PRESERVATIVE FREE 5 ML: 5 INJECTION INTRAVENOUS at 14:00

## 2023-04-01 RX ADMIN — PREDNISONE 40 MG: 20 TABLET ORAL at 10:25

## 2023-04-01 RX ADMIN — ONDANSETRON 4 MG: 2 INJECTION INTRAMUSCULAR; INTRAVENOUS at 22:35

## 2023-04-01 RX ADMIN — SODIUM CHLORIDE, PRESERVATIVE FREE 10 ML: 5 INJECTION INTRAVENOUS at 22:42

## 2023-04-01 RX ADMIN — MORPHINE SULFATE 1 MG: 2 INJECTION, SOLUTION INTRAMUSCULAR; INTRAVENOUS at 15:14

## 2023-04-01 RX ADMIN — IOMEPROL INJECTION 81 ML: 714 INJECTION, SOLUTION INTRAVASCULAR at 12:08

## 2023-04-01 RX ADMIN — ENOXAPARIN SODIUM 80 MG: 100 INJECTION SUBCUTANEOUS at 13:04

## 2023-04-01 RX ADMIN — METHYLPREDNISOLONE SODIUM SUCCINATE 40 MG: 40 INJECTION, POWDER, FOR SOLUTION INTRAMUSCULAR; INTRAVENOUS at 20:52

## 2023-04-01 RX ADMIN — IPRATROPIUM BROMIDE AND ALBUTEROL SULFATE 3 ML: 2.5; .5 SOLUTION RESPIRATORY (INHALATION) at 10:25

## 2023-04-01 RX ADMIN — METHYLPREDNISOLONE SODIUM SUCCINATE 40 MG: 40 INJECTION, POWDER, FOR SOLUTION INTRAMUSCULAR; INTRAVENOUS at 15:14

## 2023-04-01 RX ADMIN — KETOROLAC TROMETHAMINE 15 MG: 30 INJECTION, SOLUTION INTRAMUSCULAR at 12:20

## 2023-04-01 RX ADMIN — ALBUTEROL SULFATE 5 MG: 2.5 SOLUTION RESPIRATORY (INHALATION) at 10:25

## 2023-04-01 RX ADMIN — MORPHINE SULFATE 2 MG: 2 INJECTION, SOLUTION INTRAMUSCULAR; INTRAVENOUS at 22:35

## 2023-04-01 NOTE — ED NOTES
eTRANSFER SBAR NOTE    IP UNIT CALLED NOTE IS READY: Yes Spoke to Brando    IF there are questions Call transferring nurse (your name) Kathryn Goodrich at phone # [phone]    SITUATION/BACKGROUND:    Patient is being transferred to [address], Room# 3118    Patient's Chief Complaint on arrival to ED was sob chest tightness and is admitted for multiple PE.     CODE STATUS: Full Code    VITAL SIGNS (MUST BE WITHIN 1 HOUR OF TRANSFER TO IP UNIT:      ISOLATION/PRECAUTIONS: No   ISOLATION TYPE: .    Called outstanding consults: Yes     Are there sign and held orders that need to be released? .  Are all STAT orders completed: Yes    STAT labs collected: Yes  REPEAT LACTIC ACID DUE? No  TIME DUE: .  Critical Labs Results? No  What? Are there any titrating drips? No   If so what? .    The following personal items will be sent with the patient during transfer to the floor: All valuables:   ITEM:    ITEM: Visual Aid: None  ITEM:           ASSESSMENT:    PETEYWA Assessment: No  Last Score: . NEURO:   NIH SCORE:        JUS SCREENING:   Swallow Screening  Is the Patient Unable to Remain Alert for Testing?: No (04/01/23 1015)  Is the Patient on a Modified Diet (Thickened Liquids) Due to Pre-existing Dysphagia?: No (04/01/23 1015)  Is There Presence of Existing Enteral Tube Feeding via the Stomach or Nose?: No (04/01/23 1015)  Is There Presence of Head-of-Bed Restrictions (Less than 30 Degrees)?: No (04/01/23 1015)  Is There Presence of Tracheotomy Tube?: No (04/01/23 1015)  Is the Patient Ordered Nothing-by-Mouth Status?: No (04/01/23 1015)  3 oz Water Swallow Screen: Pass (04/01/23 1015)      NEURO ASSESSMENT:   Neuro  Neurologic State: Alert (04/01/23 1003)  Orientation Level: Oriented X4 (04/01/23 1003)  Cognition: Appropriate decision making (04/01/23 1003)  Speech: Clear (04/01/23 1003)    If a Stroke Case . .. When are the next V/S, nuero, NIH due? Kartik Lehman Is patient impulsive? No   Is patient oriented?  Yes   Do they follow commands? Yes  Is the patient ambulatory? Yes Device need . FALL RISK? No    Is the [place] 1 Assessment completed? Yes  INTERVENTIONS: .    INTEGUMENTARY:   IS THE PATIENT UNDRESSED? Yes  WOUNDS PRESENT? No  On admit to ED No   ARE THE WOUNDS DOCUMENTED? No     RESPIRATORY:   Is patient on Oxygen? No    OXYGEN: Oxygen Therapy  O2 Device: None (23 1115)  IS patient on VENT? No      CARDIAC:   Is cardiac monitoring ordered? Yes Last Rhythm: sr  Patient to transfer with tele box on? Yes  Is patient using a LIFE VEST? No     LINE ACCESS:   Peripheral IV 23 Left Antecubital (Active)   Site Assessment Clean, dry, & intact 23 1025   Phlebitis Assessment 0 23 1025   Infiltration Assessment 0 23 1025   Dressing Status Clean, dry, & intact 23 1025        /GI:   CONTINENT BOWEL/BLADDER? Yes  URINARY OUTPUT: voiding   Written Order for [facility]ina Cath? No   CHRONIC OR ACUTE? .   If CHRONIC, is it 1days old, was it changed prior to specimen collection? No , .  WAS UA WITH REFLEX SENT TO LAB?  0  IF NO,  COLLECT AND SEND PRIOR TO TRANSPORT TO INPATIENT AREA    RESTRAINTS IN USE: No      IS DOCUMENTATION COMPLETE: No   Is there a current Order? No  When does it ?  .    Additional details as Needed:

## 2023-04-01 NOTE — ED PROVIDER NOTES
[facility] EMERGENCY DEPT  EMERGENCY DEPARTMENT ENCOUNTER       Pt Name: Keyur Ramirez  MRN: [id number]  [place] 1960  Date of evaluation: 4/1/2023  Provider: Brian Raza MD   PCP: Jyothi Garcia MD  Note Started: 10:16 AM 4/1/23     CHIEF COMPLAINT       Chief Complaint   Patient presents with    Chest Pain     Patient [facility] with a CC of chest pain and SOB 2 days. Hx asthma. HISTORY OF PRESENT ILLNESS: 1 or more elements      History From: Patient, History limited by: none     Keyur Ramirez is a 60 y.o. female past medical history notable for asthma, hyperlipidemia, who [facility] with complaints of 2 days of exertional chest pain, chest tightness, shortness of breath, and dry cough. Patient has been without fever. No known sick contacts. She states she feels like she is having an asthma exacerbation. She has been taking her home inhalers without little relief in her symptoms. Per chart review, patient had a normal stress test in March 2022. She denies known history of coronary artery disease. Denies family history of coronary artery disease. She has not had a blood clot, not anticoagulated. Was previously prescribed Lasix for lower extremity edema, patient is no longer taking this medication. She reports ankle swelling that has been an ongoing issue for her. Denies asymmetric leg swelling. No other acute complaints reported at this time. Please See MDM for Additional Details of the HPI/PMH  Nursing Notes were all reviewed and agreed with or any disagreements were addressed in the HPI. REVIEW OF SYSTEMS        Positives and Pertinent negatives as per HPI.     PAST HISTORY     Past Medical History:  Past Medical History:   Diagnosis Date    Acute right ankle pain 7/23/2018    Arthritis 8/3/2010    Asthma     Bunion of great toe of right foot 7/23/2018    Fall at home, initial encounter 9/18/2018 Hammer toe of second toe of right foot 7/23/2018    Hypercholesterolemia 8/18/2010    Postmenopausal 8/3/2010    Prediabetes 9/12/2013    Primary snoring 11/29/2017    Shoulder pain, bilateral 2/8/2018    Sinusitis, acute 12/12/2011    Wrist pain, acute, left 9/18/2018       Past Surgical History:  Past Surgical History:   Procedure Laterality Date    HX GYN  1987    tubal pregancy; laparotomy    HX ORTHOPAEDIC  2006    neck    HX ORTHOPAEDIC  2008    cervical fusion of discs x4    HX PELVIC LAPAROSCOPY  1985    endometriosis    NC REVISE KNEE JOINT REPLACE,ALL PARTS  02/29/2012    tkr left       Family History:  Family History   Problem Relation Age of Onset    Hypertension Father     Heart Disease Father         CAD    Diabetes Father     High Cholesterol Father     Other Mother         tumor in chest    Allergic Rhinitis Brother     Asthma Brother     Allergic Rhinitis Child     Sickle Cell Trait Child     GERD Child        Social History:  Social History     Tobacco Use    Smoking status: Never    Smokeless tobacco: Never   Vaping Use    Vaping Use: Never used   Substance Use Topics    Alcohol use: Yes     Comment: wine sometimes    Drug use: No       Allergies: Allergies   Allergen Reactions    Crestor [Rosuvastatin] Shortness of Breath    Nabumetone Shortness of Breath    Codeine Nausea Only    Doxycycline Unknown (comments)    Gabapentin (Bulk) Shortness of Breath    Lyrica [Pregabalin] Swelling     Ankle swelling, foot pain , insomina    Percocet [Oxycodone-Acetaminophen] Other (comments)     loopy       CURRENT MEDICATIONS      Previous Medications    ALBUTEROL (PROVENTIL HFA, VENTOLIN HFA, PROAIR HFA) 90 MCG/ACTUATION INHALER    Take 1 Puff by inhalation every four (4) hours as needed for Wheezing. ALBUTEROL (PROVENTIL VENTOLIN) 2.5 MG /3 ML (0.083 %) NEBU    3 mL by Nebulization route once for 1 dose. ASCORBIC ACID, VITAMIN C, (VITAMIN C) 500 MG TABLET    Take 500 mg by mouth daily. CHOLECALCIFEROL (VITAMIN D3) 25 MCG (1,000 UNIT) CAP    Take  by mouth daily. DICLOFENAC (VOLTAREN) 1 % GEL    Apply 4 g to affected area four (4) times daily. FUROSEMIDE (LASIX) 20 MG TABLET    Take 1 Tablet by mouth daily. LORATADINE (CLARITIN) 10 MG TABLET    Take 10 mg by mouth daily as needed. MULTIVITS,CA,MINERALS/IRON/FA (MULTI FOR HER PO)    Take 1 Tab by mouth daily. NEBULIZER & COMPRESSOR MACHINE    1 Each by Does Not Apply route four (4) times daily as needed for Cough. POTASSIUM CHLORIDE (K-DUR, KLOR-CON M20) 20 MEQ TABLET    Take 1 Tablet by mouth daily. Indications: prevention of low potassium in the blood    TIOTROPIUM BROMIDE (SPIRIVA RESPIMAT) 2.5 MCG/ACTUATION INHALER    Take 2 Puffs by inhalation nightly. SCREENINGS               No data recorded         PHYSICAL EXAM      ED Triage Vitals   ED Encounter Vitals Group      BP 04/01/23 1005 (!) 171/97      Pulse (Heart Rate) 04/01/23 1001 70      Resp Rate 04/01/23 1001 20      Temp 04/01/23 1005 97.8 °F (36.6 °C)      Temp src --       O2 Sat (%) 04/01/23 1001 100 %      Weight 04/01/23 1001 179 lb 10.8 oz      Height 04/01/23 1001 5' 2\"        Physical Exam  Vitals and nursing note reviewed. Constitutional:       Appearance: She is well-developed. HENT:      Head: Normocephalic and atraumatic. Eyes:      General:         Right eye: No discharge. Left eye: No discharge. Conjunctiva/sclera: Conjunctivae normal.      Pupils: Pupils are equal, round, and reactive to light. Neck:      Trachea: No tracheal deviation. Cardiovascular:      Rate and Rhythm: Normal rate and regular rhythm. Heart sounds: Normal heart sounds. No murmur heard. Comments: Hypertensive on arrival  Pulmonary:      Effort: Pulmonary effort is normal. No respiratory distress. Breath sounds: Normal breath sounds. No rales.       Comments: Mild expiratory wheezing  Abdominal:      General: Bowel sounds are normal. Palpations: Abdomen is soft. Tenderness: There is no abdominal tenderness. There is no guarding or rebound. Musculoskeletal:         General: No tenderness or deformity. Normal range of motion. Cervical back: Normal range of motion and neck supple. Comments: Nonpitting, bilateral lower extremity edema   Skin:     General: Skin is warm and dry. Findings: No erythema or rash. Neurological:      General: No focal deficit present. Mental Status: She is alert and oriented to person, place, and time. Psychiatric:         Behavior: Behavior normal.        DIAGNOSTIC RESULTS   LABS:     Recent Results (from the past 12 hour(s))   EKG, 12 LEAD, INITIAL    Collection Time: 04/01/23 10:10 AM   Result Value Ref Range    Ventricular Rate 63 BPM    Atrial Rate 63 BPM    P-R Interval 160 ms    QRS Duration 86 ms    Q-T Interval 410 ms    QTC Calculation (Bezet) 419 ms    Calculated P Axis 1 degrees    Calculated R Axis 49 degrees    Calculated T Axis 45 degrees    Diagnosis       Normal sinus rhythm  Minimal voltage criteria for LVH, may be normal variant  When compared with ECG of 07-JUN-2021 14:03,  No significant change was found     METABOLIC PANEL, COMPREHENSIVE    Collection Time: 04/01/23 10:22 AM   Result Value Ref Range    Sodium 136 136 - 145 mmol/L    Potassium 4.0 3.5 - 5.1 mmol/L    Chloride 103 97 - 108 mmol/L    CO2 25 21 - 32 mmol/L    Anion gap 8 5 - 15 mmol/L    Glucose 86 65 - 100 mg/dL    BUN 17 6 - 20 MG/DL    Creatinine 1.03 (H) 0.55 - 1.02 MG/DL    BUN/Creatinine ratio 17 12 - 20      eGFR >60 >60 ml/min/1.73m2    Calcium 8.9 8.5 - 10.1 MG/DL    Bilirubin, total 0.9 0.2 - 1.0 MG/DL    ALT (SGPT) 25 12 - 78 U/L    AST (SGOT) 22 15 - 37 U/L    Alk.  phosphatase 56 45 - 117 U/L    Protein, total 6.6 6.4 - 8.2 g/dL    Albumin 3.4 (L) 3.5 - 5.0 g/dL    Globulin 3.2 2.0 - 4.0 g/dL    A-G Ratio 1.1 1.1 - 2.2     NT-PRO BNP    Collection Time: 04/01/23 10:22 AM   Result Value Ref Range NT pro- (H) <125 PG/ML   TROPONIN-HIGH SENSITIVITY    Collection Time: 04/01/23 10:22 AM   Result Value Ref Range    Troponin-High Sensitivity 7 0 - 51 ng/L   CBC WITH AUTOMATED DIFF    Collection Time: 04/01/23 10:22 AM   Result Value Ref Range    WBC 8.2 3.6 - 11.0 K/uL    RBC 4.39 3.80 - 5.20 M/uL    HGB 12.2 11.5 - 16.0 g/dL    HCT 39.0 35.0 - 47.0 %    MCV 88.8 80.0 - 99.0 FL    MCH 27.8 26.0 - 34.0 PG    MCHC 31.3 30.0 - 36.5 g/dL    RDW 16.0 (H) 11.5 - 14.5 %    PLATELET 000 205 - 939 K/uL    NRBC 0.0 0  WBC    ABSOLUTE NRBC 0.00 0.00 - 0.01 K/uL    NEUTROPHILS 72 32 - 75 %    LYMPHOCYTES 16 12 - 49 %    MONOCYTES 10 5 - 13 %    EOSINOPHILS 1 0 - 7 %    BASOPHILS 0 0 - 1 %    IMMATURE GRANULOCYTES 1 (H) 0.0 - 0.5 %    ABS. NEUTROPHILS 5.9 1.8 - 8.0 K/UL    ABS. LYMPHOCYTES 1.3 0.8 - 3.5 K/UL    ABS. MONOCYTES 0.8 0.0 - 1.0 K/UL    ABS. EOSINOPHILS 0.1 0.0 - 0.4 K/UL    ABS. BASOPHILS 0.0 0.0 - 0.1 K/UL    ABS. IMM. GRANS. 0.1 (H) 0.00 - 0.04 K/UL    DF SMEAR SCANNED      RBC COMMENTS NORMOCYTIC, NORMOCHROMIC     COVID-19 RAPID TEST    Collection Time: 04/01/23 10:22 AM   Result Value Ref Range    Specimen source Nasopharyngeal      COVID-19 rapid test Not detected NOTD     INFLUENZA A+B VIRAL AGS    Collection Time: 04/01/23 10:22 AM   Result Value Ref Range    Influenza A Antigen Negative NEG      Influenza B Antigen Negative NEG     D DIMER    Collection Time: 04/01/23 10:22 AM   Result Value Ref Range    D-dimer 3.09 (H) 0.00 - 0.65 mg/L FEU        EKG: If performed, independent interpretation documented below in the MDM section     RADIOLOGY:  Non-plain film images such as CT, Ultrasound and MRI are read by the radiologist. Plain radiographic images are visualized and preliminarily interpreted by the ED Provider with the findings documented in the MDM section.      Interpretation per the Radiologist below, if available at the time of this note:     XR CHEST PA LAT    Result Date: 4/1/2023  EXAM: XR CHEST PA LAT INDICATION: Chest pain COMPARISON: 3/31/2022 TECHNIQUE: PA and lateral chest 2 views FINDINGS: Cardiac monitoring leads. The cardiac size is within normal limits. The pulmonary vasculature is within normal limits. Probable trace left pleural effusion and bibasilar atelectasis. The visualized bones and upper abdomen are age-appropriate. Probable trace pleural effusion and bibasilar atelectasis. PROCEDURES   Unless otherwise noted below, none  Procedures     CRITICAL CARE TIME   0    EMERGENCY DEPARTMENT COURSE and DIFFERENTIAL DIAGNOSIS/MDM   Vitals:    Vitals:    04/01/23 1024 04/01/23 1030 04/01/23 1115 04/01/23 1136   BP: (!) 182/89  (!) 172/79 (!) 168/68   Pulse:  69 87 90   Resp:  23 19    Temp:       SpO2:  93% 97%    Weight:       Height:            Patient was given the following medications:  Medications   enoxaparin (LOVENOX) injection 80 mg (has no administration in time range)   predniSONE (DELTASONE) tablet 40 mg (40 mg Oral Given 4/1/23 1025)   albuterol CONCENTRATE 2.5mg/0.5 mL neb soln (5 mg Nebulization Given 4/1/23 1025)   albuterol-ipratropium (DUO-NEB) 2.5 MG-0.5 MG/3 ML (3 mL Nebulization Given 4/1/23 1025)   iomeprol (IOMERON 350) 350 mg iodine /mL (71.44 %) contrast injection 100 mL (81 mL IntraVENous Given 4/1/23 1208)   ketorolac (TORADOL) injection 15 mg (15 mg IntraVENous Given 4/1/23 1220)       Medical Decision Making  Patient presents with acute dyspnea and exertional chest tightness. DDx: asthma, pna, pulmonary edema, acute bronchitis, ACS, ptx, pna. Will obtain EKG, labs, CXR, provide O2 as needed for hypoxia, treat symptomatically and reassess. Will treat with nebs, steroids, cardiac work-up. Patient has a low risk Wells score for PE, will evaluate with D-dimer. Low risk heart score of 3. Likely discharge home with outpatient cardiology and PCP follow-up. D-dimer was notably elevated on work-up here.   CTA showed multiple segmental PEs which are unprovoked in origin. Patient will be admitted to hospitalist for further work-up and management. Will initiate Lovenox in the emergency department. Duplex ultrasound added. Amount and/or Complexity of Data Reviewed  Labs: ordered. Decision-making details documented in ED Course. Radiology: ordered and independent interpretation performed. ECG/medicine tests: ordered and independent interpretation performed. Details: EKG shows normal sinus rhythm at 63 bpm, voltage criteria for LVH. Normal axis. Normal intervals. Risk  Prescription drug management. Decision regarding hospitalization. I have spent 35 minutes of critical care time in evaluating and treating this patient. This includes time spent at bedside, time with family and decision makers, documentation, review of labs and imaging, and/or consultation with specialists. It does not include time spent on separately billed procedures. This patient presents with a critical illness or injury that acutely impairs one or more vital organ systems such that there is a high probability of imminent or life threatening deterioration in the patient's condition. This case involved decision making of high complexity to assess, manipulate, and support vital organ system failure and/or to prevent further life threatening deterioration of the patient's condition. Failure to initiate these interventions on an urgent basis would likely result in sudden, clinically significant or life threatening deterioration in the patient's condition.     Abnormal findings supporting critical care: PE, chest pain  Interventions to support critical care: Performing bedside care, reviewing ancillary studies, discussion with family/consultants, completing documentation  Failure to intervene may result in: Decompensation/death      ED Course as of 04/01/23 1257   Sat Apr 01, 2023   1124 D-dimer(!): 3.09  Will obtain CTA chest.  [AR]   1236 Radiology advises patient has multiple segmental PEs without evidence of right heart strain, no saddle. [AR]      ED Course User Index  [AR] Jaya Proctor MD         FINAL IMPRESSION     1. Other acute pulmonary embolism, unspecified whether acute cor pulmonale present ([address]    2. Mild asthma with acute exacerbation, unspecified whether persistent    3. Exertional chest pain          DISPOSITION/PLAN   Lo Alexandra's  results have been reviewed with her. She has been counseled regarding her diagnosis, treatment, and plan. She verbally conveys understanding and agreement of the signs, symptoms, diagnosis, treatment and prognosis and additionally agrees to follow up as discussed. She also agrees with the care-plan and conveys that all of her questions have been answered. CLINICAL IMPRESSION    Admit Note: Pt is being admitted by Dr. Aman Burton. The results of their tests and reason(s) for their admission have been discussed with pt and/or available family. They convey agreement and understanding for the need to be admitted and for the admission diagnosis. PATIENT REFERRED TO:  Follow-up Information    None           DISCHARGE MEDICATIONS:  Current Discharge Medication List            DISCONTINUED MEDICATIONS:  Current Discharge Medication List          I am the Primary Clinician of Record. Lisa Ferrara MD (electronically signed)  I personally saw and examined the patient. I have reviewed and agree with the residents findings, including all diagnostic interpretations, and plans as written. I was present during the key portions of separately billed procedures. Jay Crespo MD     (Please note that parts of this dictation were completed with voice recognition software. Quite often unanticipated grammatical, syntax, homophones, and other interpretive errors are inadvertently transcribed by the computer software. Please disregards these errors.  Please excuse any errors that have escaped final proofreading.) electronic

## 2023-04-01 NOTE — PROGRESS NOTES
End of Shift Note    Bedside shift change report given to [place] (oncoming nurse) by Rona Cortez RN (offgoing nurse).   Report included the following information SBAR, Kardex, Intake/Output, MAR, and Recent Results    Shift worked:  3p-7p     Shift summary and any significant changes:     Oriented to room from ED, high sensitivity troponin collected and sent to lab, rated pain 5/10, declined morphine      Concerns for physician to address:  none     Zone phone for oncoming shift:   [phone]

## 2023-04-01 NOTE — H&P
Hospitalist Admission Note    NAME: Kimmie Rater   :  1960   MRN:  [id number]     Date/Time:  2023 2:01 PM    Patient PCP: Malvin Arreola MD  ______________________________________________________________________  Given the patient's current clinical presentation, I have a high level of concern for decompensation if discharged from the emergency department. Complex decision making was performed, which includes reviewing the patient's available past medical records, laboratory results, and x-ray films. My assessment of this patient's clinical condition and my plan of care is as follows. Assessment / Plan:  Chest pain/SOB  Pulmonary embolus  1. Multiple bilateral pulmonary emboli as described. No definite evidence of  right heart strain enlargement of the pulmonary artery. 2.  Probable atelectasis in the bilateral lung bases. No DVT with duplex lower ultrasound  We will get echo to rule out right-sided heart strain  We will start therapeutic dose of Lovenox  Patient hemodynamically stable  Saturating 97% on room air  Troponin negative x2  EKG no significant ischemic changes  We will get echo  Oxygen NC as needed      Asthma was moderate exacerbation  We will start on Solu-Medrol 40 Mg twice daily  She is only mildly wheezing good air movement  Atrovent as needed    Lower extremity edema  We will get echo  Follow-up with Doppler ultrasound to rule DVT      Hypertension  Patient stated that she is not taking Lasix anymore  Blood pressure likely increasing due to chest pain  We will start the medication if needed      Code Status: Full code  Surrogate Decision Maker:    DVT Prophylaxis: Patient already on Lovenox therapeutic  GI Prophylaxis: not indicated    Baseline: Independent      Subjective:   CHIEF COMPLAINT: Chest pain and shortness of breath    HISTORY OF PRESENT ILLNESS:     Little March is a 60 y.o.    female was feeling shortness of breath and chest tightness for the past 1 week patient was trying to use her albuterol at home inhaler to help her, but it did not help patient also noticed that she is out of breath with walking, yesterday while she walking at the [place] she could not catch her breath and went back home and decided to go to PCP tomorrow, but in the morning patient felt too much chest tightness and [facility] there and they recommend patient to go to the emergency room, patient brought to the ER by her daughter. At the bedside patient seems in mild distress on room air, explained history above, and stated that it is still her chest is a little bit tight, and yesterday they were tingling in the left arm, patient denied any similar pain in the past, patient denied any recent infection, patient also stated that she is not taking Lasix for blood pressure medication anymore, her usual her blood pressure get higher when she feels a lot of pain due to her knee arthritis. We were asked to admit for work up and evaluation of the above problems.      Past Medical History:   Diagnosis Date    Acute right ankle pain 7/23/2018    Arthritis 8/3/2010    Asthma     Bunion of great toe of right foot 7/23/2018    Fall at home, initial encounter 9/18/2018    Hammer toe of second toe of right foot 7/23/2018    Hypercholesterolemia 8/18/2010    Postmenopausal 8/3/2010    Prediabetes 9/12/2013    Primary snoring 11/29/2017    Pulmonary embolus ([facility] [address] 4/1/2023    Shoulder pain, bilateral 2/8/2018    Sinusitis, acute 12/12/2011    Wrist pain, acute, left 9/18/2018        Past Surgical History:   Procedure Laterality Date    HX GYN  1987    tubal pregancy; laparotomy    HX ORTHOPAEDIC  2006    neck    HX ORTHOPAEDIC  2008    cervical fusion of discs x4    HX PELVIC LAPAROSCOPY  1985    endometriosis    [place] [id number]  02/29/2012    tkr left       Social History     Tobacco Use    Smoking status: Never Smokeless tobacco: Never   Substance Use Topics    Alcohol use: Yes     Comment: wine sometimes        Family History   Problem Relation Age of Onset    Hypertension Father     Heart Disease Father         CAD    Diabetes Father     High Cholesterol Father     Other Mother         tumor in chest    Allergic Rhinitis Brother     Asthma Brother     Allergic Rhinitis Child     Sickle Cell Trait Child     GERD Child      Allergies   Allergen Reactions    Crestor [Rosuvastatin] Shortness of Breath    Nabumetone Shortness of Breath    Codeine Nausea Only    Doxycycline Unknown (comments)    Gabapentin (Bulk) Shortness of Breath    Lyrica [Pregabalin] Swelling     Ankle swelling, foot pain , insomina    Percocet [Oxycodone-Acetaminophen] Other (comments)     loopy        Prior to Admission medications    Medication Sig Start Date End Date Taking? Authorizing Provider   furosemide (LASIX) 20 mg tablet Take 1 Tablet by mouth daily. 9/29/22   Bernie Velasco MD   potassium chloride (K-DUR, KLOR-CON M20) 20 mEq tablet Take 1 Tablet by mouth daily. Indications: prevention of low potassium in the blood 9/29/22   Alberto Albarran MD   diclofenac (VOLTAREN) 1 % gel Apply 4 g to affected area four (4) times daily. 2/24/22   Bernie Velasco MD   loratadine (CLARITIN) 10 mg tablet Take 10 mg by mouth daily as needed. Provider, Historical   cholecalciferol (VITAMIN D3) 25 mcg (1,000 unit) cap Take  by mouth daily. Provider, Historical   albuterol (PROVENTIL VENTOLIN) 2.5 mg /3 mL (0.083 %) nebu 3 mL by Nebulization route once for 1 dose. 6/7/21 6/7/21  Bernie Velasco MD   albuterol (PROVENTIL HFA, VENTOLIN HFA, PROAIR HFA) 90 mcg/actuation inhaler Take 1 Puff by inhalation every four (4) hours as needed for Wheezing. 12/29/20   Bernie Velasco MD   tiotropium bromide (Spiriva Respimat) 2.5 mcg/actuation inhaler Take 2 Puffs by inhalation nightly.  6/12/20   Bernie Velasco MD   Nebulizer & Compressor machine 1 Each by Does Not Apply route four (4) times daily as needed for Cough. 6/12/20   Carmencita Joseph MD   ascorbic acid, vitamin C, (VITAMIN C) 500 mg tablet Take 500 mg by mouth daily. Provider, Historical   MULTIVITS,CA,MINERALS/IRON/FA (MULTI FOR HER PO) Take 1 Tab by mouth daily. Provider, Historical       REVIEW OF SYSTEMS:     I am not able to complete the review of systems because:    The patient is intubated and sedated    The patient has altered mental status due to his acute medical problems    The patient has baseline aphasia from prior stroke(s)    The patient has baseline dementia and is not reliable historian    The patient is in acute medical distress and unable to provide information           Total of 12 systems reviewed as follows:       POSITIVE= underlined text  Negative = text not underlined  General:  fever, chills, sweats, generalized weakness, weight loss/gain,      loss of appetite   Eyes:    blurred vision, eye pain, loss of vision, double vision  ENT:    rhinorrhea, pharyngitis   Respiratory:   cough, sputum production, SOB, SWAN, wheezing, pleuritic pain   Cardiology:   chest pain, palpitations, orthopnea, PND, edema, syncope   Gastrointestinal:  abdominal pain , N/V, diarrhea, dysphagia, constipation, bleeding   Genitourinary:  frequency, urgency, dysuria, hematuria, incontinence   Muskuloskeletal :  arthralgia, myalgia, back pain  Hematology:  easy bruising, nose or gum bleeding, lymphadenopathy   Dermatological: rash, ulceration, pruritis, color change / jaundice  Endocrine:   hot flashes or polydipsia   Neurological:  headache, dizziness, confusion, focal weakness, paresthesia,     Speech difficulties, memory loss, gait difficulty  Psychological: Feelings of anxiety, depression, agitation    Objective:   VITALS:    Visit Vitals  BP (!) 168/68 (BP 1 Location: Right upper arm)   Pulse 90   Temp 97.8 °F (36.6 °C)   Resp 19   Ht 5' 2\" (1.575 m)   Wt 81.5 kg (179 lb 10.8 oz)   SpO2 97%   BMI 32.86 kg/m²       PHYSICAL EXAM:    General:    Alert, cooperative, no distress, appears stated age. HEENT: Atraumatic, anicteric sclerae, pink conjunctivae     No oral ulcers, mucosa moist, throat clear, dentition fair  Neck:  Supple, symmetrical,  thyroid: non tender  Lungs: Tachypneic, mild distress, good air entry with mild wheezing  Chest wall:  No tenderness  No Accessory muscle use. Heart:   Regular  rhythm,  No  murmur   No edema  Abdomen:   Soft, non-tender. Not distended. Bowel sounds normal  Extremities: No cyanosis. No clubbing,      Skin turgor normal, Capillary refill normal, Radial dial pulse 2+  Skin:     Not pale. Not Jaundiced  No rashes   Psych:  Good insight. Not depressed. Not anxious or agitated. Neurologic: EOMs intact. No facial asymmetry. No aphasia or slurred speech. Symmetrical strength, Sensation grossly intact. Alert and oriented X 4.     _______________________________________________________________________  Care Plan discussed with:    Comments   Patient     Family      RN     Care Manager                    Consultant:      _______________________________________________________________________  Expected  Disposition:   Home with Family    HH/PT/OT/RN    SNF/LTC    GLADIS    ________________________________________________________________________  TOTAL TIME:  76 Minutes    Critical Care Provided     Minutes non procedure based      Comments     Reviewed previous records   >50% of visit spent in counseling and coordination of care  Discussion with patient and/or family and questions answered       ________________________________________________________________________  Signed: Chela Duval MD    Procedures: see electronic medical records for all procedures/Xrays and details which were not copied into this note but were reviewed prior to creation of Plan.     LAB DATA REVIEWED:    Recent Results (from the past 24 hour(s))   EKG, 12 LEAD, INITIAL    Collection Time: 04/01/23 10:10 AM   Result Value Ref Range    Ventricular Rate 63 BPM    Atrial Rate 63 BPM    P-R Interval 160 ms    QRS Duration 86 ms    Q-T Interval 410 ms    QTC Calculation (Bezet) 419 ms    Calculated P Axis 1 degrees    Calculated R Axis 49 degrees    Calculated T Axis 45 degrees    Diagnosis       Normal sinus rhythm  Minimal voltage criteria for LVH, may be normal variant  When compared with ECG of 07-JUN-2021 14:03,  No significant change was found     METABOLIC PANEL, COMPREHENSIVE    Collection Time: 04/01/23 10:22 AM   Result Value Ref Range    Sodium 136 136 - 145 mmol/L    Potassium 4.0 3.5 - 5.1 mmol/L    Chloride 103 97 - 108 mmol/L    CO2 25 21 - 32 mmol/L    Anion gap 8 5 - 15 mmol/L    Glucose 86 65 - 100 mg/dL    BUN 17 6 - 20 MG/DL    Creatinine 1.03 (H) 0.55 - 1.02 MG/DL    BUN/Creatinine ratio 17 12 - 20      eGFR >60 >60 ml/min/1.73m2    Calcium 8.9 8.5 - 10.1 MG/DL    Bilirubin, total 0.9 0.2 - 1.0 MG/DL    ALT (SGPT) 25 12 - 78 U/L    AST (SGOT) 22 15 - 37 U/L    Alk.  phosphatase 56 45 - 117 U/L    Protein, total 6.6 6.4 - 8.2 g/dL    Albumin 3.4 (L) 3.5 - 5.0 g/dL    Globulin 3.2 2.0 - 4.0 g/dL    A-G Ratio 1.1 1.1 - 2.2     NT-PRO BNP    Collection Time: 04/01/23 10:22 AM   Result Value Ref Range    NT pro- (H) <125 PG/ML   TROPONIN-HIGH SENSITIVITY    Collection Time: 04/01/23 10:22 AM   Result Value Ref Range    Troponin-High Sensitivity 7 0 - 51 ng/L   CBC WITH AUTOMATED DIFF    Collection Time: 04/01/23 10:22 AM   Result Value Ref Range    WBC 8.2 3.6 - 11.0 K/uL    RBC 4.39 3.80 - 5.20 M/uL    HGB 12.2 11.5 - 16.0 g/dL    HCT 39.0 35.0 - 47.0 %    MCV 88.8 80.0 - 99.0 FL    MCH 27.8 26.0 - 34.0 PG    MCHC 31.3 30.0 - 36.5 g/dL    RDW 16.0 (H) 11.5 - 14.5 %    PLATELET 549 413 - 783 K/uL    NRBC 0.0 0  WBC    ABSOLUTE NRBC 0.00 0.00 - 0.01 K/uL    NEUTROPHILS 72 32 - 75 %    LYMPHOCYTES 16 12 - 49 %    MONOCYTES 10 5 - 13 %    EOSINOPHILS 1 0 - 7 %    BASOPHILS 0 0 - 1 %    IMMATURE GRANULOCYTES 1 (H) 0.0 - 0.5 %    ABS. NEUTROPHILS 5.9 1.8 - 8.0 K/UL    ABS. LYMPHOCYTES 1.3 0.8 - 3.5 K/UL    ABS. MONOCYTES 0.8 0.0 - 1.0 K/UL    ABS. EOSINOPHILS 0.1 0.0 - 0.4 K/UL    ABS. BASOPHILS 0.0 0.0 - 0.1 K/UL    ABS. IMM.  GRANS. 0.1 (H) 0.00 - 0.04 K/UL    DF SMEAR SCANNED      RBC COMMENTS NORMOCYTIC, NORMOCHROMIC     COVID-19 RAPID TEST    Collection Time: 04/01/23 10:22 AM   Result Value Ref Range    Specimen source Nasopharyngeal      COVID-19 rapid test Not detected NOTD     INFLUENZA A+B VIRAL AGS    Collection Time: 04/01/23 10:22 AM   Result Value Ref Range    Influenza A Antigen Negative NEG      Influenza B Antigen Negative NEG     D DIMER    Collection Time: 04/01/23 10:22 AM   Result Value Ref Range    D-dimer 3.09 (H) 0.00 - 0.65 mg/L FEU   TROPONIN-HIGH SENSITIVITY    Collection Time: 04/01/23 12:19 PM   Result Value Ref Range    Troponin-High Sensitivity 7 0 - 51 ng/L

## 2023-04-02 PROCEDURE — 94640 AIRWAY INHALATION TREATMENT: CPT

## 2023-04-02 PROCEDURE — 74011000250 HC RX REV CODE- 250: Performed by: STUDENT IN AN ORGANIZED HEALTH CARE EDUCATION/TRAINING PROGRAM

## 2023-04-02 PROCEDURE — 74011250636 HC RX REV CODE- 250/636: Performed by: STUDENT IN AN ORGANIZED HEALTH CARE EDUCATION/TRAINING PROGRAM

## 2023-04-02 PROCEDURE — 74011250636 HC RX REV CODE- 250/636

## 2023-04-02 PROCEDURE — 74011250637 HC RX REV CODE- 250/637: Performed by: STUDENT IN AN ORGANIZED HEALTH CARE EDUCATION/TRAINING PROGRAM

## 2023-04-02 PROCEDURE — 74011250637 HC RX REV CODE- 250/637: Performed by: INTERNAL MEDICINE

## 2023-04-02 PROCEDURE — 65270000046 HC RM TELEMETRY

## 2023-04-02 RX ORDER — BUTALBITAL, ACETAMINOPHEN AND CAFFEINE 50; 325; 40 MG/1; MG/1; MG/1
1 TABLET ORAL
Status: DISCONTINUED | OUTPATIENT
Start: 2023-04-02 | End: 2023-04-03 | Stop reason: HOSPADM

## 2023-04-02 RX ORDER — ACETAMINOPHEN 325 MG/1
650 TABLET ORAL
Status: DISCONTINUED | OUTPATIENT
Start: 2023-04-02 | End: 2023-04-03 | Stop reason: HOSPADM

## 2023-04-02 RX ORDER — CLONIDINE HYDROCHLORIDE 0.1 MG/1
0.1 TABLET ORAL
Status: DISCONTINUED | OUTPATIENT
Start: 2023-04-02 | End: 2023-04-03 | Stop reason: HOSPADM

## 2023-04-02 RX ADMIN — IPRATROPIUM BROMIDE 0.5 MG: 0.5 SOLUTION RESPIRATORY (INHALATION) at 17:30

## 2023-04-02 RX ADMIN — APIXABAN 10 MG: 5 TABLET, FILM COATED ORAL at 11:50

## 2023-04-02 RX ADMIN — FUROSEMIDE 20 MG: 20 TABLET ORAL at 08:42

## 2023-04-02 RX ADMIN — BUTALBITAL, ACETAMINOPHEN, AND CAFFEINE 1 TABLET: 50; 325; 40 TABLET ORAL at 16:04

## 2023-04-02 RX ADMIN — APIXABAN 10 MG: 5 TABLET, FILM COATED ORAL at 18:09

## 2023-04-02 RX ADMIN — ACETAMINOPHEN 650 MG: 325 TABLET ORAL at 10:06

## 2023-04-02 RX ADMIN — METHYLPREDNISOLONE SODIUM SUCCINATE 40 MG: 40 INJECTION, POWDER, FOR SOLUTION INTRAMUSCULAR; INTRAVENOUS at 08:42

## 2023-04-02 RX ADMIN — ENOXAPARIN SODIUM 80 MG: 100 INJECTION SUBCUTANEOUS at 00:33

## 2023-04-02 RX ADMIN — SODIUM CHLORIDE, PRESERVATIVE FREE 10 ML: 5 INJECTION INTRAVENOUS at 06:00

## 2023-04-02 RX ADMIN — METHYLPREDNISOLONE SODIUM SUCCINATE 40 MG: 40 INJECTION, POWDER, FOR SOLUTION INTRAMUSCULAR; INTRAVENOUS at 21:19

## 2023-04-02 RX ADMIN — SODIUM CHLORIDE, PRESERVATIVE FREE 10 ML: 5 INJECTION INTRAVENOUS at 21:19

## 2023-04-02 RX ADMIN — SODIUM CHLORIDE, PRESERVATIVE FREE 10 ML: 5 INJECTION INTRAVENOUS at 15:00

## 2023-04-02 NOTE — PROGRESS NOTES
End of Shift Note    Bedside shift change report given to [facility], RN (oncoming nurse) by Christopher Blackwell RN (offgoing nurse). Report included the following information SBAR, Kardex, ED Summary, MAR, Recent Results, Med Rec Status, and Cardiac Rhythm NSR    Shift worked:  [id number]     Shift summary and any significant changes:     Pt awake throughout night, concerned about plan of care, but calm and cooperative. Pt tolerating pain, but no pain medications on board. Vital signs stable, but BP elevated, no significant events.    Concerns for physician to address:  Plan of care   Zone phone for oncoming shift:   [phone]       Activity:  Activity Level: Up ad akash  Number times ambulated in hallways past shift: 0  Number of times OOB to chair past shift: 0    Cardiac:   Cardiac Monitoring: Yes      Cardiac Rhythm: Sinus Rhythm    Access:  Current line(s): PIV     Genitourinary:   Urinary status: voiding    Respiratory:   O2 Device: None (Room air)  Chronic home O2 use?: NO  Incentive spirometer at bedside: NO       GI:     Current diet:  ADULT DIET Regular  Passing flatus: YES  Tolerating current diet: YES       Pain Management:   Patient states pain is manageable on current regimen: YES    Skin:  Marvel Score: 21  Interventions: PT/OT consult    Patient Safety:  Fall Score:    Interventions: bed/chair alarm, pt to call before getting OOB, and stay with me (per policy)       Length of Stay:  Expected LOS: - - -  Actual LOS: 1      Christopher Blackwell RN

## 2023-04-02 NOTE — PROGRESS NOTES
End of Shift Note    Bedside shift change report given to [place] (oncoming nurse) by Octaviano Vazquez RN (offgoing nurse).   Report included the following information SBAR, Kardex, Intake/Output, MAR, and Recent Results    Shift worked:  7a-7p     Shift summary and any significant changes:     Tylenol ordered for headache, unresolved, fioricet ordered, headache resolved, clonidine added prn, lovenox switched to eliquis BID, RT called to administer nebulizer, possibly DC tomorrow     Concerns for physician to address:  none     Zone phone for oncoming shift:   [phone]     Octaviano Vazquez, RN

## 2023-04-02 NOTE — PROGRESS NOTES
Problem: Falls - Risk of  Goal: *Absence of Falls  Description: Document Vernell Salines Fall Risk and appropriate interventions in the flowsheet.   Outcome: Progressing Towards Goal  Note: Fall Risk Interventions:                                Problem: Patient Education: Go to Patient Education Activity  Goal: Patient/Family Education  Outcome: Progressing Towards Goal     Problem: Pulmonary Embolism Care Plan (Adult)  Goal: *Improvement of existing pulmonary embolism  Outcome: Progressing Towards Goal  Goal: *Absence of bleeding  Outcome: Progressing Towards Goal  Goal: *Labs within defined limits  Outcome: Progressing Towards Goal     Problem: Pulmonary Embolism Care Plan (Adult)  Goal: *Improvement of existing pulmonary embolism  Outcome: Progressing Towards Goal  Goal: *Absence of bleeding  Outcome: Progressing Towards Goal  Goal: *Labs within defined limits  Outcome: Progressing Towards Goal     Problem: Patient Education: Go to Patient Education Activity  Goal: Patient/Family Education  Outcome: Progressing Towards Goal

## 2023-04-02 NOTE — PROGRESS NOTES
Hospitalist Progress Note    NAME: Pratima Holm   :  1960   MRN:  [id number]       Assessment / Plan:  Acute bilateral pulmonary embolism hemodynamically stable unprovoked  -CT shows evidence of multiple bilateral pulmonary embolism with no evidence of right heart strain  -Troponin is 7, proBNP is 138  -Stop Lovenox. Start Eliquis. Consult case management to find out the cost of Eliquis  -Pending 2D echocardiogram  -She will need age-appropriate cancer screening on outpatient basis due to unprovoked pulmonary embolism    Acute asthma exacerbation  -Continue Solu-Medrol. Continue ipratropium nebulizations. Bilateral pedal edema  -proBNP is only 138. Pending 2D echocardiogram.  Continue PTA Lasix. She is currently on room air    Hypertension  -Continue PTA Lasix    ROMANA: 4/3  Barriers: Clinical improvement, 2D echocardiogram             Code status: Full  Prophylaxis:    Eliquis  Recommended Disposition: Home w/Family     Subjective:     Chief Complaint / Reason for Physician Visit  Chest pain is improved. Still has some shortness of breath. Mild cough. No phlegm. No fever. Review of Systems:  Symptom Y/N Comments  Symptom Y/N Comments   Fever/Chills    Chest Pain     Poor Appetite    Edema     Cough    Abdominal Pain     Sputum    Joint Pain     SOB/SWAN    Pruritis/Rash     Nausea/vomit    Tolerating PT/OT     Diarrhea    Tolerating Diet     Constipation    Other       Could NOT obtain due to:      Objective:     VITALS:   Last 24hrs VS reviewed since prior progress note.  Most recent are:  Patient Vitals for the past 24 hrs:   Temp Pulse Resp BP SpO2   23 1508 97.9 °F (36.6 °C) 78 18 138/81 92 %   23 1118 98.2 °F (36.8 °C) 80 18 139/71 92 %   23 0800 -- 89 -- -- --   23 0711 98.2 °F (36.8 °C) 68 16 (!) 155/77 97 %   23 0431 98.2 °F (36.8 °C) 70 16 (!) 149/84 --   23 0400 -- 69 -- -- --   23 0000 -- 77 -- -- --   23 2342 98.2 °F (36.8 °C) 74 -- (!) 154/84 97 %   04/01/23 2225 -- -- -- (!) 153/80 --   04/01/23 2104 -- -- -- (!) 155/72 --   04/01/23 2040 98.4 °F (36.9 °C) 83 16 (!) 165/80 95 %   04/01/23 1739 98.1 °F (36.7 °C) 81 16 (!) 158/74 97 %     No intake or output data in the 24 hours ending 04/02/23 1701     I had a face to face encounter and independently examined this patient on 4/2/2023, as outlined below:  PHYSICAL EXAM:  General: cooperative, no acute distress    EENT:  EOMI. Anicteric sclerae. MMM  Resp:  Bilateral air entry present, crackles present, no wheezing  CV:  Regular  rhythm,  No edema  GI:  Soft, Non distended, Non tender. +Bowel sounds  Neurologic:  Alert and oriented X 3, normal speech,   Psych:   Not anxious nor agitated  Skin:  No rashes. No jaundice    Reviewed most current lab test results and cultures  YES  Reviewed most current radiology test results   YES  Review and summation of old records today    NO  Reviewed patient's current orders and MAR    YES  PMH/ reviewed - no change compared to H&P  ________________________________________________________________________  Care Plan discussed with:    Comments   Patient y    Family      RN y    Care Manager     Consultant                        Multidiciplinary team rounds were held today with , nursing, pharmacist and clinical coordinator. Patient's plan of care was discussed; medications were reviewed and discharge planning was addressed. ________________________________________________________________________  Total NON critical care TIME:  55  Minutes    Total CRITICAL CARE TIME Spent:   Minutes non procedure based      Comments   >50% of visit spent in counseling and coordination of care     ________________________________________________________________________  Ez Ross MD     Procedures: see electronic medical records for all procedures/Xrays and details which were not copied into this note but were reviewed prior to creation of Plan. LABS:  I reviewed today's most current labs and imaging studies.   Pertinent labs include:  Recent Labs     04/01/23  1022   WBC 8.2   HGB 12.2   HCT 39.0        Recent Labs     04/01/23  1022      K 4.0      CO2 25   GLU 86   BUN 17   CREA 1.03*   CA 8.9   ALB 3.4*   TBILI 0.9   ALT 25       Signed: Yaw Camilo MD

## 2023-04-03 ENCOUNTER — APPOINTMENT (OUTPATIENT)
Dept: NON INVASIVE DIAGNOSTICS | Age: 63
DRG: 176 | End: 2023-04-03
Attending: STUDENT IN AN ORGANIZED HEALTH CARE EDUCATION/TRAINING PROGRAM
Payer: COMMERCIAL

## 2023-04-03 LAB
ANION GAP SERPL CALC-SCNC: 4 MMOL/L (ref 5–15)
ATRIAL RATE: 63 BPM
BUN SERPL-MCNC: 38 MG/DL (ref 6–20)
BUN/CREAT SERPL: 29 (ref 12–20)
CALCIUM SERPL-MCNC: 9 MG/DL (ref 8.5–10.1)
CALCULATED P AXIS, ECG09: 1 DEGREES
CALCULATED R AXIS, ECG10: 49 DEGREES
CALCULATED T AXIS, ECG11: 45 DEGREES
CHLORIDE SERPL-SCNC: 108 MMOL/L (ref 97–108)
CO2 SERPL-SCNC: 25 MMOL/L (ref 21–32)
CREAT SERPL-MCNC: 1.32 MG/DL (ref 0.55–1.02)
DIAGNOSIS, 93000: NORMAL
ECHO AV AREA PEAK VELOCITY: 1.7 CM2
ECHO AV AREA VTI: 1.7 CM2
ECHO AV AREA/BSA PEAK VELOCITY: 0.9 CM2/M2
ECHO AV AREA/BSA VTI: 0.9 CM2/M2
ECHO AV MEAN GRADIENT: 6 MMHG
ECHO AV MEAN VELOCITY: 1.1 M/S
ECHO AV PEAK GRADIENT: 12 MMHG
ECHO AV PEAK VELOCITY: 1.7 M/S
ECHO AV VELOCITY RATIO: 0.71
ECHO AV VTI: 38.9 CM
ECHO LA DIAMETER INDEX: 1.43 CM/M2
ECHO LA DIAMETER: 2.6 CM
ECHO LA VOL 2C: 24 ML (ref 22–52)
ECHO LA VOL 4C: 32 ML (ref 22–52)
ECHO LA VOLUME AREA LENGTH: 34 ML
ECHO LA VOLUME INDEX A2C: 13 ML/M2 (ref 16–34)
ECHO LA VOLUME INDEX A4C: 18 ML/M2 (ref 16–34)
ECHO LA VOLUME INDEX AREA LENGTH: 19 ML/M2 (ref 16–34)
ECHO LV E' LATERAL VELOCITY: 7 CM/S
ECHO LV E' SEPTAL VELOCITY: 5 CM/S
ECHO LV FRACTIONAL SHORTENING: 43 % (ref 28–44)
ECHO LV INTERNAL DIMENSION DIASTOLE INDEX: 2.2 CM/M2
ECHO LV INTERNAL DIMENSION DIASTOLIC: 4 CM (ref 3.9–5.3)
ECHO LV INTERNAL DIMENSION SYSTOLIC INDEX: 1.26 CM/M2
ECHO LV INTERNAL DIMENSION SYSTOLIC: 2.3 CM
ECHO LV IVSD: 0.9 CM (ref 0.6–0.9)
ECHO LV MASS 2D: 109.7 G (ref 67–162)
ECHO LV MASS INDEX 2D: 60.3 G/M2 (ref 43–95)
ECHO LV POSTERIOR WALL DIASTOLIC: 0.9 CM (ref 0.6–0.9)
ECHO LV RELATIVE WALL THICKNESS RATIO: 0.45
ECHO LVOT AREA: 2.5 CM2
ECHO LVOT AV VTI INDEX: 0.66
ECHO LVOT DIAM: 1.8 CM
ECHO LVOT MEAN GRADIENT: 3 MMHG
ECHO LVOT PEAK GRADIENT: 5 MMHG
ECHO LVOT PEAK VELOCITY: 1.2 M/S
ECHO LVOT STROKE VOLUME INDEX: 36.1 ML/M2
ECHO LVOT SV: 65.6 ML
ECHO LVOT VTI: 25.8 CM
ECHO MV A VELOCITY: 1.29 M/S
ECHO MV AREA VTI: 1.6 CM2
ECHO MV E DECELERATION TIME (DT): 211.4 MS
ECHO MV E VELOCITY: 0.71 M/S
ECHO MV E/A RATIO: 0.55
ECHO MV E/E' LATERAL: 10.14
ECHO MV E/E' RATIO (AVERAGED): 12.17
ECHO MV E/E' SEPTAL: 14.2
ECHO MV LVOT VTI INDEX: 1.62
ECHO MV MAX VELOCITY: 1.4 M/S
ECHO MV MEAN GRADIENT: 3 MMHG
ECHO MV MEAN VELOCITY: 0.8 M/S
ECHO MV PEAK GRADIENT: 8 MMHG
ECHO MV VTI: 41.9 CM
ECHO PV MAX VELOCITY: 0.8 M/S
ECHO PV PEAK GRADIENT: 3 MMHG
ECHO PVEIN PEAK D VELOCITY: 0.3 M/S
ECHO PVEIN PEAK S VELOCITY: 0.5 M/S
ECHO PVEIN S/D RATIO: 1.7 NO UNITS
ECHO RV TAPSE: 2.8 CM (ref 1.7–?)
ECHO TV REGURGITANT MAX VELOCITY: 2.84 M/S
ECHO TV REGURGITANT PEAK GRADIENT: 32 MMHG
ERYTHROCYTE [DISTWIDTH] IN BLOOD BY AUTOMATED COUNT: 15.6 % (ref 11.5–14.5)
GLUCOSE SERPL-MCNC: 142 MG/DL (ref 65–100)
HCT VFR BLD AUTO: 39.8 % (ref 35–47)
HGB BLD-MCNC: 12.4 G/DL (ref 11.5–16)
MCH RBC QN AUTO: 27.6 PG (ref 26–34)
MCHC RBC AUTO-ENTMCNC: 31.2 G/DL (ref 30–36.5)
MCV RBC AUTO: 88.6 FL (ref 80–99)
NRBC # BLD: 0 K/UL (ref 0–0.01)
NRBC BLD-RTO: 0 PER 100 WBC
P-R INTERVAL, ECG05: 160 MS
PLATELET # BLD AUTO: 196 K/UL (ref 150–400)
PMV BLD AUTO: 8.9 FL (ref 8.9–12.9)
POTASSIUM SERPL-SCNC: 5.1 MMOL/L (ref 3.5–5.1)
Q-T INTERVAL, ECG07: 410 MS
QRS DURATION, ECG06: 86 MS
QTC CALCULATION (BEZET), ECG08: 419 MS
RBC # BLD AUTO: 4.49 M/UL (ref 3.8–5.2)
SODIUM SERPL-SCNC: 137 MMOL/L (ref 136–145)
VENTRICULAR RATE, ECG03: 63 BPM
WBC # BLD AUTO: 10.9 K/UL (ref 3.6–11)

## 2023-04-03 PROCEDURE — 80048 BASIC METABOLIC PNL TOTAL CA: CPT

## 2023-04-03 PROCEDURE — 93306 TTE W/DOPPLER COMPLETE: CPT

## 2023-04-03 PROCEDURE — 74011250637 HC RX REV CODE- 250/637: Performed by: STUDENT IN AN ORGANIZED HEALTH CARE EDUCATION/TRAINING PROGRAM

## 2023-04-03 PROCEDURE — 36415 COLL VENOUS BLD VENIPUNCTURE: CPT

## 2023-04-03 PROCEDURE — 74011250636 HC RX REV CODE- 250/636: Performed by: STUDENT IN AN ORGANIZED HEALTH CARE EDUCATION/TRAINING PROGRAM

## 2023-04-03 PROCEDURE — 74011250637 HC RX REV CODE- 250/637: Performed by: INTERNAL MEDICINE

## 2023-04-03 PROCEDURE — 74011000250 HC RX REV CODE- 250: Performed by: STUDENT IN AN ORGANIZED HEALTH CARE EDUCATION/TRAINING PROGRAM

## 2023-04-03 PROCEDURE — 85027 COMPLETE CBC AUTOMATED: CPT

## 2023-04-03 RX ORDER — PREDNISONE 20 MG/1
40 TABLET ORAL DAILY
Qty: 10 TABLET | Refills: 0 | Status: SHIPPED | OUTPATIENT
Start: 2023-04-04 | End: 2023-04-09

## 2023-04-03 RX ADMIN — SODIUM CHLORIDE, PRESERVATIVE FREE 10 ML: 5 INJECTION INTRAVENOUS at 06:22

## 2023-04-03 RX ADMIN — FUROSEMIDE 20 MG: 20 TABLET ORAL at 09:01

## 2023-04-03 RX ADMIN — BUTALBITAL, ACETAMINOPHEN, AND CAFFEINE 1 TABLET: 50; 325; 40 TABLET ORAL at 12:59

## 2023-04-03 RX ADMIN — METHYLPREDNISOLONE SODIUM SUCCINATE 40 MG: 40 INJECTION, POWDER, FOR SOLUTION INTRAMUSCULAR; INTRAVENOUS at 09:01

## 2023-04-03 RX ADMIN — SODIUM CHLORIDE, PRESERVATIVE FREE 10 ML: 5 INJECTION INTRAVENOUS at 13:00

## 2023-04-03 RX ADMIN — BUTALBITAL, ACETAMINOPHEN, AND CAFFEINE 1 TABLET: 50; 325; 40 TABLET ORAL at 03:22

## 2023-04-03 RX ADMIN — APIXABAN 10 MG: 5 TABLET, FILM COATED ORAL at 17:55

## 2023-04-03 RX ADMIN — APIXABAN 10 MG: 5 TABLET, FILM COATED ORAL at 09:01

## 2023-04-03 NOTE — PROGRESS NOTES
DISCHARGE NOTE FROM [facility] NURSE    Patient determined to be stable for discharge by attending provider. I have reviewed the discharge instructions and follow-up appointments with the patient and daughter. They verbalized understanding and all questions were answered to their satisfaction. No complaints or further questions were expressed. Medications sent to pharmacy. Appropriate educational materials and medication side effect teaching were provided. PIV and cardiac monitoring were removed prior to discharge. Patient did not discharge with any line, machado, or drain. All personal items collected during admission were returned to the patient prior to discharge.     Post-op patient: Gayle Thapa, LARISSA

## 2023-04-03 NOTE — DISCHARGE INSTRUCTIONS
Patient Discharge Instructions    Milton Chadwick / [id number] : 1960    Admitted 2023 Discharged: 4/3/2023         DISCHARGE DIAGNOSIS:   Acute bilateral pulmonary embolism hemodynamically stable unprovoked  Acute asthma exacerbation  Bilateral pedal edema  Hypertension       Take Home Medications     {Medication reconciliation information is now added to the patient's AVS automatically when it is printed. There is no need to use this SmartLink in discharge instructions. Highlight this text and delete it to clear this message}      General drug facts     If you have a very bad allergy, wear an allergy ID at all times. It is important that you take the medication exactly as they are prescribed. Keep your medication in the bottles provided by the pharmacist.  Keep a list of all your drugs (prescription, natural products, vitamins, OTC) with you. Give this list to your doctor. Do not take other medications without consulting your doctor. Do not share your drugs with others and do not take anyone else's drugs. Keep all drugs out of the reach of children and pets. Most drugs may be thrown away in household trash after mixing with coffee grounds or christine litter and sealing in a plastic bag. Keep a list Call your doctor for help with any side effects. If in the [place] you may also call the FDA at 3-375-RUO-2496    Talk with the doctor before starting any new drug, including OTC, natural products, or vitamins. What to do at Home    1. Recommended diet: Regular    2. Recommended activity: Activity as tolerated    3. If you experience any of the following symptoms then please call your primary care physician or return to the emergency room if you cannot get hold of your doctor:    4. Wound Care: None    5. Lab work: CBC and BMP in 1 week    6. You will need age-appropriate cancer screening with your primary care physician including colonoscopy and mammogram    7.   If you notice any bleeding, please stop taking blood thinner and go to nearest emergency department    8. Bring these papers with you to your follow up appointments. The papers will help your doctors be sure to continue the care plan from the hospital.      If you have questions regarding the hospital related prescriptions or hospital related issues please call [facility] at [phone]. You can always direct your questions to your primary care doctor if you are unable to reach your hospital physician; your PCP works as an extension of your hospital doctor just like your hospital doctor is an extension of your PCP for your time at the hospital [facility], [facility])      Follow-up with:   PCP: @PCP@  [facility] [address]  [phone]      Office will call patient with a hospital follow-up appointment       Please call for your own appointment        Information obtained by :  I understand that if any problems occur once I am at home I am to contact my physician. I understand and acknowledge receipt of the instructions indicated above.                                                                                                                                            Physician's or R.N.'s Signature                                                                  Date/Time                                                                                                                                              Patient or Representative Signature                                                          Date/Time

## 2023-04-03 NOTE — PROGRESS NOTES
Problem: Falls - Risk of  Goal: *Absence of Falls  Description: Document Ajit Martin Fall Risk and appropriate interventions in the flowsheet.   Outcome: Progressing Towards Goal  Note: Fall Risk Interventions:                                Problem: Patient Education: Go to Patient Education Activity  Goal: Patient/Family Education  Outcome: Progressing Towards Goal     Problem: Pulmonary Embolism Care Plan (Adult)  Goal: *Improvement of existing pulmonary embolism  Outcome: Progressing Towards Goal  Goal: *Absence of bleeding  Outcome: Progressing Towards Goal  Goal: *Labs within defined limits  Outcome: Progressing Towards Goal     Problem: Pulmonary Embolism Care Plan (Adult)  Goal: *Improvement of existing pulmonary embolism  Outcome: Progressing Towards Goal  Goal: *Absence of bleeding  Outcome: Progressing Towards Goal  Goal: *Labs within defined limits  Outcome: Progressing Towards Goal     Problem: Patient Education: Go to Patient Education Activity  Goal: Patient/Family Education  Outcome: Progressing Towards Goal

## 2023-04-03 NOTE — PROGRESS NOTES
[address] April 3, 2023       RE: Michael Jones      To Whom It May Concern,    This is to certify that Michael Jones was hospitalized from 4/1/23 to 4/3/23. She can not return to work for at least 1 week/ until cleared by primary care physician. Please feel free to contact my office if you have any questions or concerns. Thank you for your assistance in this matter.       Sincerely,  William Cruz MD

## 2023-04-03 NOTE — DISCHARGE SUMMARY
Hospitalist Discharge Summary     Patient ID:  Michael Jones  [id number]  60 y.o.  1960 4/1/2023    PCP on record: Ty Severin, MD    Admit date: 4/1/2023  Discharge date and time: 4/3/2023    DISCHARGE DIAGNOSIS:    Acute bilateral pulmonary embolism hemodynamically stable unprovoked  Acute asthma exacerbation  Bilateral pedal edema  Hypertension     CONSULTATIONS:  IP CONSULT TO HOSPITALIST    Excerpted HPI from H&P of Marybel Wells MD:  Judy Stern is a 60 y.o.  female was feeling shortness of breath and chest tightness for the past 1 week patient was trying to use her albuterol at home inhaler to help her, but it did not help patient also noticed that she is out of breath with walking, yesterday while she walking at the [facility] she could not catch her breath and went back home and decided to go to PCP tomorrow, but in the morning patient felt too much chest tightness and [facility] and they recommend patient to go to the emergency room, patient brought to the ER by her daughter. At the bedside patient seems in mild distress on room air, explained history above, and stated that it is still her chest is a little bit tight, and yesterday they were tingling in the left arm, patient denied any similar pain in the past, patient denied any recent infection, patient also stated that she is not taking Lasix for blood pressure medication anymore, her usual her blood pressure get higher when she feels a lot of pain due to her knee arthritis.    ______________________________________________________________________  DISCHARGE SUMMARY/HOSPITAL COURSE:  for full details see H&P, daily progress notes, labs, consult notes. Patient was admitted hospital noted to have acute bilateral pulmonary embolism. Troponin is 7 and proBNP is 138. Patient was initially started on Lovenox and she later changed to Eliquis.   Case management was contacted to find out the cost of Eliquis and it would cost about $200 and patient notified us that she will be able to afford that. Patient was advised to have outpatient age-appropriate cancer screening with primary care physician. Patient had a 2D echocardiogram done and is pending at this time. Patient was also noted to have acute asthma exacerbation for which she was started on Solu-Medrol with improvement. Patient was also noted to have bilateral pedal edema for which she was continued on PTA Lasix and 2D echocardiogram was done and is pending. Patient's rest of the medical problems remained stable during hospitalization. Patient seen and examined today, vital signs stable, lab work is at baseline and echo is normal.           _______________________________________________________________________  Patient seen and examined by me on discharge day. Pertinent Findings:  Gen:    Not in distress  Chest: Clear lungs  CVS:   Regular rhythm. No edema  Abd:  Soft, not distended, not tender  Neuro:  Alert, awake  _______________________________________________________________________  DISCHARGE MEDICATIONS:   Current Discharge Medication List        START taking these medications    Details   !! apixaban (Eliquis) 5 mg tablet Take 2 Tablets by mouth two (2) times a day for 5 days. Qty: 20 Tablet, Refills: 0  Start date: 4/3/2023, End date: 4/8/2023      !! apixaban (Eliquis) 5 mg tablet Take 1 Tablet by mouth two (2) times a day. Qty: 60 Tablet, Refills: 1  Start date: 4/9/2023      predniSONE (DELTASONE) 20 mg tablet Take 2 Tablets by mouth daily for 5 days. Qty: 10 Tablet, Refills: 0  Start date: 4/4/2023, End date: 4/9/2023       !! - Potential duplicate medications found. Please discuss with provider. CONTINUE these medications which have NOT CHANGED    Details   calcium-cholecalciferol, D3, (CALTRATE 600+D) tablet Take 1 Tablet by mouth daily.       furosemide (LASIX) 20 mg tablet Take 1 Tablet by mouth daily.  Qty: 14 Tablet, Refills: 0    Associated Diagnoses: Localized swelling of both lower legs      diclofenac (VOLTAREN) 1 % gel Apply 4 g to affected area four (4) times daily. Qty: 200 g, Refills: 2    Associated Diagnoses: Pes planus of both feet      loratadine (CLARITIN) 10 mg tablet Take 10 mg by mouth daily as needed. cholecalciferol (VITAMIN D3) 25 mcg (1,000 unit) cap Take  by mouth daily. albuterol (PROVENTIL VENTOLIN) 2.5 mg /3 mL (0.083 %) nebu 3 mL by Nebulization route once for 1 dose. Qty: 70 Each, Refills: 1    Associated Diagnoses: Moderate persistent asthma with acute exacerbation; Acute bronchitis, unspecified organism; Asthma exacerbation; Dizziness; Chest pain at rest; Advice given about COVID-19 virus infection      albuterol (PROVENTIL HFA, VENTOLIN HFA, PROAIR HFA) 90 mcg/actuation inhaler Take 1 Puff by inhalation every four (4) hours as needed for Wheezing. Qty: 1 Inhaler, Refills: 6    Associated Diagnoses: Mild intermittent asthma without complication      tiotropium bromide (Spiriva Respimat) 2.5 mcg/actuation inhaler Take 2 Puffs by inhalation nightly. Qty: 1 Inhaler, Refills: 6      Nebulizer & Compressor machine 1 Each by Does Not Apply route four (4) times daily as needed for Cough. Qty: 1 Each, Refills: 0      ascorbic acid, vitamin C, (VITAMIN C) 500 mg tablet Take 500 mg by mouth daily. Associated Diagnoses: Mild intermittent asthma without complication      MULTIVITS,CA,MINERALS/IRON/FA (MULTI FOR HER PO) Take 1 Tab by mouth daily. Associated Diagnoses: Arthritis; Knee pain, bilateral; Osteoarthritis of knee; Hypercholesterolemia           STOP taking these medications       potassium chloride (K-DUR, KLOR-CON M20) 20 mEq tablet Comments:   Reason for Stopping:                 Patient Follow Up Instructions:     1. Recommended diet: Regular    2. Recommended activity: Activity as tolerated    3.  If you experience any of the following symptoms then please call your primary care physician or return to the emergency room if you cannot get hold of your doctor:    4. Wound Care: None    5. Lab work: CBC and BMP in 1 week    6. You will need age-appropriate cancer screening with your primary care physician including colonoscopy and mammogram    7. If you notice any bleeding, please stop taking blood thinner and go to nearest emergency department    8. Bring these papers with you to your follow up appointments.  The papers will help your doctors be sure to continue the care plan from the hospital.      Follow-up Information       Follow up With Specialties Details Why Contact John Red MD [facility] Medicine  Office will call patient with a hospital follow-up appointment [address] 65.  [phone]            ________________________________________________________________    Risk of deterioration: High    Condition at Discharge:  Stable  __________________________________________________________________    Disposition  Home with family, no needs    ____________________________________________________________________    Code Status: Full Code  ___________________________________________________________________      Total time in minutes spent coordinating this discharge (includes going over instructions, follow-up, prescriptions, and preparing report for sign off to her PCP) :  35 minutes    Signed:  Jennie Lama MD

## 2023-04-03 NOTE — PROGRESS NOTES
End of Shift Note    Bedside shift change report given to Raz Lin RN (oncoming nurse) by Nani Zuniga RN (offgoing nurse). Report included the following information SBAR, Kardex, Intake/Output, MAR, and Recent Results    Shift worked:  7097-0542     Shift summary and any significant changes:    Uneventful shift. Pt had one headache over shift, resolved with heat pack and fioricet. AM labs drawn, vital signs stable.      Concerns for physician to address: Labs     Zone phone for oncoming shift:   [phone]     Nani Zuniga RN

## 2023-04-03 NOTE — PROGRESS NOTES
Patient is ready for d/c from CM standpoint    Transition of Care Plan:    RUR:8%  Disposition:Home  If SNF or IPR: Date FOC offered:  Date FOC received:  Date authorization started with reference number:  Date authorization received and expires:  Accepting facility:  Follow up appointments:PCP will contact pt w/f/u   DME needed:n/a-owns a walker & shower chair  Transportation at [address] [facility] or means to access home:        IM Medicare Letter:n/a  Is patient a [place] and connected with the [place]? If yes, was [place] transfer form completed and [facility] notified? Caregiver Contact:  Discharge Caregiver contacted prior to discharge? Care Conference needed?:       3:00  CM provided pt with [employer] discount card. Patient is d/c home today without any needs    12:21  Per consult, CM priced Eliquist.   Eliquist would need prior Auth. Without Auth, it's about $200. CM completed assessment w/pt via phone. Patient is independent w/ADL/IADL to include driving. Patient has had [facility] in the past, but unable to remember provider. No history of Rehab. No DME use, but does own a walker & cane. Patient support system includes, three daughters & other family members. No needs or concerns identified. PCP-   Gill Rao MD  Pharmacy-[facility] on [address]    Care Management Interventions  PCP Verified by CM: Yes  Mode of Transport at Discharge:  Other (see comment) (daughter)  Transition of Care Consult (CM Consult): Discharge Planning  Discharge Durable Medical Equipment: No (no DME use-owns a walker & shower chair)  Physical Therapy Consult: No  Occupational Therapy Consult: No  Speech Therapy Consult: No  Support Systems: Other Family Member(s), Child(michael) (Lives in a two story home w/all living done on first fl & 4 STE)  Confirm Follow Up Transport: Self  Discharge Location  Patient Expects to be Discharged to[de-identified] [address]

## 2023-04-05 ENCOUNTER — TELEPHONE (OUTPATIENT)
Dept: FAMILY MEDICINE CLINIC | Age: 63
End: 2023-04-05

## 2023-04-05 NOTE — TELEPHONE ENCOUNTER
Care Transitions Initial Follow Up Call    Outreach made within 2 business days of discharge: Yes    Patient: Jessica Patel Patient : 1960   MRN: [id number]  Reason for Admission: 23  Discharge Date: 4/3/23       Spoke with: Lan Stahl    Discharge department/facility: home    TCM Interactive Patient Contact:  Was patient able to fill all prescriptions: Yes  Was patient instructed to bring all medications to the follow-up visit: Yes  Is patient taking all medications as directed in the discharge summary?  Yes  Does patient understand their discharge instructions: Yes  Does patient have questions or concerns that need addressed prior to 7-14 day follow up office visit: No    Scheduled appointment with PCP within 7-14 days    Follow Up  Future Appointments   Date Time Provider Jesica Alvarez   2023  4:00 PM Bernard Vasques MD [facility] HUSSIAN Zapata LPN

## 2023-04-20 ENCOUNTER — OFFICE VISIT (OUTPATIENT)
Dept: FAMILY MEDICINE CLINIC | Age: 63
End: 2023-04-20
Payer: COMMERCIAL

## 2023-04-20 VITALS
WEIGHT: 180.4 LBS | SYSTOLIC BLOOD PRESSURE: 138 MMHG | RESPIRATION RATE: 18 BRPM | OXYGEN SATURATION: 98 % | TEMPERATURE: 98.3 F | HEART RATE: 70 BPM | HEIGHT: 62 IN | BODY MASS INDEX: 33.2 KG/M2 | DIASTOLIC BLOOD PRESSURE: 83 MMHG

## 2023-04-20 DIAGNOSIS — Z12.31 VISIT FOR SCREENING MAMMOGRAM: ICD-10-CM

## 2023-04-20 DIAGNOSIS — R06.02 SHORTNESS OF BREATH: ICD-10-CM

## 2023-04-20 DIAGNOSIS — I26.99 OTHER ACUTE PULMONARY EMBOLISM WITHOUT ACUTE COR PULMONALE (HCC): Primary | ICD-10-CM

## 2023-04-20 DIAGNOSIS — Z09 HOSPITAL DISCHARGE FOLLOW-UP: ICD-10-CM

## 2023-04-20 PROCEDURE — 1111F DSCHRG MED/CURRENT MED MERGE: CPT | Performed by: FAMILY MEDICINE

## 2023-04-20 PROCEDURE — 99215 OFFICE O/P EST HI 40 MIN: CPT | Performed by: FAMILY MEDICINE

## 2023-04-20 RX ORDER — MONTELUKAST SODIUM 10 MG/1
10 TABLET ORAL DAILY
Qty: 30 TABLET | Refills: 4 | Status: SHIPPED | OUTPATIENT
Start: 2023-04-20

## 2023-04-20 NOTE — PROGRESS NOTES
Verified patient with two type of identifiers. No chief complaint on file. 1. \"Have you been to the ER, urgent care clinic since your last visit? Hospitalized since your last visit? \" Yes [id number] for blood clots in lungs. 2. \"Have you seen or consulted any other health care providers outside of the [address] [place] [address] since your last visit? \" No     3. For patients aged 39-70: Has the patient had a colonoscopy / FIT/ Cologuard? No      If the patient is female:    4. For patients aged 41-77: Has the patient had a mammogram within the past 2 years? Yes - no Care Gap present      5. For patients aged 21-65: Has the patient had a pap smear? Yes - no Care Gap present    There are no preventive care reminders to display for this patient.

## 2023-04-21 LAB
ANION GAP SERPL CALC-SCNC: 1 MMOL/L (ref 5–15)
BUN SERPL-MCNC: 19 MG/DL (ref 6–20)
BUN/CREAT SERPL: 19 (ref 12–20)
CALCIUM SERPL-MCNC: 9.3 MG/DL (ref 8.5–10.1)
CHLORIDE SERPL-SCNC: 109 MMOL/L (ref 97–108)
CO2 SERPL-SCNC: 30 MMOL/L (ref 21–32)
CREAT SERPL-MCNC: 0.98 MG/DL (ref 0.55–1.02)
GLUCOSE SERPL-MCNC: 74 MG/DL (ref 65–100)
POTASSIUM SERPL-SCNC: 4.8 MMOL/L (ref 3.5–5.1)
SODIUM SERPL-SCNC: 140 MMOL/L (ref 136–145)

## 2023-04-21 NOTE — PROGRESS NOTES
Transitional Care Management Progress Note    Patient: Megha Bowling  : 1960  PCP: Faisal Corral MD    Date of office visit: 2023   Date of admission: 23  Date of discharge: 4/3/23  Hospital: [facility]    Call initiated w/i 2 business dates of discharge: *No response documented in the last 14 days   Date of the most recent call to the patient: *No documented post hospital discharge outreach found in the last 14 days      Assessment/Plan:   Diagnoses and all orders for this visit:    1. Other acute pulmonary embolism without acute cor pulmonale (HCC)  -     REFERRAL TO PULMONARY DISEASE  -     METABOLIC PANEL, BASIC; Future    2. Shortness of breath  -     REFERRAL TO PULMONARY DISEASE  -     METABOLIC PANEL, BASIC; Future    3. Visit for screening mammogram  -     [facility] MAMMO BI SCREENING INCL CAD    4. Hospital discharge follow-up  -     [facility] [address] MEDS RECONCILED W/CURRENT MED LIST    Other orders  -     montelukast (Singulair) 10 mg tablet; Take 1 Tablet by mouth daily. The complexity of medical decision making for this patient's transitional care is high   Follow-up and Dispositions    Return in 1 month (on 2023), or if symptoms worsen or fail to improve. Subjective:   Megha Bowling is a 58 y.o. female presenting today for follow-up after hospital discharge. This encounter and supporting documentation was reviewed if available. Medication reconciliation was performed today. The main problem requiring admission was dyspnea and chest pain with discharge dx of PE,    Complications during admission: none    Interval history/Current status: stable    Admitting symptoms have: improved      Medications marked \"taking\" at this time:  Prior to Admission medications    Medication Sig Start Date End Date Taking? Authorizing Provider   montelukast (Singulair) 10 mg tablet Take 1 Tablet by mouth daily.  23  Yes Faisal Corral MD   predniSONE (DELTASONE) 20 mg tablet Take 1 Tablet by mouth daily (with breakfast). Provider, Historical   cetirizine (ZYRTEC) 10 mg tablet Take 1 Tablet by mouth nightly. 4/11/23   Marcus Springer MD   apixaban (Eliquis) 5 mg tablet Take 1 Tablet by mouth two (2) times a day. 4/9/23   Sylvain Handley MD   calcium-cholecalciferol, D3, (CALTRATE 600+D) tablet Take 1 Tablet by mouth daily. Provider, Historical   furosemide (LASIX) 20 mg tablet Take 1 Tablet by mouth daily. 9/29/22   Marcus Springer MD   diclofenac (VOLTAREN) 1 % gel Apply 4 g to affected area four (4) times daily. 2/24/22   Marcus Springer MD   loratadine (CLARITIN) 10 mg tablet Take 1 Tablet by mouth daily as needed. Provider, Historical   cholecalciferol (VITAMIN D3) 25 mcg (1,000 unit) cap Take  by mouth daily. Provider, Historical   albuterol (PROVENTIL VENTOLIN) 2.5 mg /3 mL (0.083 %) nebu 3 mL by Nebulization route once for 1 dose. 6/7/21 6/7/21  Marcus Springer MD   albuterol (PROVENTIL HFA, VENTOLIN HFA, PROAIR HFA) 90 mcg/actuation inhaler Take 1 Puff by inhalation every four (4) hours as needed for Wheezing. 12/29/20   Marcus Springer MD   tiotropium bromide (Spiriva Respimat) 2.5 mcg/actuation inhaler Take 2 Puffs by inhalation nightly. 6/12/20   Marcus Springer MD   Nebulizer & Compressor machine 1 Each by Does Not Apply route four (4) times daily as needed for Cough. 6/12/20   Marcus Springer MD   ascorbic acid, vitamin C, (VITAMIN C) 500 mg tablet Take 1 Tablet by mouth daily. Provider, Historical   MULTIVITS,CA,MINERALS/IRON/FA (MULTI FOR HER PO) Take 1 Tab by mouth daily. Provider, Historical        ROS:      Constitutional: no chills and fever,  , nad     HENT: no ear pain or nosebleeds. No blurred vision  Respiratory: no shortness of breath, wheezing cough   Cardiovascular: Has no chest pain, ,and racing heart . Gastrointestinal: No constipation, diarrhea, nausea and vomiting. Genitourinary: No frequency.    Musculoskeletal: Negative for joint pain. Skin: no itching, no rash. Neurological: Negative for dizziness, no tremors  Psychiatric/Behavioral: Negative for depression   is not nervous/anxious. and not depressed the patient is not nervous/anxious. Current Outpatient Medications   Medication Sig Dispense Refill    montelukast (Singulair) 10 mg tablet Take 1 Tablet by mouth daily. 30 Tablet 4    predniSONE (DELTASONE) 20 mg tablet Take 1 Tablet by mouth daily (with breakfast). cetirizine (ZYRTEC) 10 mg tablet Take 1 Tablet by mouth nightly. 30 Tablet 3    apixaban (Eliquis) 5 mg tablet Take 1 Tablet by mouth two (2) times a day. 60 Tablet 1    calcium-cholecalciferol, D3, (CALTRATE 600+D) tablet Take 1 Tablet by mouth daily. furosemide (LASIX) 20 mg tablet Take 1 Tablet by mouth daily. 14 Tablet 0    diclofenac (VOLTAREN) 1 % gel Apply 4 g to affected area four (4) times daily. 200 g 2    loratadine (CLARITIN) 10 mg tablet Take 1 Tablet by mouth daily as needed. cholecalciferol (VITAMIN D3) 25 mcg (1,000 unit) cap Take  by mouth daily. albuterol (PROVENTIL VENTOLIN) 2.5 mg /3 mL (0.083 %) nebu 3 mL by Nebulization route once for 1 dose. 70 Each 1    albuterol (PROVENTIL HFA, VENTOLIN HFA, PROAIR HFA) 90 mcg/actuation inhaler Take 1 Puff by inhalation every four (4) hours as needed for Wheezing. 1 Inhaler 6    tiotropium bromide (Spiriva Respimat) 2.5 mcg/actuation inhaler Take 2 Puffs by inhalation nightly. 1 Inhaler 6    Nebulizer & Compressor machine 1 Each by Does Not Apply route four (4) times daily as needed for Cough. 1 Each 0    ascorbic acid, vitamin C, (VITAMIN C) 500 mg tablet Take 1 Tablet by mouth daily. MULTIVITS,CA,MINERALS/IRON/FA (MULTI FOR HER PO) Take 1 Tab by mouth daily.        Allergies   Allergen Reactions    Crestor [Rosuvastatin] Shortness of Breath    Nabumetone Shortness of Breath    Codeine Nausea Only    Doxycycline Unknown (comments)     Other reaction(s): Unknown (comments)    Gabapentin (Bulk) Shortness of Breath    Lyrica [Pregabalin] Swelling     Ankle swelling, foot pain , insomina    Percocet [Oxycodone-Acetaminophen] Other (comments)     loopy     Past Medical History:   Diagnosis Date    Acute right ankle pain 7/23/2018    Arthritis 8/3/2010    Asthma     Bunion of great toe of right foot 7/23/2018    Fall at home, initial encounter 9/18/2018    Hammer toe of second toe of right foot 7/23/2018    Hypercholesterolemia 8/18/2010    Postmenopausal 8/3/2010    Prediabetes 9/12/2013    Primary snoring 11/29/2017    Pulmonary embolus ([address] 4/1/2023    Shoulder pain, bilateral 2/8/2018    Sinusitis, acute 12/12/2011    Wrist pain, acute, left 9/18/2018     Past Surgical History:   Procedure Laterality Date    HX GYN  1987    tubal pregancy; laparotomy    HX ORTHOPAEDIC  2006    neck    HX ORTHOPAEDIC  2008    cervical fusion of discs x4    HX PELVIC LAPAROSCOPY  1985    endometriosis    CA REVJ TOT [place]  02/29/2012    tkr left     Family History   Problem Relation Age of Onset    Hypertension Father     Heart Disease Father         CAD    Diabetes Father     High Cholesterol Father     Other Mother         tumor in chest    Allergic Rhinitis Brother     Asthma Brother     Allergic Rhinitis Child     Sickle Cell Trait Child     GERD Child      Social History     Tobacco Use    Smoking status: Never    Smokeless tobacco: Never   Substance Use Topics    Alcohol use: Yes     Comment: wine sometimes          Objective:   Visit Vitals  /83   Pulse 70   Temp 98.3 °F (36.8 °C) (Oral)   Resp 18   Ht 5' 2\" (1.575 m)   Wt 180 lb 6.4 oz (81.8 kg)   SpO2 98%   BMI 33.00 kg/m²        General:  alert cooperative appears stated for the age  [de-identified]; normocephalic and atraumatic PERRLA extraocular motor intact normal tympanic membrane normal external ear bilaterally, mucosal normal no drainage  Neck: supple no adenopathy no JVD no bruit  Lungs: Clear to auscultation bilaterally no rales rhonchi or wheezes  Heart: Normal regular S1-S2 ,  no murmur  Breast exam deferred  Abdomen: Soft nontender normal bowel sounds   Extremities: Normal range of motion no point tenderness normal pulses no edema  Pelvic/: No lesion, no lymphadenopathy  Skin: Normal no lesion no rash      We discussed the expected course, resolution and complications of the diagnosis(es) in detail. Medication risks, benefits, costs, interactions, and alternatives were discussed as indicated. I advised her to contact the office if her condition worsens, changes or fails to improve as anticipated. She expressed understanding with the diagnosis(es) and plan.      Avis Trammell MD

## 2023-04-23 ENCOUNTER — TRANSCRIBE ORDERS (OUTPATIENT)
Facility: HOSPITAL | Age: 63
End: 2023-04-23

## 2023-04-23 DIAGNOSIS — Z12.31 VISIT FOR SCREENING MAMMOGRAM: Primary | ICD-10-CM

## 2023-05-01 RX ORDER — CALCIUM CARBONATE/VITAMIN D3 600 MG-10
1 TABLET ORAL DAILY
COMMUNITY

## 2023-05-09 ENCOUNTER — OFFICE VISIT (OUTPATIENT)
Age: 63
End: 2023-05-09
Payer: COMMERCIAL

## 2023-05-09 VITALS
OXYGEN SATURATION: 96 % | SYSTOLIC BLOOD PRESSURE: 118 MMHG | RESPIRATION RATE: 16 BRPM | BODY MASS INDEX: 33.47 KG/M2 | DIASTOLIC BLOOD PRESSURE: 78 MMHG | TEMPERATURE: 97.8 F | WEIGHT: 183 LBS | HEART RATE: 80 BPM

## 2023-05-09 DIAGNOSIS — R60.0 EDEMA, LEG: ICD-10-CM

## 2023-05-09 DIAGNOSIS — G89.29 CHRONIC PAIN OF RIGHT KNEE: Primary | ICD-10-CM

## 2023-05-09 DIAGNOSIS — M25.561 CHRONIC PAIN OF RIGHT KNEE: Primary | ICD-10-CM

## 2023-05-09 PROCEDURE — 99213 OFFICE O/P EST LOW 20 MIN: CPT | Performed by: FAMILY MEDICINE

## 2023-05-09 RX ORDER — FUROSEMIDE 20 MG/1
20 TABLET ORAL DAILY
Qty: 20 TABLET | Refills: 0 | Status: SHIPPED | OUTPATIENT
Start: 2023-05-09

## 2023-05-09 NOTE — PROGRESS NOTES
Chief Complaint   Patient presents with    Knee Pain     Right knee pain up to lower abdomen      Vitals:    23 1546   BP: 118/78   Pulse: 80   Resp: 16   Temp: 97.8 °F (36.6 °C)   TempSrc: Oral   SpO2: 96%   Weight: 183 lb (83 kg)      1. Have you been to the ER, urgent care clinic since your last visit? Hospitalized since your last visit? No    2. Have you seen or consulted any other health care providers outside of the [address] [place] [address] since your last visit? Include any pap smears or colon screening. No    The patient, Adam Mejia, identity was verified by  Name and .

## 2023-05-09 NOTE — PROGRESS NOTES
David Loera (:  1960) is a 58 y.o. female,Established patient, here for evaluation of the following chief complaint(s):  Knee Pain (Right knee pain up to lower abdomen )    Knee Pain   This is a chronic  problem. Couple yrs ago, morbid obese, she is not working, going up and down the steps not bothering her,   The problem has not changed since onset. The pain is present in the right knee. The quality of the pain is described as sharp. The pain is at a severity of 4/10. Associated symptoms include limited range of motion. Pertinent negatives include no numbness, no stiffness, no tingling, no itching, no back pain and no neck pain. The symptoms are aggravated by movement and palpation. There has been no history of extremity trauma. Neg Duplex US,   Weight - Scale: 183 lb (83 kg)  as of 2023 183 lb (83 kg) 180 lb 6.4 oz (81.8 kg) 180 lb (81.6 kg) -- 179 lb       Constitutional: no chills and fever,nad     HENT: no ear pain and nosebleeds. No blurred vision,   Respiratory: no shortness of breath, wheezing cough nor sore throat. Cardiovascular: Has no chest pain, leg swelling ,and racing heart . Gastrointestinal: No constipation, diarrhea, nausea and vomiting. Genitourinary: No frequency. Musculoskeletal: +++for multiple joint pain. Skin: no itching, pimples   Neurological: Negative for dizziness, no tremors  Psychiatric/Behavioral: Negative for depression,  not nervous/anxious. General: Patient alert cooperative   HEENT; normocephalic and atraumatic     Neck: supple no adenopathy no JVD no bruit  Lungs: Clear to auscultation bilaterally no rales rhonchi or wheezes  Heart: Normal regular S1-S2 s no murmur  Breast exam deferred  Abdomen: Soft nontender normal bowel sounds    Extremities: abnormal range of motion ++ point tenderness normal pulses no edema,   Pelvic/: No lesion, no lymphadenopathy  Skin: abormal no lesion no rash          ASSESSMENT/PLAN:  1.  Chronic pain of right knee  -

## 2023-05-12 RX ORDER — PREDNISONE 20 MG/1
20 TABLET ORAL
COMMUNITY

## 2023-05-12 RX ORDER — CETIRIZINE HYDROCHLORIDE 10 MG/1
1 TABLET ORAL NIGHTLY
COMMUNITY
Start: 2023-04-11

## 2023-05-12 RX ORDER — MONTELUKAST SODIUM 10 MG/1
10 TABLET ORAL DAILY
COMMUNITY
Start: 2023-04-20

## 2023-05-22 ENCOUNTER — HOSPITAL ENCOUNTER (OUTPATIENT)
Facility: HOSPITAL | Age: 63
Discharge: HOME | End: 2023-05-25
Payer: COMMERCIAL

## 2023-05-22 DIAGNOSIS — Z12.31 VISIT FOR SCREENING MAMMOGRAM: ICD-10-CM

## 2023-05-22 PROCEDURE — 77063 BREAST TOMOSYNTHESIS BI: CPT

## 2023-08-18 DIAGNOSIS — G47.9 SLEEPING DIFFICULTY: ICD-10-CM

## 2023-08-18 DIAGNOSIS — M54.16 LUMBAR BACK PAIN WITH RADICULOPATHY AFFECTING RIGHT LOWER EXTREMITY: Primary | ICD-10-CM

## 2023-08-18 RX ORDER — TRAZODONE HYDROCHLORIDE 100 MG/1
100 TABLET ORAL NIGHTLY PRN
Qty: 30 TABLET | Refills: 0 | Status: SHIPPED | OUTPATIENT
Start: 2023-08-18

## 2023-08-18 NOTE — PROGRESS NOTES
Patient called patient called with a complaint lack of sleep and low back pain trazodone has been call patient can call back if condition not better

## 2023-08-25 RX ORDER — BUDESONIDE AND FORMOTEROL FUMARATE DIHYDRATE 160; 4.5 UG/1; UG/1
2 AEROSOL RESPIRATORY (INHALATION) 2 TIMES DAILY
COMMUNITY

## 2023-08-25 RX ORDER — DULOXETIN HYDROCHLORIDE 30 MG/1
CAPSULE, DELAYED RELEASE ORAL DAILY
COMMUNITY
Start: 2022-09-14

## 2023-10-25 ENCOUNTER — OFFICE VISIT (OUTPATIENT)
Age: 63
End: 2023-10-25

## 2023-10-25 VITALS
DIASTOLIC BLOOD PRESSURE: 82 MMHG | BODY MASS INDEX: 32.74 KG/M2 | RESPIRATION RATE: 16 BRPM | TEMPERATURE: 97.8 F | WEIGHT: 179 LBS | OXYGEN SATURATION: 98 % | HEART RATE: 63 BPM | SYSTOLIC BLOOD PRESSURE: 138 MMHG

## 2023-10-25 DIAGNOSIS — Z01.818 PREOPERATIVE EXAMINATION: ICD-10-CM

## 2023-10-25 DIAGNOSIS — M19.90 ARTHRITIS: ICD-10-CM

## 2023-10-25 DIAGNOSIS — R73.03 PREDIABETES: ICD-10-CM

## 2023-10-25 DIAGNOSIS — Z01.818 PRE-OP EVALUATION: Primary | ICD-10-CM

## 2023-10-25 LAB
ALBUMIN SERPL-MCNC: 4 G/DL (ref 3.5–5)
ALBUMIN/GLOB SERPL: 1.3 (ref 1.1–2.2)
ALP SERPL-CCNC: 72 U/L (ref 45–117)
ALT SERPL-CCNC: 17 U/L (ref 12–78)
ANION GAP SERPL CALC-SCNC: 4 MMOL/L (ref 5–15)
AST SERPL-CCNC: 18 U/L (ref 15–37)
BILIRUB SERPL-MCNC: 0.7 MG/DL (ref 0.2–1)
BUN SERPL-MCNC: 18 MG/DL (ref 6–20)
BUN/CREAT SERPL: 17 (ref 12–20)
CALCIUM SERPL-MCNC: 9.3 MG/DL (ref 8.5–10.1)
CHLORIDE SERPL-SCNC: 104 MMOL/L (ref 97–108)
CO2 SERPL-SCNC: 29 MMOL/L (ref 21–32)
CREAT SERPL-MCNC: 1.07 MG/DL (ref 0.55–1.02)
ERYTHROCYTE [DISTWIDTH] IN BLOOD BY AUTOMATED COUNT: 15.4 % (ref 11.5–14.5)
EST. AVERAGE GLUCOSE BLD GHB EST-MCNC: 114 MG/DL
GLOBULIN SER CALC-MCNC: 3.2 G/DL (ref 2–4)
GLUCOSE SERPL-MCNC: 89 MG/DL (ref 65–100)
HBA1C MFR BLD: 5.6 % (ref 4–5.6)
HCT VFR BLD AUTO: 43.6 % (ref 35–47)
HGB BLD-MCNC: 13.4 G/DL (ref 11.5–16)
INR PPP: 1.1 (ref 0.9–1.1)
MCH RBC QN AUTO: 26.1 PG (ref 26–34)
MCHC RBC AUTO-ENTMCNC: 30.7 G/DL (ref 30–36.5)
MCV RBC AUTO: 84.8 FL (ref 80–99)
NRBC # BLD: 0 K/UL (ref 0–0.01)
NRBC BLD-RTO: 0 PER 100 WBC
PLATELET # BLD AUTO: 272 K/UL (ref 150–400)
PMV BLD AUTO: 9.4 FL (ref 8.9–12.9)
POTASSIUM SERPL-SCNC: 4.5 MMOL/L (ref 3.5–5.1)
PROT SERPL-MCNC: 7.2 G/DL (ref 6.4–8.2)
PROTHROMBIN TIME: 11.4 SEC (ref 9–11.1)
RBC # BLD AUTO: 5.14 M/UL (ref 3.8–5.2)
SODIUM SERPL-SCNC: 137 MMOL/L (ref 136–145)
WBC # BLD AUTO: 4.8 K/UL (ref 3.6–11)

## 2023-10-25 SDOH — ECONOMIC STABILITY: HOUSING INSECURITY
IN THE LAST 12 MONTHS, WAS THERE A TIME WHEN YOU DID NOT HAVE A STEADY PLACE TO SLEEP OR SLEPT IN A SHELTER (INCLUDING NOW)?: NO

## 2023-10-25 SDOH — ECONOMIC STABILITY: FOOD INSECURITY: WITHIN THE PAST 12 MONTHS, THE FOOD YOU BOUGHT JUST DIDN'T LAST AND YOU DIDN'T HAVE MONEY TO GET MORE.: NEVER TRUE

## 2023-10-25 SDOH — ECONOMIC STABILITY: FOOD INSECURITY: WITHIN THE PAST 12 MONTHS, YOU WORRIED THAT YOUR FOOD WOULD RUN OUT BEFORE YOU GOT MONEY TO BUY MORE.: NEVER TRUE

## 2023-10-25 SDOH — ECONOMIC STABILITY: INCOME INSECURITY: HOW HARD IS IT FOR YOU TO PAY FOR THE VERY BASICS LIKE FOOD, HOUSING, MEDICAL CARE, AND HEATING?: NOT HARD AT ALL

## 2023-10-25 ASSESSMENT — PATIENT HEALTH QUESTIONNAIRE - PHQ9
SUM OF ALL RESPONSES TO PHQ QUESTIONS 1-9: 0
SUM OF ALL RESPONSES TO PHQ9 QUESTIONS 1 & 2: 0
2. FEELING DOWN, DEPRESSED OR HOPELESS: 0
SUM OF ALL RESPONSES TO PHQ QUESTIONS 1-9: 0
SUM OF ALL RESPONSES TO PHQ QUESTIONS 1-9: 0
1. LITTLE INTEREST OR PLEASURE IN DOING THINGS: 0
SUM OF ALL RESPONSES TO PHQ QUESTIONS 1-9: 0

## 2023-10-25 NOTE — PROGRESS NOTES
Chief Complaint   Patient presents with    Pre-op Exam     Scheduled for right shoulder reverse cuff on 23 by Dr Gaona Grade       1. Have you been to the ER, urgent care clinic since your last visit? Hospitalized since your last visit? No    2. Have you seen or consulted any other health care providers outside of the [address] [place] [address] Avenue since your last visit? Include any pap smears or colon screening.  No       Health Maintenance Due   Topic Date Due    Pneumococcal 0-64 years Vaccine (1 - PCV) 1966    HIV screen  Never done    A1C test (Diabetic or Prediabetic)  2023      The patient, James Baker, identity was verified by name and

## 2023-10-25 NOTE — PROGRESS NOTES
Subjective:      Leon Rodriguez is a 58 y.o. female who presents to the office today for a preoperative consultation at the request of surgeon,    Indication for surgery: Arthritic Right shoulder OA.      who presents for preoperative evaluation with current medical hx of  PE, Asthma stable, elevated bp due to the current shoulder pain, pt has been in a stable and pleasant conditions for many yrs w/ Right  knee pain of many yrs, stating that the current pain meds not helping any more and seeking alternative surgical correction        physical functioning is capable of doing 9 blocks of walking w/out getting shortness of breath, at this time there is assistive device a cane used physically,  Patient with a good social support which include living at home,   is self cared, and other primary care giver is family member at home, no recent major trauma, does not have any history of blood clots,  on ++blood thinner,    no hx of abnormal bleeding or easy bruisability,   and currently not taking any herbal supplements, nor any illicit drugs,current status of the pt is stable         Past Medical History:   Diagnosis Date    Acute right ankle pain 7/23/2018    Arthritis 8/3/2010    Asthma     Bunion of great toe of right foot 7/23/2018    Fall at home, initial encounter 9/18/2018    Hammer toe of second toe of right foot 7/23/2018    Hypercholesterolemia 8/18/2010    Postmenopausal 8/3/2010    Prediabetes 9/12/2013    Primary snoring 11/29/2017    Pulmonary embolus ([address]) 4/1/2023    Shoulder pain, bilateral 2/8/2018    Sinusitis, acute 12/12/2011    Wrist pain, acute, left 9/18/2018     Patient Active Problem List    Diagnosis Date Noted    Pulmonary embolus ([address]) 04/01/2023    Chest tightness 02/17/2022    Chest pain 02/17/2022    Wrist pain, acute, left 09/18/2018    Fall at home, initial encounter 09/18/2018    Bunion of great toe of right foot 07/23/2018    Hammer toe of second toe of right foot 07/23/2018    Acute right

## 2023-12-11 ENCOUNTER — HOSPITAL ENCOUNTER (EMERGENCY)
Facility: HOSPITAL | Age: 63
Discharge: HOME | End: 2023-12-11
Attending: STUDENT IN AN ORGANIZED HEALTH CARE EDUCATION/TRAINING PROGRAM
Payer: COMMERCIAL

## 2023-12-11 ENCOUNTER — APPOINTMENT (OUTPATIENT)
Facility: HOSPITAL | Age: 63
End: 2023-12-11
Payer: COMMERCIAL

## 2023-12-11 ENCOUNTER — NURSE TRIAGE (OUTPATIENT)
Dept: OTHER | Facility: CLINIC | Age: 63
End: 2023-12-11

## 2023-12-11 VITALS
WEIGHT: 180.78 LBS | HEART RATE: 72 BPM | OXYGEN SATURATION: 98 % | HEIGHT: 62 IN | SYSTOLIC BLOOD PRESSURE: 147 MMHG | DIASTOLIC BLOOD PRESSURE: 80 MMHG | TEMPERATURE: 98.1 F | RESPIRATION RATE: 18 BRPM | BODY MASS INDEX: 33.27 KG/M2

## 2023-12-11 DIAGNOSIS — R07.9 CHEST PAIN, UNSPECIFIED TYPE: ICD-10-CM

## 2023-12-11 DIAGNOSIS — M79.604 RIGHT LEG PAIN: Primary | ICD-10-CM

## 2023-12-11 DIAGNOSIS — E04.1 THYROID NODULE: ICD-10-CM

## 2023-12-11 LAB
ALBUMIN SERPL-MCNC: 3.8 G/DL (ref 3.5–5)
ALBUMIN/GLOB SERPL: 1.1 (ref 1.1–2.2)
ALP SERPL-CCNC: 84 U/L (ref 45–117)
ALT SERPL-CCNC: 18 U/L (ref 12–78)
ANION GAP SERPL CALC-SCNC: 6 MMOL/L (ref 5–15)
AST SERPL-CCNC: 15 U/L (ref 15–37)
BASOPHILS # BLD: 0 K/UL (ref 0–0.1)
BASOPHILS NFR BLD: 1 % (ref 0–1)
BILIRUB SERPL-MCNC: 0.6 MG/DL (ref 0.2–1)
BUN SERPL-MCNC: 17 MG/DL (ref 6–20)
BUN/CREAT SERPL: 16 (ref 12–20)
CALCIUM SERPL-MCNC: 10.1 MG/DL (ref 8.5–10.1)
CHLORIDE SERPL-SCNC: 104 MMOL/L (ref 97–108)
CO2 SERPL-SCNC: 30 MMOL/L (ref 21–32)
CREAT SERPL-MCNC: 1.07 MG/DL (ref 0.55–1.02)
DIFFERENTIAL METHOD BLD: ABNORMAL
ECHO BSA: 1.89 M2
EOSINOPHIL # BLD: 0.1 K/UL (ref 0–0.4)
EOSINOPHIL NFR BLD: 2 % (ref 0–7)
ERYTHROCYTE [DISTWIDTH] IN BLOOD BY AUTOMATED COUNT: 15.3 % (ref 11.5–14.5)
GLOBULIN SER CALC-MCNC: 3.6 G/DL (ref 2–4)
GLUCOSE SERPL-MCNC: 93 MG/DL (ref 65–100)
HCT VFR BLD AUTO: 43.8 % (ref 35–47)
HGB BLD-MCNC: 13.7 G/DL (ref 11.5–16)
IMM GRANULOCYTES # BLD AUTO: 0 K/UL (ref 0–0.04)
IMM GRANULOCYTES NFR BLD AUTO: 0 % (ref 0–0.5)
LYMPHOCYTES # BLD: 1.8 K/UL (ref 0.8–3.5)
LYMPHOCYTES NFR BLD: 32 % (ref 12–49)
MCH RBC QN AUTO: 26.8 PG (ref 26–34)
MCHC RBC AUTO-ENTMCNC: 31.3 G/DL (ref 30–36.5)
MCV RBC AUTO: 85.5 FL (ref 80–99)
MONOCYTES # BLD: 0.5 K/UL (ref 0–1)
MONOCYTES NFR BLD: 9 % (ref 5–13)
NEUTS SEG # BLD: 3.2 K/UL (ref 1.8–8)
NEUTS SEG NFR BLD: 56 % (ref 32–75)
NRBC # BLD: 0 K/UL (ref 0–0.01)
NRBC BLD-RTO: 0 PER 100 WBC
NT PRO BNP: 56 PG/ML
PLATELET # BLD AUTO: 286 K/UL (ref 150–400)
PMV BLD AUTO: 8.8 FL (ref 8.9–12.9)
POTASSIUM SERPL-SCNC: 4.5 MMOL/L (ref 3.5–5.1)
PROT SERPL-MCNC: 7.4 G/DL (ref 6.4–8.2)
RBC # BLD AUTO: 5.12 M/UL (ref 3.8–5.2)
SODIUM SERPL-SCNC: 140 MMOL/L (ref 136–145)
TROPONIN I SERPL HS-MCNC: 6 NG/L (ref 0–51)
TROPONIN I SERPL HS-MCNC: 6 NG/L (ref 0–51)
WBC # BLD AUTO: 5.8 K/UL (ref 3.6–11)

## 2023-12-11 PROCEDURE — 2580000003 HC RX 258: Performed by: STUDENT IN AN ORGANIZED HEALTH CARE EDUCATION/TRAINING PROGRAM

## 2023-12-11 PROCEDURE — 6370000000 HC RX 637 (ALT 250 FOR IP): Performed by: STUDENT IN AN ORGANIZED HEALTH CARE EDUCATION/TRAINING PROGRAM

## 2023-12-11 PROCEDURE — 2500000003 HC RX 250 WO HCPCS: Performed by: STUDENT IN AN ORGANIZED HEALTH CARE EDUCATION/TRAINING PROGRAM

## 2023-12-11 PROCEDURE — 83880 ASSAY OF NATRIURETIC PEPTIDE: CPT

## 2023-12-11 PROCEDURE — 6360000004 HC RX CONTRAST MEDICATION: Performed by: STUDENT IN AN ORGANIZED HEALTH CARE EDUCATION/TRAINING PROGRAM

## 2023-12-11 PROCEDURE — 85025 COMPLETE CBC W/AUTO DIFF WBC: CPT

## 2023-12-11 PROCEDURE — 71275 CT ANGIOGRAPHY CHEST: CPT

## 2023-12-11 PROCEDURE — 99285 EMERGENCY DEPT VISIT HI MDM: CPT

## 2023-12-11 PROCEDURE — A4216 STERILE WATER/SALINE, 10 ML: HCPCS | Performed by: STUDENT IN AN ORGANIZED HEALTH CARE EDUCATION/TRAINING PROGRAM

## 2023-12-11 PROCEDURE — 96374 THER/PROPH/DIAG INJ IV PUSH: CPT

## 2023-12-11 PROCEDURE — 80053 COMPREHEN METABOLIC PANEL: CPT

## 2023-12-11 PROCEDURE — 93005 ELECTROCARDIOGRAM TRACING: CPT | Performed by: STUDENT IN AN ORGANIZED HEALTH CARE EDUCATION/TRAINING PROGRAM

## 2023-12-11 PROCEDURE — 93971 EXTREMITY STUDY: CPT

## 2023-12-11 PROCEDURE — 71046 X-RAY EXAM CHEST 2 VIEWS: CPT

## 2023-12-11 PROCEDURE — 84484 ASSAY OF TROPONIN QUANT: CPT

## 2023-12-11 PROCEDURE — 36415 COLL VENOUS BLD VENIPUNCTURE: CPT

## 2023-12-11 RX ORDER — ACETAMINOPHEN 500 MG
1000 TABLET ORAL
Status: DISCONTINUED | OUTPATIENT
Start: 2023-12-11 | End: 2023-12-11 | Stop reason: HOSPADM

## 2023-12-11 RX ADMIN — IOPAMIDOL 100 ML: 755 INJECTION, SOLUTION INTRAVENOUS at 12:57

## 2023-12-11 RX ADMIN — ALUMINUM HYDROXIDE AND MAGNESIUM HYDROXIDE 30 ML: 200; 200 SUSPENSION ORAL at 13:43

## 2023-12-11 RX ADMIN — FAMOTIDINE 20 MG: 10 INJECTION, SOLUTION INTRAVENOUS at 13:43

## 2023-12-11 ASSESSMENT — PAIN - FUNCTIONAL ASSESSMENT: PAIN_FUNCTIONAL_ASSESSMENT: 0-10

## 2023-12-11 NOTE — TELEPHONE ENCOUNTER
[facility]    Received call from [address] Wilkins at [facility] with [employer] Complaint. Subjective: Caller states \"The swelling started about a week ago. The swelling with the knee and the ankle. I had surgery on my right shoulder. They told me the swelling would be in my arm. I've got swelling just in one leg. The other leg and ankle is not swollen. Mostly it's the knee and ankle that swells. \"     Current Symptoms: right knee and ankle swelling, intermittent tingling in right knee-worse with touching; NO numbness, hot flashes, chest pain    Hx of GERD, Asthma    Shoulder surgery on 11/9/23    Hx of PE-taking blood thinners as prescribed    NO hx of stroke    Onset: 1 week ago; worsening    Associated Symptoms: reduced activity    Pain Severity: 6/10; tingling; intermittent    Temperature: Denies    What has been tried: Elevating, Rx pain medication, muscle relaxers    LMP: NA Pregnant: NA    Recommended disposition: Call  Now. Patient agreeable. Care advice provided, patient verbalizes understanding; denies any other questions or concerns; instructed to call back for any new or worsening symptoms. Patient/caller agrees to calling 911    Attention Provider: Thank you for allowing me to participate in the care of your patient. The patient was connected to triage in response to information provided to the ECC/PSC. Please do not respond through this encounter as the response is not directed to a shared pool.       Reason for Disposition   Sounds like a life-threatening emergency to the triager    Protocols used: Leg Swelling and Edema-ADULT-[place]

## 2023-12-11 NOTE — ED NOTES
Pt discharged by Sharon Carrillo RN. Discharge instructions discussed and pt given opportunity to ask questions.  Pt ambulatory out of ED        Chirag Thakkar RN  12/11/23 [id number]

## 2023-12-12 LAB
EKG ATRIAL RATE: 70 BPM
EKG DIAGNOSIS: NORMAL
EKG P AXIS: 16 DEGREES
EKG P-R INTERVAL: 164 MS
EKG Q-T INTERVAL: 394 MS
EKG QRS DURATION: 88 MS
EKG QTC CALCULATION (BAZETT): 425 MS
EKG R AXIS: 20 DEGREES
EKG T AXIS: 7 DEGREES
EKG VENTRICULAR RATE: 70 BPM

## 2023-12-28 ENCOUNTER — OFFICE VISIT (OUTPATIENT)
Age: 63
End: 2023-12-28
Payer: COMMERCIAL

## 2023-12-28 VITALS
DIASTOLIC BLOOD PRESSURE: 82 MMHG | WEIGHT: 184 LBS | BODY MASS INDEX: 33.65 KG/M2 | SYSTOLIC BLOOD PRESSURE: 137 MMHG | RESPIRATION RATE: 14 BRPM | OXYGEN SATURATION: 96 % | TEMPERATURE: 98.1 F | HEART RATE: 68 BPM

## 2023-12-28 DIAGNOSIS — H92.01 OTALGIA, RIGHT EAR: ICD-10-CM

## 2023-12-28 DIAGNOSIS — G89.29 OTHER CHRONIC PAIN: ICD-10-CM

## 2023-12-28 DIAGNOSIS — E04.1 THYROID NODULE: Primary | ICD-10-CM

## 2023-12-28 PROCEDURE — 99214 OFFICE O/P EST MOD 30 MIN: CPT | Performed by: FAMILY MEDICINE

## 2023-12-28 RX ORDER — CIPROFLOXACIN AND DEXAMETHASONE 3; 1 MG/ML; MG/ML
4 SUSPENSION/ DROPS AURICULAR (OTIC) 2 TIMES DAILY
Qty: 3 ML | Refills: 0 | Status: SHIPPED | OUTPATIENT
Start: 2023-12-28 | End: 2024-01-04

## 2023-12-28 RX ORDER — TRAMADOL HYDROCHLORIDE 50 MG/1
50 TABLET ORAL EVERY 6 HOURS PRN
COMMUNITY
Start: 2023-12-27 | End: 2024-01-03

## 2023-12-28 RX ORDER — PANTOPRAZOLE SODIUM 40 MG/1
40 TABLET, DELAYED RELEASE ORAL
Qty: 90 TABLET | Refills: 1 | Status: SHIPPED | OUTPATIENT
Start: 2023-12-28

## 2023-12-28 RX ORDER — HYDROCODONE BITARTRATE AND ACETAMINOPHEN 7.5; 325 MG/1; MG/1
TABLET ORAL
COMMUNITY
Start: 2023-11-20

## 2023-12-29 ENCOUNTER — CLINICAL DOCUMENTATION (OUTPATIENT)
Age: 63
End: 2023-12-29

## 2023-12-29 LAB
T4 FREE SERPL-MCNC: 1 NG/DL (ref 0.8–1.5)
TSH SERPL DL<=0.05 MIU/L-ACNC: 0.21 UIU/ML (ref 0.36–3.74)

## 2024-01-09 ENCOUNTER — OFFICE VISIT (OUTPATIENT)
Age: 64
End: 2024-01-09
Payer: COMMERCIAL

## 2024-01-09 VITALS
SYSTOLIC BLOOD PRESSURE: 122 MMHG | BODY MASS INDEX: 33.53 KG/M2 | WEIGHT: 182.2 LBS | HEIGHT: 62 IN | DIASTOLIC BLOOD PRESSURE: 80 MMHG | HEART RATE: 80 BPM

## 2024-01-09 DIAGNOSIS — E04.1 THYROID NODULE: Primary | ICD-10-CM

## 2024-01-09 DIAGNOSIS — R94.6 ABNORMAL THYROID FUNCTION TEST: ICD-10-CM

## 2024-01-09 PROCEDURE — 99204 OFFICE O/P NEW MOD 45 MIN: CPT | Performed by: GENERAL ACUTE CARE HOSPITAL

## 2024-01-09 NOTE — PATIENT INSTRUCTIONS
Please complete thyroid ultrasound at your earliest convenience.   Please complete lab tests in 6 weeks (on or after 02/20/2024).th    1. Plan for a thyroid ultrasound to evaluate your thyroid gland. You can go ahead and call the scheduling department to have your procedure scheduled at a time that is convenient to you. Please call [phone].     2. If there are any concerning findings, I will advise you and will let you know if a thyroid biopsy is warranted, which I will order and have you complete. If a biopsy is performed, I will let you know of the result, and any follow up plans.     3. Assuming that the nodule is benign, we will plan to repeat the ultrasound again in 1 year to assure that the nodule is stable without suspicious growth or other changes. I will order this for you after you complete the initial US evaluation in the next couple weeks.     Feel free to call with concerns, in the meantime we will have you schedule a 1 year follow up when you check out today, which can be adjusted if needed based on results.    MD [facility]  [facility]

## 2024-01-18 ENCOUNTER — HOSPITAL ENCOUNTER (OUTPATIENT)
Facility: HOSPITAL | Age: 64
Discharge: HOME | End: 2024-01-18
Attending: GENERAL ACUTE CARE HOSPITAL
Payer: COMMERCIAL

## 2024-01-18 DIAGNOSIS — E04.1 THYROID NODULE: ICD-10-CM

## 2024-01-18 PROCEDURE — 76536 US EXAM OF HEAD AND NECK: CPT

## 2024-02-20 DIAGNOSIS — E04.1 THYROID NODULE: ICD-10-CM

## 2024-02-20 DIAGNOSIS — R94.6 ABNORMAL THYROID FUNCTION TEST: ICD-10-CM

## 2024-02-25 PROBLEM — E04.1 THYROID NODULE: Status: ACTIVE | Noted: 2024-02-25

## 2024-02-25 PROBLEM — R94.6 ABNORMAL THYROID FUNCTION TEST: Status: ACTIVE | Noted: 2024-02-25

## 2024-02-28 ENCOUNTER — OFFICE VISIT (OUTPATIENT)
Age: 64
End: 2024-02-28
Payer: COMMERCIAL

## 2024-02-28 VITALS
WEIGHT: 187 LBS | TEMPERATURE: 97.3 F | SYSTOLIC BLOOD PRESSURE: 129 MMHG | BODY MASS INDEX: 34.2 KG/M2 | RESPIRATION RATE: 17 BRPM | OXYGEN SATURATION: 97 % | DIASTOLIC BLOOD PRESSURE: 83 MMHG | HEART RATE: 73 BPM

## 2024-02-28 DIAGNOSIS — E03.8 TSH DEFICIENCY: ICD-10-CM

## 2024-02-28 DIAGNOSIS — I27.82 OTHER CHRONIC PULMONARY EMBOLISM, UNSPECIFIED WHETHER ACUTE COR PULMONALE PRESENT (HCC): Primary | ICD-10-CM

## 2024-02-28 DIAGNOSIS — E04.1 THYROID NODULE: ICD-10-CM

## 2024-02-28 PROCEDURE — 99214 OFFICE O/P EST MOD 30 MIN: CPT | Performed by: FAMILY MEDICINE

## 2024-02-28 RX ORDER — ALBUTEROL SULFATE 2.5 MG/3ML
2.5 SOLUTION RESPIRATORY (INHALATION) EVERY 6 HOURS PRN
Qty: 120 EACH | Refills: 3 | Status: SHIPPED | OUTPATIENT
Start: 2024-02-28

## 2024-02-28 ASSESSMENT — PATIENT HEALTH QUESTIONNAIRE - PHQ9
1. LITTLE INTEREST OR PLEASURE IN DOING THINGS: 0
SUM OF ALL RESPONSES TO PHQ QUESTIONS 1-9: 0
SUM OF ALL RESPONSES TO PHQ9 QUESTIONS 1 & 2: 0
2. FEELING DOWN, DEPRESSED OR HOPELESS: 0

## 2024-02-28 NOTE — PROGRESS NOTES
Chief Complaint   Patient presents with    Follow-up    Numbness     Right leg numbness       \"Have you been to the ER, urgent care clinic since your last visit?  Hospitalized since your last visit?\"    NO    “Have you seen or consulted any other health care providers outside of [facility] since your last visit?”    NO            Vitals:    24 1418   BP: 129/83   Pulse: 73   Resp: 17   Temp: 97.3 °F (36.3 °C)   TempSrc: Infrared   SpO2: 97%   Weight: 84.8 kg (187 lb)          There are no preventive care reminders to display for this patient.     The patient, Zoey Cruz, identity was verified by name and

## 2024-02-28 NOTE — PROGRESS NOTES
Zoey Cruz (:  1960) is a 63 y.o. female,Established patient, here for evaluation of the following chief complaint(s):  Follow-up and Numbness (Right leg numbness)    Decrease TSH, has been  on Eliquis since 2023, due to PE, had shoulder injection in 2023 only procedure before her PE,   Patient present for follow-up currently on no thyroid medication has had some wt loss since last visit   last thyroid-stimulating hormone level was in the abnormal range patient is aware of the results, as per presentation the patient has no been feeling tired , and had no cold intolerance,     Est, Glom Filt Rate  >60 ml/min/1.73m2 58 Low  58 Low  CM     TSH, 3RD GENERATION  0.36 - 3.74 uIU/mL 0.21 Low      T4 Free  0.8 - 1.5 NG/DL 1.0   Pt also had low GFR and elevated crt, pt states that she urinating fine,  No burning sensation, having now back pain, pt is currently on no NSAID's        Constitutional: no chills and fever,nad     HENT: no ear pain and nosebleeds. No blurred vision,   Respiratory: no shortness of breath, wheezing cough nor sore throat.    Cardiovascular: Has no chest pain, leg swelling ,and racing heart .   Gastrointestinal: No constipation, diarrhea, nausea and vomiting.   Genitourinary: No frequency.   Musculoskeletal: +++for multiple joint pain.   Skin: no itching, pimples   Neurological: Negative for dizziness, no tremors  Psychiatric/Behavioral: Negative for depression,  not nervous/anxious.        General: Patient alert cooperative   HEENT; normocephalic and atraumatic     Neck: supple no adenopathy no JVD no bruit  Lungs: Clear to auscultation bilaterally no rales rhonchi or wheezes  Heart: Normal regular S1-S2 s no murmur  Breast exam deferred  Abdomen: Soft nontender normal bowel sounds    Extremities: abnormal range of motion ++ point tenderness normal pulses no edema,   Pelvic/: No lesion, no lymphadenopathy  Skin: abormal no lesion no rash         ASSESSMENT/PLAN:  1.

## 2024-02-29 LAB
ALBUMIN SERPL-MCNC: 3.8 G/DL (ref 3.5–5)
ALBUMIN/GLOB SERPL: 1.2 (ref 1.1–2.2)
ALP SERPL-CCNC: 87 U/L (ref 45–117)
ALT SERPL-CCNC: 19 U/L (ref 12–78)
ANION GAP SERPL CALC-SCNC: 7 MMOL/L (ref 5–15)
AST SERPL-CCNC: 12 U/L (ref 15–37)
BILIRUB SERPL-MCNC: 0.5 MG/DL (ref 0.2–1)
BUN SERPL-MCNC: 20 MG/DL (ref 6–20)
BUN/CREAT SERPL: 17 (ref 12–20)
CALCIUM SERPL-MCNC: 9.4 MG/DL (ref 8.5–10.1)
CHLORIDE SERPL-SCNC: 106 MMOL/L (ref 97–108)
CO2 SERPL-SCNC: 29 MMOL/L (ref 21–32)
CREAT SERPL-MCNC: 1.19 MG/DL (ref 0.55–1.02)
ERYTHROCYTE [DISTWIDTH] IN BLOOD BY AUTOMATED COUNT: 16.5 % (ref 11.5–14.5)
EST. AVERAGE GLUCOSE BLD GHB EST-MCNC: 114 MG/DL
GLOBULIN SER CALC-MCNC: 3.3 G/DL (ref 2–4)
GLUCOSE SERPL-MCNC: 84 MG/DL (ref 65–100)
HBA1C MFR BLD: 5.6 % (ref 4–5.6)
HCT VFR BLD AUTO: 44.5 % (ref 35–47)
HGB BLD-MCNC: 13.9 G/DL (ref 11.5–16)
MCH RBC QN AUTO: 27 PG (ref 26–34)
MCHC RBC AUTO-ENTMCNC: 31.2 G/DL (ref 30–36.5)
MCV RBC AUTO: 86.6 FL (ref 80–99)
NRBC # BLD: 0 K/UL (ref 0–0.01)
NRBC BLD-RTO: 0 PER 100 WBC
PLATELET # BLD AUTO: 304 K/UL (ref 150–400)
PMV BLD AUTO: 9.2 FL (ref 8.9–12.9)
POTASSIUM SERPL-SCNC: 4.3 MMOL/L (ref 3.5–5.1)
PROT SERPL-MCNC: 7.1 G/DL (ref 6.4–8.2)
RBC # BLD AUTO: 5.14 M/UL (ref 3.8–5.2)
SODIUM SERPL-SCNC: 142 MMOL/L (ref 136–145)
T4 FREE SERPL-MCNC: 0.9 NG/DL (ref 0.8–1.5)
TSH SERPL DL<=0.05 MIU/L-ACNC: 0.36 UIU/ML (ref 0.36–3.74)
WBC # BLD AUTO: 7.3 K/UL (ref 3.6–11)

## 2024-03-01 LAB
T3 SERPL-MCNC: 106 NG/DL (ref 71–180)
TSI ACT/NOR SER: <0.1 IU/L (ref 0–0.55)

## 2024-05-03 ENCOUNTER — TELEPHONE (OUTPATIENT)
Age: 64
End: 2024-05-03

## 2024-05-03 NOTE — TELEPHONE ENCOUNTER
Returned call to pt.  Fell earlier this week.  C/o back pain and swelling to feet. Advised pt to go to urgent care, no available appointments

## 2024-05-03 NOTE — TELEPHONE ENCOUNTER
Patient states that her lower back has been bothering her she thinks it's her kidney she is in severe pain and says that her ankles are swollen please give her a call @ [phone]

## 2024-05-13 ENCOUNTER — TRANSCRIBE ORDERS (OUTPATIENT)
Facility: HOSPITAL | Age: 64
End: 2024-05-13

## 2024-05-13 ENCOUNTER — TELEPHONE (OUTPATIENT)
Age: 64
End: 2024-05-13

## 2024-05-13 DIAGNOSIS — Z12.31 VISIT FOR SCREENING MAMMOGRAM: Primary | ICD-10-CM

## 2024-05-13 NOTE — TELEPHONE ENCOUNTER
Patient would like to know if  can put in an order for a Bone density test she can be reached @ [phone]

## 2024-05-14 NOTE — TELEPHONE ENCOUNTER
Spoke with patient and she has an appointment for 5/29 @10am. Patient was advised that Dr. Wang can place order for testing then if needed.Patient expressed understanding.

## 2024-05-23 ENCOUNTER — HOSPITAL ENCOUNTER (OUTPATIENT)
Facility: HOSPITAL | Age: 64
Discharge: HOME | End: 2024-05-23
Attending: FAMILY MEDICINE
Payer: COMMERCIAL

## 2024-05-23 VITALS — HEIGHT: 62 IN | WEIGHT: 196 LBS | BODY MASS INDEX: 36.07 KG/M2

## 2024-05-23 DIAGNOSIS — Z12.31 VISIT FOR SCREENING MAMMOGRAM: ICD-10-CM

## 2024-05-23 PROCEDURE — 77063 BREAST TOMOSYNTHESIS BI: CPT

## 2024-05-29 ENCOUNTER — OFFICE VISIT (OUTPATIENT)
Age: 64
End: 2024-05-29
Payer: COMMERCIAL

## 2024-05-29 ENCOUNTER — CLINICAL DOCUMENTATION (OUTPATIENT)
Age: 64
End: 2024-05-29

## 2024-05-29 VITALS
OXYGEN SATURATION: 95 % | SYSTOLIC BLOOD PRESSURE: 131 MMHG | WEIGHT: 195 LBS | TEMPERATURE: 97.9 F | BODY MASS INDEX: 35.67 KG/M2 | RESPIRATION RATE: 18 BRPM | HEART RATE: 69 BPM | DIASTOLIC BLOOD PRESSURE: 84 MMHG

## 2024-05-29 DIAGNOSIS — Z13.820 ENCOUNTER FOR SCREENING FOR OSTEOPOROSIS: ICD-10-CM

## 2024-05-29 DIAGNOSIS — B35.1 ONYCHOMYCOSIS: ICD-10-CM

## 2024-05-29 DIAGNOSIS — M54.42 CHRONIC MIDLINE LOW BACK PAIN WITH LEFT-SIDED SCIATICA: Primary | ICD-10-CM

## 2024-05-29 DIAGNOSIS — G89.29 CHRONIC MIDLINE LOW BACK PAIN WITH LEFT-SIDED SCIATICA: Primary | ICD-10-CM

## 2024-05-29 DIAGNOSIS — E66.09 OBESITY DUE TO EXCESS CALORIES WITH SERIOUS COMORBIDITY, UNSPECIFIED CLASSIFICATION: ICD-10-CM

## 2024-05-29 DIAGNOSIS — Z91.81 AT HIGH RISK FOR FALLS: ICD-10-CM

## 2024-05-29 PROCEDURE — 99214 OFFICE O/P EST MOD 30 MIN: CPT | Performed by: FAMILY MEDICINE

## 2024-05-29 PROCEDURE — 1123F ACP DISCUSS/DSCN MKR DOCD: CPT | Performed by: FAMILY MEDICINE

## 2024-05-29 RX ORDER — TIRZEPATIDE 2.5 MG/.5ML
2.5 INJECTION, SOLUTION SUBCUTANEOUS
Qty: 0.5 ML | Refills: 2 | Status: SHIPPED | OUTPATIENT
Start: 2024-05-29

## 2024-05-29 RX ORDER — CICLOPIROX 80 MG/ML
SOLUTION TOPICAL
Qty: 6 ML | Refills: 3 | Status: SHIPPED | OUTPATIENT
Start: 2024-05-29

## 2024-05-29 ASSESSMENT — PATIENT HEALTH QUESTIONNAIRE - PHQ9
SUM OF ALL RESPONSES TO PHQ QUESTIONS 1-9: 0
SUM OF ALL RESPONSES TO PHQ QUESTIONS 1-9: 0
2. FEELING DOWN, DEPRESSED OR HOPELESS: NOT AT ALL
SUM OF ALL RESPONSES TO PHQ9 QUESTIONS 1 & 2: 0
1. LITTLE INTEREST OR PLEASURE IN DOING THINGS: NOT AT ALL
SUM OF ALL RESPONSES TO PHQ QUESTIONS 1-9: 0
SUM OF ALL RESPONSES TO PHQ QUESTIONS 1-9: 0

## 2024-05-29 NOTE — PROGRESS NOTES
PA for Zepbound is denied,weight loss treatments, including but not limited to medications, diet supplements and surgeries are not covered

## 2024-05-29 NOTE — PROGRESS NOTES
Zoey Cruz (:  1960) is a 64 y.o. female,Established patient, here for evaluation of the following chief complaint(s):  Follow-up and Back Pain    Back pain  Givne prednisone did not like it,    Patient with BMI of 35.56, with hx of b/l Knee replacements,  also has been participating in nutritional counseling in addition the patient has been involved in an physical activity program and the current obesity is disabling which could also be life-threatening due to the fact that the patient is not able to have a normal body image, in addition,  pt is w/out hx of pancreatic problem and thryoid  medullary cancer or thyroid problem, nor MEN I, and II, in current medical history.      Rash toe nail  Started few months  ago much better with the givne tx on ht elast visit,  Does not tingles and not pain full, states that is notexpanding red, and not swelled up, unresolved whitish patches rounds, without  itchiness    Constitutional: no chills and fever,nad     HENT: no ear pain and nosebleeds. No blurred vision,   Respiratory: no shortness of breath, wheezing cough nor sore throat.    Cardiovascular: Has no chest pain, leg swelling ,and racing heart .   Gastrointestinal: No constipation, diarrhea, nausea and vomiting.   Genitourinary: No frequency.   Musculoskeletal: +++for multiple joint pain.   Skin: no itching, pimples   Neurological: Negative for dizziness, no tremors  Psychiatric/Behavioral: Negative for depression,  not nervous/anxious.      General: Patient alert cooperative   HEENT; normocephalic and atraumatic     Neck: supple no adenopathy no JVD no bruit  Lungs: Clear to auscultation bilaterally no rales rhonchi or wheezes  Heart: Normal regular S1-S2 s no murmur  Breast exam deferred  Abdomen: Soft nontender normal bowel sounds    Extremities: abnormal range of motion ++ point tenderness normal pulses no edema,   Pelvic/: No lesion, no lymphadenopathy  Skin: abormal no lesion no

## 2024-05-29 NOTE — PROGRESS NOTES
Chief Complaint   Patient presents with    Follow-up    Back Pain       \"Have you been to the ER, urgent care clinic since your last visit?  Hospitalized since your last visit?\"    [facility] weeks ago back pain     “Have you seen or consulted any other health care providers outside of [facility] since your last visit?”    NO              Vitals:    24 1002   BP: 131/84   Pulse: 69   Resp: 18   Temp: 97.9 °F (36.6 °C)   TempSrc: Infrared   SpO2: 95%   Weight: 88.5 kg (195 lb)        Health Maintenance Due   Topic Date Due    Pneumococcal 65+ years Vaccine (1 of 2 - PCV) 1965    DEXA (modify frequency per FRAX score)  2014        The patient, Zoey Cruz, identity was verified by name and

## 2024-06-11 ENCOUNTER — HOSPITAL ENCOUNTER (OUTPATIENT)
Facility: HOSPITAL | Age: 64
Discharge: HOME | End: 2024-06-14
Attending: FAMILY MEDICINE
Payer: COMMERCIAL

## 2024-06-11 DIAGNOSIS — Z13.820 ENCOUNTER FOR SCREENING FOR OSTEOPOROSIS: ICD-10-CM

## 2024-06-11 PROCEDURE — 77080 DXA BONE DENSITY AXIAL: CPT

## 2024-06-12 ENCOUNTER — OFFICE VISIT (OUTPATIENT)
Age: 64
End: 2024-06-12
Payer: COMMERCIAL

## 2024-06-12 VITALS
DIASTOLIC BLOOD PRESSURE: 83 MMHG | OXYGEN SATURATION: 95 % | BODY MASS INDEX: 36.25 KG/M2 | SYSTOLIC BLOOD PRESSURE: 124 MMHG | HEIGHT: 62 IN | WEIGHT: 197 LBS | HEART RATE: 63 BPM

## 2024-06-12 DIAGNOSIS — E66.9 OBESITY (BMI 30-39.9): ICD-10-CM

## 2024-06-12 DIAGNOSIS — R94.6 ABNORMAL THYROID FUNCTION TEST: ICD-10-CM

## 2024-06-12 DIAGNOSIS — E04.1 THYROID NODULE: ICD-10-CM

## 2024-06-12 DIAGNOSIS — E04.1 THYROID NODULE: Primary | ICD-10-CM

## 2024-06-12 PROCEDURE — 99214 OFFICE O/P EST MOD 30 MIN: CPT | Performed by: GENERAL ACUTE CARE HOSPITAL

## 2024-06-12 PROCEDURE — 1123F ACP DISCUSS/DSCN MKR DOCD: CPT | Performed by: GENERAL ACUTE CARE HOSPITAL

## 2024-06-12 RX ORDER — APIXABAN 2.5 MG/1
2.5 TABLET, FILM COATED ORAL 2 TIMES DAILY
COMMUNITY
Start: 2024-05-13

## 2024-06-12 RX ORDER — METHOCARBAMOL 500 MG/1
500 TABLET, FILM COATED ORAL 4 TIMES DAILY PRN
COMMUNITY
Start: 2023-11-10

## 2024-06-12 RX ORDER — BIMATOPROST 0.1 MG/ML
SOLUTION/ DROPS OPHTHALMIC
COMMUNITY
Start: 2024-06-03

## 2024-06-12 NOTE — PROGRESS NOTES
Identified pt with two pt identifiers(name and ). Reviewed record in preparation for visit and have obtained necessary documentation. All patient medications has been reviewed.  Chief Complaint   Patient presents with    Thyroid Problem    Follow-up             Wt Readings from Last 3 Encounters:   24 89.4 kg (197 lb)   24 88.5 kg (195 lb)   24 88.9 kg (196 lb)     Temp Readings from Last 3 Encounters:   24 97.9 °F (36.6 °C) (Infrared)   24 97.3 °F (36.3 °C) (Infrared)   23 98.1 °F (36.7 °C) (Oral)     BP Readings from Last 3 Encounters:   24 124/83   24 131/84   24 129/83     Pulse Readings from Last 3 Encounters:   24 63   24 69   24 73       \"Have you been to the ER, urgent care clinic since your last visit?  Hospitalized since your last visit?\"    NO    “Have you seen or consulted any other health care providers outside of [facility] since your last visit?”    NO

## 2024-06-12 NOTE — PROGRESS NOTES
[facility]  DR ANA CRISTINA SHETTY           ASSESSMENT AND PLAN:     Zoey Cruz is a 63 y.o. female with a PMHx as noted above who was referred to our endocrinology clinic for evaluation of a thyroid nodule.     Thyroid Nodule    Patient denies compressive symptoms, last thyroid ultrasound stable, plan to obtain thyroid ultrasound on 11/2024  Based on the results we will decide on the next steps in management    Abnormal thyroid function   TSH 0.36, advised patient to obtain repeat thyroid function test now, patient indicates understanding and agrees with plan.  Orders Placed This Encounter   Procedures    US HEAD NECK SOFT TISSUE THYROID    Thyroid Cascade Profile    Thyroid Antibodies   Review of most recent thyroid function:  Lab Results   Component Value Date    TSH 0.13 (L) 07/27/2022    TSH 0.45 12/20/2021    FVW6EIB 0.36 02/28/2024    XMO2ZNF 0.21 (L) 12/28/2023    T4FREE 0.9 02/28/2024    T4FREE 1.0 12/28/2023    T4FREE 0.9 07/27/2022    O8IEGXU 106 02/28/2024      Obesity   Discussed lifestyle modif, will monitor, given brochure of [facility] Wt management program      -----------------------------------------------------------------------------------------------------------------------------------      REFERRED BY: Jose Daniel Wang MD     HISTORY OF PRESENT ILLNESS:   Zoey Cruz is a 64 y.o. female with a PMHx as noted below who was referred to our endocrinology clinic for evaluation of a thyroid nodule.    6/12/24   5 months ago had right shoulder surgery, just returened to work last months and gained 30 lbs     Presentation:  Patient describes that she recently had a chest CT scan on 12/11/2023 which showed thyroid enlargement.  Patient indicates that in 2008 had neck surgery but she is not sure of other details of the surgery, surgery done in [facility], says it may have been related to her thyroid but she is unsure.  She denies any compressive symptoms, [trouble

## 2024-06-13 LAB
Lab: NORMAL
T3 FREE: 2.9 PG/ML (ref 2–4.4)
T4 FREE SERPL-MCNC: 1.08 NG/DL (ref 0.82–1.77)
THYROGLOB AB SERPL-ACNC: <1 IU/ML (ref 0–0.9)
THYROPEROXIDASE AB SERPL-ACNC: 10 IU/ML (ref 0–34)
TSH SERPL DL<=0.05 MIU/L-ACNC: 0.44 UIU/ML (ref 0.45–4.5)

## 2024-06-18 PROBLEM — E66.9 OBESITY (BMI 30-39.9): Status: ACTIVE | Noted: 2024-06-18

## 2024-08-16 DIAGNOSIS — M85.80 OSTEOPENIA, UNSPECIFIED LOCATION: Primary | ICD-10-CM

## 2024-08-16 RX ORDER — CA/D3/MAG OX/ZINC/COP/MANG/BOR 600 MG-800
1 TABLET,CHEWABLE ORAL 2 TIMES DAILY
Qty: 60 TABLET | Refills: 11 | Status: SHIPPED | OUTPATIENT
Start: 2024-08-16

## 2024-08-29 ENCOUNTER — OFFICE VISIT (OUTPATIENT)
Age: 64
End: 2024-08-29
Payer: COMMERCIAL

## 2024-08-29 VITALS
DIASTOLIC BLOOD PRESSURE: 81 MMHG | OXYGEN SATURATION: 95 % | HEART RATE: 67 BPM | SYSTOLIC BLOOD PRESSURE: 134 MMHG | RESPIRATION RATE: 17 BRPM | WEIGHT: 194 LBS | TEMPERATURE: 97.6 F | BODY MASS INDEX: 35.48 KG/M2

## 2024-08-29 DIAGNOSIS — E66.9 OBESITY (BMI 30-39.9): Primary | ICD-10-CM

## 2024-08-29 DIAGNOSIS — E78.00 HYPERCHOLESTEROLEMIA: ICD-10-CM

## 2024-08-29 DIAGNOSIS — N18.31 STAGE 3A CHRONIC KIDNEY DISEASE (HCC): ICD-10-CM

## 2024-08-29 DIAGNOSIS — M79.642 PAIN OF LEFT HAND: ICD-10-CM

## 2024-08-29 DIAGNOSIS — F41.1 GENERALIZED ANXIETY DISORDER: ICD-10-CM

## 2024-08-29 LAB
ALBUMIN SERPL-MCNC: 3.8 G/DL (ref 3.5–5)
ALBUMIN/GLOB SERPL: 1.2 (ref 1.1–2.2)
ALP SERPL-CCNC: 71 U/L (ref 45–117)
ALT SERPL-CCNC: 16 U/L (ref 12–78)
ANION GAP SERPL CALC-SCNC: 5 MMOL/L (ref 5–15)
AST SERPL-CCNC: 16 U/L (ref 15–37)
BILIRUB SERPL-MCNC: 0.5 MG/DL (ref 0.2–1)
BUN SERPL-MCNC: 14 MG/DL (ref 6–20)
BUN/CREAT SERPL: 13 (ref 12–20)
CALCIUM SERPL-MCNC: 9.4 MG/DL (ref 8.5–10.1)
CHLORIDE SERPL-SCNC: 106 MMOL/L (ref 97–108)
CHOLEST SERPL-MCNC: 236 MG/DL
CO2 SERPL-SCNC: 28 MMOL/L (ref 21–32)
CREAT SERPL-MCNC: 1.09 MG/DL (ref 0.55–1.02)
GLOBULIN SER CALC-MCNC: 3.1 G/DL (ref 2–4)
GLUCOSE SERPL-MCNC: 82 MG/DL (ref 65–100)
HDLC SERPL-MCNC: 68 MG/DL
HDLC SERPL: 3.5 (ref 0–5)
LDLC SERPL CALC-MCNC: 147.8 MG/DL (ref 0–100)
POTASSIUM SERPL-SCNC: 4.4 MMOL/L (ref 3.5–5.1)
PROT SERPL-MCNC: 6.9 G/DL (ref 6.4–8.2)
SODIUM SERPL-SCNC: 139 MMOL/L (ref 136–145)
TRIGL SERPL-MCNC: 101 MG/DL
VLDLC SERPL CALC-MCNC: 20.2 MG/DL

## 2024-08-29 PROCEDURE — G8399 PT W/DXA RESULTS DOCUMENT: HCPCS | Performed by: FAMILY MEDICINE

## 2024-08-29 PROCEDURE — G8417 CALC BMI ABV UP PARAM F/U: HCPCS | Performed by: FAMILY MEDICINE

## 2024-08-29 PROCEDURE — 1090F PRES/ABSN URINE INCON ASSESS: CPT | Performed by: FAMILY MEDICINE

## 2024-08-29 PROCEDURE — 1036F TOBACCO NON-USER: CPT | Performed by: FAMILY MEDICINE

## 2024-08-29 PROCEDURE — 1123F ACP DISCUSS/DSCN MKR DOCD: CPT | Performed by: FAMILY MEDICINE

## 2024-08-29 PROCEDURE — G8427 DOCREV CUR MEDS BY ELIG CLIN: HCPCS | Performed by: FAMILY MEDICINE

## 2024-08-29 PROCEDURE — 3017F COLORECTAL CA SCREEN DOC REV: CPT | Performed by: FAMILY MEDICINE

## 2024-08-29 PROCEDURE — 99214 OFFICE O/P EST MOD 30 MIN: CPT | Performed by: FAMILY MEDICINE

## 2024-08-29 RX ORDER — NALTREXONE HYDROCHLORIDE 50 MG/1
25 TABLET, FILM COATED ORAL DAILY
Qty: 30 TABLET | Refills: 3 | Status: SHIPPED | OUTPATIENT
Start: 2024-08-29

## 2024-08-29 RX ORDER — BUPROPION HYDROCHLORIDE 150 MG/1
150 TABLET ORAL EVERY MORNING
Qty: 30 TABLET | Refills: 3 | Status: SHIPPED | OUTPATIENT
Start: 2024-08-29

## 2024-08-29 ASSESSMENT — PATIENT HEALTH QUESTIONNAIRE - PHQ9
2. FEELING DOWN, DEPRESSED OR HOPELESS: SEVERAL DAYS
SUM OF ALL RESPONSES TO PHQ QUESTIONS 1-9: 1
1. LITTLE INTEREST OR PLEASURE IN DOING THINGS: NOT AT ALL
SUM OF ALL RESPONSES TO PHQ9 QUESTIONS 1 & 2: 1
SUM OF ALL RESPONSES TO PHQ QUESTIONS 1-9: 1

## 2024-08-29 NOTE — ASSESSMENT & PLAN NOTE
Orders:    buPROPion (WELLBUTRIN XL) 150 MG extended release tablet; Take 1 tablet by mouth every morning    naltrexone (DEPADE) 50 MG tablet; Take 0.5 tablets by mouth daily    Comprehensive Metabolic Panel; Future    Lipid Panel; Future

## 2024-08-29 NOTE — PROGRESS NOTES
Chief Complaint   Patient presents with    Asthma    Anxiety       \"Have you been to the ER, urgent care clinic since your last visit?  Hospitalized since your last visit?\"    NO    “Have you seen or consulted any other health care providers outside of [facility] since your last visit?”    NO            Click Here for Release of Records Request     No results found for this visit on 24.   Vitals:    24 1046   BP: 134/81   Pulse: 67   Resp: 17   Temp: 97.6 °F (36.4 °C)   TempSrc: Infrared   SpO2: 95%   Weight: 88 kg (194 lb)      Health Maintenance Due   Topic Date Due    Flu vaccine (1) 2024        The patient, Zoey Cruz, identity was verified by name and .

## 2024-08-29 NOTE — PROGRESS NOTES
Zoey Cruz (:  1960) is a 64 y.o. female,Established patient, here for evaluation of the following chief complaint(s):  Asthma and Anxiety    Patient complains of edema. Of the lower legs bilateral, ankles bilateral, feet bilateral, it has been moderate.  The condition has been long-standing, and gradually worsening since that time.   denies leg pain, denies rash, and legs lesion,and the denial of dizziness,   the wt went up   Hx of b/l TKR and mild leg swelling    Patient states that is able to sleep, able to concentrate,  without the medications, not feeling anxius, no guilty feeling,   Nl interest doing things not depressed an the pt is feeling safe at home     Lf hand Pain    This is a recurrent problem. 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 The pain is at a severity of 5/10. Associated symptoms include numbness, limited range of motion, stiffness, tingling and itching. The symptoms are aggravated by movement. She has tried arthritis medications and OTC ointments for the symptoms. The treatment provided no relief. There has been no history of extremity trauma,        Constitutional: no chills and fever,  , nad     HENT: no ear pain or nosebleeds. No blurred vision  Respiratory: no shortness of breath, wheezing cough   Cardiovascular: Has no chest pain, ,and racing heart .   Gastrointestinal: No constipation, diarrhea, nausea and vomiting.   Genitourinary: No frequency.   Musculoskeletal: ++ for joint pain.   Skin: no itching, no rash.   Neurological: Negative for dizziness, no tremors  Psychiatric/Behavioral: +++ for depression  ++nervous/anxious.         Constitutional: Well developed, well nourished.  non-toxic in appearance, not diaphoretic.   HEENT: PERRL. EOMI. The left TM is unremarkable. The right TM is unremarkable. No nasal  erythema noted.  THROAT: Posterior pharynx has no erythema, no exudates.    Neck:  no cervical lymphadenopathy.

## 2024-09-18 DIAGNOSIS — R94.6 ABNORMAL THYROID FUNCTION TEST: ICD-10-CM

## 2024-10-27 SDOH — ECONOMIC STABILITY: FOOD INSECURITY: WITHIN THE PAST 12 MONTHS, YOU WORRIED THAT YOUR FOOD WOULD RUN OUT BEFORE YOU GOT MONEY TO BUY MORE.: OFTEN TRUE

## 2024-10-27 SDOH — ECONOMIC STABILITY: INCOME INSECURITY: HOW HARD IS IT FOR YOU TO PAY FOR THE VERY BASICS LIKE FOOD, HOUSING, MEDICAL CARE, AND HEATING?: VERY HARD

## 2024-10-27 SDOH — ECONOMIC STABILITY: FOOD INSECURITY: WITHIN THE PAST 12 MONTHS, THE FOOD YOU BOUGHT JUST DIDN'T LAST AND YOU DIDN'T HAVE MONEY TO GET MORE.: OFTEN TRUE

## 2024-10-27 SDOH — ECONOMIC STABILITY: TRANSPORTATION INSECURITY
IN THE PAST 12 MONTHS, HAS LACK OF TRANSPORTATION KEPT YOU FROM MEETINGS, WORK, OR FROM GETTING THINGS NEEDED FOR DAILY LIVING?: NO

## 2024-10-29 ENCOUNTER — OFFICE VISIT (OUTPATIENT)
Age: 64
End: 2024-10-29
Payer: COMMERCIAL

## 2024-10-29 VITALS
BODY MASS INDEX: 35.15 KG/M2 | WEIGHT: 191 LBS | RESPIRATION RATE: 17 BRPM | TEMPERATURE: 97.8 F | SYSTOLIC BLOOD PRESSURE: 137 MMHG | OXYGEN SATURATION: 95 % | DIASTOLIC BLOOD PRESSURE: 85 MMHG | HEIGHT: 62 IN | HEART RATE: 65 BPM

## 2024-10-29 DIAGNOSIS — Z23 ENCOUNTER FOR IMMUNIZATION: ICD-10-CM

## 2024-10-29 DIAGNOSIS — M25.512 CHRONIC LEFT SHOULDER PAIN: Primary | ICD-10-CM

## 2024-10-29 DIAGNOSIS — R35.0 URINARY FREQUENCY: ICD-10-CM

## 2024-10-29 DIAGNOSIS — G89.29 CHRONIC LEFT SHOULDER PAIN: Primary | ICD-10-CM

## 2024-10-29 DIAGNOSIS — E66.9 OBESITY (BMI 30-39.9): ICD-10-CM

## 2024-10-29 LAB
APPEARANCE UR: CLEAR
BACTERIA URNS QL MICRO: ABNORMAL /HPF
BILIRUB UR QL: NEGATIVE
COLOR UR: ABNORMAL
EPITH CASTS URNS QL MICRO: ABNORMAL /LPF
GLUCOSE UR STRIP.AUTO-MCNC: NEGATIVE MG/DL
HGB UR QL STRIP: NEGATIVE
KETONES UR QL STRIP.AUTO: NEGATIVE MG/DL
LEUKOCYTE ESTERASE UR QL STRIP.AUTO: ABNORMAL
NITRITE UR QL STRIP.AUTO: NEGATIVE
PH UR STRIP: 6 (ref 5–8)
PROT UR STRIP-MCNC: NEGATIVE MG/DL
RBC #/AREA URNS HPF: ABNORMAL /HPF (ref 0–5)
SP GR UR REFRACTOMETRY: 1.01 (ref 1–1.03)
URINE CULTURE IF INDICATED: ABNORMAL
UROBILINOGEN UR QL STRIP.AUTO: 0.2 EU/DL (ref 0.2–1)
WBC URNS QL MICRO: ABNORMAL /HPF (ref 0–4)

## 2024-10-29 PROCEDURE — 3017F COLORECTAL CA SCREEN DOC REV: CPT | Performed by: FAMILY MEDICINE

## 2024-10-29 PROCEDURE — 1036F TOBACCO NON-USER: CPT | Performed by: FAMILY MEDICINE

## 2024-10-29 PROCEDURE — 1090F PRES/ABSN URINE INCON ASSESS: CPT | Performed by: FAMILY MEDICINE

## 2024-10-29 PROCEDURE — G8484 FLU IMMUNIZE NO ADMIN: HCPCS | Performed by: FAMILY MEDICINE

## 2024-10-29 PROCEDURE — 1123F ACP DISCUSS/DSCN MKR DOCD: CPT | Performed by: FAMILY MEDICINE

## 2024-10-29 PROCEDURE — G8417 CALC BMI ABV UP PARAM F/U: HCPCS | Performed by: FAMILY MEDICINE

## 2024-10-29 PROCEDURE — G8427 DOCREV CUR MEDS BY ELIG CLIN: HCPCS | Performed by: FAMILY MEDICINE

## 2024-10-29 PROCEDURE — 99214 OFFICE O/P EST MOD 30 MIN: CPT | Performed by: FAMILY MEDICINE

## 2024-10-29 PROCEDURE — G8399 PT W/DXA RESULTS DOCUMENT: HCPCS | Performed by: FAMILY MEDICINE

## 2024-10-29 ASSESSMENT — PATIENT HEALTH QUESTIONNAIRE - PHQ9
SUM OF ALL RESPONSES TO PHQ QUESTIONS 1-9: 0
SUM OF ALL RESPONSES TO PHQ QUESTIONS 1-9: 0
SUM OF ALL RESPONSES TO PHQ9 QUESTIONS 1 & 2: 0
2. FEELING DOWN, DEPRESSED OR HOPELESS: NOT AT ALL
SUM OF ALL RESPONSES TO PHQ QUESTIONS 1-9: 0
SUM OF ALL RESPONSES TO PHQ QUESTIONS 1-9: 0
1. LITTLE INTEREST OR PLEASURE IN DOING THINGS: NOT AT ALL

## 2024-10-29 NOTE — PATIENT INSTRUCTIONS
[place], [facility], [place], [place], [place], [place], [place] and [place]: You may also apply by calling [facility] Agency on [phone]  For Cities of [place], [place], and [place] as well as Counties of [place], [facility], [place], [place], and [place]:    Contact [facility] Agency on [phone]    [facility] Area:   [facility], [place], [facility], [place], [place], [place], [place], [place], [place] and [place].  Website: [email or web address]  To Apply by phone: [phone]    [facility] ([facility]) area:   [facility], [place], [place], [place], [place], [facility], and [facility].   Website: [email or web address]  For More Information: Call [phone] or email [email or web address]    Meals on [facility]:   Website: [email or web address]  To Apply Online: [email or web address]  To Apply by phone: [phone]    Meals on [facility]:  [phone]            Other Resources:     [employer]   What they offer: “[employer] is a nonprofit working together to build healthy communities by growing and sharing healthy food.  The food we grow is distributed through our network of programs and partnerships in communities where access to healthy food is limited.”   Website: https://SmartExposee/find-fresh-food/  Phone: 141.966.7352   Cash, cards, and SNAP/EBT accepted      [facility] [employer] [place] [employer]  What they offer: A nutrition midday meal and interaction with fellow community members.  There are 20 [place] [employer] through the region including [facility], and the [facility] of [place], [place], [place], [place], [place], [place], and [place]  Website:

## 2024-10-29 NOTE — PROGRESS NOTES
Chief Complaint   Patient presents with    Weight Management     Follow up     Shoulder Pain     Right shoulder pain     Medication Problem       \"Have you been to the ER, urgent care clinic since your last visit?  Hospitalized since your last visit?\"    NO    “Have you seen or consulted any other health care providers outside of [facility] since your last visit?”    NO            Click Here for Release of Records Request     No results found for this visit on 10/29/24.   Vitals:    10/29/24 0917   BP: 137/85   Pulse: 65   Resp: 17   Temp: 97.8 °F (36.6 °C)   TempSrc: Infrared   SpO2: 95%   Weight: 86.6 kg (191 lb)   Height: 1.575 m (5' 2\")      There are no preventive care reminders to display for this patient.       The patient, Zoey Cruz, identity was verified by name and .

## 2024-10-29 NOTE — PROGRESS NOTES
Zoey Cruz (:  1960) is a 64 y.o. female,Established patient, here for evaluation of the following chief complaint(s):  Weight Management (Follow up ), Shoulder Pain (Right shoulder pain ), and Medication Problem    Did not like the given Wellbutrin and naltrexone instead of Contrave due to not coverage patient states that since she has been taking it she is not able to sleep, patient was given medication for weight loss unfortunately not able to get any injection therapy     Patient present with history of chronic fatigue struggling with high BMI low energy has been given Adipex in the past patient today stating that she has been feeling very good on this meds, feeling less tired and fatigued, becoming to be more concentrated at work and at home, the pt is becoming to be more active , not a fast fooder,    Urinary Frequency     stating that this is a new but recurrent problem. The current episode started more than 1 week ago. The problem occurs every urination. The problem has been gradually worsening. The quality of the pain is described as burning. The pain is at a severity of 2/10.       Shoulder Pain   The history is provided by the patient. This is a chronic problem. Episode onset: few yrs ago, not obese, patient has a lot of difficulty with overhead activity, raising arm is very painful and the pain  awakens the patient at night,  The problem occurs constantly. The problem has not changed since onset. The pain is present in the lt shoulder. The quality of the pain is described as dull. The pain is at a severity of 8/10.        Constitutional: no fever, nl  energy levels, nl sleep patterns, nl appetite, and nl weight fluctuations,  - exercise habits,  nad     HENT: no ear pain or nosebleeds. No blurred vision  Respiratory: no shortness of breath, wheezing cough   Cardiovascular: Has no chest pain, ,and racing heart .   Gastrointestinal: No constipation, diarrhea, nausea and vomiting.

## 2024-11-04 ENCOUNTER — HOSPITAL ENCOUNTER (OUTPATIENT)
Facility: HOSPITAL | Age: 64
Discharge: HOME | End: 2024-11-07
Attending: GENERAL ACUTE CARE HOSPITAL
Payer: COMMERCIAL

## 2024-11-04 DIAGNOSIS — R94.6 ABNORMAL THYROID FUNCTION TEST: ICD-10-CM

## 2024-11-04 DIAGNOSIS — E04.1 THYROID NODULE: ICD-10-CM

## 2024-11-04 PROCEDURE — 76536 US EXAM OF HEAD AND NECK: CPT

## 2024-11-05 LAB
INTERPRETIVE COMMENT: NORMAL
T3FREE SERPL-MCNC: 3.1 PG/ML (ref 2–4.4)
T4 FREE SERPL-MCNC: 1.09 NG/DL (ref 0.82–1.77)
TSH SERPL DL<=0.005 MIU/L-ACNC: 0.22 UIU/ML (ref 0.45–4.5)

## 2024-11-11 NOTE — PROGRESS NOTES
Discharge Instructions  Dental Pain    You have been seen today for a toothache. Your pain may be caused by an exposed nerve, an infection (pulpitis), a root abscess (pocket of pus), or other problems. You will need to see a dentist for a solution to your tooth problem. Emergency Department care is only to help control your problem until you can see a dentist; we cannot provide complete dental care.  Today, we did not find any sign that your toothache was caused by any dangerous or life-threatening condition, but sometimes symptoms develop over time and cannot be found during an emergency visit, so it is very important that you follow up with your dentist.      Generally, every Emergency Department visit should have a follow-up clinic visit with either a primary or a specialty clinic/provider. Please follow-up as instructed by your emergency provider today.    Return to the Emergency Department if:  You develop a new fever over 100.4 F.  You cannot open your mouth normally, cannot move your tongue well, or cannot swallow.  You have new or increased swelling of your face or neck.  You develop drainage of pus or foul smelling material from around your tooth.  What can I do to help myself?  Take any antibiotic the provider may have prescribed for you today.  Avoid very hot or very cold foods as both can cause pain.  Make an appointment to see a dentist as soon as possible. Dentists are generally not  on-staff  at hospitals so we cannot  refer  to you to dentist but we may be able to provide a list of dental clinics to help you.  If you were given a prescription for medicine here today, be sure to read all of the information (including the package insert) that comes with your prescription.  This will include important information about the medicine, its side effects, and any warnings that you need to know about.  The pharmacist who fills the prescription can provide more information and answer questions you may have  about the medicine.  If you have questions or concerns that the pharmacist cannot address, please call or return to the Emergency Department.   Remember that you can always come back to the Emergency Department if you are not able to see your regular provider in the amount of time listed above, if you get any new symptoms, or if there is anything that worries you.     symptoms, [trouble swallowing, shortness of breath while lying flat, hoarseness of voice] or neck enlargement.    Family history is not significant for thyroid cancers - daughter has thyroid nodule , aunt and cousin had hypothyroidism    Patient denies any history of radiation exposure.  Patient denies symptoms of hypothyroidism.   She also recently had abnormal thyroid function test with TSH being 0.21 and that is the only recorded abnormality and says she was feeling unwell at the time.  Denies symptoms of hyperthyroidism.    No prior ultrasounds in our system    Review of most recent thyroid function:  Lab Results   Component Value Date    TSH 0.13 (L) 07/27/2022    TSH 0.45 12/20/2021    TSH 0.297 (L) 07/30/2020      TSILT = Thyroid stimulating antibodies  TMCLT = TPO antibodies  T3LT = Total T3 levels    At this time they would like to further investigate the nature of the thyroid nodule.    PAST HISTORY:   Past Medical History:   Diagnosis Date    Acute right ankle pain 7/23/2018    Arthritis 8/3/2010    Asthma     Bunion of great toe of right foot 7/23/2018    Fall at home, initial encounter 9/18/2018    Hammer toe of second toe of right foot 7/23/2018    Hypercholesterolemia 8/18/2010    Postmenopausal 8/3/2010    Prediabetes 9/12/2013    Primary snoring 11/29/2017    Pulmonary embolus (HCC) 4/1/2023    Shoulder pain, bilateral 2/8/2018    Sinusitis, acute 12/12/2011    Wrist pain, acute, left 9/18/2018     Past Surgical History:   Procedure Laterality Date    GYN  1987    tubal pregancy; laparotomy    ORTHOPEDIC SURGERY  2008    cervical fusion of discs x4    ORTHOPEDIC SURGERY  2006    neck    PELVIC LAPAROSCOPY  1985    endometriosis    REVISE KNEE JOINT REPLACE,ALL PARTS  02/29/2012    tkr left         MEDICATIONS     Current Outpatient Medications:     HYDROcodone-acetaminophen (NORCO) 7.5-325 MG per tablet, TAKE 1 TABLET BY MOUTH EVERY 4 HOURS AS NEEDED FOR SEVERE PAIN, Disp: , Rfl:     pantoprazole

## 2024-11-12 ENCOUNTER — OFFICE VISIT (OUTPATIENT)
Age: 64
End: 2024-11-12
Payer: COMMERCIAL

## 2024-11-12 VITALS
DIASTOLIC BLOOD PRESSURE: 77 MMHG | BODY MASS INDEX: 34.89 KG/M2 | SYSTOLIC BLOOD PRESSURE: 141 MMHG | WEIGHT: 189.6 LBS | HEIGHT: 62 IN | HEART RATE: 65 BPM

## 2024-11-12 DIAGNOSIS — Z87.09 HISTORY OF ASTHMA: ICD-10-CM

## 2024-11-12 DIAGNOSIS — R11.0 NAUSEA: ICD-10-CM

## 2024-11-12 DIAGNOSIS — E05.90 SUBCLINICAL HYPERTHYROIDISM: ICD-10-CM

## 2024-11-12 DIAGNOSIS — E04.1 THYROID NODULE: Primary | ICD-10-CM

## 2024-11-12 PROCEDURE — 1123F ACP DISCUSS/DSCN MKR DOCD: CPT | Performed by: GENERAL ACUTE CARE HOSPITAL

## 2024-11-12 PROCEDURE — 99214 OFFICE O/P EST MOD 30 MIN: CPT | Performed by: GENERAL ACUTE CARE HOSPITAL

## 2024-11-12 RX ORDER — METHIMAZOLE 5 MG/1
TABLET ORAL
Qty: 15 TABLET | Refills: 3 | Status: SHIPPED | OUTPATIENT
Start: 2024-11-12

## 2024-11-12 RX ORDER — LATANOPROST 50 UG/ML
2 SOLUTION/ DROPS OPHTHALMIC NIGHTLY
COMMUNITY
Start: 2024-08-12

## 2024-11-12 NOTE — PATIENT INSTRUCTIONS
Complete the Thyroid Uptake and Scan   After that START methimazole 5 mg half tablet from Monday to Friday, skip Saturday and Sunday  Complete blood test 1 week prior to next visit (does not need to be fasting)

## 2024-11-12 NOTE — PROGRESS NOTES
[facility]  DR ANA CRISTINA SHETTY       The patient (or guardian, if applicable) and other individuals in attendance with the patient were advised that Artificial Intelligence will be utilized during this visit to record, process the conversation to generate a clinical note, and support improvement of the AI technology. The patient (or guardian, if applicable) and other individuals in attendance at the appointment consented to the use of AI, including the recording.      ASSESSMENT AND PLAN:     Zoey Cruz is a 64 y.o. female with a PMHx as noted above who was referred to our endocrinology clinic for evaluation of a thyroid nodule.     Assessment & Plan  Subclinical hyperthyroidism  Thyroid Nodule    Elevated thyroid levels antibody negative, concern for hot nodule. The repeat thyroid ultrasound shows nodules appear stable in size. Thyroid antibodies, including TPO, thyroglobulin, TSI, and TRAb, were tested and found to be negative, ruling out autoimmune causes for the elevated thyroid levels. A thyroid uptake and scan will be ordered. Methimazole 2.5 mg, half a tablet, will be prescribed to be taken from Monday to Friday, skipping Saturday and Sunday. The medication should be started after the thyroid uptake and scan. A complete blood test will be ordered to be done one week before the next visit.    Component      Latest Ref Rng 6/12/2024 11/4/2024   Thyroid Peroxidase (TPO) Abs      0 - 34 IU/mL 10     Thyroglobulin Ab      0.0 - 0.9 IU/mL <1.0     Triiodothyronine, Free, Serum      2.0 - 4.4 pg/mL 2.9  3.1    Comments        Comment     Interpretive Comment  Comment    TSH, 3rd Generation      0.450 - 4.500 uIU/mL 0.437 (L)  0.222 (L)    T4,FREE,(DIRECT)      0.82 - 1.77 ng/dL 1.08     T4 Free      0.82 - 1.77 ng/dL  1.09       Legend:  (L) Low    Nausea  Over-the-counter Gaviscon was recommended for symptom relief. Dietary modifications were suggested, including avoiding spicy foods

## 2024-12-03 ENCOUNTER — HOSPITAL ENCOUNTER (OUTPATIENT)
Facility: HOSPITAL | Age: 64
Discharge: HOME | End: 2024-12-06
Attending: GENERAL ACUTE CARE HOSPITAL
Payer: COMMERCIAL

## 2024-12-03 DIAGNOSIS — E05.90 SUBCLINICAL HYPERTHYROIDISM: ICD-10-CM

## 2024-12-03 DIAGNOSIS — E04.1 THYROID NODULE: ICD-10-CM

## 2024-12-03 PROCEDURE — 78014 THYROID IMAGING W/BLOOD FLOW: CPT

## 2024-12-03 PROCEDURE — 3430000000 HC RX DIAGNOSTIC RADIOPHARMACEUTICAL: Performed by: GENERAL ACUTE CARE HOSPITAL

## 2024-12-03 PROCEDURE — A9516 IODINE I-123 SOD IODIDE MIC: HCPCS | Performed by: GENERAL ACUTE CARE HOSPITAL

## 2024-12-03 RX ORDER — SODIUM IODIDE I 123 200 UCI/1
305 CAPSULE, GELATIN COATED ORAL ONCE
Status: COMPLETED | OUTPATIENT
Start: 2024-12-03 | End: 2024-12-03

## 2024-12-03 RX ADMIN — SODIUM IODIDE I 123 305 MICRO CURIE: 200 CAPSULE, GELATIN COATED ORAL at 09:30

## 2024-12-04 ENCOUNTER — HOSPITAL ENCOUNTER (OUTPATIENT)
Facility: HOSPITAL | Age: 64
Discharge: HOME | End: 2024-12-07
Attending: GENERAL ACUTE CARE HOSPITAL

## 2024-12-10 ENCOUNTER — TELEPHONE (OUTPATIENT)
Age: 64
End: 2024-12-10

## 2024-12-10 DIAGNOSIS — E05.10 TOXIC SOLITARY THYROID NODULE: Primary | ICD-10-CM

## 2024-12-11 NOTE — TELEPHONE ENCOUNTER
Discussed with patient yesterday and given toxic thyroid nodule will refer to RFA to Dr Jagjit Coombs

## 2025-01-10 DIAGNOSIS — M54.42 CHRONIC MIDLINE LOW BACK PAIN WITH LEFT-SIDED SCIATICA: ICD-10-CM

## 2025-01-10 DIAGNOSIS — G89.29 CHRONIC MIDLINE LOW BACK PAIN WITH LEFT-SIDED SCIATICA: ICD-10-CM

## 2025-01-10 NOTE — TELEPHONE ENCOUNTER
Taking tynenol and ibupron for my artristis    And she wants to know if they can call something else in stronger     [facility] [address]    Patient-henna meneses [phone]

## 2025-01-12 RX ORDER — METHOCARBAMOL 500 MG/1
500 TABLET, FILM COATED ORAL 4 TIMES DAILY PRN
Qty: 90 TABLET | Refills: 0 | Status: SHIPPED | OUTPATIENT
Start: 2025-01-12

## 2025-02-04 ENCOUNTER — TELEPHONE (OUTPATIENT)
Age: 65
End: 2025-02-04

## 2025-02-04 DIAGNOSIS — G89.29 CHRONIC LEFT SHOULDER PAIN: ICD-10-CM

## 2025-02-04 DIAGNOSIS — M25.512 CHRONIC LEFT SHOULDER PAIN: ICD-10-CM

## 2025-02-04 DIAGNOSIS — G89.29 CHRONIC MIDLINE LOW BACK PAIN WITH LEFT-SIDED SCIATICA: Primary | ICD-10-CM

## 2025-02-04 DIAGNOSIS — M54.42 CHRONIC MIDLINE LOW BACK PAIN WITH LEFT-SIDED SCIATICA: Primary | ICD-10-CM

## 2025-02-04 NOTE — TELEPHONE ENCOUNTER
Patient states that  gave her pain medication  methocarbamol (ROBAXIN) 500 MG tablet it is not helping the pain only make her sleep please give her a call @ [phone]

## 2025-02-10 ENCOUNTER — TELEPHONE (OUTPATIENT)
Age: 65
End: 2025-02-10

## 2025-02-10 NOTE — TELEPHONE ENCOUNTER
Pt would like to get a call back regarding pain in her shoulder. She can be reached at [phone]

## 2025-02-11 ENCOUNTER — OFFICE VISIT (OUTPATIENT)
Age: 65
End: 2025-02-11
Payer: COMMERCIAL

## 2025-02-11 VITALS
OXYGEN SATURATION: 96 % | TEMPERATURE: 97.9 F | RESPIRATION RATE: 16 BRPM | SYSTOLIC BLOOD PRESSURE: 134 MMHG | DIASTOLIC BLOOD PRESSURE: 85 MMHG | WEIGHT: 188 LBS | BODY MASS INDEX: 34.39 KG/M2 | HEART RATE: 70 BPM

## 2025-02-11 DIAGNOSIS — G89.29 CHRONIC PAIN OF BOTH SHOULDERS: Primary | ICD-10-CM

## 2025-02-11 DIAGNOSIS — M25.512 CHRONIC PAIN OF BOTH SHOULDERS: Primary | ICD-10-CM

## 2025-02-11 DIAGNOSIS — M25.511 CHRONIC PAIN OF BOTH SHOULDERS: Primary | ICD-10-CM

## 2025-02-11 DIAGNOSIS — N18.31 STAGE 3A CHRONIC KIDNEY DISEASE (HCC): ICD-10-CM

## 2025-02-11 PROCEDURE — G8427 DOCREV CUR MEDS BY ELIG CLIN: HCPCS | Performed by: FAMILY MEDICINE

## 2025-02-11 PROCEDURE — 3017F COLORECTAL CA SCREEN DOC REV: CPT | Performed by: FAMILY MEDICINE

## 2025-02-11 PROCEDURE — G8417 CALC BMI ABV UP PARAM F/U: HCPCS | Performed by: FAMILY MEDICINE

## 2025-02-11 PROCEDURE — 99213 OFFICE O/P EST LOW 20 MIN: CPT | Performed by: FAMILY MEDICINE

## 2025-02-11 PROCEDURE — 1123F ACP DISCUSS/DSCN MKR DOCD: CPT | Performed by: FAMILY MEDICINE

## 2025-02-11 PROCEDURE — 1090F PRES/ABSN URINE INCON ASSESS: CPT | Performed by: FAMILY MEDICINE

## 2025-02-11 PROCEDURE — G8399 PT W/DXA RESULTS DOCUMENT: HCPCS | Performed by: FAMILY MEDICINE

## 2025-02-11 PROCEDURE — 1036F TOBACCO NON-USER: CPT | Performed by: FAMILY MEDICINE

## 2025-02-11 SDOH — ECONOMIC STABILITY: FOOD INSECURITY: WITHIN THE PAST 12 MONTHS, YOU WORRIED THAT YOUR FOOD WOULD RUN OUT BEFORE YOU GOT MONEY TO BUY MORE.: NEVER TRUE

## 2025-02-11 SDOH — ECONOMIC STABILITY: FOOD INSECURITY: WITHIN THE PAST 12 MONTHS, THE FOOD YOU BOUGHT JUST DIDN'T LAST AND YOU DIDN'T HAVE MONEY TO GET MORE.: NEVER TRUE

## 2025-02-11 ASSESSMENT — PATIENT HEALTH QUESTIONNAIRE - PHQ9
SUM OF ALL RESPONSES TO PHQ QUESTIONS 1-9: 0
SUM OF ALL RESPONSES TO PHQ9 QUESTIONS 1 & 2: 0
1. LITTLE INTEREST OR PLEASURE IN DOING THINGS: NOT AT ALL
2. FEELING DOWN, DEPRESSED OR HOPELESS: NOT AT ALL
SUM OF ALL RESPONSES TO PHQ QUESTIONS 1-9: 0

## 2025-02-11 NOTE — PROGRESS NOTES
Zoey Cruz (:  1960) is a 64 y.o. female,Established patient, here for evaluation of the following chief complaint(s):  Shoulder Pain (Left shoulder pain)    Shoulder Pain   The history is provided by the patient. This is a chronic problem. Episode onset: few yrs ago, not obese, patient has a lot of difficulty with overhead activity, raising arm is very painful and the pain  awakens the patient at night,  The problem occurs constantly. The problem has not changed since onset. The pain is present in the lt shoulder. The quality of the pain is described as dull. The pain is at a severity of 7/10. Associated symptoms include limited range of motion. Pertinent negatives include no numbness, ++stiffness, no tingling, no itching, no back pain and + neck pain. The symptoms are aggravated by movement and palpation. There has been no history of extremity trauma.         Constitutional: no chills and fever,nad     HENT: no ear pain and nosebleeds. No blurred vision,   Respiratory: no shortness of breath, wheezing cough nor sore throat.    Cardiovascular: Has no chest pain, leg swelling ,and racing heart .   Gastrointestinal: No constipation, diarrhea, nausea and vomiting.   Genitourinary: No frequency.   Musculoskeletal: +++for multiple joint pain.   Skin: no itching, pimples   Neurological: Negative for dizziness, no tremors  Psychiatric/Behavioral: Negative for depression,  not nervous/anxious.        General: Patient alert cooperative   HEENT; normocephalic and atraumatic     Neck: supple no adenopathy no JVD no bruit  Lungs: Clear to auscultation bilaterally no rales rhonchi or wheezes  Heart: Normal regular S1-S2 s no murmur  Breast exam deferred  Abdomen: Soft nontender normal bowel sounds    Extremities: abnormal range of motion ++ point tenderness normal pulses no edema,   Pelvic/: No lesion, no lymphadenopathy  Skin: abormal no lesion no rash       Assessment & Plan  Chronic pain of both shoulders

## 2025-02-11 NOTE — PROGRESS NOTES
Chief Complaint   Patient presents with    Shoulder Pain     Left shoulder pain       \"Have you been to the ER, urgent care clinic since your last visit?  Hospitalized since your last visit?\"    NO    “Have you seen or consulted any other health care providers outside of [facility] since your last visit?”    NO            Click Here for Release of Records Request     No results found for this visit on 25.   Vitals:    25 1003   BP: 134/85   Pulse: 70   Resp: 16   Temp: 97.9 °F (36.6 °C)   TempSrc: Infrared   SpO2: 96%   Weight: 85.3 kg (188 lb)      Health Maintenance Due   Topic Date Due    A1C test (Diabetic or Prediabetic)  2025        The patient, Zoey Cruz, identity was verified by name and .

## 2025-02-17 ENCOUNTER — TELEPHONE (OUTPATIENT)
Age: 65
End: 2025-02-17

## 2025-02-17 NOTE — TELEPHONE ENCOUNTER
Returned call to pt.  Left voice message.   Verbal Order  give with [facility] for ortho referral  per Jose Daniel Wang MD

## 2025-02-17 NOTE — TELEPHONE ENCOUNTER
Pt said that Sherry called her and she was returning her call. She can be reached at [phone].

## 2025-02-18 ENCOUNTER — TELEPHONE (OUTPATIENT)
Age: 65
End: 2025-02-18

## 2025-02-18 NOTE — TELEPHONE ENCOUNTER
The patient mentioned that she was unable to get the RFA completed with Dr. Malvin Danielson because the office does not have the required machine, and she won’t be able to schedule the procedure until next month. Additionally, she has not yet had her labs done.  Would you like her to keep her current appointment with you, or should she reschedule it for a time after she completes the RFA?

## 2025-03-01 DIAGNOSIS — E05.90 SUBCLINICAL HYPERTHYROIDISM: ICD-10-CM

## 2025-03-01 DIAGNOSIS — E04.1 THYROID NODULE: ICD-10-CM

## 2025-03-01 LAB
T3 SERPL-MCNC: 120 NG/DL (ref 71–180)
T4 FREE SERPL-MCNC: 0.93 NG/DL (ref 0.82–1.77)
TSH SERPL DL<=0.005 MIU/L-ACNC: 1 UIU/ML (ref 0.45–4.5)

## 2025-03-03 ENCOUNTER — OFFICE VISIT (OUTPATIENT)
Age: 65
End: 2025-03-03
Payer: COMMERCIAL

## 2025-03-03 VITALS
SYSTOLIC BLOOD PRESSURE: 147 MMHG | WEIGHT: 187.1 LBS | HEIGHT: 62 IN | BODY MASS INDEX: 34.43 KG/M2 | HEART RATE: 66 BPM | DIASTOLIC BLOOD PRESSURE: 73 MMHG

## 2025-03-03 VITALS
RESPIRATION RATE: 16 BRPM | DIASTOLIC BLOOD PRESSURE: 84 MMHG | SYSTOLIC BLOOD PRESSURE: 122 MMHG | HEART RATE: 60 BPM | BODY MASS INDEX: 34.2 KG/M2 | OXYGEN SATURATION: 96 % | WEIGHT: 187 LBS | TEMPERATURE: 97.7 F

## 2025-03-03 DIAGNOSIS — M54.42 CHRONIC MIDLINE LOW BACK PAIN WITH LEFT-SIDED SCIATICA: ICD-10-CM

## 2025-03-03 DIAGNOSIS — G89.29 CHRONIC LEFT SHOULDER PAIN: Primary | ICD-10-CM

## 2025-03-03 DIAGNOSIS — T84.81XA CEMENT EMBOLISM OF PULMONARY ARTERY WITHOUT ACUTE COR PULMONALE: ICD-10-CM

## 2025-03-03 DIAGNOSIS — E05.90 SUBCLINICAL HYPERTHYROIDISM: ICD-10-CM

## 2025-03-03 DIAGNOSIS — R73.03 PREDIABETES: ICD-10-CM

## 2025-03-03 DIAGNOSIS — M25.512 CHRONIC LEFT SHOULDER PAIN: Primary | ICD-10-CM

## 2025-03-03 DIAGNOSIS — E78.00 HYPERCHOLESTEROLEMIA: ICD-10-CM

## 2025-03-03 DIAGNOSIS — G89.29 CHRONIC MIDLINE LOW BACK PAIN WITH LEFT-SIDED SCIATICA: ICD-10-CM

## 2025-03-03 DIAGNOSIS — I26.95 CEMENT EMBOLISM OF PULMONARY ARTERY WITHOUT ACUTE COR PULMONALE: ICD-10-CM

## 2025-03-03 DIAGNOSIS — Z18.83 CEMENT EMBOLISM OF PULMONARY ARTERY WITHOUT ACUTE COR PULMONALE: ICD-10-CM

## 2025-03-03 DIAGNOSIS — E05.10 TOXIC SOLITARY THYROID NODULE: Primary | ICD-10-CM

## 2025-03-03 PROCEDURE — 1090F PRES/ABSN URINE INCON ASSESS: CPT | Performed by: GENERAL ACUTE CARE HOSPITAL

## 2025-03-03 PROCEDURE — 1123F ACP DISCUSS/DSCN MKR DOCD: CPT | Performed by: GENERAL ACUTE CARE HOSPITAL

## 2025-03-03 PROCEDURE — G8417 CALC BMI ABV UP PARAM F/U: HCPCS | Performed by: FAMILY MEDICINE

## 2025-03-03 PROCEDURE — 1036F TOBACCO NON-USER: CPT | Performed by: GENERAL ACUTE CARE HOSPITAL

## 2025-03-03 PROCEDURE — 1090F PRES/ABSN URINE INCON ASSESS: CPT | Performed by: FAMILY MEDICINE

## 2025-03-03 PROCEDURE — G8399 PT W/DXA RESULTS DOCUMENT: HCPCS | Performed by: GENERAL ACUTE CARE HOSPITAL

## 2025-03-03 PROCEDURE — G8427 DOCREV CUR MEDS BY ELIG CLIN: HCPCS | Performed by: FAMILY MEDICINE

## 2025-03-03 PROCEDURE — 1036F TOBACCO NON-USER: CPT | Performed by: FAMILY MEDICINE

## 2025-03-03 PROCEDURE — G8399 PT W/DXA RESULTS DOCUMENT: HCPCS | Performed by: FAMILY MEDICINE

## 2025-03-03 PROCEDURE — 1123F ACP DISCUSS/DSCN MKR DOCD: CPT | Performed by: FAMILY MEDICINE

## 2025-03-03 PROCEDURE — 99214 OFFICE O/P EST MOD 30 MIN: CPT | Performed by: GENERAL ACUTE CARE HOSPITAL

## 2025-03-03 PROCEDURE — 3017F COLORECTAL CA SCREEN DOC REV: CPT | Performed by: GENERAL ACUTE CARE HOSPITAL

## 2025-03-03 PROCEDURE — G8427 DOCREV CUR MEDS BY ELIG CLIN: HCPCS | Performed by: GENERAL ACUTE CARE HOSPITAL

## 2025-03-03 PROCEDURE — G8417 CALC BMI ABV UP PARAM F/U: HCPCS | Performed by: GENERAL ACUTE CARE HOSPITAL

## 2025-03-03 PROCEDURE — 3017F COLORECTAL CA SCREEN DOC REV: CPT | Performed by: FAMILY MEDICINE

## 2025-03-03 PROCEDURE — 99213 OFFICE O/P EST LOW 20 MIN: CPT | Performed by: FAMILY MEDICINE

## 2025-03-03 ASSESSMENT — PATIENT HEALTH QUESTIONNAIRE - PHQ9
SUM OF ALL RESPONSES TO PHQ QUESTIONS 1-9: 0
2. FEELING DOWN, DEPRESSED OR HOPELESS: NOT AT ALL
1. LITTLE INTEREST OR PLEASURE IN DOING THINGS: NOT AT ALL
SUM OF ALL RESPONSES TO PHQ QUESTIONS 1-9: 0

## 2025-03-03 NOTE — ASSESSMENT & PLAN NOTE
Chronic, not at goal (unstable), continue current treatment plan, medication adherence emphasized, and lifestyle modifications recommended    Orders:    Hemoglobin A1C; Future    Lipid Panel; Future    Comprehensive Metabolic Panel; Future    CBC with Auto Differential; Future    diclofenac sodium (VOLTAREN) 1 % GEL; Apply 4 g topically 4 times daily

## 2025-03-03 NOTE — PATIENT INSTRUCTIONS
Complete the RFA with Dr Jagjit Mason on 03/17/25  Continue to take methimazole 5 mg half tablet from Monday to Friday, skip Saturday and Sunday and stop it after the procedure and obtain blood test 6 weeks after the procedure   Obtain repeat lab results in mid-May 2025  Repeat thyroid ultrasound in 11/2025     If you develop any of the following symptoms please reach out to our clinic by calling [phone]:  -difficulty swallowing  -hoarseness  -shortness of breath when lying down

## 2025-03-03 NOTE — PROGRESS NOTES
[facility]  DR ANA CRISTINA SHETTY       The patient (or guardian, if applicable) and other individuals in attendance with the patient were advised that Artificial Intelligence will be utilized during this visit to record, process the conversation to generate a clinical note, and support improvement of the AI technology. The patient (or guardian, if applicable) and other individuals in attendance at the appointment consented to the use of AI, including the recording.      ASSESSMENT AND PLAN:     Zoey Cruz is a 64 y.o. female with a PMHx as noted above who was referred to our endocrinology clinic for evaluation of a thyroid nodule.     Subclinical hyperthyroidism  Thyroid Nodule, Right lower Hot Nodule     Thyroid antibodies, including TPO, thyroglobulin, TSI, and TRAb, were tested and found to be negative, ruling out autoimmune causes for the elevated thyroid levels.    Methimazole 2.5 mg, half a tablet, will be prescribed to be taken from Monday to Friday, skipping Saturday and Sunday.     - Recent thyroid function tests indicate normal levels  - Kidney function stable (last assessment in August 2024)  - Radiofrequency ablation with Dr. Danielson recommended on 03/17/25  - Informed that some patients may experience recurrence after a few years  - Continue methimazole until the day of the procedure, then discontinue unless directed by Dr. Danielson  - Repeat blood test scheduled for 05/15/2025  - Follow-up ultrasound planned for November 2025, or earlier if changes observed    Review of most recent thyroid function:  Lab Results   Component Value Date    TSH 1.000 02/28/2025    TSH 0.222 (L) 11/04/2024    TSH 0.437 (L) 06/12/2024    T4FREE 0.93 02/28/2025    T4FREE 1.09 11/04/2024    T4FREE 0.9 02/28/2024    R1YJDUW 120 02/28/2025    B1MKZFR 106 02/28/2024    [id number] 1.08 06/12/2024    TPOABS 10 06/12/2024      Thyroid Lab Key:  TSILT = Thyroid stimulating antibodies  TRALT = TSH Receptor

## 2025-03-03 NOTE — PROGRESS NOTES
Zoey Cruz (:  1960) is a 64 y.o. female,Established patient, here for evaluation of the following chief complaint(s):  Shoulder Pain and Discuss Labs (Follow up )  Shoulder Pain   The history is provided by the patient. This is a chronic problem. Episode onset: few yrs ago,   patient has a lot of difficulty with overhead activity, raising arm is very painful and the pain  awakens the patient at night,  The problem occurs constantly. The problem has not changed since onset. The pain is present in the lt shoulder. The quality of the pain is described as dull. The pain is at a severity of 4/10. Associated symptoms include limited range of motion. Pertinent negatives include no numbness, ++stiffness, no tingling, no itching, no back pain and + neck pain. The symptoms are aggravated by movement and palpation. There has been no history of extremity trauma.             Constitutional: no chills and fever,nad     HENT: no ear pain and nosebleeds. No blurred vision,   Respiratory: no shortness of breath, wheezing cough nor sore throat.    Cardiovascular: Has no chest pain, leg swelling ,and racing heart .   Gastrointestinal: No constipation, diarrhea, nausea and vomiting.   Genitourinary: No frequency.   Musculoskeletal: +++for multiple joint pain.   Skin: no itching, pimples   Neurological: Negative for dizziness, no tremors  Psychiatric/Behavioral: Negative for depression,  not nervous/anxious.        General: Patient alert cooperative   HEENT; normocephalic and atraumatic     Neck: supple no adenopathy no JVD no bruit  Lungs: Clear to auscultation bilaterally no rales rhonchi or wheezes  Heart: Normal regular S1-S2 s no murmur  Breast exam deferred  Abdomen: Soft nontender normal bowel sounds    Extremities: abnormal range of motion ++ point tenderness normal pulses no edema,   Pelvic/: No lesion, no lymphadenopathy  Skin: abormal no lesion no rash    Assessment & Plan  Chronic left shoulder pain   Chronic,

## 2025-03-04 LAB
ALBUMIN SERPL-MCNC: 3.8 G/DL (ref 3.5–5)
ALBUMIN/GLOB SERPL: 1.2 (ref 1.1–2.2)
ALP SERPL-CCNC: 71 U/L (ref 45–117)
ALT SERPL-CCNC: 16 U/L (ref 12–78)
ANION GAP SERPL CALC-SCNC: 1 MMOL/L (ref 2–12)
AST SERPL-CCNC: 17 U/L (ref 15–37)
BASOPHILS # BLD: 0.03 K/UL (ref 0–0.1)
BASOPHILS NFR BLD: 0.6 % (ref 0–1)
BILIRUB SERPL-MCNC: 0.6 MG/DL (ref 0.2–1)
BUN SERPL-MCNC: 20 MG/DL (ref 6–20)
BUN/CREAT SERPL: 19 (ref 12–20)
CALCIUM SERPL-MCNC: 9.3 MG/DL (ref 8.5–10.1)
CHLORIDE SERPL-SCNC: 108 MMOL/L (ref 97–108)
CHOLEST SERPL-MCNC: 238 MG/DL
CO2 SERPL-SCNC: 28 MMOL/L (ref 21–32)
CREAT SERPL-MCNC: 1.03 MG/DL (ref 0.55–1.02)
DIFFERENTIAL METHOD BLD: ABNORMAL
EOSINOPHIL # BLD: 0.22 K/UL (ref 0–0.4)
EOSINOPHIL NFR BLD: 4.4 % (ref 0–7)
ERYTHROCYTE [DISTWIDTH] IN BLOOD BY AUTOMATED COUNT: 15.5 % (ref 11.5–14.5)
EST. AVERAGE GLUCOSE BLD GHB EST-MCNC: 120 MG/DL
GLOBULIN SER CALC-MCNC: 3.3 G/DL (ref 2–4)
GLUCOSE SERPL-MCNC: 84 MG/DL (ref 65–100)
HBA1C MFR BLD: 5.8 % (ref 4–5.6)
HCT VFR BLD AUTO: 42.7 % (ref 35–47)
HDLC SERPL-MCNC: 69 MG/DL
HDLC SERPL: 3.4 (ref 0–5)
HGB BLD-MCNC: 13.1 G/DL (ref 11.5–16)
IMM GRANULOCYTES # BLD AUTO: 0.01 K/UL (ref 0–0.04)
IMM GRANULOCYTES NFR BLD AUTO: 0.2 % (ref 0–0.5)
LDLC SERPL CALC-MCNC: 154.2 MG/DL (ref 0–100)
LYMPHOCYTES # BLD: 1.99 K/UL (ref 0.8–3.5)
LYMPHOCYTES NFR BLD: 39.7 % (ref 12–49)
MCH RBC QN AUTO: 26.6 PG (ref 26–34)
MCHC RBC AUTO-ENTMCNC: 30.7 G/DL (ref 30–36.5)
MCV RBC AUTO: 86.6 FL (ref 80–99)
MONOCYTES # BLD: 0.56 K/UL (ref 0–1)
MONOCYTES NFR BLD: 11.2 % (ref 5–13)
NEUTS SEG # BLD: 2.2 K/UL (ref 1.8–8)
NEUTS SEG NFR BLD: 43.9 % (ref 32–75)
NRBC # BLD: 0 K/UL (ref 0–0.01)
NRBC BLD-RTO: 0 PER 100 WBC
PLATELET # BLD AUTO: 251 K/UL (ref 150–400)
PMV BLD AUTO: 10 FL (ref 8.9–12.9)
POTASSIUM SERPL-SCNC: 4.5 MMOL/L (ref 3.5–5.1)
PROT SERPL-MCNC: 7.1 G/DL (ref 6.4–8.2)
RBC # BLD AUTO: 4.93 M/UL (ref 3.8–5.2)
SODIUM SERPL-SCNC: 137 MMOL/L (ref 136–145)
TRIGL SERPL-MCNC: 74 MG/DL
VLDLC SERPL CALC-MCNC: 14.8 MG/DL
WBC # BLD AUTO: 5 K/UL (ref 3.6–11)

## 2025-05-13 LAB
T3 SERPL-MCNC: 120 NG/DL (ref 71–180)
T4 FREE SERPL-MCNC: 0.9 NG/DL (ref 0.82–1.77)
TSH SERPL DL<=0.005 MIU/L-ACNC: 0.92 UIU/ML (ref 0.45–4.5)

## 2025-05-15 DIAGNOSIS — E05.10 TOXIC SOLITARY THYROID NODULE: ICD-10-CM

## 2025-05-15 DIAGNOSIS — E05.90 SUBCLINICAL HYPERTHYROIDISM: ICD-10-CM

## 2025-05-23 ENCOUNTER — RESULTS FOLLOW-UP (OUTPATIENT)
Age: 65
End: 2025-05-23

## 2025-05-23 DIAGNOSIS — E05.90 SUBCLINICAL HYPERTHYROIDISM: ICD-10-CM

## 2025-05-23 DIAGNOSIS — E04.1 THYROID NODULE: ICD-10-CM

## 2025-05-23 RX ORDER — METHIMAZOLE 5 MG/1
TABLET ORAL
Qty: 15 TABLET | Refills: 3 | Status: SHIPPED | OUTPATIENT
Start: 2025-05-23 | End: 2025-05-30

## 2025-05-24 NOTE — RESULT ENCOUNTER NOTE
Dear Zoey Cruz, your recent lab results are within normal limits, which is reassuring. No concerning findings were noted at this time. Please continue with your current care plan and healthy habits, and keep up the good work!  It has been a pleasure being part of your care. As you move forward, I wish you all the best in health and in life. Thank you for the trust you've placed in me-take good care and stay well!  Dr Diaz

## 2025-05-30 DIAGNOSIS — E05.90 SUBCLINICAL HYPERTHYROIDISM: ICD-10-CM

## 2025-05-30 DIAGNOSIS — E04.1 THYROID NODULE: ICD-10-CM

## 2025-05-30 RX ORDER — METHIMAZOLE 5 MG/1
TABLET ORAL
Qty: 15 TABLET | Refills: 5 | Status: SHIPPED | OUTPATIENT
Start: 2025-05-30 | End: 2025-05-31 | Stop reason: SDUPTHER

## 2025-05-30 RX ORDER — METHIMAZOLE 5 MG/1
TABLET ORAL
Qty: 15 TABLET | Refills: 5 | Status: CANCELLED | OUTPATIENT
Start: 2025-05-30

## 2025-05-30 NOTE — TELEPHONE ENCOUNTER
Per last OV: Continue to take methimazole 5 mg half tablet from Monday to Friday, skip Saturday and Sunday.    [facility] called for clarification on directions for MMI of Rx received today.     Spoke with Kandace Byrd, pharmacist and clarified the directions. She read back the directions for clarity.

## 2025-05-31 RX ORDER — METHIMAZOLE 5 MG/1
TABLET ORAL
Qty: 15 TABLET | Refills: 5 | Status: SHIPPED | OUTPATIENT
Start: 2025-05-31

## 2025-07-07 ENCOUNTER — TRANSCRIBE ORDERS (OUTPATIENT)
Facility: HOSPITAL | Age: 65
End: 2025-07-07

## 2025-07-07 DIAGNOSIS — Z12.31 VISIT FOR SCREENING MAMMOGRAM: Primary | ICD-10-CM

## 2025-07-10 ENCOUNTER — HOSPITAL ENCOUNTER (OUTPATIENT)
Facility: HOSPITAL | Age: 65
Discharge: HOME | End: 2025-07-13
Attending: FAMILY MEDICINE
Payer: COMMERCIAL

## 2025-07-10 DIAGNOSIS — Z12.31 VISIT FOR SCREENING MAMMOGRAM: ICD-10-CM

## 2025-07-10 PROCEDURE — 77063 BREAST TOMOSYNTHESIS BI: CPT

## 2025-08-30 RX ORDER — FLUTICASONE PROPIONATE 50 MCG
2 SPRAY, SUSPENSION (ML) NASAL DAILY
Qty: 16 G | Refills: 5 | Status: SHIPPED | OUTPATIENT
Start: 2025-08-30